# Patient Record
Sex: FEMALE | Race: WHITE | NOT HISPANIC OR LATINO | Employment: OTHER | URBAN - METROPOLITAN AREA
[De-identification: names, ages, dates, MRNs, and addresses within clinical notes are randomized per-mention and may not be internally consistent; named-entity substitution may affect disease eponyms.]

---

## 2017-04-10 ENCOUNTER — OFFICE VISIT (OUTPATIENT)
Dept: FAMILY MEDICINE | Facility: CLINIC | Age: 72
End: 2017-04-10
Payer: MEDICARE

## 2017-04-10 VITALS
RESPIRATION RATE: 18 BRPM | HEIGHT: 64 IN | WEIGHT: 169 LBS | OXYGEN SATURATION: 96 % | SYSTOLIC BLOOD PRESSURE: 109 MMHG | HEART RATE: 94 BPM | DIASTOLIC BLOOD PRESSURE: 65 MMHG | BODY MASS INDEX: 28.85 KG/M2 | TEMPERATURE: 97.9 F

## 2017-04-10 DIAGNOSIS — Z12.31 ENCOUNTER FOR SCREENING MAMMOGRAM FOR BREAST CANCER: ICD-10-CM

## 2017-04-10 DIAGNOSIS — N89.8 VAGINAL DISCHARGE: ICD-10-CM

## 2017-04-10 DIAGNOSIS — Z11.59 NEED FOR HEPATITIS C SCREENING TEST: ICD-10-CM

## 2017-04-10 DIAGNOSIS — Z87.891 HISTORY OF TOBACCO USE: ICD-10-CM

## 2017-04-10 DIAGNOSIS — R73.01 IMPAIRED FASTING GLUCOSE: ICD-10-CM

## 2017-04-10 DIAGNOSIS — Z23 NEED FOR TDAP VACCINATION: Primary | ICD-10-CM

## 2017-04-10 LAB
HBA1C MFR BLD: 5.1 % (ref 4.3–6)
MICRO REPORT STATUS: NORMAL
SPECIMEN SOURCE: NORMAL
WET PREP SPEC: NORMAL

## 2017-04-10 PROCEDURE — G0472 HEP C SCREEN HIGH RISK/OTHER: HCPCS | Performed by: FAMILY MEDICINE

## 2017-04-10 PROCEDURE — 36415 COLL VENOUS BLD VENIPUNCTURE: CPT | Performed by: FAMILY MEDICINE

## 2017-04-10 PROCEDURE — 90471 IMMUNIZATION ADMIN: CPT | Performed by: FAMILY MEDICINE

## 2017-04-10 PROCEDURE — 87210 SMEAR WET MOUNT SALINE/INK: CPT | Performed by: FAMILY MEDICINE

## 2017-04-10 PROCEDURE — 83036 HEMOGLOBIN GLYCOSYLATED A1C: CPT | Performed by: FAMILY MEDICINE

## 2017-04-10 PROCEDURE — 90715 TDAP VACCINE 7 YRS/> IM: CPT | Performed by: FAMILY MEDICINE

## 2017-04-10 PROCEDURE — 86803 HEPATITIS C AB TEST: CPT | Performed by: FAMILY MEDICINE

## 2017-04-10 PROCEDURE — G0439 PPPS, SUBSEQ VISIT: HCPCS | Performed by: FAMILY MEDICINE

## 2017-04-10 NOTE — PATIENT INSTRUCTIONS
Preventive Health Recommendations    Female Ages 65 +    Yearly exam:     See your health care provider every year in order to  o Review health changes.   o Discuss preventive care.    o Review your medicines if your doctor has prescribed any.      You no longer need a yearly Pap test unless you've had an abnormal Pap test in the past 10 years. If you have vaginal symptoms, such as bleeding or discharge, be sure to talk with your provider about a Pap test.      Every 1 to 2 years, have a mammogram.  If you are over 69, talk with your health care provider about whether or not you want to continue having screening mammograms.      Every 10 years, have a colonoscopy. Or, have a yearly FIT test (stool test). These exams will check for colon cancer.       Have a cholesterol test every 5 years, or more often if your doctor advises it.       Have a diabetes test (fasting glucose) every three years. If you are at risk for diabetes, you should have this test more often.       At age 65, have a bone density scan (DEXA) to check for osteoporosis (brittle bone disease).    Shots:    Get a flu shot each year.    Get a tetanus shot every 10 years.    Talk to your doctor about your pneumonia vaccines. There are now two you should receive - Pneumovax (PPSV 23) and Prevnar (PCV 13).    Talk to your doctor about the shingles vaccine.    Talk to your doctor about the hepatitis B vaccine.    Nutrition:     Eat at least 5 servings of fruits and vegetables each day.      Eat whole-grain bread, whole-wheat pasta and brown rice instead of white grains and rice.      Talk to your provider about Calcium and Vitamin D.     Lifestyle    Exercise at least 150 minutes a week (30 minutes a day, 5 days a week). This will help you control your weight and prevent disease.      Limit alcohol to one drink per day.      No smoking.       Wear sunscreen to prevent skin cancer.       See your dentist twice a year for an exam and cleaning.      See your  eye doctor every 1 to 2 years to screen for conditions such as glaucoma, macular degeneration and cataracts.  Lung Cancer Screening   Frequently Asked Questions  If you are at high-risk for lung cancer, getting screened with low-dose computed tomography (LDCT) every year can help save your life. This handout offers answers to some of the most common questions about lung cancer screening. If you have other questions, please call [phone] ([phone]).     What is it?  Lung cancer screening uses special X-ray technology to create an image of your lung tissue. The exam is quick and easy and takes less than 10 seconds. We don t give you any medicine or use any needles. You can eat before and after the exam. You don t need to change your clothes as long as the clothing on your chest doesn t contain metal. But, you do need to be able to hold your breath for at least 6 seconds during the exam.    What is the goal of lung cancer screening?  The goal of lung cancer screening is to save lives. Many times, lung cancer is not found until a person starts having physical symptoms. Lung cancer screening can help detect lung cancer in the earliest stages when it may be easier to treat.    Who should be screened for lung cancer?  We suggest lung cancer screening for anyone who is at high-risk for lung cancer. You are in the high-risk group if you:      are between the ages of 55 and 79, and    have smoked at least 1 pack of cigarettes a day for 30 or more years, and    still smoke or have quit within the past 15 years.    However, if you have a new cough or shortness of breath, you should talk to your doctor before being screened.    Some national lung health advocacy groups also recommend screening for people ages 50 to 79 who have smoked an average of 1 pack of cigarettes a day for 20 years. They must also have at least 1 other risk factor for lung cancer, not including exposure to secondhand smoke. Other risk factors  are having had cancer in the past, emphysema, pulmonary fibrosis, COPD, a family history of lung cancer, or exposure to certain materials such as arsenic, asbestos, beryllium, cadmium, chromium, diesel fumes, nickel, radon or silica. Your care team can help you know if you have one of these risk factors.     Why does it matter if I have symptoms?  Certain symptoms can be a sign that you have a condition in your lungs that should be checked and treated by your doctor. These symptoms include fever, chest pain, a new or changing cough, shortness of breath that you have never felt before, coughing up blood or unexplained weight loss. Having any of these symptoms can greatly affect the results of lung cancer screening.       Should all smokers get an LDCT lung cancer screening exam?  It depends. Lung cancer screening is for a very specific group of men and women who have a history of heavy smoking over a long period of time (see  Who should be screened for lung cancer  above).  I am in the high-risk group, but have been diagnosed with cancer in the past. Is LDCT lung cancer screening right for me?  In some cases, you should not have LDCT lung screening, such as when your doctor is already following your cancer with CT scan studies. Your doctor will help you decide if LDCT lung screening is right for you.  Do I need to have a screening exam every year?  Yes. If you are in the high-risk group described earlier, you should get an LDCT lung cancer screening exam every year until you are 79, or are no longer willing or able to undergo screening and possible procedures to diagnose and treat lung cancer.  How effective is LDCT at preventing death from lung cancer?  Studies have shown that LDCT lung cancer screening can lower the risk of death from lung cancer by 20 percent in people who are at high-risk.  What are the risks?  There are some risks and limitations of LDCT lung cancer screening. We want to make sure you understand  the risks and benefits, so please let us know if you have any questions. Your doctor may want to talk with you more about these risks.    Radiation exposure: As with any exam that uses radiation, there is a very small increased risk of cancer. The amount of radiation in LDCT is small--about the same amount a person would get from a mammogram. Your doctor orders the exam when he or she feels the potential benefits outweigh the risks.    False negatives: No test is perfect, including LDCT. It is possible that you may have a medical condition, including lung cancer, that is not found during your exam. This is called a false negative result.    False positives and more testing: LDCT very often finds something in the lung that could be cancer, but in fact is not. This is called a false positive result. False positive tests often cause anxiety. To make sure these findings are not cancer, you may need to have more tests. These tests will be done only if you give us permission. Sometimes patients need a treatment that can have side effects, such as a biopsy. For more information on false positives, see  What can I expect from the results?     Findings not related to lung cancer: Your LDCT exam also takes pictures of areas of your body next to your lungs. In a very small number of cases, the CT scan will show an abnormal finding in one of these areas, such as your kidneys, adrenal glands, liver or thyroid. This finding may not be serious, but you may need more tests. Your doctor can help you decide what other tests you may need, if any.  What can I expect from the results?  About 1 out of 4 LDCT exams will find something that may need more tests. Most of the time, these findings are lung nodules. Lung nodules are very small collections of tissue in the lung. These nodules are very common, and the vast majority--more than 97 percent--are not cancer (benign). Most are normal lymph nodes or small areas of scarring from past  infections.  But, if a small lung nodule is found to be cancer, the cancer can be cured more than 90 percent of the time. To know if the nodule is cancer, we may need to get more images before your next yearly screening exam. If the nodule has suspicious features (for example, it is large, has an odd shape or grows over time), we will refer you to a specialist for further testing.  Will my doctor also get the results?  Yes. Your doctor will get a copy of your results.  Is it okay to keep smoking now that there s a cancer screening exam?  No. Tobacco is one of the strongest cancer-causing agents. It causes not only lung cancer, but other cancers and cardiovascular (heart) diseases as well. The damage caused by smoking builds over time. This means that the longer you smoke, the higher your risk of disease. While it is never too late to quit, the sooner you quit, the better.  Where can I find help to quit smoking?  The best way to prevent lung cancer is to stop smoking. If you have already quit smoking, congratulations and keep it up! For help on quitting smoking, please call [facility] at 8-061-818-QOLU ([phone]) or the [employer] at 1-534.733.7406 to find local resources near you.  One-on-one health coaching:  If you d prefer to work individually with a health care provider on tobacco cessation, we offer:      Medication Therapy Management:  Our specially trained pharmacists work closely with you and your doctor to help you quit smoking.  Call [phone] or [phone] (toll free).     Can Do: Health coaching offered by [facility].  [email or web address]

## 2017-04-10 NOTE — PROGRESS NOTES
SUBJECTIVE:                                                            Raisa Archer is a 72 year old female who presents for Preventive Visit.    Are you in the first 12 months of your Medicare Part B coverage?  No    Healthy Habits:    Do you get at least three servings of calcium containing foods daily (dairy, green leafy vegetables, etc.)? yes    Amount of exercise or daily activities, outside of work: 1-2 day(s) per week    Problems taking medications regularly No    Medication side effects: No    Have you had an eye exam in the past two years? yes    Do you see a dentist twice per year? yes    Do you have sleep apnea, excessive snoring or daytime drowsiness?no    COGNITIVE SCREEN  1) Repeat 3 items (Banana, Sunrise, Chair)    2) Clock draw: ABNORMAL   3) 3 item recall: Recalls 2 objects   Results: ABNORMAL clock, 1-2 items recalled: PROBABLE COGNITIVE IMPAIRMENT, **INFORM PROVIDER**  She denies any problems with memory.      Mini-CogTM Copyright S [employer]rington. Licensed by the author for use in [facility]; reprinted with permission ([email or web address]). All rights reserved.      CV: h/o tobacco use, quit 2 years ago.  Strong family h/o diabetes and would like screening today; is not fasting.  She may have had some elevated glucose in the past (though last year A1c was 5.6).  Malignancy: patient with h/o breast cancer s/p surgery, chemo and radiation, has been discharged from oncology clinic. Two great maternal uncles with colon cancer; she has had colon polyps, last colonoscopy was 2016, and she is due for repeat q 3 years. She is s/p hysterectomy >20 yrs ago, nonmalignant.  She has a known meningioma and is due for recheck with neurology.  She will be due for her yearly low dose CT scan to screen for lung cancer this May.  She quit smoking three years ago and has a 30 pk year history; no symptoms such as fever, unintended weight loss, cough or hemoptysis.    Bone health: she does have osteoporosis,  denies any h/o fracture (last dexa 2010 showed possible vertebral fracture), is improving her diet, getting some exercise, has stopped smoking; does NOT want to try osteoporosis medications due to risk of side effects.   Immunizations: tdap is due now.  Up to date on zoster, PCV13 and pneumovax.   Sexual health: she is not sexually active.  She has noticed some intermittent discharge, which she thinks is clear.  There has been no bleeding, itching or odor.  She has some urge and stress incontinence, no dysuria.      Intermittent right lower quad pain, worse if she hasn't had a bowel movement.  Worse with bending over.  Was told she had a hernia somewhere, not sure where, years ago (on other imaging done to stage her breast cancer). She has not noticed any bulging or worse pain with valsalva or lifting items.  This has been on and off for months.  It is relieved by having a BM.  She had a colonoscopy last year, as above.      TMJ: bilateral, intermittent, better with wearing a disposable mouth guard she bought over the counter, also better with applying heating pads to neck and face.  She has some left ear pressure.  No drainage or hearing change.  No sinus pain/pressure.    Reviewed and updated as needed this visit by clinical staff         Reviewed and updated as needed this visit by Provider        Social History   Substance Use Topics     Smoking status: Former Smoker     Packs/day: 1.00     Years: 30.00     Types: Cigarettes     Start date: 1/2/1967     Quit date: 5/10/2014     Smokeless tobacco: Never Used      Comment: Quit several times over the years including when pregnant     Alcohol use No       The patient does not drink >3 drinks per day nor >7 drinks per week.    Today's PHQ-2 Score:   PHQ-2 ( 1999 Pfizer) 10/7/2016 9/2/2016   Q1: Little interest or pleasure in doing things 0 0   Q2: Feeling down, depressed or hopeless 0 0   PHQ-2 Score 0 0   Little interest or pleasure in doing things - -   Feeling  down, depressed or hopeless - -   PHQ-2 Score - -       Do you feel safe in your environment - Yes    Do you have a Health Care Directive?: No: Advance care planning reviewed with patient; information given to patient to review.    Current providers sharing in care for this patient include:   Patient Care Team:  Lyla Hawkins MD as PCP - General (Family Practice)      Hearing impairment: No    Ability to successfully perform activities of daily living: Yes, no assistance needed     Fall risk:  Fallen 2 or more times in the past year?: No  Any fall with injury in the past year?: No    Home safety:  none identified  click delete button to remove this line now    The following health maintenance items are reviewed in Epic and correct as of today:  Health Maintenance   Topic Date Due     HEPATITIS C SCREENING  02/28/1963     ADVANCE DIRECTIVE PLANNING Q5 YRS (NO INBASKET)  12/14/2016     FALL RISK ASSESSMENT  01/04/2017     TETANUS IMMUNIZATION (SYSTEM ASSIGNED)  03/12/2017     LUNG CANCER SCREENING ANNUAL  05/13/2017     MAMMO Q1 YR INY[employer]ja Media GroupSKET MESSAGE  05/13/2017     INFLUENZA VACCINE (SYSTEM ASSIGNED)  09/01/2017     COLONOSCOPY Q3 YR INBASKET MESSAGE  05/31/2019     LIPID SCREEN Q5 YR FEMALE (SYSTEM ASSIGNED)  04/11/2021     DEXA SCAN SCREENING (SYSTEM ASSIGNED)  Completed     PNEUMOCOCCAL  Completed              ROS:  Constitutional, HEENT, cardiovascular, pulmonary, gi and gu systems are negative, except as otherwise noted.    BP Readings from Last 3 Encounters:   04/10/17 109/65   10/07/16 118/69   09/02/16 118/71    Wt Readings from Last 3 Encounters:   04/10/17 169 lb (76.7 kg)   10/07/16 165 lb 3.2 oz (74.9 kg)   09/02/16 166 lb (75.3 kg)                  Patient Active Problem List   Diagnosis     Tobacco use disorder     Malignant neoplasm of female breast (H)     Gall stones     Osteoporosis     Advanced directives, counseling/discussion     Chronic rhinitis     Hyperlipidemia LDL goal <160     Other  hammer toe (acquired)     Meningioma (H)     History of colonic polyps     Past Surgical History:   Procedure Laterality Date     ABDOMEN SURGERY       BIOPSY       BREAST SURGERY       CATARACT IOL, RT/LT       CHOLECYSTECTOMY       COLONOSCOPY       ORTHOPEDIC SURGERY       PHACOEMULSIFICATION CLEAR CORNEA WITH STANDARD INTRAOCULAR LENS IMPLANT  5/22/2014    Procedure: PHACOEMULSIFICATION CLEAR CORNEA WITH STANDARD INTRAOCULAR LENS IMPLANT;  Surgeon: Rahul Reid MD;  Location:  [facility]     PHACOEMULSIFICATION CLEAR CORNEA WITH STANDARD INTRAOCULAR LENS IMPLANT  7/8/2014    Procedure: PHACOEMULSIFICATION CLEAR CORNEA WITH STANDARD INTRAOCULAR LENS IMPLANT;  Surgeon: Rahul Reid MD;  Location:  [facility]     SURGICAL HISTORY OF -   8/00    left foot reconstruction     SURGICAL HISTORY OF -   1979    hysterectomy       Social History   Substance Use Topics     Smoking status: Former Smoker     Packs/day: 1.00     Years: 30.00     Types: Cigarettes     Start date: 1/2/1967     Quit date: 5/10/2014     Smokeless tobacco: Never Used      Comment: Quit several times over the years including when pregnant     Alcohol use No     Family History   Problem Relation Age of Onset     Breast Cancer Sister      DIABETES Sister      CANCER Brother      Asthma Brother      Other Cancer Brother      Hodkins Lymphoma/Hodkins Lymphoma/Hodkins Lymphoma/Hodkins Lymphoma     CEREBROVASCULAR DISEASE Father      Had several minor strokes     CANCER Other      Stomach cancer- cousin     Breast Cancer Other      Aunts     DIABETES Other      Uncles and cousin/Paternal Uncles/Paternal Uncles/Paternal Uncles/Paternal Uncles     CANCER Brother      Obesity Brother      Asthma Brother      CANCER Other      Family Hx of colon cancer     Breast Cancer Other      Paternal Aunt/Paternal Aunt/Paternal Aunt/Paternal Aunt     Colon Cancer Other      Maternal Great Uncles     Breast Cancer Sister      Breast Cancer Cousin      Paternal/Paternal      "Colon Cancer Other      Maternal Uncle/Maternal Great Uncles/Maternal Great Uncles/Maternal Great Uncles     Obesity Sister      On diet/On diet/On diet     Obesity Brother          Current Outpatient Prescriptions   Medication Sig Dispense Refill     Nutritional Supplements (OSTEO ADVANCE PO)        glycerin-hypromellose- ([employer] DRY EYE RELIEF) 0.2-0.2-1 % SOLN ophthalmic solution 1 drop 1 Bottle      carboxymethylcellul-glycerin (OPTIVE) 0.5-0.9 % SOLN ophthalmic solution 1 drop       Acetaminophen (TYLENOL PO) Take by mouth as needed for mild pain or fever       fluticasone (FLONASE) 50 MCG/ACT nasal spray Spray 1-2 sprays into both nostrils daily 16 g 11     cycloSPORINE (RESTASIS) 0.05 % ophthalmic emulsion 1 drop 2 times daily       vitamin E 400 UNIT capsule Take 400 Units by mouth daily       Omega-3 Fatty Acids (FISH OIL) 1200 MG CAPS Take 1 capsule by mouth daily       Homeopathic Products (SLEEP MEDICINE) TABS Take by mouth daily as needed       Calcium Carbonate-Vitamin D (CALCIUM + D PO) Take 2 tablets by mouth daily.       Multiple Vitamins-Minerals (ZAHIDA MULTIVITAMIN FOR WOMEN) TABS Take 1 tablet by mouth daily.       predniSONE (DELTASONE) 20 MG tablet Take 1 tablet (20 mg) by mouth daily (Patient not taking: Reported on 4/10/2017) 3 tablet 0     triamcinolone (KENALOG) 0.1 % cream Apply sparingly to affected area two times daily for 14 days. (Patient not taking: Reported on 4/10/2017) 15 g 0     OBJECTIVE:                                                            /65  Pulse 94  Temp 97.9  F (36.6  C) (Oral)  Resp 18  Ht 5' 4\" (1.626 m)  Wt 169 lb (76.7 kg)  SpO2 96%  BMI 29.01 kg/m2 Estimated body mass index is 28.34 kg/(m^2) as calculated from the following:    Height as of 5/31/16: 5' 4.02\" (1.626 m).    Weight as of 10/7/16: 165 lb 3.2 oz (74.9 kg).  EXAM:   GENERAL APPEARANCE: healthy, alert and no distress  EYES: Eyes grossly normal to inspection, PERRL and conjunctivae and " sclerae normal  HENT: ear canals and TM's normal, nose and mouth without ulcers or lesions, oropharynx clear and oral mucous membranes moist  NECK: no adenopathy, no asymmetry, masses, or scars and thyroid normal to palpation  RESP: lungs clear to auscultation - no rales, rhonchi or wheezes  BREAST: right breast s/p mastectomy no masses.  Left breast is normal without masses, tenderness or nipple discharge and no palpable axillary masses or adenopathy  CV: regular rate and rhythm, normal S1 S2, no S3 or S4, no murmur, click or rub, no peripheral edema and peripheral pulses strong  ABDOMEN: soft, nontender, no hepatosplenomegaly, no masses and bowel sounds normal   (female): normal female external genitalia, some scant yellowish discharge on the wet prep swab.  MS: no musculoskeletal defects are noted and gait is age appropriate without ataxia  SKIN: no suspicious lesions or rashes  NEURO: Normal strength and tone, sensory exam grossly normal, mentation intact and speech normal  PSYCH: mentation appears normal and affect normal/bright    ASSESSMENT / PLAN:                                                            1. Need for Tdap vaccination    - TDAP VACCINE (ADACEL)    2. Impaired fasting glucose    - Hemoglobin A1c    3. Need for hepatitis C screening test    - **Hepatitis C Screen Reflex to RNA FUTURE anytime    4. Vaginal discharge  Wet prep negative today.  F/u if worsening or more symptomatic.  Denies sexual activity.  - Wet prep    5. History of tobacco use  Needs yearly screening till 81.  - CT Chest Lung Cancer Scrn Low Dose wo; Future    6. Encounter for screening mammogram for breast cancer    - [place] Screening Digital Left; Future    CV: checking A1c  Malignancy: up to date on colonoscopy, repeat 2019. Needs left breast mammogram, needs to f/u with neurology regarding meningioma, and needs yearly low dose Ct scan as below for screening for lung cancer.  Bone health; as she does not want to treat this,  "did not repeat dexa at this point.  Immunizations: tdap today    Right lower quadrant pain: mild, intermittent and worse with constipation, so is most likely related to constipation.  Had colonoscopy last year.  I reviewed her PET scans from 2006, no mention of a hernia, and I did not palpate a bulge.  F/u if worsening.  Manage constipation with more fluids, fiber, laxative if needed.     End of Life Planning:  Patient was given advance care forms and was shown the poster for free class on health care directives.  She thinks she would like to be DNR.    COUNSELING:  Reviewed preventive health counseling, as reflected in patient instructions       Regular exercise       Healthy diet/nutrition       Dental care       Vaccinated for: TDAP       Consider lung cancer screening for ages 55-80 years and 30 pack-year smoking history        Colon cancer screening       Hepatitis C screening        Estimated body mass index is 28.34 kg/(m^2) as calculated from the following:    Height as of 5/31/16: 5' 4.02\" (1.626 m).    Weight as of 10/7/16: 165 lb 3.2 oz (74.9 kg).     reports that she quit smoking about 2 years ago. Her smoking use included Cigarettes. She started smoking about 50 years ago. She has a 30.00 pack-year smoking history. She has never used smokeless tobacco.      Appropriate preventive services were discussed with this patient, including applicable screening as appropriate for cardiovascular disease, diabetes, osteopenia/osteoporosis, and glaucoma.  As appropriate for age/gender, discussed screening for colorectal cancer, prostate cancer, breast cancer, and cervical cancer. Checklist reviewing preventive services available has been given to the patient.    Reviewed patients plan of care and provided an AVS. The Basic Care Plan (routine screening as documented in Health Maintenance) for Raisa meets the Care Plan requirement. This Care Plan has been established and reviewed with the Patient.    Counseling " Resources:  ATP IV Guidelines  Pooled Cohorts Equation Calculator  Breast Cancer Risk Calculator  FRAX Risk Assessment  ICSI Preventive Guidelines  Dietary Guidelines for Americans, 2010  [employer]'s MyPlate  ASA Prophylaxis  Lung CA Screening    Lyla Hawkins MD  [facility]  Lung Cancer Screening Shared Decision Making Visit     Raisa Archer is eligible for lung cancer screening on the basis of the information provided in my signed lung cancer screening order.     I have discussed with patient the risks and benefits of screening for lung cancer with low-dose CT.     The risks include:   radiation exposure    false positives     over-diagnosis    The benefit of early detection of lung cancer is contingent upon adherence to annual screening or more frequent follow up if indicated.     Furthermore, reaping the benefits of screening requires Raisa Archer to be willing and physically able to undergo diagnostic procedures, if indicated. Although no specific guide is available for determining severity of comorbidities, it is reasonable to withhold screening in patients who have greater mortality risk from other diseases.     We did discuss that the only way to prevent lung cancer is to not smoke. Smoking cessation assistance was not offered because she has already quit.    I did not offer risk estimation using a calculator such as this one:    ShouldIScreen

## 2017-04-10 NOTE — NURSING NOTE
"Chief Complaint   Patient presents with     Medicare Visit       Initial /65  Pulse 94  Temp 97.9  F (36.6  C) (Oral)  Resp 18  Ht 5' 4\" (1.626 m)  Wt 169 lb (76.7 kg)  SpO2 96%  BMI 29.01 kg/m2 Estimated body mass index is 29.01 kg/(m^2) as calculated from the following:    Height as of this encounter: 5' 4\" (1.626 m).    Weight as of this encounter: 169 lb (76.7 kg).  Medication Reconciliation: complete     Savana Oneill MA     "

## 2017-04-10 NOTE — MR AVS SNAPSHOT
After Visit Summary   4/10/2017    Raisa Archer    MRN: [id number]           Patient Information     Date Of Birth          1945        Visit Information        Provider Department      4/10/2017 1:40 PM Lyla Hawkins MD [facility]        Today's Diagnoses     Need for Tdap vaccination    -  1    Impaired fasting glucose        Need for hepatitis C screening test        Vaginal discharge        History of tobacco use        Encounter for screening mammogram for breast cancer          Care Instructions      Preventive Health Recommendations    Female Ages 65 +    Yearly exam:     See your health care provider every year in order to  o Review health changes.   o Discuss preventive care.    o Review your medicines if your doctor has prescribed any.      You no longer need a yearly Pap test unless you've had an abnormal Pap test in the past 10 years. If you have vaginal symptoms, such as bleeding or discharge, be sure to talk with your provider about a Pap test.      Every 1 to 2 years, have a mammogram.  If you are over 69, talk with your health care provider about whether or not you want to continue having screening mammograms.      Every 10 years, have a colonoscopy. Or, have a yearly FIT test (stool test). These exams will check for colon cancer.       Have a cholesterol test every 5 years, or more often if your doctor advises it.       Have a diabetes test (fasting glucose) every three years. If you are at risk for diabetes, you should have this test more often.       At age 65, have a bone density scan (DEXA) to check for osteoporosis (brittle bone disease).    Shots:    Get a flu shot each year.    Get a tetanus shot every 10 years.    Talk to your doctor about your pneumonia vaccines. There are now two you should receive - Pneumovax (PPSV 23) and Prevnar (PCV 13).    Talk to your doctor about the shingles vaccine.    Talk to your doctor about the hepatitis B  vaccine.    Nutrition:     Eat at least 5 servings of fruits and vegetables each day.      Eat whole-grain bread, whole-wheat pasta and brown rice instead of white grains and rice.      Talk to your provider about Calcium and Vitamin D.     Lifestyle    Exercise at least 150 minutes a week (30 minutes a day, 5 days a week). This will help you control your weight and prevent disease.      Limit alcohol to one drink per day.      No smoking.       Wear sunscreen to prevent skin cancer.       See your dentist twice a year for an exam and cleaning.      See your eye doctor every 1 to 2 years to screen for conditions such as glaucoma, macular degeneration and cataracts.  Lung Cancer Screening   Frequently Asked Questions  If you are at high-risk for lung cancer, getting screened with low-dose computed tomography (LDCT) every year can help save your life. This handout offers answers to some of the most common questions about lung cancer screening. If you have other questions, please call [phone] ([phone]).     What is it?  Lung cancer screening uses special X-ray technology to create an image of your lung tissue. The exam is quick and easy and takes less than 10 seconds. We don t give you any medicine or use any needles. You can eat before and after the exam. You don t need to change your clothes as long as the clothing on your chest doesn t contain metal. But, you do need to be able to hold your breath for at least 6 seconds during the exam.    What is the goal of lung cancer screening?  The goal of lung cancer screening is to save lives. Many times, lung cancer is not found until a person starts having physical symptoms. Lung cancer screening can help detect lung cancer in the earliest stages when it may be easier to treat.    Who should be screened for lung cancer?  We suggest lung cancer screening for anyone who is at high-risk for lung cancer. You are in the high-risk group if you:      are between  the ages of 55 and 79, and    have smoked at least 1 pack of cigarettes a day for 30 or more years, and    still smoke or have quit within the past 15 years.    However, if you have a new cough or shortness of breath, you should talk to your doctor before being screened.    Some national lung health advocacy groups also recommend screening for people ages 50 to 79 who have smoked an average of 1 pack of cigarettes a day for 20 years. They must also have at least 1 other risk factor for lung cancer, not including exposure to secondhand smoke. Other risk factors are having had cancer in the past, emphysema, pulmonary fibrosis, COPD, a family history of lung cancer, or exposure to certain materials such as arsenic, asbestos, beryllium, cadmium, chromium, diesel fumes, nickel, radon or silica. Your care team can help you know if you have one of these risk factors.     Why does it matter if I have symptoms?  Certain symptoms can be a sign that you have a condition in your lungs that should be checked and treated by your doctor. These symptoms include fever, chest pain, a new or changing cough, shortness of breath that you have never felt before, coughing up blood or unexplained weight loss. Having any of these symptoms can greatly affect the results of lung cancer screening.       Should all smokers get an LDCT lung cancer screening exam?  It depends. Lung cancer screening is for a very specific group of men and women who have a history of heavy smoking over a long period of time (see  Who should be screened for lung cancer  above).  I am in the high-risk group, but have been diagnosed with cancer in the past. Is LDCT lung cancer screening right for me?  In some cases, you should not have LDCT lung screening, such as when your doctor is already following your cancer with CT scan studies. Your doctor will help you decide if LDCT lung screening is right for you.  Do I need to have a screening exam every year?  Yes. If you  are in the high-risk group described earlier, you should get an LDCT lung cancer screening exam every year until you are 79, or are no longer willing or able to undergo screening and possible procedures to diagnose and treat lung cancer.  How effective is LDCT at preventing death from lung cancer?  Studies have shown that LDCT lung cancer screening can lower the risk of death from lung cancer by 20 percent in people who are at high-risk.  What are the risks?  There are some risks and limitations of LDCT lung cancer screening. We want to make sure you understand the risks and benefits, so please let us know if you have any questions. Your doctor may want to talk with you more about these risks.    Radiation exposure: As with any exam that uses radiation, there is a very small increased risk of cancer. The amount of radiation in LDCT is small--about the same amount a person would get from a mammogram. Your doctor orders the exam when he or she feels the potential benefits outweigh the risks.    False negatives: No test is perfect, including LDCT. It is possible that you may have a medical condition, including lung cancer, that is not found during your exam. This is called a false negative result.    False positives and more testing: LDCT very often finds something in the lung that could be cancer, but in fact is not. This is called a false positive result. False positive tests often cause anxiety. To make sure these findings are not cancer, you may need to have more tests. These tests will be done only if you give us permission. Sometimes patients need a treatment that can have side effects, such as a biopsy. For more information on false positives, see  What can I expect from the results?     Findings not related to lung cancer: Your LDCT exam also takes pictures of areas of your body next to your lungs. In a very small number of cases, the CT scan will show an abnormal finding in one of these areas, such as your  kidneys, adrenal glands, liver or thyroid. This finding may not be serious, but you may need more tests. Your doctor can help you decide what other tests you may need, if any.  What can I expect from the results?  About 1 out of 4 LDCT exams will find something that may need more tests. Most of the time, these findings are lung nodules. Lung nodules are very small collections of tissue in the lung. These nodules are very common, and the vast majority--more than 97 percent--are not cancer (benign). Most are normal lymph nodes or small areas of scarring from past infections.  But, if a small lung nodule is found to be cancer, the cancer can be cured more than 90 percent of the time. To know if the nodule is cancer, we may need to get more images before your next yearly screening exam. If the nodule has suspicious features (for example, it is large, has an odd shape or grows over time), we will refer you to a specialist for further testing.  Will my doctor also get the results?  Yes. Your doctor will get a copy of your results.  Is it okay to keep smoking now that there s a cancer screening exam?  No. Tobacco is one of the strongest cancer-causing agents. It causes not only lung cancer, but other cancers and cardiovascular (heart) diseases as well. The damage caused by smoking builds over time. This means that the longer you smoke, the higher your risk of disease. While it is never too late to quit, the sooner you quit, the better.  Where can I find help to quit smoking?  The best way to prevent lung cancer is to stop smoking. If you have already quit smoking, congratulations and keep it up! For help on quitting smoking, please call [employer] at 9-452-837-RPMF ([phone]) or the American Cancer Society at 1-262.949.4284 to find local resources near you.  One-on-one health coaching:  If you d prefer to work individually with a health care provider on tobacco cessation, we offer:      Medication Therapy Management:  Our  specially trained pharmacists work closely with you and your doctor to help you quit smoking.  Call [phone] or [phone] (toll free).     Can Do: Health coaching offered by [facility].  [email or web address]            Follow-ups after your visit        Future tests that were ordered for you today     Open Future Orders        Priority Expected Expires Ordered    MA Screening Digital Left Routine  4/10/2018 4/10/2017    CT Chest Lung Cancer Scrn Low Dose wo Routine  4/10/2018 4/10/2017            Who to contact     If you have questions or need follow up information about today's clinic visit or your schedule please contact [facility] directly at [phone].  Normal or non-critical lab and imaging results will be communicated to you by [facility], letter or phone within 4 business days after the clinic has received the results. If you do not hear from us within 7 days, please contact the clinic through [facility] or phone. If you have a critical or abnormal lab result, we will notify you by phone as soon as possible.  Submit refill requests through [employer] or call your pharmacy and they will forward the refill request to us. Please allow 3 business days for your refill to be completed.          Additional Information About Your Visit        [employer] Information     [employer] gives you secure access to your electronic health record. If you see a primary care provider, you can also send messages to your care team and make appointments. If you have questions, please call your primary care clinic.  If you do not have a primary care provider, please call [phone] and they will assist you.        Care EveryWhere ID     This is your Care EveryWhere ID. This could be used by other organizations to access your [place] medical records  [id number]        Your Vitals Were     Pulse Temperature Respirations Height Pulse Oximetry BMI (Body Mass Index)    94 97.9  F (36.6  C) (Oral)  "18 5' 4\" (1.626 m) 96% 29.01 kg/m2       Blood Pressure from Last 3 Encounters:   04/10/17 109/65   10/07/16 118/69   09/02/16 118/71    Weight from Last 3 Encounters:   04/10/17 169 lb (76.7 kg)   10/07/16 165 lb 3.2 oz (74.9 kg)   09/02/16 166 lb (75.3 kg)              We Performed the Following     **Hepatitis C Screen Reflex to RNA FUTURE anytime     Hemoglobin A1c     Prof fee: Shared Decisionmaking for Lung Cancer Screening     TDAP VACCINE (ADACEL)     Wet prep        Primary Care Provider Office Phone # Fax #    Lyla Hawkins [phone]       [facility] [address]  [place] [address]        Thank you!     Thank you for choosing [facility]  for your care. Our goal is always to provide you with excellent care. Hearing back from our patients is one way we can continue to improve our services. Please take a few minutes to complete the written survey that you may receive in the mail after your visit with us. Thank you!             Your Updated Medication List - Protect others around you: Learn how to safely use, store and throw away your medicines at www.disposemymeds.org.          This list is accurate as of: 4/10/17  2:43 PM.  Always use your most recent med list.                   Brand Name Dispense Instructions for use    CALCIUM + D PO      Take 2 tablets by mouth daily.       [employer] DRY EYE RELIEF 0.2-0.2-1 % Soln ophthalmic solution   Generic drug:  glycerin-hypromellose-     1 Bottle    1 drop       cycloSPORINE 0.05 % ophthalmic emulsion    RESTASIS     1 drop 2 times daily       Fish Oil 1200 MG Caps      Take 1 capsule by mouth daily       fluticasone 50 MCG/ACT spray    FLONASE    16 g    Spray 1-2 sprays into both nostrils daily       ZAHIDA MULTIVITAMIN FOR WOMEN Tabs      Take 1 tablet by mouth daily.       OPTIVE 0.5-0.9 % Soln ophthalmic solution   Generic drug:  carboxymethylcellul-glycerin      1 drop       OSTEO ADVANCE PO "          predniSONE 20 MG tablet    DELTASONE    3 tablet    Take 1 tablet (20 mg) by mouth daily       SLEEP MEDICINE Tabs      Take by mouth daily as needed       triamcinolone 0.1 % cream    KENALOG    15 g    Apply sparingly to affected area two times daily for 14 days.       TYLENOL PO      Take by mouth as needed for mild pain or fever       vitamin E 400 UNIT capsule      Take 400 Units by mouth daily

## 2017-04-11 LAB — HCV AB SERPL QL IA: NORMAL

## 2017-04-18 DIAGNOSIS — D32.9 MENINGIOMA (H): Primary | ICD-10-CM

## 2017-05-18 ENCOUNTER — RADIANT APPOINTMENT (OUTPATIENT)
Dept: MAMMOGRAPHY | Facility: CLINIC | Age: 72
End: 2017-05-18
Attending: FAMILY MEDICINE

## 2017-05-18 DIAGNOSIS — Z12.31 ENCOUNTER FOR SCREENING MAMMOGRAM FOR BREAST CANCER: ICD-10-CM

## 2017-07-20 ENCOUNTER — RADIANT APPOINTMENT (OUTPATIENT)
Dept: GENERAL RADIOLOGY | Facility: CLINIC | Age: 72
End: 2017-07-20
Attending: FAMILY MEDICINE
Payer: MEDICARE

## 2017-07-20 ENCOUNTER — THERAPY VISIT (OUTPATIENT)
Dept: PHYSICAL THERAPY | Facility: CLINIC | Age: 72
End: 2017-07-20
Payer: MEDICARE

## 2017-07-20 ENCOUNTER — OFFICE VISIT (OUTPATIENT)
Dept: FAMILY MEDICINE | Facility: CLINIC | Age: 72
End: 2017-07-20
Payer: MEDICARE

## 2017-07-20 VITALS
SYSTOLIC BLOOD PRESSURE: 118 MMHG | BODY MASS INDEX: 29.83 KG/M2 | TEMPERATURE: 98.2 F | RESPIRATION RATE: 16 BRPM | WEIGHT: 173.8 LBS | DIASTOLIC BLOOD PRESSURE: 76 MMHG

## 2017-07-20 DIAGNOSIS — M25.561 KNEE PAIN, RIGHT: Primary | ICD-10-CM

## 2017-07-20 DIAGNOSIS — M25.561 ACUTE PAIN OF RIGHT KNEE: ICD-10-CM

## 2017-07-20 DIAGNOSIS — M25.561 ACUTE PAIN OF RIGHT KNEE: Primary | ICD-10-CM

## 2017-07-20 DIAGNOSIS — R06.09 DOE (DYSPNEA ON EXERTION): ICD-10-CM

## 2017-07-20 DIAGNOSIS — J43.2 CENTRILOBULAR EMPHYSEMA (H): ICD-10-CM

## 2017-07-20 LAB
FEF 25/75: NORMAL
FEV-1: NORMAL
FEV1/FVC: NORMAL
FVC: NORMAL

## 2017-07-20 PROCEDURE — 97161 PT EVAL LOW COMPLEX 20 MIN: CPT | Mod: GP | Performed by: PHYSICAL THERAPIST

## 2017-07-20 PROCEDURE — 99214 OFFICE O/P EST MOD 30 MIN: CPT | Mod: 25 | Performed by: FAMILY MEDICINE

## 2017-07-20 PROCEDURE — 73562 X-RAY EXAM OF KNEE 3: CPT | Mod: RT

## 2017-07-20 PROCEDURE — 97110 THERAPEUTIC EXERCISES: CPT | Mod: GP | Performed by: PHYSICAL THERAPIST

## 2017-07-20 PROCEDURE — G8978 MOBILITY CURRENT STATUS: HCPCS | Mod: GP | Performed by: PHYSICAL THERAPIST

## 2017-07-20 PROCEDURE — G8979 MOBILITY GOAL STATUS: HCPCS | Mod: GP | Performed by: PHYSICAL THERAPIST

## 2017-07-20 PROCEDURE — 94010 BREATHING CAPACITY TEST: CPT | Performed by: FAMILY MEDICINE

## 2017-07-20 PROCEDURE — 71020 XR CHEST 2 VW: CPT

## 2017-07-20 ASSESSMENT — ACTIVITIES OF DAILY LIVING (ADL)
SWELLING: THE SYMPTOM AFFECTS MY ACTIVITY SLIGHTLY
GIVING WAY, BUCKLING OR SHIFTING OF KNEE: I DO NOT HAVE THE SYMPTOM
HOW_WOULD_YOU_RATE_THE_OVERALL_FUNCTION_OF_YOUR_KNEE_DURING_YOUR_USUAL_DAILY_ACTIVITIES?: NEARLY NORMAL
GO UP STAIRS: ACTIVITY IS FAIRLY DIFFICULT
AS_A_RESULT_OF_YOUR_KNEE_INJURY,_HOW_WOULD_YOU_RATE_YOUR_CURRENT_LEVEL_OF_DAILY_ACTIVITY?: NEARLY NORMAL
RISE FROM A CHAIR: ACTIVITY IS MINIMALLY DIFFICULT
STIFFNESS: THE SYMPTOM AFFECTS MY ACTIVITY SLIGHTLY
GO DOWN STAIRS: ACTIVITY IS SOMEWHAT DIFFICULT
KNEE_ACTIVITY_OF_DAILY_LIVING_SCORE: 67.14
WEAKNESS: I HAVE THE SYMPTOM BUT IT DOES NOT AFFECT MY ACTIVITY
WALK: ACTIVITY IS MINIMALLY DIFFICULT
RAW_SCORE: 47
PAIN: THE SYMPTOM AFFECTS MY ACTIVITY MODERATELY
SQUAT: ACTIVITY IS SOMEWHAT DIFFICULT
HOW_WOULD_YOU_RATE_THE_CURRENT_FUNCTION_OF_YOUR_KNEE_DURING_YOUR_USUAL_DAILY_ACTIVITIES_ON_A_SCALE_FROM_0_TO_100_WITH_100_BEING_YOUR_LEVEL_OF_KNEE_FUNCTION_PRIOR_TO_YOUR_INJURY_AND_0_BEING_THE_INABILITY_TO_PERFORM_ANY_OF_YOUR_USUAL_DAILY_ACTIVITIES?: 94
STAND: ACTIVITY IS MINIMALLY DIFFICULT
KNEEL ON THE FRONT OF YOUR KNEE: ACTIVITY IS VERY DIFFICULT
KNEE_ACTIVITY_OF_DAILY_LIVING_SUM: 47
SIT WITH YOUR KNEE BENT: ACTIVITY IS NOT DIFFICULT
LIMPING: I HAVE THE SYMPTOM BUT IT DOES NOT AFFECT MY ACTIVITY

## 2017-07-20 NOTE — LETTER
DEPARTMENT OF HEALTH AND HUMAN SERVICES  CENTERS FOR MEDICARE & MEDICAID SERVICES    PLAN/UPDATED PLAN OF PROGRESS FOR OUTPATIENT REHABILITATION    PATIENTS NAME:  Raisa Archer   : 1945  PROVIDER NUMBER:    [id number]  [id number]  PROVIDER NAME: [facility] PHYSICAL THERAP[facility]  MEDICAL RECORD NUMBER: [id number]   START OF CARE DATE:  SOC Date: 17   TYPE:  PT  PRIMARY/TREATMENT DIAGNOSIS: (Pertinent Medical Diagnosis)  Knee pain, right  VISITS FROM START OF CARE: 1  Rxs Used: 1     Subjective:  Patient is a 72 year old female presenting with rehab right knee hpi.   Raisa Archer is a 72 year old female with a right knee condition.  Condition occurred with:  Insidious onset.  Condition occurred: for unknown reasons.  This is a recurrent condition  17 referral.  Hx of R knee injury skiing at age 22, was hit by another skiier.  Had PT at that time.  It has bothered her on and off since then, but has generally felt fine.  Suddenly 3 weeks she began to feel throbbing pain with no known precipitating event..    Patient reports pain:  Anterior and posterior.    Pain is described as aching and other (throbbing) and is constant and reported as 7/10.  Associated symptoms:  Edema and loss of motion/stiffness. Pain is worse in the P.M..  Symptoms are exacerbated by sitting, ascending stairs and descending stairs and relieved by bracing/immobilizing, ice, NSAID's and other (elevation).  Since onset symptoms are gradually improving.  Special tests:  X-ray (normal).      General health as reported by patient is good.  Pertinent medical history includes:  High blood pressure and cancer (BP controlled, cancer resolved 10 years).  Medical allergies: yes (see Epic).  Other surgeries include:  Cancer surgery, orthopedic surgery and other (R breast, L hammer toe (not painful now), gall bladder, hysterectomy).  Current medications:  None as reported by the patient.  Current  occupation is retired.    Employment tasks: house work.  Barriers include:  None as reported by the patient.  Red flags:  None as reported by the patient.               Objective:  Standing Alignment:    Lumbar:  Lordosis decr  Pelvis deviations alignment: CCW rotation.  Hip deviations alignment: L>R increased femoral medial rotation.  Ankle/Foot:  Calcaneal valgus R  Functional/Special Tests:  L single leg stance: Trendelenburg, no change in sxs; UE support required due to impaired balance  R single leg stance: R trunk shift, pulling in R knee; UE support required due to impaired balance  Anterior drawer negative B  MCL and LCL stress tests negative B  Sensation:  Light touch intact and symmetrical B LEs  PATIENTS NAME:  Raisa Archer   : 1945    Strength:  Hip flexion: B 4+/5 pain free  Knee extension: L 5/5, R 4/5 limited by pain   Dorsiflexion: B 5/5 pain free  Glut med: B 3+/5 prior to compensation very stiff into hip extension  Glut max: L 5/5 pain free though stiffness into hip ext, R 3-/5 limited by pain   Hamstrings: B 5/5 pain free    Knee ROM:  Motion L R Comments   Passive flexion 134 119 Limited by pain R   Passive extension 10 deg hyper ext 8 deg hyper ext Pain free B     Palpation:  Not assessed today  Edema:  None noted today  Gait:  B Trendelenburg, R CCW pelvic rotation, decreased R knee flexion, R toe out    Assessment/Plan:    Patient is a 72 year old female with right side knee complaints.    Patient has the following significant findings with corresponding treatment plan.                Diagnosis 1:  R knee pain  Pain -  hot/cold therapy, manual therapy, education and home program  Decreased ROM/flexibility - manual therapy, therapeutic exercise, therapeutic activity and home program  Decreased strength - therapeutic exercise, therapeutic activities and home program  Impaired balance - neuro re-education, therapeutic activities and home program  Impaired gait - gait training and home  program  Decreased function - therapeutic activities and home program  Therapy Evaluation Codes:   1) History comprised of:   Personal factors that impact the plan of care:      Age and Time since onset of symptoms.    Comorbidity factors that impact the plan of care are:      Cancer.     Medications impacting care: None.  2) Examination of Body Systems comprised of:   Body structures and functions that impact the plan of care:      Knee.   Activity limitations that impact the plan of care are:      Sitting and Stairs.  3) Clinical presentation characteristics are:   Stable/Uncomplicated.  4) Decision-Making    Low complexity using standardized patient assessment instrument and/or measureable assessment of functional outcome.  PATIENTS NAME:  Raisa Archer   : 1945    Cumulative Therapy Evaluation is: Low complexity.  Previous and current functional limitations:  (See Goal Flow Sheet for this information)    Short term and Long term goals: (See Goal Flow Sheet for this information)   Communication ability:  Patient appears to be able to clearly communicate and understand verbal and written communication and follow directions correctly.  Treatment Explanation - The following has been discussed with the patient:   RX ordered/plan of care  Anticipated outcomes  Possible risks and side effects  This patient would benefit from PT intervention to resume normal activities.   Rehab potential is good.  Frequency:  1 X week, once daily  Duration:  for 6 weeks  Discharge Plan:  Achieve all LTG.  Independent in home treatment program.  Reach maximal therapeutic benefit.                Caregiver Signature/Credentials _____________________________ Date ________           Allen Aguilera PT, DPT   I have reviewed and certified the need for these services and plan of treatment while under my care.        PHYSICIAN'S SIGNATURE:   _____________________________________  Date___________                   Les Gamez,  "MD    Certification period:  Beginning of Cert date period: 07/20/17 to  End of Cert period date: 10/17/17     Functional Level Progress Report: Please see attached \"Goal Flow sheet for Functional level.\"    ____X____ Continue Services or       ________ DC Services                Service dates: From  SOC Date: 07/20/17 date to present                         "

## 2017-07-20 NOTE — MR AVS SNAPSHOT
After Visit Summary   7/20/2017    Raisa Archer    MRN: [id number]           Patient Information     Date Of Birth          1945        Visit Information        Provider Department      7/20/2017 1:40 PM Les Gamez MD [facility]        Today's Diagnoses     Acute pain of right knee    -  1    ZAPATA (dyspnea on exertion)        Centrilobular emphysema (H)           Follow-ups after your visit        Additional Services     [facility] PT, HAND, AND CHIROPRACTIC REFERRAL       **This order will print in the [facility] Scheduling Office**    Physical Therapy, Hand Therapy and Chiropractic Care are available through:    *[facility]  *[facility]  *[facility]    Call one number to schedule at any of the above locations: [phone].    Your provider has referred you to: Physical Therapy at [facility] or [facility]    Indication/Reason for Referral: Knee Pain  Onset of Illness:    Therapy Orders: Evaluate and Treat  Special Programs: None  Special Request: None    Tyler Scott      Additional Comments for the Therapist or Chiropractor:      Please be aware that coverage of these services is subject to the terms and limitations of your health insurance plan.  Call member services at your health plan with any benefit or coverage questions.      Please bring the following to your appointment:    *Your personal calendar for scheduling future appointments  *Comfortable clothing                  Your next 10 appointments already scheduled     Jul 27, 2017  1:40 PM CDT   Yaakov Chicas with Lyla Hawkins MD   [facility] ([facility])    [address] [place] [address] [phone]            Jul 28, 2017  2:10 PM CDT   CARLA Extremity with Allen Aguilera PT   [facility] Physical Therapy ([facility]  )    [address] [place] [address]    [phone]            Aug 11, 2017  1:30 PM CDT   CARLA Extremity with Allen Aguilera PT   [facility] ([facility]  )    [address] [place] [address]   [phone]            Aug 18, 2017  1:30 PM CDT   CARLA Extremity with Allen Aguilera PT   [facility] Physical Therapy ([facility]  )    [address] [place] [address]   [phone]            Aug 25, 2017  1:30 PM CDT   CARLA Extremity with Allen Aguilera PT   [facility] Physical [facility] ([facility]  )    [address] [place] [address]   [phone]            Sep 01, 2017  1:30 PM CDT   CARLA Extremity with Allen Aguilera PT   [facility] ([facility]  )    [address] [place] [address]   [phone]            Sep 06, 2017  1:45 PM CDT   (Arrive by 1:30 PM)   MR BRAIN W/O & W CONTRAST with [address] [facility] ([facility])    [address] [address] [phone]           Take your medicines as usual, unless your doctor tells you not to. Bring a list of your current medicines to your exam (including vitamins, minerals and over-the-counter drugs).  You will be given intravenous contrast for this exam. To prepare:   The day before your exam, drink extra fluids at least six 8-ounce glasses (unless your doctor tells you to restrict your fluids).   Have a blood test (creatinine test) within 30 days of your exam. Go to your clinic or Diagnostic Imaging Department for this test.  The MRI machine uses a strong magnet. Please wear clothes without metal (snaps, zippers). A sweatsuit works well, or we may give you a hospital gown.  Please remove any body piercings and hair extensions before you arrive. You will also remove watches, jewelry, hairpins, wallets,  dentures, partial dental plates and hearing aids. You may wear contact lenses, and you may be able to wear your rings. We have a safe place to keep your personal items, but it is safer to leave them at home.   **IMPORTANT** THE INSTRUCTIONS BELOW ARE ONLY FOR THOSE PATIENTS WHO HAVE BEEN TOLD THEY WILL RECEIVE SEDATION OR GENERAL ANESTHESIA DURING THEIR MRI PROCEDURE:  IF YOU WILL RECEIVE SEDATION (take medicine to help you relax during your exam):   You must get the medicine from your doctor before you arrive. Bring the medicine to the exam. Do not take it at home.   Arrive one hour early. Bring someone who can take you home after the test. Your medicine will make you sleepy. After the exam, you may not drive, take a bus or take a taxi by yourself.   No eating 8 hours before your exam. You may have clear liquids up until 4 hours before your exam. (Clear liquids include water, clear tea, black coffee and fruit juice without pulp.)  IF YOU WILL RECEIVE ANESTHESIA (be asleep for your exam):   Arrive 1 1/2 hours early. Bring someone who can take you home after the test. You may not drive, take a bus or take a taxi by yourself.   No eating 8 hours before your exam. You may have clear liquids up until 4 hours before your exam. (Clear liquids include water, clear tea, black coffee and fruit juice without pulp.)  Please call the Imaging Department at your exam site with any questions.            Sep 08, 2017  1:30 PM CDT   CARLA Extremity with Allen Aguilera PT   [facility] Therapy ([facility]  )    [address] [place] [id number] [phone]            Sep 20, 2017 11:00 AM CDT   (Arrive by 10:45 AM)   Return Visit with Lazaro So MD   [facility] Neuro[facility] ([facility])    [address] [facility]  3rd Floor  [facility] [address] [phone]              Who to contact     If you have questions or need follow up information  about today's clinic visit or your schedule please contact [facility] directly at [phone].  Normal or non-critical lab and imaging results will be communicated to you by Snapdealhart, letter or phone within 4 business days after the clinic has received the results. If you do not hear from us within 7 days, please contact the clinic through Snapdealhart or phone. If you have a critical or abnormal lab result, we will notify you by phone as soon as possible.  Submit refill requests through [employer] or call your pharmacy and they will forward the refill request to us. Please allow 3 business days for your refill to be completed.          Additional Information About Your Visit        [employer]apdeal[employer] Information     [employer] gives you secure access to your electronic health record. If you see a primary care provider, you can also send messages to your care team and make appointments. If you have questions, please call your primary care clinic.  If you do not have a primary care provider, please call [phone] and they will assist you.        Care EveryWhere ID     This is your Care EveryWhere ID. This could be used by other organizations to access your [facility] medical records  [id number]        Your Vitals Were     Temperature Respirations BMI (Body Mass Index)             98.2  F (36.8  C) (Oral) 16 29.83 kg/m2          Blood Pressure from Last 3 Encounters:   07/20/17 118/76   04/10/17 109/65   10/07/16 118/69    Weight from Last 3 Encounters:   07/20/17 173 lb 12.8 oz (78.8 kg)   04/10/17 169 lb (76.7 kg)   10/07/16 165 lb 3.2 oz (74.9 kg)              We Performed the Following     [facility] PT, HAND, AND CHIROPRACTIC REFERRAL     Spirometry, Breathing Capacity: Normal Order, Clinic Performed          Today's Medication Changes          These changes are accurate as of: 7/20/17 11:59 PM.  If you have any questions, ask your nurse or doctor.               Start taking these medicines.        Dose/Directions     Ipratropium-Albuterol  MCG/ACT inhaler   Commonly known as:  COMBIVENT RESPIMAT   Used for:  ZAPATA (dyspnea on exertion)   Started by:  Les Gamez MD        Dose:  1 puff   Inhale 1 puff into the lungs 4 times daily Not to exceed 6 doses per day.   Quantity:  1 Inhaler   Refills:  1            Where to get your medicines      These medications were sent to [facility] [address] [place] [address] AT [address] [address]    Hours:  24-hours Phone:  [phone]     Ipratropium-Albuterol  MCG/ACT inhaler                Primary Care Provider Office Phone # Fax #    Lyla Hawkins [phone]       [address] [place] [address]        Equal Access to Services     ASHLEY NAIR AH: Hadii aad ku hadasho Soomaali, waaxda luqadaha, qaybta kaalmada adeegyada, waxay idiin hayaan anais alexis . So [facility] [phone].    ATENCIÓN: Si habla español, tiene a casanova disposición servicios gratuitos de asistencia lingüística. Llame al [phone].    We comply with applicable federal civil rights laws and [place] laws. We do not discriminate on the basis of race, color, national origin, age, disability sex, sexual orientation or gender identity.            Thank you!     Thank you for choosing [facility]  for your care. Our goal is always to provide you with excellent care. Hearing back from our patients is one way we can continue to improve our services. Please take a few minutes to complete the written survey that you may receive in the mail after your visit with us. Thank you!             Your Updated Medication List - Protect others around you: Learn how to safely use, store and throw away your medicines at www.disposemymeds.org.          This list is accurate as of: 7/20/17 11:59 PM.  Always use your most recent med list.                   Brand Name  Dispense Instructions for use Diagnosis    CALCIUM + D PO      Take 2 tablets by mouth daily.        [employer] DRY EYE RELIEF 0.2-0.2-1 % Soln ophthalmic solution   Generic drug:  glycerin-hypromellose-     1 Bottle    1 drop    Medicare annual wellness visit, subsequent       cycloSPORINE 0.05 % ophthalmic emulsion    RESTASIS     1 drop 2 times daily        Fish Oil 1200 MG Caps      Take 1 capsule by mouth daily        fluticasone 50 MCG/ACT spray    FLONASE    16 g    Spray 1-2 sprays into both nostrils daily    Seasonal allergic rhinitis       Ipratropium-Albuterol  MCG/ACT inhaler    COMBIVENT RESPIMAT    1 Inhaler    Inhale 1 puff into the lungs 4 times daily Not to exceed 6 doses per day.    ZAPATA (dyspnea on exertion)       ZAHIDA MULTIVITAMIN FOR WOMEN Tabs      Take 1 tablet by mouth daily.        OPTIVE 0.5-0.9 % Soln ophthalmic solution   Generic drug:  carboxymethylcellul-glycerin      1 drop    Medicare annual wellness visit, subsequent       OSTEO ADVANCE PO           predniSONE 20 MG tablet    DELTASONE    3 tablet    Take 1 tablet (20 mg) by mouth daily    Hives       SLEEP MEDICINE Tabs      Take by mouth daily as needed        triamcinolone 0.1 % cream    KENALOG    15 g    Apply sparingly to affected area two times daily for 14 days.    Hives       TYLENOL PO      Take by mouth as needed for mild pain or fever        vitamin E 400 UNIT capsule      Take 400 Units by mouth daily

## 2017-07-20 NOTE — PROGRESS NOTES
SUBJECTIVE:                                                    Raisa BOWMAN Archer is a 72 year old female who presents to clinic today for the following health issues:    Shortness of Breath       Duration: Beginning of July     Description (location/character/radiation): Pt state she get shortness of breath going up and down the stairs, and getting stuff out from the car.    Intensity:  mild    Accompanying signs and symptoms: None     History (similar episodes/previous evaluation): None    Precipitating or alleviating factors: None    Therapies tried and outcome: None           Noted when active. Not when at rest. ? Allergy related. More sneezing as of late. Not much for cough. Former smoker. No fevers nor chest pain. No palpitations.     Musculoskeletal problem/pain      Duration: Beginning of July    Description  Location: right knee     Intensity:  mild    Accompanying signs and symptoms: sharp pain, dull ache, and stabbing pain     History  Previous similar problem: no   Previous evaluation:  none    Precipitating or alleviating factors:  Trauma or overuse: YES, pt state she twisted her knee when someone ran into her   Aggravating factors include: pt state no matter what she do her knee hurts     Therapies tried and outcome: knee brace somewhat effective     She quit smoking about 4 years ago    Ros negative for const, cardiac, other resp, gi, derm symptoms     OBJECTIVE: /76 (BP Location: Left arm, Patient Position: Sitting, Cuff Size: Adult Regular)  Temp 98.2  F (36.8  C) (Oral)  Resp 16  Wt 173 lb 12.8 oz (78.8 kg)  BMI 29.83 kg/m2   Exam:  GENERAL APPEARANCE: healthy, alert and no distress  EYES: Eyes grossly normal to inspection  HENT: ear canals and TM's normal and nose and mouth without ulcers or lesions  NECK: no adenopathy, no asymmetry, masses, or scars and thyroid normal to palpation  RESP: lungs clear to auscultation - no rales, rhonchi or wheezes  CV: regular rates and rhythm, normal S1  S2, no S3 or S4 and no murmur, click or rub -  ABDOMEN:  soft, nontender, no HSM or masses and bowel sounds normal  MS: Focused knee examination: flexes to about 75 degrees, no effusion, no laxity with Lachman's, varus or valgus stress, negative Jo's, mild pes anserine bursa tenderness but more tenderness about the patella. Worse with compression   SKIN: no suspicious lesions or rashes  PSYCH: mentation appears normal and affect normal/bright    Some swelling. No new injury or overuse. No calf or thigh pain.      Xray minimal djd changes of the knee. The cxr is clear    spirmetry shows obstructive pattern      ICD-10-CM    1. Acute pain of right knee M25.561 XR Knee Right 3 Views     [facility] PT, HAND, AND CHIROPRACTIC REFERRAL   2. ZAPATA (dyspnea on exertion) R06.09 XR Chest 2 Views     Spirometry, Breathing Capacity: Normal Order, Clinic Performed     Ipratropium-Albuterol (COMBIVENT RESPIMAT)  MCG/ACT inhaler   3. Centrilobular emphysema (H) J43.2     likely her dyspnea on exertion is related to her emphysema. Discussed trial of inhaler. follow up discussed. We started with combivent. Cold consider switching over to a longer acting medication if it seems helpful. I doubt there is a ligament or sigificant meniscal tear. Recommend physical therapy for possible beginning of OA or patella alignment syndrome. Use of OTC  meds. discussed

## 2017-07-20 NOTE — NURSING NOTE
"Chief Complaint   Patient presents with     Shortness of Breath     Musculoskeletal Problem       Initial /76 (BP Location: Left arm, Patient Position: Sitting, Cuff Size: Adult Regular)  Temp 98.2  F (36.8  C) (Oral)  Resp 16  Wt 173 lb 12.8 oz (78.8 kg)  BMI 29.83 kg/m2 Estimated body mass index is 29.83 kg/(m^2) as calculated from the following:    Height as of 4/10/17: 5' 4\" (1.626 m).    Weight as of this encounter: 173 lb 12.8 oz (78.8 kg).  Medication Reconciliation: unable or not appropriate to perform       Scott Diaz MA        "

## 2017-07-20 NOTE — MR AVS SNAPSHOT
After Visit Summary   7/20/2017    Raisa Archer    MRN: [id number]           Patient Information     Date Of Birth          1945        Visit Information        Provider Department      7/20/2017 3:20 PM Allen Aguilera PT [facility] Physical Therapy        Today's Diagnoses     Knee pain, right    -  1       Follow-ups after your visit        Your next 10 appointments already scheduled     Jul 27, 2017  1:40 PM CDT   Ajayt Long with Lyla Hawkins MD   [facility] ([facility])    [address] [place] [address]   [phone]            Jul 28, 2017  2:10 PM CDT   CARLA Extremity with Allen Aguilera PT   [facility] ([facility]  )    [address] [place] [address]   [phone]            Aug 11, 2017  1:30 PM CDT   CARLA Extremity with Allen Aguilera PT   [place] [facility] ([facility]  )    [address] [place] [address]   [phone]            Aug 18, 2017  1:30 PM CDT   CARLA Extremity with Allen Aguilera PT   [place] [facility] ([facility]  )    [address] [place] [address]   [phone]            Aug 25, 2017  1:30 PM CDT   CARLA Extremity with Allen Aguilera PT   [facility] Physical Therapy ([facility]  )    [address] [place] [address]   [phone]            Sep 01, 2017  1:30 PM CDT   CARLA Extremity with Allen Aguilera PT   [facility] Physical Therapy ([facility]  )    [address] [place] [address]   [phone]            Sep 06, 2017  1:45 PM CDT   (Arrive by 1:30 PM)   MR BRAIN W/O & W CONTRAST with [address] [facility] ([facility])    [address] [place] [address]  [address] [facility] [address] [phone]           Take your medicines as usual, unless your doctor tells you not to. Bring a list of your current medicines to your exam (including vitamins, minerals and over-the-counter drugs).  You will be given intravenous contrast for this exam. To prepare:   The day before your exam, drink extra fluids at least six 8-ounce glasses (unless your doctor tells you to restrict your fluids).   Have a blood test (creatinine test) within 30 days of your exam. Go to your clinic or Diagnostic Imaging Department for this test.  The MRI machine uses a strong magnet. Please wear clothes without metal (snaps, zippers). A sweatsuit works well, or we may give you a hospital gown.  Please remove any body piercings and hair extensions before you arrive. You will also remove watches, jewelry, hairpins, wallets, dentures, partial dental plates and hearing aids. You may wear contact lenses, and you may be able to wear your rings. We have a safe place to keep your personal items, but it is safer to leave them at home.   **IMPORTANT** THE INSTRUCTIONS BELOW ARE ONLY FOR THOSE PATIENTS WHO HAVE BEEN TOLD THEY WILL RECEIVE SEDATION OR GENERAL ANESTHESIA DURING THEIR MRI PROCEDURE:  IF YOU WILL RECEIVE SEDATION (take medicine to help you relax during your exam):   You must get the medicine from your doctor before you arrive. Bring the medicine to the exam. Do not take it at home.   Arrive one hour early. Bring someone who can take you home after the test. Your medicine will make you sleepy. After the exam, you may not drive, take a bus or take a taxi by yourself.   No eating 8 hours before your exam. You may have clear liquids up until 4 hours before your exam. (Clear liquids include water, clear tea, black coffee and fruit juice without pulp.)  IF YOU WILL RECEIVE ANESTHESIA (be asleep for your exam):   Arrive 1 1/2 hours early. Bring someone who can take you home after the test. You may  not drive, take a bus or take a taxi by yourself.   No eating 8 hours before your exam. You may have clear liquids up until 4 hours before your exam. (Clear liquids include water, clear tea, black coffee and fruit juice without pulp.)  Please call the Imaging Department at your exam site with any questions.            Sep 08, 2017  1:30 PM CDT   CARLA Extremity with Allen Aguilera PT   [facility] Physical Therapy ([facility]  )    [address] [place] [address] [phone]            Sep 20, 2017 11:00 AM CDT   (Arrive by 10:45 AM)   Return Visit with Lazaro So MD   [facility] ([facility] [place])    [address] [address] [phone]              Who to contact     If you have questions or need follow up information about today's clinic visit or your schedule please contact [facility] ATHLETIC [facility] PHYSICAL [facility] directly at [phone].  Normal or non-critical lab and imaging results will be communicated to you by Ardelyxhart, letter or phone within 4 business days after the clinic has received the results. If you do not hear from us within 7 days, please contact the clinic through [employer] or phone. If you have a critical or abnormal lab result, we will notify you by phone as soon as possible.  Submit refill requests through Ayannah or call your pharmacy and they will forward the refill request to us. Please allow 3 business days for your refill to be completed.          Additional Information About Your Visit        ArdelyxharSyndicateRoom Information     Ayannah gives you secure access to your electronic health record. If you see a primary care provider, you can also send messages to your care team and make appointments. If you have questions, please call your primary care clinic.  If you do not have a primary care provider, please call [phone] and they will assist you.         Care EveryWhere ID     This is your Care EveryWhere ID. This could be used by other organizations to access your [facility] medical records  [id number]         Blood Pressure from Last 3 Encounters:   07/20/17 118/76   04/10/17 109/65   10/07/16 118/69    Weight from Last 3 Encounters:   07/20/17 78.8 kg (173 lb 12.8 oz)   04/10/17 76.7 kg (169 lb)   10/07/16 74.9 kg (165 lb 3.2 oz)              We Performed the Following     HC PT EVAL, LOW COMPLEXITY     CARLA CERT REPORT     THERAPEUTIC EXERCISES          Today's Medication Changes          These changes are accurate as of: 7/20/17 11:59 PM.  If you have any questions, ask your nurse or doctor.               Start taking these medicines.        Dose/Directions    Ipratropium-Albuterol  MCG/ACT inhaler   Commonly known as:  COMBIVENT RESPIMAT   Used for:  ZAPATA (dyspnea on exertion)   Started by:  Les Gamez MD        Dose:  1 puff   Inhale 1 puff into the lungs 4 times daily Not to exceed 6 doses per day.   Quantity:  1 Inhaler   Refills:  1            Where to get your medicines      These medications were sent to [facility] [address] [place] [address] AT [address] [address]    Hours:  24-hours Phone:  [phone]     Ipratropium-Albuterol  MCG/ACT inhaler                Primary Care Provider Office Phone # Fax #    Lyla Hawkins [phone]       [address] [place] [address]        Equal Access to Services     MARIA D NAIR AH: Hadii dania cruz Solaura, waaxda luqadaha, qaybta kaalmada simon, david heck. So [facility] [phone].    ATENCIÓN: Si habla español, tiene a casanova disposición servicios gratuitos de asistencia lingüística. Llame al [phone].    We comply with applicable federal civil rights laws and [place] laws. We do not discriminate on the basis of race,  color, national origin, age, disability sex, sexual orientation or gender identity.            Thank you!     Thank you for choosing [facility] PHYSICAL [facility]  for your care. Our goal is always to provide you with excellent care. Hearing back from our patients is one way we can continue to improve our services. Please take a few minutes to complete the written survey that you may receive in the mail after your visit with us. Thank you!             Your Updated Medication List - Protect others around you: Learn how to safely use, store and throw away your medicines at www.disposemymeds.org.          This list is accurate as of: 7/20/17 11:59 PM.  Always use your most recent med list.                   Brand Name Dispense Instructions for use Diagnosis    CALCIUM + D PO      Take 2 tablets by mouth daily.        [employer] DRY EYE RELIEF 0.2-0.2-1 % Soln ophthalmic solution   Generic drug:  glycerin-hypromellose-     1 Bottle    1 drop    Medicare annual wellness visit, subsequent       cycloSPORINE 0.05 % ophthalmic emulsion    RESTASIS     1 drop 2 times daily        Fish Oil 1200 MG Caps      Take 1 capsule by mouth daily        fluticasone 50 MCG/ACT spray    FLONASE    16 g    Spray 1-2 sprays into both nostrils daily    Seasonal allergic rhinitis       Ipratropium-Albuterol  MCG/ACT inhaler    COMBIVENT RESPIMAT    1 Inhaler    Inhale 1 puff into the lungs 4 times daily Not to exceed 6 doses per day.    ZAPATA (dyspnea on exertion)       ZAHIDA MULTIVITAMIN FOR WOMEN Tabs      Take 1 tablet by mouth daily.        OPTIVE 0.5-0.9 % Soln ophthalmic solution   Generic drug:  carboxymethylcellul-glycerin      1 drop    Medicare annual wellness visit, subsequent       OSTEO ADVANCE PO           predniSONE 20 MG tablet    DELTASONE    3 tablet    Take 1 tablet (20 mg) by mouth daily    Hives       SLEEP MEDICINE Tabs      Take by mouth daily as needed        triamcinolone 0.1 %  cream    KENALOG    15 g    Apply sparingly to affected area two times daily for 14 days.    Hives       TYLENOL PO      Take by mouth as needed for mild pain or fever        vitamin E 400 UNIT capsule      Take 400 Units by mouth daily

## 2017-07-20 NOTE — PROGRESS NOTES
Subjective:    Patient is a 72 year old female presenting with rehab right knee hpi.   Raisa Archer is a 72 year old female with a right knee condition.  Condition occurred with:  Insidious onset.  Condition occurred: for unknown reasons.  This is a recurrent condition  7/20/17 referral.  Hx of R knee injury skiing at age 22, was hit by another skiier.  Had PT at that time.  It has bothered her on and off since then, but has generally felt fine.  Suddenly 3 weeks she began to feel throbbing pain with no known precipitating event..    Patient reports pain:  Anterior and posterior.    Pain is described as aching and other (throbbing) and is constant and reported as 7/10.  Associated symptoms:  Edema and loss of motion/stiffness. Pain is worse in the P.M..  Symptoms are exacerbated by sitting, ascending stairs and descending stairs and relieved by bracing/immobilizing, ice, NSAID's and other (elevation).  Since onset symptoms are gradually improving.  Special tests:  X-ray (normal).      General health as reported by patient is good.  Pertinent medical history includes:  High blood pressure and cancer (BP controlled, cancer resolved 10 years).  Medical allergies: yes (see Epic).  Other surgeries include:  Cancer surgery, orthopedic surgery and other (R breast, L hammer toe (not painful now), gall bladder, hysterectomy).  Current medications:  None as reported by the patient.  Current occupation is retired.    Employment tasks: house work.    Barriers include:  None as reported by the patient.    Red flags:  None as reported by the patient.                        Objective:    Standing Alignment:        Lumbar:  Lordosis decr  Pelvis deviations alignment: CCW rotation.  Hip deviations alignment: L>R increased femoral medial rotation.    Ankle/Foot:  Calcaneal valgus R              Physical Exam     Functional/Special Tests:  L single leg stance: Trendelenburg, no change in sxs; UE support required due to impaired  balance  R single leg stance: R trunk shift, pulling in R knee; UE support required due to impaired balance  Anterior drawer negative B  MCL and LCL stress tests negative B    Sensation:  Light touch intact and symmetrical B LEs    Strength:  Hip flexion: B 4+/5 pain free  Knee extension: L 5/5, R 4/5 limited by pain   Dorsiflexion: B 5/5 pain free  Glut med: B 3+/5 prior to compensation very stiff into hip extension  Glut max: L 5/5 pain free though stiffness into hip ext, R 3-/5 limited by pain   Hamstrings: B 5/5 pain free    Knee ROM:  Motion L R Comments   Passive flexion 134 119 Limited by pain R   Passive extension 10 deg hyper ext 8 deg hyper ext Pain free B     Palpation:  Not assessed today    Edema:  None noted today    Gait:  B Trendelenburg, R CCW pelvic rotation, decreased R knee flexion, R toe out        General     ROS    Assessment/Plan:      Patient is a 72 year old female with right side knee complaints.    Patient has the following significant findings with corresponding treatment plan.                Diagnosis 1:  R knee pain  Pain -  hot/cold therapy, manual therapy, education and home program  Decreased ROM/flexibility - manual therapy, therapeutic exercise, therapeutic activity and home program  Decreased strength - therapeutic exercise, therapeutic activities and home program  Impaired balance - neuro re-education, therapeutic activities and home program  Impaired gait - gait training and home program  Decreased function - therapeutic activities and home program    Therapy Evaluation Codes:   1) History comprised of:   Personal factors that impact the plan of care:      Age and Time since onset of symptoms.    Comorbidity factors that impact the plan of care are:      Cancer.     Medications impacting care: None.  2) Examination of Body Systems comprised of:   Body structures and functions that impact the plan of care:      Knee.   Activity limitations that impact the plan of care are:       Sitting and Stairs.  3) Clinical presentation characteristics are:   Stable/Uncomplicated.  4) Decision-Making    Low complexity using standardized patient assessment instrument and/or measureable assessment of functional outcome.  Cumulative Therapy Evaluation is: Low complexity.    Previous and current functional limitations:  (See Goal Flow Sheet for this information)    Short term and Long term goals: (See Goal Flow Sheet for this information)     Communication ability:  Patient appears to be able to clearly communicate and understand verbal and written communication and follow directions correctly.  Treatment Explanation - The following has been discussed with the patient:   RX ordered/plan of care  Anticipated outcomes  Possible risks and side effects  This patient would benefit from PT intervention to resume normal activities.   Rehab potential is good.    Frequency:  1 X week, once daily  Duration:  for 6 weeks  Discharge Plan:  Achieve all LTG.  Independent in home treatment program.  Reach maximal therapeutic benefit.    Please refer to the daily flowsheet for treatment today, total treatment time and time spent performing 1:1 timed codes.

## 2017-07-21 PROBLEM — M25.561 KNEE PAIN, RIGHT: Status: ACTIVE | Noted: 2017-07-21

## 2017-07-28 ENCOUNTER — THERAPY VISIT (OUTPATIENT)
Dept: PHYSICAL THERAPY | Facility: CLINIC | Age: 72
End: 2017-07-28
Payer: MEDICARE

## 2017-07-28 DIAGNOSIS — M25.561 KNEE PAIN, RIGHT: ICD-10-CM

## 2017-07-28 PROCEDURE — 97110 THERAPEUTIC EXERCISES: CPT | Mod: GP | Performed by: PHYSICAL THERAPIST

## 2017-08-11 ENCOUNTER — THERAPY VISIT (OUTPATIENT)
Dept: PHYSICAL THERAPY | Facility: CLINIC | Age: 72
End: 2017-08-11
Payer: MEDICARE

## 2017-08-11 DIAGNOSIS — M25.561 KNEE PAIN, RIGHT: ICD-10-CM

## 2017-08-11 PROCEDURE — 97110 THERAPEUTIC EXERCISES: CPT | Mod: GP | Performed by: PHYSICAL THERAPIST

## 2017-08-11 PROCEDURE — 97112 NEUROMUSCULAR REEDUCATION: CPT | Mod: GP | Performed by: PHYSICAL THERAPIST

## 2017-08-18 ENCOUNTER — THERAPY VISIT (OUTPATIENT)
Dept: PHYSICAL THERAPY | Facility: CLINIC | Age: 72
End: 2017-08-18
Payer: MEDICARE

## 2017-08-18 DIAGNOSIS — M25.561 KNEE PAIN, RIGHT: ICD-10-CM

## 2017-08-18 PROCEDURE — 97112 NEUROMUSCULAR REEDUCATION: CPT | Mod: GP | Performed by: PHYSICAL THERAPIST

## 2017-08-18 PROCEDURE — 97530 THERAPEUTIC ACTIVITIES: CPT | Mod: GP | Performed by: PHYSICAL THERAPIST

## 2017-08-18 PROCEDURE — 97110 THERAPEUTIC EXERCISES: CPT | Mod: GP | Performed by: PHYSICAL THERAPIST

## 2017-08-25 ENCOUNTER — THERAPY VISIT (OUTPATIENT)
Dept: PHYSICAL THERAPY | Facility: CLINIC | Age: 72
End: 2017-08-25
Payer: MEDICARE

## 2017-08-25 DIAGNOSIS — M25.561 KNEE PAIN, RIGHT: ICD-10-CM

## 2017-08-25 PROCEDURE — 97530 THERAPEUTIC ACTIVITIES: CPT | Mod: GP | Performed by: PHYSICAL THERAPIST

## 2017-08-25 PROCEDURE — 97110 THERAPEUTIC EXERCISES: CPT | Mod: GP | Performed by: PHYSICAL THERAPIST

## 2017-08-25 PROCEDURE — 97112 NEUROMUSCULAR REEDUCATION: CPT | Mod: GP | Performed by: PHYSICAL THERAPIST

## 2017-09-01 ENCOUNTER — OFFICE VISIT (OUTPATIENT)
Dept: FAMILY MEDICINE | Facility: CLINIC | Age: 72
End: 2017-09-01
Payer: MEDICARE

## 2017-09-01 ENCOUNTER — THERAPY VISIT (OUTPATIENT)
Dept: PHYSICAL THERAPY | Facility: CLINIC | Age: 72
End: 2017-09-01
Payer: MEDICARE

## 2017-09-01 VITALS
DIASTOLIC BLOOD PRESSURE: 72 MMHG | WEIGHT: 174 LBS | TEMPERATURE: 97.3 F | RESPIRATION RATE: 18 BRPM | HEART RATE: 103 BPM | OXYGEN SATURATION: 95 % | SYSTOLIC BLOOD PRESSURE: 101 MMHG | BODY MASS INDEX: 29.87 KG/M2

## 2017-09-01 DIAGNOSIS — M77.12 LEFT LATERAL EPICONDYLITIS: Primary | ICD-10-CM

## 2017-09-01 DIAGNOSIS — M81.0 AGE-RELATED OSTEOPOROSIS WITHOUT CURRENT PATHOLOGICAL FRACTURE: ICD-10-CM

## 2017-09-01 DIAGNOSIS — M25.561 KNEE PAIN, RIGHT: ICD-10-CM

## 2017-09-01 DIAGNOSIS — D32.9 MENINGIOMA (H): ICD-10-CM

## 2017-09-01 DIAGNOSIS — J43.2 CENTRILOBULAR EMPHYSEMA (H): ICD-10-CM

## 2017-09-01 DIAGNOSIS — Z23 NEED FOR PROPHYLACTIC VACCINATION AND INOCULATION AGAINST INFLUENZA: ICD-10-CM

## 2017-09-01 PROCEDURE — 90662 IIV NO PRSV INCREASED AG IM: CPT | Performed by: FAMILY MEDICINE

## 2017-09-01 PROCEDURE — G0008 ADMIN INFLUENZA VIRUS VAC: HCPCS | Performed by: FAMILY MEDICINE

## 2017-09-01 PROCEDURE — G8979 MOBILITY GOAL STATUS: HCPCS | Mod: GP | Performed by: PHYSICAL THERAPIST

## 2017-09-01 PROCEDURE — 99213 OFFICE O/P EST LOW 20 MIN: CPT | Performed by: FAMILY MEDICINE

## 2017-09-01 PROCEDURE — 97112 NEUROMUSCULAR REEDUCATION: CPT | Mod: GP | Performed by: PHYSICAL THERAPIST

## 2017-09-01 PROCEDURE — 36415 COLL VENOUS BLD VENIPUNCTURE: CPT | Performed by: FAMILY MEDICINE

## 2017-09-01 PROCEDURE — 82565 ASSAY OF CREATININE: CPT | Performed by: FAMILY MEDICINE

## 2017-09-01 PROCEDURE — 97110 THERAPEUTIC EXERCISES: CPT | Mod: GP | Performed by: PHYSICAL THERAPIST

## 2017-09-01 PROCEDURE — G8980 MOBILITY D/C STATUS: HCPCS | Mod: GP | Performed by: PHYSICAL THERAPIST

## 2017-09-01 ASSESSMENT — ENCOUNTER SYMPTOMS
SENSORY CHANGE: 0
TINGLING: 0
FEVER: 0
CHILLS: 0
FOCAL WEAKNESS: 0

## 2017-09-01 ASSESSMENT — ACTIVITIES OF DAILY LIVING (ADL)
SIT WITH YOUR KNEE BENT: ACTIVITY IS NOT DIFFICULT
RISE FROM A CHAIR: ACTIVITY IS MINIMALLY DIFFICULT
HOW_WOULD_YOU_RATE_THE_OVERALL_FUNCTION_OF_YOUR_KNEE_DURING_YOUR_USUAL_DAILY_ACTIVITIES?: NORMAL
STAND: ACTIVITY IS NOT DIFFICULT
KNEE_ACTIVITY_OF_DAILY_LIVING_SUM: 63
GO UP STAIRS: ACTIVITY IS MINIMALLY DIFFICULT
STIFFNESS: I HAVE THE SYMPTOM BUT IT DOES NOT AFFECT MY ACTIVITY
LIMPING: I DO NOT HAVE THE SYMPTOM
WALK: ACTIVITY IS NOT DIFFICULT
SWELLING: I DO NOT HAVE THE SYMPTOM
KNEEL ON THE FRONT OF YOUR KNEE: ACTIVITY IS MINIMALLY DIFFICULT
PAIN: I HAVE THE SYMPTOM BUT IT DOES NOT AFFECT MY ACTIVITY
KNEE_ACTIVITY_OF_DAILY_LIVING_SCORE: 90
GO DOWN STAIRS: ACTIVITY IS MINIMALLY DIFFICULT
SQUAT: ACTIVITY IS NOT DIFFICULT
HOW_WOULD_YOU_RATE_THE_CURRENT_FUNCTION_OF_YOUR_KNEE_DURING_YOUR_USUAL_DAILY_ACTIVITIES_ON_A_SCALE_FROM_0_TO_100_WITH_100_BEING_YOUR_LEVEL_OF_KNEE_FUNCTION_PRIOR_TO_YOUR_INJURY_AND_0_BEING_THE_INABILITY_TO_PERFORM_ANY_OF_YOUR_USUAL_DAILY_ACTIVITIES?: 95
GIVING WAY, BUCKLING OR SHIFTING OF KNEE: I DO NOT HAVE THE SYMPTOM
RAW_SCORE: 63
WEAKNESS: I HAVE THE SYMPTOM BUT IT DOES NOT AFFECT MY ACTIVITY
AS_A_RESULT_OF_YOUR_KNEE_INJURY,_HOW_WOULD_YOU_RATE_YOUR_CURRENT_LEVEL_OF_DAILY_ACTIVITY?: NEARLY NORMAL

## 2017-09-01 NOTE — MR AVS SNAPSHOT
After Visit Summary   9/1/2017    Raisa Archer    MRN: [id number]           Patient Information     Date Of Birth          1945        Visit Information        Provider Department      9/1/2017 1:30 PM Allen Aguilera, PT [place] [facility]        Today's Diagnoses     Knee pain, right           Follow-ups after your visit        Your next 10 appointments already scheduled     Sep 06, 2017  1:45 PM CDT   (Arrive by 1:30 PM)   MR BRAIN W/O & W CONTRAST with [address] [facility] ([facility])    [address] [place] [address] [phone]           Take your medicines as usual, unless your doctor tells you not to. Bring a list of your current medicines to your exam (including vitamins, minerals and over-the-counter drugs).  You will be given intravenous contrast for this exam. To prepare:   The day before your exam, drink extra fluids at least six 8-ounce glasses (unless your doctor tells you to restrict your fluids).   Have a blood test (creatinine test) within 30 days of your exam. Go to your clinic or Diagnostic Imaging Department for this test.  The MRI machine uses a strong magnet. Please wear clothes without metal (snaps, zippers). A sweatsuit works well, or we may give you a hospital gown.  Please remove any body piercings and hair extensions before you arrive. You will also remove watches, jewelry, hairpins, wallets, dentures, partial dental plates and hearing aids. You may wear contact lenses, and you may be able to wear your rings. We have a safe place to keep your personal items, but it is safer to leave them at home.   **IMPORTANT** THE INSTRUCTIONS BELOW ARE ONLY FOR THOSE PATIENTS WHO HAVE BEEN TOLD THEY WILL RECEIVE SEDATION OR GENERAL ANESTHESIA DURING THEIR MRI PROCEDURE:  IF YOU WILL RECEIVE SEDATION (take medicine to help you relax during your exam):   You must get  the medicine from your doctor before you arrive. Bring the medicine to the exam. Do not take it at home.   Arrive one hour early. Bring someone who can take you home after the test. Your medicine will make you sleepy. After the exam, you may not drive, take a bus or take a taxi by yourself.   No eating 8 hours before your exam. You may have clear liquids up until 4 hours before your exam. (Clear liquids include water, clear tea, black coffee and fruit juice without pulp.)  IF YOU WILL RECEIVE ANESTHESIA (be asleep for your exam):   Arrive 1 1/2 hours early. Bring someone who can take you home after the test. You may not drive, take a bus or take a taxi by yourself.   No eating 8 hours before your exam. You may have clear liquids up until 4 hours before your exam. (Clear liquids include water, clear tea, black coffee and fruit juice without pulp.)  Please call the Imaging Department at your exam site with any questions.            Sep 20, 2017 11:00 AM CDT   (Arrive by 10:45 AM)   Return Visit with Lazaro So MD   [facility] ([facility])    [address] [place] [address] [phone]              Who to contact     If you have questions or need follow up information about today's clinic visit or your schedule please contact [facility] directly at [phone].  Normal or non-critical lab and imaging results will be communicated to you by MyChart, letter or phone within 4 business days after the clinic has received the results. If you do not hear from us within 7 days, please contact the clinic through MyChart or phone. If you have a critical or abnormal lab result, we will notify you by phone as soon as possible.  Submit refill requests through [employer] or call your pharmacy and they will forward the refill request to us. Please allow 3 business days for your refill to be completed.           Additional Information About Your Visit        [employer] Information     MEEP gives you secure access to your electronic health record. If you see a primary care provider, you can also send messages to your care team and make appointments. If you have questions, please call your primary care clinic.  If you do not have a primary care provider, please call [phone] and they will assist you.        Care EveryWhere ID     This is your Care EveryWhere ID. This could be used by other organizations to access your [place] medical records  [id number]         Blood Pressure from Last 3 Encounters:   09/01/17 101/72   07/20/17 118/76   04/10/17 109/65    Weight from Last 3 Encounters:   09/01/17 78.9 kg (174 lb)   07/20/17 78.8 kg (173 lb 12.8 oz)   04/10/17 76.7 kg (169 lb)              We Performed the Following     CARLA PROGRESS NOTES REPORT     NEUROMUSCULAR RE-EDUCATION     THERAPEUTIC EXERCISES          Today's Medication Changes          These changes are accurate as of: 9/1/17  2:04 PM.  If you have any questions, ask your nurse or doctor.               Start taking these medicines.        Dose/Directions    order for DME   Used for:  Left lateral epicondylitis   Started by:  Lyla Hawkins MD        Left elbow brace for lateral epicondylitis (tennis elbow)   Quantity:  1 each   Refills:  0         Stop taking these medicines if you haven't already. Please contact your care team if you have questions.     [employer] DRY EYE RELIEF 0.2-0.2-1 % Soln ophthalmic solution   Generic drug:  glycerin-hypromellose-   Stopped by:  Lyla Hawkins MD           cycloSPORINE 0.05 % ophthalmic emulsion   Commonly known as:  RESTASIS   Stopped by:  Lyla Hawkins MD           predniSONE 20 MG tablet   Commonly known as:  DELTASONE   Stopped by:  Lyla Hawkins MD           triamcinolone 0.1 % cream   Commonly known as:  KENALOG   Stopped by:  Lyla Hawkins MD                Where to get your  medicines      Some of these will need a paper prescription and others can be bought over the counter.  Ask your nurse if you have questions.     Bring a paper prescription for each of these medications     order for DME                Primary Care Provider Office Phone # Fax #    Lyla Hawkins [phone] [address] [facility] [address]        Equal Access to Services     [facility] : Hadii aad ku hadasho Soomaali, waaxda luqadaha, qaybta kaalmada adeegyada, waxay idiin hayaan adeeg khjoselitosh lanikn ah. So [facility] [phone].    ATENCIÓN: Si david al, tiene a casanova disposición servicios gratuitos de asistencia lingüística. [facility] [phone].    We comply with applicable federal civil rights laws and [place] laws. We do not discriminate on the basis of race, color, national origin, age, disability sex, sexual orientation or gender identity.            Thank you!     Thank you for choosing [place] [facility]  for your care. Our goal is always to provide you with excellent care. Hearing back from our patients is one way we can continue to improve our services. Please take a few minutes to complete the written survey that you may receive in the mail after your visit with us. Thank you!             Your Updated Medication List - Protect others around you: Learn how to safely use, store and throw away your medicines at www.disposemymeds.org.          This list is accurate as of: 9/1/17  2:04 PM.  Always use your most recent med list.                   Brand Name Dispense Instructions for use Diagnosis    CALCIUM + D PO      Take 2 tablets by mouth daily.        Fish Oil 1200 MG Caps      Take 1 capsule by mouth daily        fluticasone 50 MCG/ACT spray    FLONASE    16 g    Spray 1-2 sprays into both nostrils daily    Seasonal allergic rhinitis       Ipratropium-Albuterol  MCG/ACT inhaler    COMBIVENT RESPIMAT    1 Inhaler     Inhale 1 puff into the lungs 4 times daily Not to exceed 6 doses per day.    ZAPATA (dyspnea on exertion)       ZAHIDA MULTIVITAMIN FOR WOMEN Tabs      Take 1 tablet by mouth daily.        OPTIVE 0.5-0.9 % Soln ophthalmic solution   Generic drug:  carboxymethylcellul-glycerin      1 drop    Medicare annual wellness visit, subsequent       order for DME     1 each    Left elbow brace for lateral epicondylitis (tennis elbow)    Left lateral epicondylitis       OSTEO ADVANCE PO           SLEEP MEDICINE Tabs      Take by mouth daily as needed        TYLENOL PO      Take by mouth as needed for mild pain or fever        vitamin E 400 UNIT capsule      Take 400 Units by mouth daily

## 2017-09-01 NOTE — MR AVS SNAPSHOT
After Visit Summary   9/1/2017    Raisa Archer    MRN: [id number]           Patient Information     Date Of Birth          1945        Visit Information        Provider Department      9/1/2017 12:40 PM Lyla Hawkins MD [facility]        Today's Diagnoses     Left lateral epicondylitis    -  1    Meningioma (H)        Age-related osteoporosis without current pathological fracture        Centrilobular emphysema (H)           Follow-ups after your visit        Your next 10 appointments already scheduled     Sep 01, 2017  1:30 PM CDT   CARLA Extremity with Allen Aguilera PT   [place] [facility]    [address] [facility] [address]   [phone]            Sep 06, 2017  1:45 PM CDT   (Arrive by 1:30 PM)   MR BRAIN W/O & W CONTRAST with [address] [facility] [facility] ([facility])    [address] [address] [phone]           Take your medicines as usual, unless your doctor tells you not to. Bring a list of your current medicines to your exam (including vitamins, minerals and over-the-counter drugs).  You will be given intravenous contrast for this exam. To prepare:   The day before your exam, drink extra fluids at least six 8-ounce glasses (unless your doctor tells you to restrict your fluids).   Have a blood test (creatinine test) within 30 days of your exam. Go to your clinic or Diagnostic Imaging Department for this test.  The MRI machine uses a strong magnet. Please wear clothes without metal (snaps, zippers). A sweatsuit works well, or we may give you a hospital gown.  Please remove any body piercings and hair extensions before you arrive. You will also remove watches, jewelry, hairpins, wallets, dentures, partial dental plates and hearing aids. You may wear contact lenses, and you may be able to wear your rings. We have a safe  place to keep your personal items, but it is safer to leave them at home.   **IMPORTANT** THE INSTRUCTIONS BELOW ARE ONLY FOR THOSE PATIENTS WHO HAVE BEEN TOLD THEY WILL RECEIVE SEDATION OR GENERAL ANESTHESIA DURING THEIR MRI PROCEDURE:  IF YOU WILL RECEIVE SEDATION (take medicine to help you relax during your exam):   You must get the medicine from your doctor before you arrive. Bring the medicine to the exam. Do not take it at home.   Arrive one hour early. Bring someone who can take you home after the test. Your medicine will make you sleepy. After the exam, you may not drive, take a bus or take a taxi by yourself.   No eating 8 hours before your exam. You may have clear liquids up until 4 hours before your exam. (Clear liquids include water, clear tea, black coffee and fruit juice without pulp.)  IF YOU WILL RECEIVE ANESTHESIA (be asleep for your exam):   Arrive 1 1/2 hours early. Bring someone who can take you home after the test. You may not drive, take a bus or take a taxi by yourself.   No eating 8 hours before your exam. You may have clear liquids up until 4 hours before your exam. (Clear liquids include water, clear tea, black coffee and fruit juice without pulp.)  Please call the Imaging Department at your exam site with any questions.            Sep 07, 2017  1:20 PM CDT   CARLA Extremity with Allen Aguilera PT   [place] [facility]  )    [address] [place] [id number] [phone]            Sep 20, 2017 11:00 AM CDT   (Arrive by 10:45 AM)   Return Visit with Lazaro So MD   [facility] ([facility])    [address] [facility]  [address] [facility] [address] [phone]              Who to contact     If you have questions or need follow up information about today's clinic visit or your schedule please contact [facility] directly at [phone].  Normal or  non-critical lab and imaging results will be communicated to you by [employer], letter or phone within 4 business days after the clinic has received the results. If you do not hear from us within 7 days, please contact the clinic through [facility] or phone. If you have a critical or abnormal lab result, we will notify you by phone as soon as possible.  Submit refill requests through [employer] or call your pharmacy and they will forward the refill request to us. Please allow 3 business days for your refill to be completed.          Additional Information About Your Visit        [facility] Information     [facility] gives you secure access to your electronic health record. If you see a primary care provider, you can also send messages to your care team and make appointments. If you have questions, please call your primary care clinic.  If you do not have a primary care provider, please call [phone] and they will assist you.        Care EveryWhere ID     This is your Care EveryWhere ID. This could be used by other organizations to access your [place] medical records  [id number]        Your Vitals Were     Pulse Temperature Respirations Pulse Oximetry BMI (Body Mass Index)       103 97.3  F (36.3  C) (Tympanic) 18 95% 29.87 kg/m2        Blood Pressure from Last 3 Encounters:   09/01/17 101/72   07/20/17 118/76   04/10/17 109/65    Weight from Last 3 Encounters:   09/01/17 174 lb (78.9 kg)   07/20/17 173 lb 12.8 oz (78.8 kg)   04/10/17 169 lb (76.7 kg)              We Performed the Following     Creatinine          Today's Medication Changes          These changes are accurate as of: 9/1/17  1:20 PM.  If you have any questions, ask your nurse or doctor.               Start taking these medicines.        Dose/Directions    order for DME   Used for:  Left lateral epicondylitis   Started by:  Lyla Hawkins MD        Left elbow brace for lateral epicondylitis (tennis elbow)   Quantity:  1 each   Refills:  0         Stop  taking these medicines if you haven't already. Please contact your care team if you have questions.     [employer]S DRY EYE RELIEF 0.2-0.2-1 % Soln ophthalmic solution   Generic drug:  glycerin-hypromellose-   Stopped by:  Lyla Hawkins MD           cycloSPORINE 0.05 % ophthalmic emulsion   Commonly known as:  RESTASIS   Stopped by:  Lyla Hawkins MD           predniSONE 20 MG tablet   Commonly known as:  DELTASONE   Stopped by:  Lyla Hawkins MD           triamcinolone 0.1 % cream   Commonly known as:  KENALOG   Stopped by:  Lyla Hawkins MD                Where to get your medicines      Some of these will need a paper prescription and others can be bought over the counter.  Ask your nurse if you have questions.     Bring a paper prescription for each of these medications     order for DME                Primary Care Provider Office Phone # Fax #    Lyla Hawkins [phone] [address] [place] [address]        Equal Access to Services     [facility]: Hadii dania ku hadasho Soomaali, waaxda luqadaha, qaybta kaalmada adeegyada, waxay mikyin hayzulay alexis . So [facility] [phone].    ATENCIÓN: Si habla español, tiene a casanova disposición servicios gratuitos de asistencia lingüística. Llame al [phone].    We comply with applicable federal civil rights laws and [place] laws. We do not discriminate on the basis of race, color, national origin, age, disability sex, sexual orientation or gender identity.            Thank you!     Thank you for choosing [facility]  for your care. Our goal is always to provide you with excellent care. Hearing back from our patients is one way we can continue to improve our services. Please take a few minutes to complete the written survey that you may receive in the mail after your visit with us. Thank you!             Your Updated Medication List - Protect others around you: Learn how to  safely use, store and throw away your medicines at www.disposemymeds.org.          This list is accurate as of: 9/1/17  1:20 PM.  Always use your most recent med list.                   Brand Name Dispense Instructions for use Diagnosis    CALCIUM + D PO      Take 2 tablets by mouth daily.        Fish Oil 1200 MG Caps      Take 1 capsule by mouth daily        fluticasone 50 MCG/ACT spray    FLONASE    16 g    Spray 1-2 sprays into both nostrils daily    Seasonal allergic rhinitis       Ipratropium-Albuterol  MCG/ACT inhaler    COMBIVENT RESPIMAT    1 Inhaler    Inhale 1 puff into the lungs 4 times daily Not to exceed 6 doses per day.    ZAPATA (dyspnea on exertion)       ZAHIDA MULTIVITAMIN FOR WOMEN Tabs      Take 1 tablet by mouth daily.        OPTIVE 0.5-0.9 % Soln ophthalmic solution   Generic drug:  carboxymethylcellul-glycerin      1 drop    Medicare annual wellness visit, subsequent       order for DME     1 each    Left elbow brace for lateral epicondylitis (tennis elbow)    Left lateral epicondylitis       OSTEO ADVANCE PO           SLEEP MEDICINE Tabs      Take by mouth daily as needed        TYLENOL PO      Take by mouth as needed for mild pain or fever        vitamin E 400 UNIT capsule      Take 400 Units by mouth daily

## 2017-09-01 NOTE — NURSING NOTE
"Chief Complaint   Patient presents with     *_* Health Care Directive *_*     discuss health care directive        Initial /72 (BP Location: Right arm, Patient Position: Sitting, Cuff Size: Adult Regular)  Pulse 103  Temp 97.3  F (36.3  C) (Tympanic)  Resp 18  Wt 174 lb (78.9 kg)  SpO2 95%  BMI 29.87 kg/m2 Estimated body mass index is 29.87 kg/(m^2) as calculated from the following:    Height as of 4/10/17: 5' 4\" (1.626 m).    Weight as of this encounter: 174 lb (78.9 kg).  Medication Reconciliation: unable or not appropriate to perform       Scott Diaz MA      "

## 2017-09-01 NOTE — PROGRESS NOTES
Subjective:    HPI                    Objective:    System    Physical Exam    General     ROS    Assessment/Plan:      DISCHARGE REPORT    Progress reporting period is from 7/20/17 to 9/1/17.       SUBJECTIVE  Subjective changes noted by patient: Pt stated her knee is feeling good, only starts to hurt a little if she sits too long.  Comfortable discharging today.    Current pain level is 0/10.     Previous pain level was 7/10.   Changes in function:  Yes (See Goal flowsheet attached for changes in current functional level)  Adverse reaction to treatment or activity: None    OBJECTIVE  Changes noted in objective findings:  Yes, improved knee ROM  AAROM L knee flexion: 123 deg     ASSESSMENT/PLAN  STG/LTGs have been met or progress has been made towards goals:  Yes (See Goal flow sheet completed today.)  Assessment of Progress: The patient's condition is improving.  Self Management Plans:  Patient has been instructed in a home treatment program.  Raisa continues to require the following intervention to meet STG and LTG's:  PT intervention is no longer required to meet STG/LTG.    Recommendations:  This patient is ready to be discharged from therapy and continue their home treatment program.    Please refer to the daily flowsheet for treatment today, total treatment time and time spent performing 1:1 timed codes.

## 2017-09-01 NOTE — PROGRESS NOTES
HPI  1. Pt received a high risk noticed from her insurance that the dosage combivent respimat is too much for her because of her age.  She uses it twice daily at most.    2. Discuss health care directive--she forgot this paperwork at home.   3. Neurologist requested some lab work to be done before MRI --creatine--for routine f/u of meningioma.  4.  Left elbow pain: started about 2 weeks ago, no known injury or repetitive actions, but lateral elbow is sore, tender and looks swollen.  It is achy.  Pain is mild.  It is worse with certain movements and better with rest.  She has tried icing, which helps temporarily. She thinks that, because she was sleeping more on her left side to accommodate her right knee pain, this might have caused it.  She is right handed but uses her left hand a lot (thinks she was really left-handed but trained at home and in school to use her right hand).    5.  Osteoporosis: seen on dexa in 2010, patient resistant to taking a medication, has been reading about diet and exercise and is implementing these changes.     Review of Systems   Constitutional: Negative for chills and fever.   Musculoskeletal: Positive for joint pain.   Neurological: Negative for tingling, sensory change and focal weakness.       Allergies   Allergen Reactions     Aspirin Nausea and Vomiting     sick     Chantix [Varenicline Tartrate] Other (See Comments)     Sees things     Glycerin Itching     LIQUID,   Reactions: itchy and redness       Sympathomimetics Other (See Comments)     Patient doesn't know why this is listed as an allergy     Wool Fiber Itching     Redness      Current Outpatient Prescriptions   Medication     order for DME     Ipratropium-Albuterol (COMBIVENT RESPIMAT)  MCG/ACT inhaler     Nutritional Supplements (OSTEO ADVANCE PO)     carboxymethylcellul-glycerin (OPTIVE) 0.5-0.9 % SOLN ophthalmic solution     Acetaminophen (TYLENOL PO)     fluticasone (FLONASE) 50 MCG/ACT nasal spray     vitamin E  400 UNIT capsule     Omega-3 Fatty Acids (FISH OIL) 1200 MG CAPS     Homeopathic Products (SLEEP MEDICINE) TABS     Calcium Carbonate-Vitamin D (CALCIUM + D PO)     Multiple Vitamins-Minerals (ZAHIDA MULTIVITAMIN FOR WOMEN) TABS     No current facility-administered medications for this visit.      Active Ambulatory Problems     Diagnosis Date Noted     Tobacco use disorder 09/21/2006     Malignant neoplasm of female breast (H) 01/03/2007     Gall stones 02/24/2009     Osteoporosis 08/18/2010     Advanced directives, counseling/discussion 12/14/2011     Chronic rhinitis 12/14/2011     Hyperlipidemia LDL goal <160 04/04/2012     Other hammer toe (acquired) 05/19/2014     Meningioma (H) 09/24/2014     History of colonic polyps 05/31/2016     Knee pain, right 07/21/2017     Centrilobular emphysema (H)      Resolved Ambulatory Problems     Diagnosis Date Noted     Stiffness of joint, not elsewhere classified,  shoulder region 04/12/2007     Post-proc states NEC 04/12/2007     CARDIOVASCULAR SCREENING; LDL GOAL LESS THAN 130 10/31/2010     Past Medical History:   Diagnosis Date     Arthritis 1971     Breast cancer, right breast (H)      Centrilobular emphysema (H)      Colon polyps      Rash      Unspecified essential hypertension        Physical Exam   Constitutional: She is well-developed, well-nourished, and in no distress.   Eyes: Conjunctivae are normal. Pupils are equal, round, and reactive to light. Right eye exhibits no discharge. Left eye exhibits no discharge. No scleral icterus.   Cardiovascular: Normal rate, regular rhythm and normal heart sounds.  Exam reveals no gallop and no friction rub.    No murmur heard.  Pulmonary/Chest: Effort normal and breath sounds normal. No respiratory distress. She has no wheezes. She has no rales.   Musculoskeletal:   Left arm: full ROM in shoulder, elbow, wrist  Normal strength throughout  Tender over the lateral epicondyle and extensor tendon muscle mass.  Pain with wrist  extension, supination and pronation against resistance.     Neurological: She is alert.   Skin: She is not diaphoretic.   Psychiatric: Affect normal.   Vitals reviewed.      /72 (BP Location: Right arm, Patient Position: Sitting, Cuff Size: Adult Regular)  Pulse 103  Temp 97.3  F (36.3  C) (Tympanic)  Resp 18  Wt 174 lb (78.9 kg)  SpO2 95%  BMI 29.87 kg/m2      A/P  1.  Emphysema: she came in to clinic and saw Dr. Gamez in July for shortness of breath with exertion.  Office spirometry was done showing moderate obstructive disease.  She was prescribed combivent, and I discussed that with using this twice daily, the benefits would outweigh the risks.  She quit smoking 3 yrs ago.  She has yearly screening CT.  2.  Health care directives--bring in another time, she just wants to share them with me.  Ok to just drop off, or can go over at next physical  3.  Creatinine ordered for MRI with contrast to re-evaluate meningioma.  4.  Left elbow pain: c/w lateral epicondylitis.  Brace given, rest, ice as needed, exercises given, f/u if not improving.\  5. Osteoporosis: advised repeat dexa next check up to re-evaluate.

## 2017-09-01 NOTE — PROGRESS NOTES
Injectable Influenza Immunization Documentation    1.  Is the person to be vaccinated sick today?  No    2. Does the person to be vaccinated have an allergy to eggs or to a component of the vaccine?  No    3. Has the person to be vaccinated today ever had a serious reaction to influenza vaccine in the past?  No    4. Has the person to be vaccinated ever had Guillain-[place] syndrome?  No     Form completed by patient.      Scott Diaz MA

## 2017-09-02 LAB
CREAT SERPL-MCNC: 0.92 MG/DL (ref 0.52–1.04)
GFR SERPL CREATININE-BSD FRML MDRD: 61 ML/MIN/1.7M2

## 2017-09-20 ENCOUNTER — OFFICE VISIT (OUTPATIENT)
Dept: NEUROSURGERY | Facility: CLINIC | Age: 72
End: 2017-09-20

## 2017-09-20 VITALS
TEMPERATURE: 97.6 F | HEIGHT: 64 IN | WEIGHT: 174.6 LBS | HEART RATE: 90 BPM | BODY MASS INDEX: 29.81 KG/M2 | RESPIRATION RATE: 20 BRPM | DIASTOLIC BLOOD PRESSURE: 67 MMHG | OXYGEN SATURATION: 93 % | SYSTOLIC BLOOD PRESSURE: 131 MMHG

## 2017-09-20 DIAGNOSIS — D32.9 MENINGIOMA (H): Primary | ICD-10-CM

## 2017-09-20 ASSESSMENT — PAIN SCALES - GENERAL: PAINLEVEL: NO PAIN (1)

## 2017-09-20 NOTE — PATIENT INSTRUCTIONS
1. Repeat brain MRI and followup visit with Dr. So in 3 years (2020).  2. Call earlier for concerns or questions.

## 2017-09-20 NOTE — Clinical Note
This pt will need a f/u and MRI of brain with and without contrast in 3 years for her left frontl convexity meningioma. Please send yourself a reminder note. MRI cannot be ordered 3 years in advance.

## 2017-09-20 NOTE — MR AVS SNAPSHOT
After Visit Summary   9/20/2017    Raisa Archer    MRN: [id number]           Patient Information     Date Of Birth          1945        Visit Information        Provider Department      9/20/2017 11:00 AM Lazaro So MD [facility] Neurosurgery        Today's Diagnoses     Meningioma (H)    -  1      Care Instructions    1. Repeat brain MRI and followup visit with Dr. So in 3 years (2020).  2. Call earlier for concerns or questions.          Follow-ups after your visit        Follow-up notes from your care team     Return in about 3 years (around 9/20/2020).      Who to contact     Please call your clinic at [phone] to:    Ask questions about your health    Make or cancel appointments    Discuss your medicines    Learn about your test results    Speak to your doctor   If you have compliments or concerns about an experience at your clinic, or if you wish to file a complaint, please contact [facility] Patient Relations at [phone] or email us at [email or web address]         Additional Information About Your Visit        MyChart Information     [employer] gives you secure access to your electronic health record. If you see a primary care provider, you can also send messages to your care team and make appointments. If you have questions, please call your primary care clinic.  If you do not have a primary care provider, please call [phone] and they will assist you.      pic5 is an electronic gateway that provides easy, online access to your medical records. With pic5, you can request a clinic appointment, read your test results, renew a prescription or communicate with your care team.     To access your existing account, please contact your [facility] or call [phone] for assistance.        Care EveryWhere ID     This is your Care EveryWhere ID. This could be used by other organizations to access  "your [place] medical records  [id number]        Your Vitals Were     Pulse Temperature Respirations Height Pulse Oximetry Breastfeeding?    90 97.6  F (36.4  C) 20 1.626 m (5' 4\") 93% No    BMI (Body Mass Index)                   29.97 kg/m2            Blood Pressure from Last 3 Encounters:   09/20/17 131/67   09/01/17 101/72   07/20/17 118/76    Weight from Last 3 Encounters:   09/20/17 79.2 kg (174 lb 9.6 oz)   09/01/17 78.9 kg (174 lb)   07/20/17 78.8 kg (173 lb 12.8 oz)               Primary Care Provider Office Phone # Fax #    Lyla Hawkins [phone] [address] [place] [address]        Equal Access to Services     [facility]: Hadii dania fernandes hadmaria de jesus Solaura, waaxda luqadaha, qaybta kaalmada anaisyagrace, david alexis . So [facility] [phone].    ATENCIÓN: Si habla español, tiene a casanova disposición servicios gratuitos de asistencia lingüística. Alfonso al [phone].    We comply with applicable federal civil rights laws and [place] laws. We do not discriminate on the basis of race, color, national origin, age, disability, sex, sexual orientation, or gender identity.            Thank you!     Thank you for choosing [facility]  for your care. Our goal is always to provide you with excellent care. Hearing back from our patients is one way we can continue to improve our services. Please take a few minutes to complete the written survey that you may receive in the mail after your visit with us. Thank you!             Your Updated Medication List - Protect others around you: Learn how to safely use, store and throw away your medicines at www.disposemymeds.org.          This list is accurate as of: 9/20/17 11:59 PM.  Always use your most recent med list.                   Brand Name Dispense Instructions for use Diagnosis    CALCIUM + D PO      Take 2 tablets by mouth daily.        Fish Oil 1200 MG Caps      Take 1 capsule by mouth daily "        fluticasone 50 MCG/ACT spray    FLONASE    16 g    Spray 1-2 sprays into both nostrils daily    Seasonal allergic rhinitis       Ipratropium-Albuterol  MCG/ACT inhaler    COMBIVENT RESPIMAT    1 Inhaler    Inhale 1 puff into the lungs 4 times daily Not to exceed 6 doses per day.    ZAPATA (dyspnea on exertion)       ZAHIDA MULTIVITAMIN FOR WOMEN Tabs      Take 1 tablet by mouth daily.        OPTIVE 0.5-0.9 % Soln ophthalmic solution   Generic drug:  carboxymethylcellul-glycerin      1 drop    Medicare annual wellness visit, subsequent       order for DME     1 each    Left elbow brace for lateral epicondylitis (tennis elbow)    Left lateral epicondylitis       OSTEO ADVANCE PO           SLEEP MEDICINE Tabs      Take by mouth daily as needed        TYLENOL PO      Take by mouth as needed for mild pain or fever        vitamin E 400 UNIT capsule      Take 400 Units by mouth daily

## 2017-09-20 NOTE — LETTER
9/20/2017       RE: Raisa Archer  [address] [facility] [address]     Dear Colleague,    Thank you for referring your patient, Raisa Archer, to the [facility] NEUROSURGERY at [facility]. Please see a copy of my visit note below.    NEUROSURGERY PROGRESS NOTE      Eder Thao MD  [facility]  [address] [place] [address] [address]      Ms. Archer was seen in neurosurgery office for followup of her incidentally discovered left frontal convexity meningioma.     This is a 72 year old female with significant history of left breast cancer (in remission), who was found to have a left posterior frontal meningioma as part of the surveillant workup after she was diagnosed with  breast cancer in 2007. Because she was asymptomatic and the lesion was relatively small, we elected watchful waiting over the years. Because she remains neurologically stable and her scan appears relatively unchanged, we extended her followup visits/ MRI's to every 3 years since we saw her last in 9/2014.     Today, she tells us that she continues to do and functions well on day to day basis. She has no unusual headaches and/or neurologic symptoms.       INTERVAL HEALTH HISTORY:    Past Medical History:   Diagnosis Date     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     Breast cancer, right breast (H)      Centrilobular emphysema (H)      Centrilobular emphysema (H)      Colon polyps     repeat colonoscopy 2019     Left tennis elbow      Rash     currently at masectomy site     Unspecified essential hypertension     situational and resolved       CURRENT MEDICATIONS:  Current Outpatient Prescriptions   Medication Sig Dispense Refill     order for DME Left elbow brace for lateral epicondylitis (tennis elbow) 1 each 0     Ipratropium-Albuterol (COMBIVENT RESPIMAT)  MCG/ACT inhaler Inhale 1 puff into the lungs 4 times daily Not to exceed 6 doses per day. 1 Inhaler 1     Nutritional Supplements (OSTEO  "ADVANCE PO)        carboxymethylcellul-glycerin (OPTIVE) 0.5-0.9 % SOLN ophthalmic solution 1 drop       Acetaminophen (TYLENOL PO) Take by mouth as needed for mild pain or fever       fluticasone (FLONASE) 50 MCG/ACT nasal spray Spray 1-2 sprays into both nostrils daily 16 g 11     vitamin E 400 UNIT capsule Take 400 Units by mouth daily       Omega-3 Fatty Acids (FISH OIL) 1200 MG CAPS Take 1 capsule by mouth daily       Homeopathic Products (SLEEP MEDICINE) TABS Take by mouth daily as needed       Calcium Carbonate-Vitamin D (CALCIUM + D PO) Take 2 tablets by mouth daily.       Multiple Vitamins-Minerals (ZAHIDA MULTIVITAMIN FOR WOMEN) TABS Take 1 tablet by mouth daily.         ALLERGIES:  Aspirin; Chantix [varenicline tartrate]; Glycerin; Sympathomimetics; and Wool fiber    REVIEW OF SYSTEMS:  Her 10 point ROS of systems including Constitutional, Eyes, Respiratory, Cardiovascular, Gastroenterology, Genitourinary, Integumentary, Musculoskeletal, Psychiatric were all negative except for pertinent positives noted in my HPI.    PHYSICAL EXAMINATION:  Filed Vitals:  /67  Pulse 90  Temp 97.6  F (36.4  C)  Resp 20  Ht 1.626 m (5' 4\")  Wt 79.2 kg (174 lb 9.6 oz)  SpO2 93%  Breastfeeding? No  BMI 29.97 kg/m2     Appearance: Comfortable and relaxed  HENT:   Normocephalic. Normal hearing and external ear bilaterally  Neck:   Normal ROM. Supple  Cardiovascular:   Normal heart rate and rhythm  Pulmonary/Chest Wall:   Effort normal. Breath sounds normal  Abdominal:   Soft. Normal bowel sounds    LOC and Cognition:  Normal:   Alert and oriented to time, person and place for age, Appropriate and fluent speech for age, Follows commands appropriately for age and Affect pleasant, behavior cooperativeGeneral fund of knowledge and memory are appropriate for age.  Cranial Nerves:  Complete II-XII is grossly normal.  Strength in 4 extremities is 5/5. DTR's are symmetric. Romberg sway is negative. Station and gait are " normal for age.    IMAGING:  We reviewed her MRI of brain completed on 9/6/2017. The study shows a slight interval increase in size of left frontotemporal extra-axial mass, consistent with meningioma (2.2 x 1.6 x 1.8 cm, previously 2.0 x  1.5 x 1.6 cm). No new findings.    ASSESSMENT AND RECOMMENDATIONS: We reviewed the study with Mrs. Archer and explained to her that in the absence of (new) symptoms, there is no reason to change the plan or intervene this lesion surgically. We will plan to see her again in 3 years with a followup scan. We went over signs and symptoms that should prompt earlier re-evaluation.      The patient was seen in conjunction with attending Dr. So. Dr. So reviewed the history, examined the patient, and jointly developed the plan of care.      Lazaro So MD, Professor Katina Alegria, DNP, APRN, FNP-[facility]   Department of Neurosurgery  Phone: [phone]  Fax: [phone]

## 2017-09-20 NOTE — NURSING NOTE
Chief Complaint   Patient presents with     RECHECK     UMP- MENINGIOMA F/U W/ MRI PRIOR     Heriberto Bowles, GILBERTO

## 2017-09-22 NOTE — PROGRESS NOTES
NEUROSURGERY PROGRESS NOTE      Eder Thao MD  [facility]  [address] [place] [address] [address]      Ms. Archer was seen in neurosurgery office for followup of her incidentally discovered left frontal convexity meningioma.     This is a 72 year old female with significant history of left breast cancer (in remission), who was found to have a left posterior frontal meningioma as part of the surveillant workup after she was diagnosed with  breast cancer in 2007. Because she was asymptomatic and the lesion was relatively small, we elected watchful waiting over the years. Because she remains neurologically stable and her scan appears relatively unchanged, we extended her followup visits/ MRI's to every 3 years since we saw her last in 9/2014.     Today, she tells us that she continues to do and functions well on day to day basis. She has no unusual headaches and/or neurologic symptoms.       INTERVAL HEALTH HISTORY:    Past Medical History:   Diagnosis Date     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     Breast cancer, right breast (H)      Centrilobular emphysema (H)      Centrilobular emphysema (H)      Colon polyps     repeat colonoscopy 2019     Left tennis elbow      Rash     currently at masectomy site     Unspecified essential hypertension     situational and resolved       CURRENT MEDICATIONS:  Current Outpatient Prescriptions   Medication Sig Dispense Refill     order for DME Left elbow brace for lateral epicondylitis (tennis elbow) 1 each 0     Ipratropium-Albuterol (COMBIVENT RESPIMAT)  MCG/ACT inhaler Inhale 1 puff into the lungs 4 times daily Not to exceed 6 doses per day. 1 Inhaler 1     Nutritional Supplements (OSTEO ADVANCE PO)        carboxymethylcellul-glycerin (OPTIVE) 0.5-0.9 % SOLN ophthalmic solution 1 drop       Acetaminophen (TYLENOL PO) Take by mouth as needed for mild pain or fever       fluticasone (FLONASE) 50 MCG/ACT nasal spray Spray 1-2 sprays into both nostrils daily 16 g 11     vitamin E  "400 UNIT capsule Take 400 Units by mouth daily       Omega-3 Fatty Acids (FISH OIL) 1200 MG CAPS Take 1 capsule by mouth daily       Homeopathic Products (SLEEP MEDICINE) TABS Take by mouth daily as needed       Calcium Carbonate-Vitamin D (CALCIUM + D PO) Take 2 tablets by mouth daily.       Multiple Vitamins-Minerals (ZAHIDA MULTIVITAMIN FOR WOMEN) TABS Take 1 tablet by mouth daily.         ALLERGIES:  Aspirin; Chantix [varenicline tartrate]; Glycerin; Sympathomimetics; and Wool fiber    REVIEW OF SYSTEMS:  Her 10 point ROS of systems including Constitutional, Eyes, Respiratory, Cardiovascular, Gastroenterology, Genitourinary, Integumentary, Musculoskeletal, Psychiatric were all negative except for pertinent positives noted in my HPI.    PHYSICAL EXAMINATION:  Filed Vitals:  /67  Pulse 90  Temp 97.6  F (36.4  C)  Resp 20  Ht 1.626 m (5' 4\")  Wt 79.2 kg (174 lb 9.6 oz)  SpO2 93%  Breastfeeding? No  BMI 29.97 kg/m2     Appearance: Comfortable and relaxed  HENT:   Normocephalic. Normal hearing and external ear bilaterally  Neck:   Normal ROM. Supple  Cardiovascular:   Normal heart rate and rhythm  Pulmonary/Chest Wall:   Effort normal. Breath sounds normal  Abdominal:   Soft. Normal bowel sounds    LOC and Cognition:  Normal:   Alert and oriented to time, person and place for age, Appropriate and fluent speech for age, Follows commands appropriately for age and Affect pleasant, behavior cooperativeGeneral fund of knowledge and memory are appropriate for age.  Cranial Nerves:  Complete II-XII is grossly normal.  Strength in 4 extremities is 5/5. DTR's are symmetric. Romberg sway is negative. Station and gait are normal for age.    IMAGING:  We reviewed her MRI of brain completed on 9/6/2017. The study shows a slight interval increase in size of left frontotemporal extra-axial mass, consistent with meningioma (2.2 x 1.6 x 1.8 cm, previously 2.0 x  1.5 x 1.6 cm). No new findings.    ASSESSMENT AND " RECOMMENDATIONS: We reviewed the study with Mrs. Archer and explained to her that in the absence of (new) symptoms, there is no reason to change the plan or intervene this lesion surgically. We will plan to see her again in 3 years with a followup scan. We went over signs and symptoms that should prompt earlier re-evaluation.      The patient was seen in conjunction with attending Dr. So. Dr. So reviewed the history, examined the patient, and jointly developed the plan of care.      Lazaro So MD, Professor  Katina Alegria, DNP, APRN, FNP-[facility]   Department of Neurosurgery  Phone: [phone]  Fax: [phone]

## 2017-11-25 DIAGNOSIS — R06.09 DOE (DYSPNEA ON EXERTION): ICD-10-CM

## 2017-11-25 NOTE — TELEPHONE ENCOUNTER
Medication Detail      Disp Refills Start End TREASURE   Ipratropium-Albuterol (COMBIVENT RESPIMAT)  MCG/ACT inhaler 1 Inhaler 1 7/20/2017  --   Sig: Inhale 1 puff into the lungs 4 times daily Not to exceed 6 doses per day.   Class: E-Prescribe   Route: Inhalation   Order: [id number]   E-Prescribing Status: Receipt confirmed by pharmacy (7/20/2017  3:11 PM CDT)     LOV:9/1/2017

## 2017-11-29 NOTE — TELEPHONE ENCOUNTER
Ipratropium-Albuterol (COMBIVENT RESPIMAT)  MCG/ACT inhaler  Last Written Prescription Date: 7/20/2017  Last Fill Quantity:1, # refills: 1   Last Office Visit with [facility], [facility] or [facility] prescribing provider:  7/20/2017        Used for Emphysema    Thanks! Makayla Wheeler RN

## 2018-04-23 ENCOUNTER — OFFICE VISIT (OUTPATIENT)
Dept: FAMILY MEDICINE | Facility: CLINIC | Age: 73
End: 2018-04-23
Payer: MEDICARE

## 2018-04-23 VITALS
DIASTOLIC BLOOD PRESSURE: 68 MMHG | HEIGHT: 63 IN | BODY MASS INDEX: 30.65 KG/M2 | TEMPERATURE: 97.6 F | SYSTOLIC BLOOD PRESSURE: 98 MMHG | RESPIRATION RATE: 18 BRPM | HEART RATE: 106 BPM | WEIGHT: 173 LBS

## 2018-04-23 DIAGNOSIS — Z13.1 SCREENING FOR DIABETES MELLITUS: ICD-10-CM

## 2018-04-23 DIAGNOSIS — Z87.891 HISTORY OF TOBACCO USE: ICD-10-CM

## 2018-04-23 DIAGNOSIS — N90.89 VULVAR IRRITATION: ICD-10-CM

## 2018-04-23 DIAGNOSIS — Z00.00 ROUTINE GENERAL MEDICAL EXAMINATION AT A HEALTH CARE FACILITY: Primary | ICD-10-CM

## 2018-04-23 DIAGNOSIS — Z12.2 ENCOUNTER FOR SCREENING FOR LUNG CANCER: ICD-10-CM

## 2018-04-23 DIAGNOSIS — R73.01 IMPAIRED FASTING GLUCOSE: ICD-10-CM

## 2018-04-23 DIAGNOSIS — R10.11 ABDOMINAL PAIN, RIGHT UPPER QUADRANT: ICD-10-CM

## 2018-04-23 LAB
ERYTHROCYTE [DISTWIDTH] IN BLOOD BY AUTOMATED COUNT: 12.6 % (ref 10–15)
HBA1C MFR BLD: 5.2 % (ref 0–5.6)
HCT VFR BLD AUTO: 44.6 % (ref 35–47)
HGB BLD-MCNC: 14.6 G/DL (ref 11.7–15.7)
MCH RBC QN AUTO: 29.3 PG (ref 26.5–33)
MCHC RBC AUTO-ENTMCNC: 32.7 G/DL (ref 31.5–36.5)
MCV RBC AUTO: 90 FL (ref 78–100)
PLATELET # BLD AUTO: 205 10E9/L (ref 150–450)
RBC # BLD AUTO: 4.98 10E12/L (ref 3.8–5.2)
SPECIMEN SOURCE: NORMAL
WBC # BLD AUTO: 5.4 10E9/L (ref 4–11)
WET PREP SPEC: NORMAL

## 2018-04-23 PROCEDURE — 99213 OFFICE O/P EST LOW 20 MIN: CPT | Mod: 25 | Performed by: FAMILY MEDICINE

## 2018-04-23 PROCEDURE — 80053 COMPREHEN METABOLIC PANEL: CPT | Performed by: FAMILY MEDICINE

## 2018-04-23 PROCEDURE — G0296 VISIT TO DETERM LDCT ELIG: HCPCS | Performed by: FAMILY MEDICINE

## 2018-04-23 PROCEDURE — 85027 COMPLETE CBC AUTOMATED: CPT | Performed by: FAMILY MEDICINE

## 2018-04-23 PROCEDURE — 83036 HEMOGLOBIN GLYCOSYLATED A1C: CPT | Performed by: FAMILY MEDICINE

## 2018-04-23 PROCEDURE — 36415 COLL VENOUS BLD VENIPUNCTURE: CPT | Performed by: FAMILY MEDICINE

## 2018-04-23 PROCEDURE — 87210 SMEAR WET MOUNT SALINE/INK: CPT | Performed by: FAMILY MEDICINE

## 2018-04-23 PROCEDURE — G0439 PPPS, SUBSEQ VISIT: HCPCS | Performed by: FAMILY MEDICINE

## 2018-04-23 RX ORDER — CLOTRIMAZOLE AND BETAMETHASONE DIPROPIONATE 10; .64 MG/G; MG/G
CREAM TOPICAL 2 TIMES DAILY
Qty: 15 G | Refills: 1 | Status: SHIPPED | OUTPATIENT
Start: 2018-04-23 | End: 2020-02-06

## 2018-04-23 NOTE — PROGRESS NOTES
Lung Cancer Screening Shared Decision Making Visit     Raisa Archer is eligible for lung cancer screening on the basis of the information provided in my signed lung cancer screening order.     I have discussed with patient the risks and benefits of screening for lung cancer with low-dose CT.     The risks include:   radiation exposure    false positives     over-diagnosis    The benefit of early detection of lung cancer is contingent upon adherence to annual screening or more frequent follow up if indicated.     Furthermore, reaping the benefits of screening requires Raisa Archer to be willing and physically able to undergo diagnostic procedures, if indicated. Although no specific guide is available for determining severity of comorbidities, it is reasonable to withhold screening in patients who have greater mortality risk from other diseases.     We did discuss that the only way to prevent lung cancer is to not smoke. Smoking cessation assistance was not offered., as she has already quit.    I did not offer risk estimation using a calculator such as this one:    ShouldIScreen

## 2018-04-23 NOTE — PROGRESS NOTES
SUBJECTIVE:   Raisa Archer is a 73 year old female who presents for Preventive Visit.  Are you in the first 12 months of your Medicare Part B coverage?  No    Healthy Habits:    Do you get at least three servings of calcium containing foods daily (dairy, green leafy vegetables, etc.)? no, taking calcium and/or vitamin D supplement: yes     Amount of exercise or daily activities, outside of work: 1-2 day(s) per week    Problems taking medications regularly No    Medication side effects: No    Have you had an eye exam in the past two years? yes    Do you see a dentist twice per year? yes    Do you have sleep apnea, excessive snoring or daytime drowsiness?no      Ability to successfully perform activities of daily living: Yes, no assistance needed    Home safety:  throw rugs in the hallway and lack of grab bars in the bathroom     Hearing impairment: No    Fall risk:  Fallen 2 or more times in the past year?: No  Any fall with injury in the past year?: No        COGNITIVE SCREEN  1) Repeat 3 items (Banana, Sunrise, Chair)    2) Clock draw: NORMAL  3) 3 item recall: Recalls 3 objects  Results: 3 items recalled: COGNITIVE IMPAIRMENT LESS LIKELY    Mini-CogTM Copyright S Barrington. Licensed by the author for use in [facility]; reprinted with permission ([email or web address]). All rights reserved.      CV: h/o tobacco use, quit 4 years ago.  Strong family h/o diabetes and would like screening today; is not fasting.  She may have had some elevated glucose in the past (though last two years have shown normal A1c).  Malignancy: patient with h/o breast cancer s/p surgery, chemo and radiation, has been discharged from oncology clinic. Two great maternal uncles with colon cancer; she has had colon polyps, last colonoscopy was 2016, and she is due for repeat q 3 years. She is s/p hysterectomy >20 yrs ago, nonmalignant.  She has a known meningioma and states she recently had a recheck with neurology and was advised to  recheck in 3 years.    She will be due for her yearly low dose CT scan to screen for lung cancer this May.  She quit smoking four years ago and has a 30 pk year history; no symptoms such as fever, unintended weight loss, cough or hemoptysis.    Bone health: she does have osteoporosis, denies any h/o fracture (last dexa 2010 showed possible vertebral fracture), is improving her diet, getting some exercise, has stopped smoking; does NOT want to try osteoporosis medications due to risk of side effects.   Immunizations: tdap done last year.  Up to date on zoster, PCV13 and pneumovax.   Sexual health: she is not sexually active.  She has noticed some intermittent discharge, which she thinks is clear.  This has been ongoing since last year's physical (wet prep was negative then).  Now she has some itching that is external and possibly also internal.  She has not tried anything for this.  She has not been sexually active for a few decades and denies possibility of STI.  She has some urge and stress incontinence, no dysuria.       Intermittent right abdominal pain: she mentioned this last year at her physical as well.  Last year she had associated it with bending over and constipation.  Today she is denying constipation or any other bowel movement changes.  Was told she had a hernia somewhere, not sure where, years ago (on other imaging done to stage her breast cancer). She has not noticed any bulging or worse pain with valsalva or lifting items.  This has been on and off for over a year now.  Last year, she told me that It is relieved by having a BM.  Her colonoscopy is up to date.  Some occasional nausea.  No vomiting.               Reviewed and updated as needed this visit by clinical staff         Reviewed and updated as needed this visit by Provider        Social History   Substance Use Topics     Smoking status: Former Smoker     Packs/day: 1.00     Years: 30.00     Types: Cigarettes     Start date: 1/2/1967     Quit  date: 5/10/2014     Smokeless tobacco: Never Used      Comment: Quit several times over the years including when pregnant     Alcohol use No       If you drink alcohol do you typically have >3 drinks per day or >7 drinks per week? No                        Today's PHQ-2 Score:   PHQ-2 ( 1999 Pf[employer]) 10/7/2016 9/2/2016   Q1: Little interest or pleasure in doing things 0 0   Q2: Feeling down, depressed or hopeless 0 0   PHQ-2 Score 0 0   Q1: Little interest or pleasure in doing things - -   Q2: Feeling down, depressed or hopeless - -   PHQ-2 Score - -       Do you feel safe in your environment - Yes    Do you have a Health Care Directive?: pt has questions regarding health care directive     Current providers sharing in care for this patient include:   Patient Care Team:  Lyla Hawkins MD as PCP - General (Family Practice)    The following health maintenance items are reviewed in Epic and correct as of today:  Health Maintenance   Topic Date Due     ADVANCE DIRECTIVE PLANNING Q5 YRS  12/14/2016     FALL RISK ASSESSMENT  04/10/2018     MAMMO Q1 YR  05/18/2018     LUNG CANCER SCREENING ANNUAL  05/18/2018     COLONOSCOPY Q3 YR  05/31/2019     LIPID SCREEN Q5 YR FEMALE (SYSTEM ASSIGNED)  04/11/2021     TETANUS IMMUNIZATION (SYSTEM ASSIGNED)  04/10/2027     DEXA SCAN SCREENING (SYSTEM ASSIGNED)  Completed     PNEUMOCOCCAL  Completed     INFLUENZA VACCINE  Completed     HEPATITIS C SCREENING  Completed     Patient Active Problem List   Diagnosis     Tobacco use disorder     Malignant neoplasm of female breast (H)     Gall stones     Osteoporosis     Advanced directives, counseling/discussion     Chronic rhinitis     Hyperlipidemia LDL goal <160     Other hammer toe (acquired)     Meningioma (H)     History of colonic polyps     Centrilobular emphysema (H)     Past Surgical History:   Procedure Laterality Date     ABDOMEN SURGERY       BIOPSY       BREAST SURGERY       CATARACT IOL, RT/LT       CHOLECYSTECTOMY        COLONOSCOPY       ORTHOPEDIC SURGERY       PHACOEMULSIFICATION CLEAR CORNEA WITH STANDARD INTRAOCULAR LENS IMPLANT  5/22/2014    Procedure: PHACOEMULSIFICATION CLEAR CORNEA WITH STANDARD INTRAOCULAR LENS IMPLANT;  Surgeon: Rahul Reid MD;  Location:  [facility]     PHACOEMULSIFICATION CLEAR CORNEA WITH STANDARD INTRAOCULAR LENS IMPLANT  7/8/2014    Procedure: PHACOEMULSIFICATION CLEAR CORNEA WITH STANDARD INTRAOCULAR LENS IMPLANT;  Surgeon: Rahul Reid MD;  Location:  [facility]     SURGICAL HISTORY OF - 8/00    left foot reconstruction     SURGICAL HISTORY OF - 1979    hysterectomy       Social History   Substance Use Topics     Smoking status: Former Smoker     Packs/day: 1.00     Years: 30.00     Types: Cigarettes     Start date: 1/2/1967     Quit date: 5/10/2014     Smokeless tobacco: Never Used      Comment: Quit several times over the years including when pregnant     Alcohol use No     Family History   Problem Relation Age of Onset     Breast Cancer Sister      DIABETES Sister      CANCER Brother      Asthma Brother      Other Cancer Brother      Hodkins Lymphoma/Hodkins Lymphoma/Hodkins Lymphoma/Hodkins Lymphoma     CEREBROVASCULAR DISEASE Father      Had several small ones     CANCER Other      Stomach cancer- cousin     Breast Cancer Other      Aunts     DIABETES Other      Uncles and cousin/Paternal Uncles/Paternal Uncles/Paternal Uncles/Paternal Uncles     CANCER Brother      Obesity Brother      Asthma Brother      CANCER Other      Family Hx of colon cancer     Breast Cancer Other      Paternal Aunt/Paternal Aunt/Paternal Aunt/Paternal Aunt     Colon Cancer Other      Maternal Great Uncles     Breast Cancer Sister      Breast Cancer Cousin      Paternal/Paternal     Colon Cancer Other      Maternal Uncle/Maternal Great Uncles/Maternal Great Uncles/Maternal Great Uncles     Obesity Sister      On diet/On diet/On diet     Obesity Brother      Asthma Niece      DIABETES Other      Breast Cancer Other       Colon Cancer Other          Current Outpatient Prescriptions   Medication Sig Dispense Refill     Acetaminophen (TYLENOL PO) Take by mouth as needed for mild pain or fever       clotrimazole-betamethasone (LOTRISONE) cream Apply topically 2 times daily 15 g 1     fluticasone (FLONASE) 50 MCG/ACT nasal spray Spray 1-2 sprays into both nostrils daily 16 g 11     Ipratropium-Albuterol (COMBIVENT RESPIMAT)  MCG/ACT inhaler Inhale 1 puff into the lungs 4 times daily Not to exceed 6 doses per day. 1 Inhaler 1     Loratadine (CLARITIN PO)        Calcium Carbonate-Vitamin D (CALCIUM + D PO) Take 2 tablets by mouth daily.       carboxymethylcellul-glycerin (OPTIVE) 0.5-0.9 % SOLN ophthalmic solution 1 drop       Homeopathic Products (SLEEP MEDICINE) TABS Take by mouth daily as needed       Multiple Vitamins-Minerals (ZAHIDA MULTIVITAMIN FOR WOMEN) TABS Take 1 tablet by mouth daily.       Nutritional Supplements (OSTEO ADVANCE PO)        Omega-3 Fatty Acids (FISH OIL) 1200 MG CAPS Take 1 capsule by mouth daily       order for DME Left elbow brace for lateral epicondylitis (tennis elbow) 1 each 0     vitamin E 400 UNIT capsule Take 400 Units by mouth daily       Allergies   Allergen Reactions     Aspirin Nausea and Vomiting     sick     Chantix [Varenicline Tartrate] Other (See Comments)     Sees things     Glycerin Itching     LIQUID,   Reactions: itchy and redness       Sympathomimetics Other (See Comments)     Patient doesn't know why this is listed as an allergy     Wool Fiber Itching     Redness      Recent Labs   Lab Test  04/23/18   1410  09/01/17   1319  04/10/17   1438  04/11/16   1503  11/03/14   1214  12/14/11   1102  11/08/11   1413  07/21/10   1030   A1C  5.2   --   5.1  5.6   --    --    --    --    LDL   --    --    --   96   --   142*   --   109   HDL   --    --    --   59   --   61   --   63   TRIG   --    --    --   130   --   113   --   130   ALT  28   --    --    --   36   --   22  21   CR  0.95   "0.92   --    --   0.96   --   0.96  1.00   GFRESTIMATED  58*  61   --    --   58*   --   59*  56*   GFRESTBLACK  70  73   --    --   70   --   71  68   POTASSIUM  4.1   --    --    --   4.2   --   4.4  3.9          ROS:  Constitutional, HEENT, cardiovascular, pulmonary, GI, , musculoskeletal, neuro, skin, endocrine and psych systems are negative, except as otherwise noted.    OBJECTIVE:   There were no vitals taken for this visit. Estimated body mass index is 29.97 kg/(m^2) as calculated from the following:    Height as of 9/20/17: 5' 4\" (1.626 m).    Weight as of 9/20/17: 174 lb 9.6 oz (79.2 kg).  EXAM:   GENERAL APPEARANCE: healthy, alert and no distress  EYES: Eyes grossly normal to inspection, PERRL and conjunctivae and sclerae normal  HENT: ear canals and TM's normal, nose and mouth without ulcers or lesions, oropharynx clear and oral mucous membranes moist  NECK: no adenopathy, no asymmetry, masses, or scars and thyroid normal to palpation  RESP: lungs clear to auscultation - no rales, rhonchi or wheezes  BREAST: right chest wall s/p mastectomy, some telangiectasias within the scars, no masses or LAD.  Left breast is normal without dominant masses, tenderness or nipple discharge and no palpable axillary masses or adenopathy  CV: regular rate and rhythm, normal S1 S2, no S3 or S4, no murmur, click or rub, no peripheral edema and peripheral pulses strong  ABDOMEN: soft, with right upper and lower quadrant tenderness, no rebound or guarding, no hepatosplenomegaly, no masses and bowel sounds normal.  Carnet's sign positive.     (female): vulva have mild erythema and area of mild excoriation no ulcerations or lesions noted, atrophic tissues, there is pelvic floor prolapse visible just beyond the introitus, no obvious discharge is observed, no unusual odor.    MS: no musculoskeletal defects are noted and gait is age appropriate without ataxia.  Hammer toe and callus to left foot.   SKIN: no suspicious lesions or " rashes  NEURO: Normal strength and tone, sensory exam grossly normal, mentation intact and speech normal  PSYCH: mentation appears normal and affect normal/bright    ASSESSMENT / PLAN:   Well adult  CV: reviewed last year's normal lipids, repeat q 5 yr   The 10-year ASCVD risk score (Reymundo ANDERSON Jr, et al., 2013) is: 6.2%    Values used to calculate the score:      Age: 73 years      Sex: Female      Is Non- : No      Diabetic: No      Tobacco smoker: No      Systolic Blood Pressure: 98 mmHg      Is BP treated: No      HDL Cholesterol: 59 mg/dL      Total Cholesterol: 181 mg/dL   Malignancy: colonoscopy due next year.  Continue left mammogram screens.  Lung cancer screening as below.    Bone health: osteoporosis, declines treatment.   Immunizations; recommended Shingrix; she will check insurance.  Sexual health; see below; declines STI screen. She also has some prolapse not sure if this may be related to discharge, or if discharge is actually bladder incontinence.    1. Vulvar irritation  Her wet prep today is negative.  Will treat with lotrisone; if not helping, then could consider a topical estrogen however has h/o breast cancer.    - clotrimazole-betamethasone (LOTRISONE) cream; Apply topically 2 times daily  Dispense: 15 g; Refill: 1  - Wet prep    2. Impaired fasting glucose  If this is normal again this year could go q 3 year screening.  - Hemoglobin A1c    5. History of tobacco use  Continue screening until age 81  - Prof fee: Shared Decisionmaking for Lung Cancer Screening  - CT Chest Lung Cancer Scrn Low Dose wo; Future    6. Abdominal pain, right upper quadrant  This really seems to be mechanical, as it only really bothers her with movement, bending over.  Also, positive Carnet's sign on exam, making visceral etiology such as hepatic disease, (she is s/p mick), kidney stone, constipation or other colon issue less likely.  Will start with US.  May need CT scan if considering hernia.  I  "will see if I can find the older imaging study to which she is referring.  Or may simply be musculoskeletal abdominal wall pain.    - US Abdomen Limited; Future  - Comprehensive metabolic panel  - CBC with platelets    End of Life Planning:  Patient currently has an advanced directive: Yes.  Practitioner is supportive of decision.    COUNSELING:  Reviewed preventive health counseling, as reflected in patient instructions        Estimated body mass index is 29.97 kg/(m^2) as calculated from the following:    Height as of 9/20/17: 5' 4\" (1.626 m).    Weight as of 9/20/17: 174 lb 9.6 oz (79.2 kg).       reports that she quit smoking about 3 years ago. Her smoking use included Cigarettes. She started smoking about 51 years ago. She has a 30.00 pack-year smoking history. She has never used smokeless tobacco.      Appropriate preventive services were discussed with this patient, including applicable screening as appropriate for cardiovascular disease, diabetes, osteopenia/osteoporosis, and glaucoma.  As appropriate for age/gender, discussed screening for colorectal cancer, prostate cancer, breast cancer, and cervical cancer. Checklist reviewing preventive services available has been given to the patient.    Reviewed patients plan of care and provided an AVS. The Intermediate Care Plan ( asthma action plan, low back pain action plan, and migraine action plan) for Raisa meets the Care Plan requirement. This Care Plan has been established and reviewed with the Patient.    Counseling Resources:  ATP IV Guidelines  Pooled Cohorts Equation Calculator  Breast Cancer Risk Calculator  FRAX Risk Assessment  ICSI Preventive Guidelines  Dietary Guidelines for Americans, 2010  [employer]'s MyPlate  ASA Prophylaxis  Lung CA Screening    Lyla Hawkins MD  [facility]  "

## 2018-04-23 NOTE — PATIENT INSTRUCTIONS
1.  Schedule your yearly lung CT scan to screen for lung cancer at [phone]  2.  Schedule an ultrasound of your right upper abdomen by calling that same number.   3.  No more neosporin.  I have sent in lotrisone.  If your vaginal swab today does not show any infection, then you may wish to try a topical estrogen cream twice weekly.  You have a bit of prolapse (where the pelvic organs are coming down a little bit), which may cause you some symptoms.  Let me know if you want to see a specialist (OB/GYN) regarding this (if the creams don't help).    4.  Check your insurance regarding Shingrix--vaccination for shingles that just came out this year 2018.  I recommend that you have the two-shot series done.        Preventive Health Recommendations    Female Ages 65 +    Yearly exam:     See your health care provider every year in order to  o Review health changes.   o Discuss preventive care.    o Review your medicines if your doctor has prescribed any.      You no longer need a yearly Pap test unless you've had an abnormal Pap test in the past 10 years. If you have vaginal symptoms, such as bleeding or discharge, be sure to talk with your provider about a Pap test.      Every 1 to 2 years, have a mammogram.  If you are over 69, talk with your health care provider about whether or not you want to continue having screening mammograms.      Every 10 years, have a colonoscopy. Or, have a yearly FIT test (stool test). These exams will check for colon cancer.       Have a cholesterol test every 5 years, or more often if your doctor advises it.       Have a diabetes test (fasting glucose) every three years. If you are at risk for diabetes, you should have this test more often.       At age 65, have a bone density scan (DEXA) to check for osteoporosis (brittle bone disease).    Shots:    Get a flu shot each year.    Get a tetanus shot every 10 years.    Talk to your doctor about your pneumonia vaccines. There are now two you  should receive - Pneumovax (PPSV 23) and Prevnar (PCV 13).    Talk to your doctor about the shingles vaccine.    Talk to your doctor about the hepatitis B vaccine.    Nutrition:     Eat at least 5 servings of fruits and vegetables each day.      Eat whole-grain bread, whole-wheat pasta and brown rice instead of white grains and rice.      Talk to your provider about Calcium and Vitamin D.     Lifestyle    Exercise at least 150 minutes a week (30 minutes a day, 5 days a week). This will help you control your weight and prevent disease.      Limit alcohol to one drink per day.      No smoking.       Wear sunscreen to prevent skin cancer.       See your dentist twice a year for an exam and cleaning.      See your eye doctor every 1 to 2 years to screen for conditions such as glaucoma, macular degeneration and cataracts.    Lung Cancer Screening   Frequently Asked Questions  If you are at high-risk for lung cancer, getting screened with low-dose computed tomography (LDCT) every year can help save your life. This handout offers answers to some of the most common questions about lung cancer screening. If you have other questions, please call [phone] ([phone]).     What is it?  Lung cancer screening uses special X-ray technology to create an image of your lung tissue. The exam is quick and easy and takes less than 10 seconds. We don t give you any medicine or use any needles. You can eat before and after the exam. You don t need to change your clothes as long as the clothing on your chest doesn t contain metal. But, you do need to be able to hold your breath for at least 6 seconds during the exam.    What is the goal of lung cancer screening?  The goal of lung cancer screening is to save lives. Many times, lung cancer is not found until a person starts having physical symptoms. Lung cancer screening can help detect lung cancer in the earliest stages when it may be easier to treat.    Who should be screened  for lung cancer?  We suggest lung cancer screening for anyone who is at high-risk for lung cancer. You are in the high-risk group if you:      are between the ages of 55 and 79, and    have smoked at least 1 pack of cigarettes a day for 30 or more years, and    still smoke or have quit within the past 15 years.    However, if you have a new cough or shortness of breath, you should talk to your doctor before being screened.    Some national lung health advocacy groups also recommend screening for people ages 50 to 79 who have smoked an average of 1 pack of cigarettes a day for 20 years. They must also have at least 1 other risk factor for lung cancer, not including exposure to secondhand smoke. Other risk factors are having had cancer in the past, emphysema, pulmonary fibrosis, COPD, a family history of lung cancer, or exposure to certain materials such as arsenic, asbestos, beryllium, cadmium, chromium, diesel fumes, nickel, radon or silica. Your care team can help you know if you have one of these risk factors.     Why does it matter if I have symptoms?  Certain symptoms can be a sign that you have a condition in your lungs that should be checked and treated by your doctor. These symptoms include fever, chest pain, a new or changing cough, shortness of breath that you have never felt before, coughing up blood or unexplained weight loss. Having any of these symptoms can greatly affect the results of lung cancer screening.       Should all smokers get an LDCT lung cancer screening exam?  It depends. Lung cancer screening is for a very specific group of men and women who have a history of heavy smoking over a long period of time (see  Who should be screened for lung cancer  above).  I am in the high-risk group, but have been diagnosed with cancer in the past. Is LDCT lung cancer screening right for me?  In some cases, you should not have LDCT lung screening, such as when your doctor is already following your cancer  with CT scan studies. Your doctor will help you decide if LDCT lung screening is right for you.  Do I need to have a screening exam every year?  Yes. If you are in the high-risk group described earlier, you should get an LDCT lung cancer screening exam every year until you are 79, or are no longer willing or able to undergo screening and possible procedures to diagnose and treat lung cancer.  How effective is LDCT at preventing death from lung cancer?  Studies have shown that LDCT lung cancer screening can lower the risk of death from lung cancer by 20 percent in people who are at high-risk.  What are the risks?  There are some risks and limitations of LDCT lung cancer screening. We want to make sure you understand the risks and benefits, so please let us know if you have any questions. Your doctor may want to talk with you more about these risks.    Radiation exposure: As with any exam that uses radiation, there is a very small increased risk of cancer. The amount of radiation in LDCT is small--about the same amount a person would get from a mammogram. Your doctor orders the exam when he or she feels the potential benefits outweigh the risks.    False negatives: No test is perfect, including LDCT. It is possible that you may have a medical condition, including lung cancer, that is not found during your exam. This is called a false negative result.    False positives and more testing: LDCT very often finds something in the lung that could be cancer, but in fact is not. This is called a false positive result. False positive tests often cause anxiety. To make sure these findings are not cancer, you may need to have more tests. These tests will be done only if you give us permission. Sometimes patients need a treatment that can have side effects, such as a biopsy. For more information on false positives, see  What can I expect from the results?     Findings not related to lung cancer: Your LDCT exam also takes pictures  of areas of your body next to your lungs. In a very small number of cases, the CT scan will show an abnormal finding in one of these areas, such as your kidneys, adrenal glands, liver or thyroid. This finding may not be serious, but you may need more tests. Your doctor can help you decide what other tests you may need, if any.  What can I expect from the results?  About 1 out of 4 LDCT exams will find something that may need more tests. Most of the time, these findings are lung nodules. Lung nodules are very small collections of tissue in the lung. These nodules are very common, and the vast majority--more than 97 percent--are not cancer (benign). Most are normal lymph nodes or small areas of scarring from past infections.  But, if a small lung nodule is found to be cancer, the cancer can be cured more than 90 percent of the time. To know if the nodule is cancer, we may need to get more images before your next yearly screening exam. If the nodule has suspicious features (for example, it is large, has an odd shape or grows over time), we will refer you to a specialist for further testing.  Will my doctor also get the results?  Yes. Your doctor will get a copy of your results.  Is it okay to keep smoking now that there s a cancer screening exam?  No. Tobacco is one of the strongest cancer-causing agents. It causes not only lung cancer, but other cancers and cardiovascular (heart) diseases as well. The damage caused by smoking builds over time. This means that the longer you smoke, the higher your risk of disease. While it is never too late to quit, the sooner you quit, the better.  Where can I find help to quit smoking?  The best way to prevent lung cancer is to stop smoking. If you have already quit smoking, congratulations and keep it up! For help on quitting smoking, please call [facility] at 1-406-558-GOFV ([phone]) or the [employer] at 1-221.353.7290 to find local resources near you.  One-on-one health  coaching:  If you d prefer to work individually with a health care provider on tobacco cessation, we offer:      Medication Therapy Management:  Our specially trained pharmacists work closely with you and your doctor to help you quit smoking.  Call [phone] or [phone] (toll free).     Can Do: Health coaching offered by [facility].  [email or web address]

## 2018-04-23 NOTE — MR AVS SNAPSHOT
After Visit Summary   4/23/2018    Raisa Archer    MRN: [id number]           Patient Information     Date Of Birth          1945        Visit Information        Provider Department      4/23/2018 1:20 PM Lyla Hawkins MD [facility]        Today's Diagnoses     Vulvar irritation    -  1    Screening for diabetes mellitus        Encounter for screening for lung cancer        Impaired fasting glucose        History of tobacco use        Abdominal pain, right upper quadrant          Care Instructions    1.  Schedule your yearly lung CT scan to screen for lung cancer at [phone]  2.  Schedule an ultrasound of your right upper abdomen by calling that same number.   3.  No more neosporin.  I have sent in lotrisone.  If your vaginal swab today does not show any infection, then you may wish to try a topical estrogen cream twice weekly.  You have a bit of prolapse (where the pelvic organs are coming down a little bit), which may cause you some symptoms.  Let me know if you want to see a specialist (OB/GYN) regarding this (if the creams don't help).    4.  Check your insurance regarding Shingrix--vaccination for shingles that just came out this year 2018.  I recommend that you have the two-shot series done.        Preventive Health Recommendations    Female Ages 65 +    Yearly exam:     See your health care provider every year in order to  o Review health changes.   o Discuss preventive care.    o Review your medicines if your doctor has prescribed any.      You no longer need a yearly Pap test unless you've had an abnormal Pap test in the past 10 years. If you have vaginal symptoms, such as bleeding or discharge, be sure to talk with your provider about a Pap test.      Every 1 to 2 years, have a mammogram.  If you are over 69, talk with your health care provider about whether or not you want to continue having screening mammograms.      Every 10 years, have a colonoscopy.  Or, have a yearly FIT test (stool test). These exams will check for colon cancer.       Have a cholesterol test every 5 years, or more often if your doctor advises it.       Have a diabetes test (fasting glucose) every three years. If you are at risk for diabetes, you should have this test more often.       At age 65, have a bone density scan (DEXA) to check for osteoporosis (brittle bone disease).    Shots:    Get a flu shot each year.    Get a tetanus shot every 10 years.    Talk to your doctor about your pneumonia vaccines. There are now two you should receive - Pneumovax (PPSV 23) and Prevnar (PCV 13).    Talk to your doctor about the shingles vaccine.    Talk to your doctor about the hepatitis B vaccine.    Nutrition:     Eat at least 5 servings of fruits and vegetables each day.      Eat whole-grain bread, whole-wheat pasta and brown rice instead of white grains and rice.      Talk to your provider about Calcium and Vitamin D.     Lifestyle    Exercise at least 150 minutes a week (30 minutes a day, 5 days a week). This will help you control your weight and prevent disease.      Limit alcohol to one drink per day.      No smoking.       Wear sunscreen to prevent skin cancer.       See your dentist twice a year for an exam and cleaning.      See your eye doctor every 1 to 2 years to screen for conditions such as glaucoma, macular degeneration and cataracts.    Lung Cancer Screening   Frequently Asked Questions  If you are at high-risk for lung cancer, getting screened with low-dose computed tomography (LDCT) every year can help save your life. This handout offers answers to some of the most common questions about lung cancer screening. If you have other questions, please call [phone] ([phone]).     What is it?  Lung cancer screening uses special X-ray technology to create an image of your lung tissue. The exam is quick and easy and takes less than 10 seconds. We don t give you any medicine or  use any needles. You can eat before and after the exam. You don t need to change your clothes as long as the clothing on your chest doesn t contain metal. But, you do need to be able to hold your breath for at least 6 seconds during the exam.    What is the goal of lung cancer screening?  The goal of lung cancer screening is to save lives. Many times, lung cancer is not found until a person starts having physical symptoms. Lung cancer screening can help detect lung cancer in the earliest stages when it may be easier to treat.    Who should be screened for lung cancer?  We suggest lung cancer screening for anyone who is at high-risk for lung cancer. You are in the high-risk group if you:      are between the ages of 55 and 79, and    have smoked at least 1 pack of cigarettes a day for 30 or more years, and    still smoke or have quit within the past 15 years.    However, if you have a new cough or shortness of breath, you should talk to your doctor before being screened.    Some national lung health advocacy groups also recommend screening for people ages 50 to 79 who have smoked an average of 1 pack of cigarettes a day for 20 years. They must also have at least 1 other risk factor for lung cancer, not including exposure to secondhand smoke. Other risk factors are having had cancer in the past, emphysema, pulmonary fibrosis, COPD, a family history of lung cancer, or exposure to certain materials such as arsenic, asbestos, beryllium, cadmium, chromium, diesel fumes, nickel, radon or silica. Your care team can help you know if you have one of these risk factors.     Why does it matter if I have symptoms?  Certain symptoms can be a sign that you have a condition in your lungs that should be checked and treated by your doctor. These symptoms include fever, chest pain, a new or changing cough, shortness of breath that you have never felt before, coughing up blood or unexplained weight loss. Having any of these symptoms  can greatly affect the results of lung cancer screening.       Should all smokers get an LDCT lung cancer screening exam?  It depends. Lung cancer screening is for a very specific group of men and women who have a history of heavy smoking over a long period of time (see  Who should be screened for lung cancer  above).  I am in the high-risk group, but have been diagnosed with cancer in the past. Is LDCT lung cancer screening right for me?  In some cases, you should not have LDCT lung screening, such as when your doctor is already following your cancer with CT scan studies. Your doctor will help you decide if LDCT lung screening is right for you.  Do I need to have a screening exam every year?  Yes. If you are in the high-risk group described earlier, you should get an LDCT lung cancer screening exam every year until you are 79, or are no longer willing or able to undergo screening and possible procedures to diagnose and treat lung cancer.  How effective is LDCT at preventing death from lung cancer?  Studies have shown that LDCT lung cancer screening can lower the risk of death from lung cancer by 20 percent in people who are at high-risk.  What are the risks?  There are some risks and limitations of LDCT lung cancer screening. We want to make sure you understand the risks and benefits, so please let us know if you have any questions. Your doctor may want to talk with you more about these risks.    Radiation exposure: As with any exam that uses radiation, there is a very small increased risk of cancer. The amount of radiation in LDCT is small--about the same amount a person would get from a mammogram. Your doctor orders the exam when he or she feels the potential benefits outweigh the risks.    False negatives: No test is perfect, including LDCT. It is possible that you may have a medical condition, including lung cancer, that is not found during your exam. This is called a false negative result.    False positives  and more testing: LDCT very often finds something in the lung that could be cancer, but in fact is not. This is called a false positive result. False positive tests often cause anxiety. To make sure these findings are not cancer, you may need to have more tests. These tests will be done only if you give us permission. Sometimes patients need a treatment that can have side effects, such as a biopsy. For more information on false positives, see  What can I expect from the results?     Findings not related to lung cancer: Your LDCT exam also takes pictures of areas of your body next to your lungs. In a very small number of cases, the CT scan will show an abnormal finding in one of these areas, such as your kidneys, adrenal glands, liver or thyroid. This finding may not be serious, but you may need more tests. Your doctor can help you decide what other tests you may need, if any.  What can I expect from the results?  About 1 out of 4 LDCT exams will find something that may need more tests. Most of the time, these findings are lung nodules. Lung nodules are very small collections of tissue in the lung. These nodules are very common, and the vast majority--more than 97 percent--are not cancer (benign). Most are normal lymph nodes or small areas of scarring from past infections.  But, if a small lung nodule is found to be cancer, the cancer can be cured more than 90 percent of the time. To know if the nodule is cancer, we may need to get more images before your next yearly screening exam. If the nodule has suspicious features (for example, it is large, has an odd shape or grows over time), we will refer you to a specialist for further testing.  Will my doctor also get the results?  Yes. Your doctor will get a copy of your results.  Is it okay to keep smoking now that there s a cancer screening exam?  No. Tobacco is one of the strongest cancer-causing agents. It causes not only lung cancer, but other cancers and  cardiovascular (heart) diseases as well. The damage caused by smoking builds over time. This means that the longer you smoke, the higher your risk of disease. While it is never too late to quit, the sooner you quit, the better.  Where can I find help to quit smoking?  The best way to prevent lung cancer is to stop smoking. If you have already quit smoking, congratulations and keep it up! For help on quitting smoking, please call [employer] at 9-094-218-VNNU (4485) or the American Cancer Society at 1-727.892.7858 to find local resources near you.  One-on-one health coaching:  If you d prefer to work individually with a health care provider on tobacco cessation, we offer:      Medication Therapy Management:  Our specially trained pharmacists work closely with you and your doctor to help you quit smoking.  Call [phone] or [phone] (toll free).     Can Do: Health coaching offered by [facility].  [email or web address]              Follow-ups after your visit        Future tests that were ordered for you today     Open Future Orders        Priority Expected Expires Ordered    US Abdomen Limited Routine  4/23/2019 4/23/2018    CT Chest Lung Cancer Scrn Low Dose wo Routine  4/23/2019 4/23/2018            Who to contact     If you have questions or need follow up information about today's clinic visit or your schedule please contact [facility] directly at [phone].  Normal or non-critical lab and imaging results will be communicated to you by MyChart, letter or phone within 4 business days after the clinic has received the results. If you do not hear from us within 7 days, please contact the clinic through [employer] or phone. If you have a critical or abnormal lab result, we will notify you by phone as soon as possible.  Submit refill requests through [employer] or call your pharmacy and they will forward the refill request to us. Please allow 3 business days for your refill to be  "completed.          Additional Information About Your Visit        Kickfirehart Information     [employer] gives you secure access to your electronic health record. If you see a primary care provider, you can also send messages to your care team and make appointments. If you have questions, please call your primary care clinic.  If you do not have a primary care provider, please call [phone] and they will assist you.        Care EveryWhere ID     This is your Care EveryWhere ID. This could be used by other organizations to access your [place] medical records  [id number]        Your Vitals Were     Pulse Temperature Respirations Height BMI (Body Mass Index)       106 97.6  F (36.4  C) (Oral) 18 5' 3\" (1.6 m) 30.65 kg/m2        Blood Pressure from Last 3 Encounters:   04/23/18 98/68   09/20/17 131/67   09/01/17 101/72    Weight from Last 3 Encounters:   04/23/18 173 lb (78.5 kg)   09/20/17 174 lb 9.6 oz (79.2 kg)   09/01/17 174 lb (78.9 kg)              We Performed the Following     CBC with platelets     Comprehensive metabolic panel     Hemoglobin A1c     Prof fee: Shared Decisionmaking for Lung Cancer Screening     Wet prep          Today's Medication Changes          These changes are accurate as of 4/23/18  2:13 PM.  If you have any questions, ask your nurse or doctor.               Start taking these medicines.        Dose/Directions    clotrimazole-betamethasone cream   Commonly known as:  LOTRISONE   Used for:  Vulvar irritation   Started by:  Lyla Hawkins MD        Apply topically 2 times daily   Quantity:  15 g   Refills:  1            Where to get your medicines      These medications were sent to [facility] [address] [facility] [address] AT [address] [address]     Phone:  [phone]     clotrimazole-betamethasone cream                Primary Care Provider Office Phone # Fax #    Lyla Hawkins [phone] " [phone]       [address]  [place] [address]        Equal Access to Services     ASHLEY NAIR : Hadii aad ku hadcaseyo Sodarcyali, waaxda luqadaha, qaybta kaalmada simon, david mikyin hayaabhavik cornellnely galvan reuben heck. So [facility] [place] [facility] [phone].    ATENCIÓN: Si habla español, tiene a casanova disposición servicios gratuitos de asistencia lingüística. Llame al [phone].    We comply with applicable federal civil rights laws and [place] laws. We do not discriminate on the basis of race, color, national origin, age, disability, sex, sexual orientation, or gender identity.            Thank you!     Thank you for choosing [facility]  for your care. Our goal is always to provide you with excellent care. Hearing back from our patients is one way we can continue to improve our services. Please take a few minutes to complete the written survey that you may receive in the mail after your visit with us. Thank you!             Your Updated Medication List - Protect others around you: Learn how to safely use, store and throw away your medicines at www.disposemymeds.org.          This list is accurate as of 4/23/18  2:13 PM.  Always use your most recent med list.                   Brand Name Dispense Instructions for use Diagnosis    CALCIUM + D PO      Take 2 tablets by mouth daily.        CLARITIN PO           clotrimazole-betamethasone cream    LOTRISONE    15 g    Apply topically 2 times daily    Vulvar irritation       fish Oil 1200 MG capsule      Take 1 capsule by mouth daily        fluticasone 50 MCG/ACT spray    FLONASE    16 g    Spray 1-2 sprays into both nostrils daily    Seasonal allergic rhinitis       Ipratropium-Albuterol  MCG/ACT inhaler    COMBIVENT RESPIMAT    1 Inhaler    Inhale 1 puff into the lungs 4 times daily Not to exceed 6 doses per day.    ZAPATA (dyspnea on exertion)       ZAHIDA MULTIVITAMIN FOR WOMEN Tabs      Take 1 tablet by mouth daily.        OPTIVE 0.5-0.9 % Soln  ophthalmic solution   Generic drug:  carboxymethylcellul-glycerin      1 drop    Medicare annual wellness visit, subsequent       order for DME     1 each    Left elbow brace for lateral epicondylitis (tennis elbow)    Left lateral epicondylitis       OSTEO ADVANCE PO           SLEEP MEDICINE Tabs      Take by mouth daily as needed        TYLENOL PO      Take by mouth as needed for mild pain or fever        vitamin E 400 UNIT capsule      Take 400 Units by mouth daily

## 2018-04-24 LAB
ALBUMIN SERPL-MCNC: 4.1 G/DL (ref 3.4–5)
ALP SERPL-CCNC: 102 U/L (ref 40–150)
ALT SERPL W P-5'-P-CCNC: 28 U/L (ref 0–50)
ANION GAP SERPL CALCULATED.3IONS-SCNC: 6 MMOL/L (ref 3–14)
AST SERPL W P-5'-P-CCNC: 18 U/L (ref 0–45)
BILIRUB SERPL-MCNC: 0.6 MG/DL (ref 0.2–1.3)
BUN SERPL-MCNC: 20 MG/DL (ref 7–30)
CALCIUM SERPL-MCNC: 9.2 MG/DL (ref 8.5–10.1)
CHLORIDE SERPL-SCNC: 107 MMOL/L (ref 94–109)
CO2 SERPL-SCNC: 28 MMOL/L (ref 20–32)
CREAT SERPL-MCNC: 0.95 MG/DL (ref 0.52–1.04)
GFR SERPL CREATININE-BSD FRML MDRD: 58 ML/MIN/1.7M2
GLUCOSE SERPL-MCNC: 92 MG/DL (ref 70–99)
POTASSIUM SERPL-SCNC: 4.1 MMOL/L (ref 3.4–5.3)
PROT SERPL-MCNC: 7.4 G/DL (ref 6.8–8.8)
SODIUM SERPL-SCNC: 141 MMOL/L (ref 133–144)

## 2018-04-26 ENCOUNTER — RADIANT APPOINTMENT (OUTPATIENT)
Dept: CT IMAGING | Facility: CLINIC | Age: 73
End: 2018-04-26
Attending: FAMILY MEDICINE
Payer: MEDICARE

## 2018-04-26 ENCOUNTER — RADIANT APPOINTMENT (OUTPATIENT)
Dept: ULTRASOUND IMAGING | Facility: CLINIC | Age: 73
End: 2018-04-26
Attending: FAMILY MEDICINE
Payer: MEDICARE

## 2018-04-26 DIAGNOSIS — R10.11 ABDOMINAL PAIN, RIGHT UPPER QUADRANT: ICD-10-CM

## 2018-04-26 DIAGNOSIS — Z87.891 HISTORY OF TOBACCO USE: ICD-10-CM

## 2018-05-04 ENCOUNTER — OFFICE VISIT (OUTPATIENT)
Dept: FAMILY MEDICINE | Facility: CLINIC | Age: 73
End: 2018-05-04
Payer: MEDICARE

## 2018-05-04 VITALS
SYSTOLIC BLOOD PRESSURE: 110 MMHG | BODY MASS INDEX: 30.65 KG/M2 | OXYGEN SATURATION: 100 % | TEMPERATURE: 97.5 F | HEART RATE: 87 BPM | DIASTOLIC BLOOD PRESSURE: 74 MMHG | WEIGHT: 173 LBS | RESPIRATION RATE: 18 BRPM

## 2018-05-04 DIAGNOSIS — R10.84 ABDOMINAL PAIN, GENERALIZED: Primary | ICD-10-CM

## 2018-05-04 DIAGNOSIS — L30.4 INTERTRIGO: ICD-10-CM

## 2018-05-04 DIAGNOSIS — R06.09 DOE (DYSPNEA ON EXERTION): ICD-10-CM

## 2018-05-04 DIAGNOSIS — L01.00 IMPETIGO: ICD-10-CM

## 2018-05-04 PROCEDURE — 99214 OFFICE O/P EST MOD 30 MIN: CPT | Performed by: FAMILY MEDICINE

## 2018-05-04 RX ORDER — MUPIROCIN 20 MG/G
OINTMENT TOPICAL 3 TIMES DAILY
Qty: 22 G | Refills: 1 | Status: SHIPPED | OUTPATIENT
Start: 2018-05-04 | End: 2018-05-09

## 2018-05-04 RX ORDER — NYSTATIN 100000 [USP'U]/G
POWDER TOPICAL 3 TIMES DAILY PRN
Qty: 60 G | Refills: 1 | Status: SHIPPED | OUTPATIENT
Start: 2018-05-04 | End: 2020-02-06

## 2018-05-04 NOTE — MR AVS SNAPSHOT
After Visit Summary   5/4/2018    Raisa Archer    MRN: [id number]           Patient Information     Date Of Birth          1945        Visit Information        Provider Department      5/4/2018 11:20 AM Lyla Hawkins MD [facility]        Today's Diagnoses     Abdominal pain, generalized    -  1    ZAPATA (dyspnea on exertion)        Intertrigo        Impetigo          Care Instructions    1.  CT scan with contrast.     2.  Mupirocin antibiotic ointment to your belly button 3 times daily for 5-7 days.  Ok to put a bit of the nystatin powder as well.     3.  Nystatin powder to the groin area three times daily as needed.            Follow-ups after your visit        Your next 10 appointments already scheduled     May 18, 2018  1:00 PM CDT   Screening Mammogram with [id number]   [facility] ([email or web address])    [address] [place] [address] [phone]           Do NOT use body powder, lotions, perfume or deodorant the day of the exam.  If your last mammogram was not done at [facility], please bring your mammogram films. We will need the name of your provider to send a copy of your report.  A mammogram may be covered on an annual or biannual basis, please check with your insurance company.              Future tests that were ordered for you today     Open Future Orders        Priority Expected Expires Ordered    CT Abdomen Pelvis w Contrast Routine  5/4/2019 5/4/2018            Who to contact     If you have questions or need follow up information about today's clinic visit or your schedule please contact [facility] directly at [phone].  Normal or non-critical lab and imaging results will be communicated to you by MyChart, letter or phone within 4 business days after the clinic has received the results. If you do not hear from us within 7 days, please contact the clinic through [employer]  or phone. If you have a critical or abnormal lab result, we will notify you by phone as soon as possible.  Submit refill requests through [employer] or call your pharmacy and they will forward the refill request to us. Please allow 3 business days for your refill to be completed.          Additional Information About Your Visit        T[employer]ver[employer] Information     [employer] gives you secure access to your electronic health record. If you see a primary care provider, you can also send messages to your care team and make appointments. If you have questions, please call your primary care clinic.  If you do not have a primary care provider, please call [phone] and they will assist you.        Care EveryWhere ID     This is your Care EveryWhere ID. This could be used by other organizations to access your [place] medical records  [id number]        Your Vitals Were     Pulse Temperature Respirations Pulse Oximetry BMI (Body Mass Index)       87 97.5  F (36.4  C) (Oral) 18 100% 30.65 kg/m2        Blood Pressure from Last 3 Encounters:   05/04/18 110/74   04/23/18 98/68   09/20/17 131/67    Weight from Last 3 Encounters:   05/04/18 173 lb (78.5 kg)   04/23/18 173 lb (78.5 kg)   09/20/17 174 lb 9.6 oz (79.2 kg)                 Today's Medication Changes          These changes are accurate as of 5/4/18 12:03 PM.  If you have any questions, ask your nurse or doctor.               Start taking these medicines.        Dose/Directions    mupirocin 2 % ointment   Commonly known as:  BACTROBAN   Used for:  Abdominal pain, generalized   Started by:  Lyla Hawkins MD        Apply topically 3 times daily for 5 days   Quantity:  22 g   Refills:  1       nystatin 830090 UNIT/GM Powd   Commonly known as:  MYCOSTATIN   Used for:  Intertrigo   Started by:  Lyla Hawkins MD        Apply topically 3 times daily as needed   Quantity:  60 g   Refills:  1            Where to get your medicines      [facility] [address] [facility] [address] AT [facility] [address]  [address] [place], [place] [address], [facility] [address]     Phone:  [phone]     Ipratropium-Albuterol  MCG/ACT inhaler    mupirocin 2 % ointment    nystatin 911110 UNIT/GM Powd                Primary Care Provider Office Phone # Fax #    Lyla Hawkins [phone] [address] [place] [address]        Equal Access to Services     [facility] : Hadii aad ku hadasho Soomaali, waaxda luqadaha, qaybta kaalmada adeegyada, david thurman hayzulay alexis . So [facility] [phone].    ATENCIÓN: Si habla español, tiene a casanova disposición servicios gratuitos de asistencia lingüística. [facility] [phone].    We comply with applicable federal civil rights laws and [place] laws. We do not discriminate on the basis of race, color, national origin, age, disability, sex, sexual orientation, or gender identity.            Thank you!     Thank you for choosing [facility]  for your care. Our goal is always to provide you with excellent care. Hearing back from our patients is one way we can continue to improve our services. Please take a few minutes to complete the written survey that you may receive in the mail after your visit with us. Thank you!             Your Updated Medication List - Protect others around you: Learn how to safely use, store and throw away your medicines at www.disposemymeds.org.          This list is accurate as of 5/4/18 12:03 PM.  Always use your most recent med list.                   Brand Name Dispense Instructions for use Diagnosis    CALCIUM + D PO      Take 2 tablets by mouth daily.        CLARITIN PO      Take by mouth daily        clotrimazole-betamethasone cream    LOTRISONE    15 g    Apply topically 2 times daily    Vulvar irritation       fish Oil 1200 MG capsule      Take 1 capsule by mouth daily        fluticasone 50 MCG/ACT spray     FLONASE    16 g    Spray 1-2 sprays into both nostrils daily    Seasonal allergic rhinitis       Ipratropium-Albuterol  MCG/ACT inhaler    COMBIVENT RESPIMAT    1 Inhaler    Inhale 1 puff into the lungs 4 times daily Not to exceed 6 doses per day.    ZAPATA (dyspnea on exertion)       ZAHIDA MULTIVITAMIN FOR WOMEN Tabs      Take 1 tablet by mouth daily.        mupirocin 2 % ointment    BACTROBAN    22 g    Apply topically 3 times daily for 5 days    Abdominal pain, generalized       nystatin 980085 UNIT/GM Powd    MYCOSTATIN    60 g    Apply topically 3 times daily as needed    Intertrigo       OPTIVE 0.5-0.9 % Soln ophthalmic solution   Generic drug:  carboxymethylcellul-glycerin      1 drop    Medicare annual wellness visit, subsequent       OSTEO ADVANCE PO           SLEEP MEDICINE Tabs      Take by mouth daily as needed        TYLENOL PO      Take by mouth as needed for mild pain or fever        vitamin E 400 UNIT capsule      Take 400 Units by mouth daily

## 2018-05-04 NOTE — PATIENT INSTRUCTIONS
1.  CT scan with contrast.     2.  Mupirocin antibiotic ointment to your belly button 3 times daily for 5-7 days.  Ok to put a bit of the nystatin powder as well.     3.  Nystatin powder to the groin area three times daily as needed.

## 2018-05-04 NOTE — PROGRESS NOTES
"HPI      ROS      Physical Exam      SUBJECTIVE:   Raisa Archer is a 73 year old female who presents to clinic today for the following health issues:    Pt is here to follow up on ultrasound results and see what further action is needed.     Right abdominal pain: this has been gradually worsening over the past year or longer.  She had mentioned it at her well adult visit last year; it was mainly positional with bending over, or associated with constipation.  At her physical last month,s he stated that she has worse pain I this area, still with bending over, but sometimes not associated with change in position.  Earlier this week, she had a more severe pain in the right abdomen that radiated laterally across her abdomen.  She has some occasional nausea and heartburn.  Stools have been normal.  No fevers. She is s/p cholecystectomy.  She had a normal RUQ ultrasound recently that did not reveal the cause of her pain. She is concerned bc the pain seems to be worsening, and around the time of her breast cancer surgery, she was advised she had a hernia somewhere.      Skin issues; She has had some redness and oozing from her belly button and from in her groin \"for a while.\"  She has been trying neosporin in her belly button but it is not helping.  It can be itchy.       Problem list and histories reviewed & adjusted, as indicated.  Additional history: as documented    Patient Active Problem List   Diagnosis     Tobacco use disorder     Malignant neoplasm of female breast (H)     Gall stones     Osteoporosis     Advanced directives, counseling/discussion     Chronic rhinitis     Hyperlipidemia LDL goal <160     Other hammer toe (acquired)     Meningioma (H)     History of colonic polyps     Centrilobular emphysema (H)     Past Surgical History:   Procedure Laterality Date     ABDOMEN SURGERY       BIOPSY       BREAST SURGERY       CATARACT IOL, RT/LT       CHOLECYSTECTOMY       COLONOSCOPY       ORTHOPEDIC SURGERY   "     PHACOEMULSIFICATION CLEAR CORNEA WITH STANDARD INTRAOCULAR LENS IMPLANT  5/22/2014    Procedure: PHACOEMULSIFICATION CLEAR CORNEA WITH STANDARD INTRAOCULAR LENS IMPLANT;  Surgeon: Rahul Reid MD;  Location:  [facility]     PHACOEMULSIFICATION CLEAR CORNEA WITH STANDARD INTRAOCULAR LENS IMPLANT  7/8/2014    Procedure: PHACOEMULSIFICATION CLEAR CORNEA WITH STANDARD INTRAOCULAR LENS IMPLANT;  Surgeon: Rahul Reid MD;  [facility]:  [facility]     SURGICAL HISTORY OF - 8/00    left foot reconstruction     SURGICAL HISTORY OF -   1979    hysterectomy       Social History   Substance Use Topics     Smoking status: Former Smoker     Packs/day: 1.00     Years: 30.00     Types: Cigarettes     Start date: 1/2/1967     Quit date: 5/10/2014     Smokeless tobacco: Never Used      Comment: Quit several times over the years including when pregnant     Alcohol use No     Family History   Problem Relation Age of Onset     Breast Cancer Sister      DIABETES Sister      CANCER Brother      Asthma Brother      Other Cancer Brother      Hodkins Lymphoma/Hodkins Lymphoma/Hodkins Lymphoma/Hodkins Lymphoma     CEREBROVASCULAR DISEASE Father      Had several small ones     CANCER Other      Stomach cancer- cousin     Breast Cancer Other      Aunts     DIABETES Other      Uncles and cousin/Paternal Uncles/Paternal Uncles/Paternal Uncles/Paternal Uncles     CANCER Brother      Obesity Brother      Asthma Brother      CANCER Other      Family Hx of colon cancer     Breast Cancer Other      Paternal Aunt/Paternal Aunt/Paternal Aunt/Paternal Aunt     Colon Cancer Other      Maternal Great Uncles     Breast Cancer Sister      Breast Cancer Cousin      Paternal/Paternal     Colon Cancer Other      Maternal Uncle/Maternal Great Uncles/Maternal Great Uncles/Maternal Great Uncles     Obesity Sister      On diet/On diet/On diet     Obesity Brother      Asthma Niece      DIABETES Other      Breast Cancer Other      Colon Cancer Other          Current  Outpatient Prescriptions   Medication Sig Dispense Refill     Acetaminophen (TYLENOL PO) Take by mouth as needed for mild pain or fever       Calcium Carbonate-Vitamin D (CALCIUM + D PO) Take 2 tablets by mouth daily.       carboxymethylcellul-glycerin (OPTIVE) 0.5-0.9 % SOLN ophthalmic solution 1 drop       clotrimazole-betamethasone (LOTRISONE) cream Apply topically 2 times daily 15 g 1     fluticasone (FLONASE) 50 MCG/ACT nasal spray Spray 1-2 sprays into both nostrils daily 16 g 11     Homeopathic Products (SLEEP MEDICINE) TABS Take by mouth daily as needed       Ipratropium-Albuterol (COMBIVENT RESPIMAT)  MCG/ACT inhaler Inhale 1 puff into the lungs 4 times daily Not to exceed 6 doses per day. 1 Inhaler 3     Loratadine (CLARITIN PO) Take by mouth daily        Multiple Vitamins-Minerals (ZAHIDA MULTIVITAMIN FOR WOMEN) TABS Take 1 tablet by mouth daily.       mupirocin (BACTROBAN) 2 % ointment Apply topically 3 times daily for 5 days 22 g 1     Nutritional Supplements (OSTEO ADVANCE PO)        nystatin (MYCOSTATIN) 540847 UNIT/GM POWD Apply topically 3 times daily as needed 60 g 1     Omega-3 Fatty Acids (FISH OIL) 1200 MG CAPS Take 1 capsule by mouth daily       vitamin E 400 UNIT capsule Take 400 Units by mouth daily       Allergies   Allergen Reactions     Aspirin Nausea and Vomiting     sick     Chantix [Varenicline Tartrate] Other (See Comments)     Sees things     Glycerin Itching     LIQUID,   Reactions: itchy and redness       Sympathomimetics Other (See Comments)     Patient doesn't know why this is listed as an allergy     Wool Fiber Itching     Redness        Reviewed and updated as needed this visit by clinical staff  Tobacco  Allergies  Meds  Med Hx  Surg Hx  Fam Hx  Soc Hx      Reviewed and updated as needed this visit by Provider         ROS:  Constitutional, HEENT, cardiovascular, pulmonary, gi and gu systems are negative, except as otherwise noted.    OBJECTIVE:     /74 (BP  Location: Left arm, Patient Position: Sitting, Cuff Size: Adult Regular)  Pulse 87  Temp 97.5  F (36.4  C) (Oral)  Resp 18  Wt 173 lb (78.5 kg)  SpO2 100%  BMI 30.65 kg/m2  Body mass index is 30.65 kg/(m^2).  GENERAL APPEARANCE: healthy, alert and no distress  EYES: Eyes grossly normal to inspection, PERRL and conjunctivae and sclerae normal  HENT: nose and mouth without ulcers or lesions  NECK: no adenopathy, no asymmetry, masses, or scars and thyroid normal to palpation  RESP: lungs clear to auscultation - no rales, rhonchi or wheezes  CV: regular rates and rhythm, normal S1 S2, no S3 or S4 and no murmur, click or rub  ABDOMEN: soft, with right upper and lower quadrant mild tenderness no guarding or rebound, without hepatosplenomegaly or masses and bowel sounds normal  Skin: skin to umbilicus is mildly erythematous with some mild crusting.  Skin to the folds under her small pannus and in her groin is red and macerated.          ASSESSMENT/PLAN:     72 yo F with h/o breast cancer, COPD, meningioma, and colon polyps presents with gradually worsening chronic abdominal pain.  She also has skin rash.        1. Abdominal pain, generalized  This had been confined to the RUQ and was positional; now it is more diffuse on the right side and can radiate across.  Last time I examined her Carnet's sign was positive.  I did not repeat this test today but still would consider abdominal wall pain. If her CT scan is negative, I will refer her for physiatry.  Other differential diagnosis to include atypical GERD, chronic pancreatitis, perhaps nephrolithiasis, bowel wall ischemia. She is up to date on colon cancer screening.    - CT Abdomen Pelvis w Contrast; Future      3. Intertrigo  To groin  - nystatin (MYCOSTATIN) 848890 UNIT/GM POWD; Apply topically 3 times daily as needed  Dispense: 60 g; Refill: 1    4. Impetigo  To umbilicus,   Mupirocin prescribed; may also apply nystatin.  - mupirocin (BACTROBAN) 2 % ointment; Apply  topically 3 times daily for 5 days  Dispense: 22 g; Refill: 1        Lyla Hawkins MD  [facility]

## 2018-05-11 ENCOUNTER — RADIANT APPOINTMENT (OUTPATIENT)
Dept: CT IMAGING | Facility: CLINIC | Age: 73
End: 2018-05-11
Attending: FAMILY MEDICINE
Payer: MEDICARE

## 2018-05-11 DIAGNOSIS — R10.84 ABDOMINAL PAIN, GENERALIZED: ICD-10-CM

## 2018-05-11 RX ORDER — IOPAMIDOL 755 MG/ML
105 INJECTION, SOLUTION INTRAVASCULAR ONCE
Status: COMPLETED | OUTPATIENT
Start: 2018-05-11 | End: 2018-05-11

## 2018-05-11 RX ADMIN — IOPAMIDOL 105 ML: 755 INJECTION, SOLUTION INTRAVASCULAR at 13:34

## 2018-05-11 NOTE — DISCHARGE INSTRUCTIONS

## 2018-05-16 DIAGNOSIS — R10.84 ABDOMINAL PAIN, GENERALIZED: ICD-10-CM

## 2018-05-16 LAB — LIPASE SERPL-CCNC: 105 U/L (ref 73–393)

## 2018-05-16 PROCEDURE — 83690 ASSAY OF LIPASE: CPT | Performed by: FAMILY MEDICINE

## 2018-05-16 PROCEDURE — 36415 COLL VENOUS BLD VENIPUNCTURE: CPT | Performed by: FAMILY MEDICINE

## 2018-05-17 ENCOUNTER — MYC MEDICAL ADVICE (OUTPATIENT)
Dept: FAMILY MEDICINE | Facility: CLINIC | Age: 73
End: 2018-05-17

## 2018-05-18 ENCOUNTER — RADIANT APPOINTMENT (OUTPATIENT)
Dept: MAMMOGRAPHY | Facility: CLINIC | Age: 73
End: 2018-05-18
Attending: FAMILY MEDICINE
Payer: MEDICARE

## 2018-05-18 DIAGNOSIS — Z12.31 VISIT FOR SCREENING MAMMOGRAM: ICD-10-CM

## 2018-09-03 ASSESSMENT — ENCOUNTER SYMPTOMS
NECK PAIN: 1
JAUNDICE: 0
MUSCLE WEAKNESS: 0
BLOATING: 1
CONSTIPATION: 0
DIFFICULTY URINATING: 0
DYSURIA: 0
BOWEL INCONTINENCE: 0
ARTHRALGIAS: 0
MYALGIAS: 1
STIFFNESS: 0
BLOOD IN STOOL: 0
VOMITING: 0
RECTAL PAIN: 0
MUSCLE CRAMPS: 0
FLANK PAIN: 0
ABDOMINAL PAIN: 0
DIARRHEA: 1
BACK PAIN: 1
NAUSEA: 0
JOINT SWELLING: 0
HEMATURIA: 0
HEARTBURN: 1

## 2018-09-07 ENCOUNTER — OFFICE VISIT (OUTPATIENT)
Dept: FAMILY MEDICINE | Facility: CLINIC | Age: 73
End: 2018-09-07
Payer: MEDICARE

## 2018-09-07 VITALS
DIASTOLIC BLOOD PRESSURE: 72 MMHG | HEART RATE: 69 BPM | OXYGEN SATURATION: 96 % | RESPIRATION RATE: 18 BRPM | BODY MASS INDEX: 29.94 KG/M2 | WEIGHT: 169 LBS | SYSTOLIC BLOOD PRESSURE: 109 MMHG

## 2018-09-07 DIAGNOSIS — M54.12 CERVICAL RADICULOPATHY: Primary | ICD-10-CM

## 2018-09-07 DIAGNOSIS — Z23 NEED FOR PROPHYLACTIC VACCINATION AND INOCULATION AGAINST INFLUENZA: ICD-10-CM

## 2018-09-07 PROCEDURE — 99213 OFFICE O/P EST LOW 20 MIN: CPT | Mod: 25 | Performed by: FAMILY MEDICINE

## 2018-09-07 PROCEDURE — 90662 IIV NO PRSV INCREASED AG IM: CPT | Performed by: FAMILY MEDICINE

## 2018-09-07 PROCEDURE — G0008 ADMIN INFLUENZA VIRUS VAC: HCPCS | Performed by: FAMILY MEDICINE

## 2018-09-07 NOTE — PROGRESS NOTES

## 2018-09-07 NOTE — PROGRESS NOTES
SUBJECTIVE:   Raisa Archer is a 73 year old female who presents to clinic today for the following health issues:    Musculoskeletal problem/pain      Duration: x1 month     Description  Location: right arm    Intensity:  5/10    Accompanying signs and symptoms: numbness once in a while pt report pain varies    History  Previous similar problem: YES  Previous evaluation:  none    Precipitating or alleviating factors:  Trauma or overuse: no   Aggravating factors include: lifting, pushing     Therapies tried and outcome: Asprin cream     Right neck pain with intermittent numbness, tingling and weakness all the way down her arm.  No known injury.  Has been needing to use a tool to open jars.  Has seen her chiropractor, but not improving.  Pain does bother her at night.  No fevers or chills.  She is right-handed.        Problem list and histories reviewed & adjusted, as indicated.  Additional history: as documented    Patient Active Problem List   Diagnosis     Tobacco use disorder     Malignant neoplasm of female breast (H)     Gall stones     Osteoporosis     Advanced directives, counseling/discussion     Chronic rhinitis     Hyperlipidemia LDL goal <160     Other hammer toe (acquired)     Meningioma (H)     History of colonic polyps     Centrilobular emphysema (H)     Past Surgical History:   Procedure Laterality Date     ABDOMEN SURGERY       BIOPSY       BREAST SURGERY       CATARACT IOL, RT/LT       CHOLECYSTECTOMY       COLONOSCOPY       ORTHOPEDIC SURGERY       PHACOEMULSIFICATION CLEAR CORNEA WITH STANDARD INTRAOCULAR LENS IMPLANT  5/22/2014    Procedure: PHACOEMULSIFICATION CLEAR CORNEA WITH STANDARD INTRAOCULAR LENS IMPLANT;  Surgeon: Rahul Reid MD;  Location: [facility]     PHACOEMULSIFICATION CLEAR CORNEA WITH STANDARD INTRAOCULAR LENS IMPLANT  7/8/2014    Procedure: PHACOEMULSIFICATION CLEAR CORNEA WITH STANDARD INTRAOCULAR LENS IMPLANT;  Surgeon: Rahul Reid MD;  Location:  OR     SURGICAL  HISTORY OF -   8/00    left foot reconstruction     SURGICAL HISTORY OF -   1979    hysterectomy       Social History   Substance Use Topics     Smoking status: Former Smoker     Packs/day: 1.50     Years: 30.00     Types: Cigarettes     Start date: 1/2/1967     Quit date: 5/10/2014     Smokeless tobacco: Never Used      Comment: Quit several times over the years including when pregnant     Alcohol use No     Family History   Problem Relation Age of Onset     Breast Cancer Sister      Diabetes Sister      Cancer Brother      Asthma Brother      Other Cancer Brother      Hodkins Lymphoma/Hodkins Lymphoma/Hodkins Lymphoma/Hodkins Lymphoma     Cerebrovascular Disease Father      Had several small ones     Cancer Other      Stomach cancer- cousin     Breast Cancer Other      Aunts     Diabetes Other      Uncles and cousin/Paternal Uncles/Paternal Uncles/Paternal Uncles/Paternal Uncles     Cancer Brother      Obesity Brother      Asthma Brother      Cancer Other      Family Hx of colon cancer     Breast Cancer Other      Paternal Aunt/Paternal Aunt/Paternal Aunt/Paternal Aunt     Colon Cancer Other      Maternal Great Uncles     Breast Cancer Sister      Breast Cancer Cousin      Paternal/Paternal     Colon Cancer Other      Maternal Uncle/Maternal Great Uncles/Maternal Great Uncles/Maternal Great Uncles     Obesity Sister      On diet/On diet/On diet     Obesity Brother      Asthma Niece      Diabetes Other      Breast Cancer Other      Colon Cancer Other          Current Outpatient Prescriptions   Medication Sig Dispense Refill     Acetaminophen (TYLENOL PO) Take by mouth as needed for mild pain or fever       Calcium Carbonate-Vitamin D (CALCIUM + D PO) Take 2 tablets by mouth daily.       carboxymethylcellul-glycerin (OPTIVE) 0.5-0.9 % SOLN ophthalmic solution 1 drop       clotrimazole-betamethasone (LOTRISONE) cream Apply topically 2 times daily 15 g 1     fluticasone (FLONASE) 50 MCG/ACT nasal spray Spray 1-2  sprays into both nostrils daily 16 g 11     Homeopathic Products (SLEEP MEDICINE) TABS Take by mouth daily as needed       Ipratropium-Albuterol (COMBIVENT RESPIMAT)  MCG/ACT inhaler Inhale 1 puff into the lungs 4 times daily Not to exceed 6 doses per day. 1 Inhaler 3     Loratadine (CLARITIN PO) Take by mouth daily        Multiple Vitamins-Minerals (ZAHIDA MULTIVITAMIN FOR WOMEN) TABS Take 1 tablet by mouth daily.       Nutritional Supplements (OSTEO ADVANCE PO)        nystatin (MYCOSTATIN) 394011 UNIT/GM POWD Apply topically 3 times daily as needed 60 g 1     Omega-3 Fatty Acids (FISH OIL) 1200 MG CAPS Take 1 capsule by mouth daily       vitamin E 400 UNIT capsule Take 400 Units by mouth daily       Allergies   Allergen Reactions     Aspirin Nausea and Vomiting     sick     Chantix [Varenicline Tartrate] Other (See Comments)     Sees things     Glycerin Itching     LIQUID,   Reactions: itchy and redness       Sympathomimetics Other (See Comments)     Patient doesn't know why this is listed as an allergy     Wool Fiber Itching     Redness        Reviewed and updated as needed this visit by clinical staff  Tobacco  Allergies  Med Hx  Surg Hx  Fam Hx  Soc Hx      Reviewed and updated as needed this visit by Provider         ROS:  Constitutional, HEENT, cardiovascular, pulmonary, gi and gu systems are negative, except as otherwise noted.    OBJECTIVE:     /72 (BP Location: Left arm, Patient Position: Sitting, Cuff Size: Adult Regular)  Pulse 69  Resp 18  Wt 169 lb (76.7 kg)  SpO2 96%  BMI 29.94 kg/m2  Body mass index is 29.94 kg/(m^2).  GENERAL APPEARANCE: healthy, alert and no distress  NECK: no adenopathy, no asymmetry, masses, or scars and thyroid normal to palpation  RESP: lungs clear to auscultation - no rales, rhonchi or wheezes  CV: regular rates and rhythm, normal S1 S2, no S3 or S4 and no murmur, click or rub  MS: cervical spine is tender to palpation over the right aspect, Spurlings is  negative, ROM is mildly limited.  Right shoulder with full ROM.  She is tender over the subacromial bursa.  Negative scarf, equivocal Hawkin's [place].  Negative pop can test.   Bicep strength is 5/5.   strength is minimally diminished on the right compared to the left.          ASSESSMENT/PLAN:             1. Cervical radiculopathy  With pain, numbness and tingling as well as weakness radiating down the right arm, I am concerned about radiculopathy.  Will check MRI and recommend physical therapy and/or referral to spine specialist if indicated.  Also considered primary shoulder impingement, and there may be a component of bursitis (discussed ice, rest, stretching, cortisone injection), but this would not be expected to cause the radicular symptoms.    - MR Cervical Spine w/o Contrast; Future    2. Need for prophylactic vaccination and inoculation against influenza    - FLU VACCINE, INCREASED ANTIGEN, PRESV FREE, AGE 65+ [04111]  - Vaccine Administration, Initial [68090]        Lyla Hawkins MD  [facility]

## 2018-09-07 NOTE — MR AVS SNAPSHOT
After Visit Summary   9/7/2018    Raisa Archer    MRN: [id number]           Patient Information     Date Of Birth          1945        Visit Information        Provider Department      9/7/2018 1:00 PM Lyla Hawkins MD [facility]        Today's Diagnoses     Cervical radiculopathy    -  1      Care Instructions    Please schedule the MRI of your cervical spine by calling [phone].          Follow-ups after your visit        Your next 10 appointments already scheduled     Sep 17, 2018  1:40 PM CDT   (Arrive by 1:25 PM)   New Patient Visit with Josh Almonte MD   [facility] ([facility])    [address] [place] [address]  [address]th [facility] [address] [phone]              Future tests that were ordered for you today     Open Future Orders        Priority Expected Expires Ordered    MR Cervical Spine w/o Contrast Routine  9/7/2019 9/7/2018            Who to contact     If you have questions or need follow up information about today's clinic visit or your schedule please contact [facility] directly at [phone].  Normal or non-critical lab and imaging results will be communicated to you by MyChart, letter or phone within 4 business days after the clinic has received the results. If you do not hear from us within 7 days, please contact the clinic through [employer] or phone. If you have a critical or abnormal lab result, we will notify you by phone as soon as possible.  Submit refill requests through [employer] or call your pharmacy and they will forward the refill request to us. Please allow 3 business days for your refill to be completed.          Additional Information About Your Visit        MyChart Information     [employer] gives you secure access to your electronic health record. If you see a primary care provider, you can also send messages to your care team and make appointments. If you  have questions, please call your primary care clinic.  If you do not have a primary care provider, please call [phone] and they will assist you.        Care EveryWhere ID     This is your Care EveryWhere ID. This could be used by other organizations to access your [facility] medical records  [id number]        Your Vitals Were     Pulse Respirations Pulse Oximetry BMI (Body Mass Index)          69 18 96% 29.94 kg/m2         Blood Pressure from Last 3 Encounters:   09/07/18 109/72   05/04/18 110/74   04/23/18 98/68    Weight from Last 3 Encounters:   09/07/18 169 lb (76.7 kg)   05/04/18 173 lb (78.5 kg)   04/23/18 173 lb (78.5 kg)               Primary Care Provider Office Phone # Fax #    Lyla Hawkins [phone] [address] [place] [address]        Equal Access to Services     ASHLEY NAIR : Hadii dania ku hadasho Soomaali, waaxda luqadaha, qaybta kaalmada adeegyada, david alexis . So [facility] [phone].    ATENCIÓN: Si habla español, tiene a casanova disposición servicios gratuitos de asistencia lingüística. Llame al [phone].    We comply with applicable federal civil rights laws and [place] laws. We do not discriminate on the basis of race, color, national origin, age, disability, sex, sexual orientation, or gender identity.            Thank you!     Thank you for choosing [facility]  for your care. Our goal is always to provide you with excellent care. Hearing back from our patients is one way we can continue to improve our services. Please take a few minutes to complete the written survey that you may receive in the mail after your visit with us. Thank you!             Your Updated Medication List - Protect others around you: Learn how to safely use, store and throw away your medicines at www.disposemymeds.org.          This list is accurate as of 9/7/18  1:36 PM.  Always use your most recent med list.                    Brand Name Dispense Instructions for use Diagnosis    CALCIUM + D PO      Take 2 tablets by mouth daily.        CLARITIN PO      Take by mouth daily        clotrimazole-betamethasone cream    LOTRISONE    15 g    Apply topically 2 times daily    Vulvar irritation       fish Oil 1200 MG capsule      Take 1 capsule by mouth daily        fluticasone 50 MCG/ACT spray    FLONASE    16 g    Spray 1-2 sprays into both nostrils daily    Seasonal allergic rhinitis       Ipratropium-Albuterol  MCG/ACT inhaler    COMBIVENT RESPIMAT    1 Inhaler    Inhale 1 puff into the lungs 4 times daily Not to exceed 6 doses per day.    ZAPATA (dyspnea on exertion)       ZAHIDA MULTIVITAMIN FOR WOMEN Tabs      Take 1 tablet by mouth daily.        nystatin 581420 UNIT/GM Powd    MYCOSTATIN    60 g    Apply topically 3 times daily as needed    Intertrigo       OPTIVE 0.5-0.9 % Soln ophthalmic solution   Generic drug:  carboxymethylcellul-glycerin      1 drop    Medicare annual wellness visit, subsequent       OSTEO ADVANCE PO           SLEEP MEDICINE Tabs      Take by mouth daily as needed        TYLENOL PO      Take by mouth as needed for mild pain or fever        vitamin E 400 UNIT capsule      Take 400 Units by mouth daily

## 2018-09-14 ENCOUNTER — TELEPHONE (OUTPATIENT)
Dept: GASTROENTEROLOGY | Facility: CLINIC | Age: 73
End: 2018-09-14

## 2018-09-17 ENCOUNTER — OFFICE VISIT (OUTPATIENT)
Dept: GASTROENTEROLOGY | Facility: CLINIC | Age: 73
End: 2018-09-17
Payer: MEDICARE

## 2018-09-17 VITALS
BODY MASS INDEX: 29.95 KG/M2 | OXYGEN SATURATION: 96 % | HEIGHT: 63 IN | RESPIRATION RATE: 16 BRPM | WEIGHT: 169 LBS | DIASTOLIC BLOOD PRESSURE: 70 MMHG | TEMPERATURE: 97.4 F | SYSTOLIC BLOOD PRESSURE: 127 MMHG | HEART RATE: 75 BPM

## 2018-09-17 DIAGNOSIS — R10.9 ABDOMINAL WALL PAIN: ICD-10-CM

## 2018-09-17 DIAGNOSIS — K59.00 CONSTIPATION, UNSPECIFIED CONSTIPATION TYPE: Primary | ICD-10-CM

## 2018-09-17 DIAGNOSIS — R93.5 ABNORMAL CT OF THE ABDOMEN: ICD-10-CM

## 2018-09-17 ASSESSMENT — PAIN SCALES - GENERAL: PAINLEVEL: NO PAIN (0)

## 2018-09-17 NOTE — LETTER
9/17/2018       RE: Raisa Archer  [address] [place] [id number]     Dear Colleague,    Thank you for referring your patient, Raisa Archer, to the [facility] at [facility]. Please see a copy of my visit note below.    GI CLINIC VISIT    CC/REFERRING PROVIDER: Lyla Hawkins  REASON FOR CONSULTATION: lower abdominal pain    HPI: 73 year old female w/ h/o R breast CA s/p mastectomy + chemotherapy 2007, s/p cholecystectomy 2008, endometriosis s/p hysterectomy + LSO ~40yrs ago, chronic tobacco abuse (in remission), centrilobular emphysema, among other medical issues - presenting for evaluation of lower abd pain x6 months (now resolved). Reports having lower abd pain, usually noticeable when leaning/bending forward and occ assoc w/ cramping in lead-up to needing to have a BM (relieved after passage of stool/flatus). Reports having 1-2 hard formed BMs of varying size at least every 2-3 days (baseline, not currently using a bowel regimen). Denies any tenesmus or insecurity passing flatus, no fecal incontinence or new perianal/nocturnal sxs noted. Denies any N/V/F/C/HA/NS or other const/syst/cardiopulmonary sxs, no BRBPR/melena/urinary changes, no unintentional wt loss or appetite/satiety changes. No other bowel/bladder habit changes, no dysphagia/odynophagia. No jaundice/icterus/pruritus, no acholic stools/steatorrhea, no new lumps/bumps, no jt pain/oral ulcer/rash/eye sxs noted. Interestingly, also reports decreased flank/lower abd pain following recent chiropractic appt.    ROS: 10pt ROS performed and otherwise negative.    PERTINENT PAST MEDICAL/SURGICAL HISTORY:  As noted above.    PERTINENT MEDICATIONS:  Medications reviewed with patient today, see Medication List/Assessment for details.  No other NSAID/anticoagulation reported by patient.  No other OTC/herbal/supplements reported by patient.    SOCIAL HISTORY: Tobacco:  quit 2013, prior 1-2ppd since age 21. Denies any etoh/rd/ivda.    FAMILY HISTORY:  Sister w/ breast CA, Great-uncle w/ CRC, brother w/ NHL. No panc/esophageal/other GI CA, no other [place] or other HPS-related [place]. No IBD/celiac, no other AI/liver/thyroid disease.    PHYSICAL EXAMINATION:  Vitals reviewed, AFVSS  Wt 169# today (stable)  Gen: aaox3, cooperative, pleasant, not diaphoretic, nad  HEENT: ncat, neck supple, no clad/sclad, normal op w/o ulcer/exudate, anicteric, mmm  Resp/CV without acute findings, not dyspneic/tachycardic  Abd: +nabs, soft, nt, nd, no peritoneal s/s noted. No ecchymoses, +lap surgical scars, +suprapubic surgical scar, no CVA/spinal tenderness noted.  Ext: no c/c/e  Skin: warm, perfused, no jaundice  Neuro: grossly intact, no asterixis noted    PERTINENT STUDIES:  MRI/MRCP ordered today    CT Abd/Pelvis 5/11/18 (images reviewed personally today)  Tiny hypodense lesions within the spleen, subcentimeter, one of which  has decreased in size since 2008, while others are new. However, the  comparison images have relatively thick slices, and therefore these  lesions are unlikely to be visualized on the comparison examination.  The pancreas is relatively hypoenhancing. Questionable mild stranding  near the tail. No focal mass on single phase of contrast. Mild  prominence of common bile duct and intrahepatic biliary system. This  is likely due to reservoir effect from cholecystectomy. Focal fat near  falciform ligament of the liver. Mild hepatic steatosis. Tiny  subcentimeter hypodense lesion the right lobe of the liver, too small  to characterize, stable since 2008. Otherwise the kidneys and adrenal  glands are unremarkable.     No abnormally dilated loops of small bowel or colon. Decompressed  appearance of the sigmoid colon and the descending colon. No free air  or free fluid. No abdominal or pelvic lymphadenopathy. Hysterectomy.  Unremarkable left ovary. Small fat-containing umbilical  hernia.  Partially visualized right mastectomy changes. Relative compression of  the left renal vein from the angle of the SMA. No surrounding  collaterals.     Emphysematous centrilobular changes of the lung. Left fat-containing  Bochdalek hernia. Left basilar atelectasis. No suspicious bony  lesions. Degenerative findings of the spine. Rightward curvature of  the lumbar spine and leftward curvature of the thoracic lumbar spine.         Impression:   1. Relative hypoenhancement of the pancreas, which is nonspecific and  likely reflect fatty infiltration. In this patient with abdominal  pain, simple correlation with lipase levels can be made to exclude  possibility of early pancreatitis.  2. Centrilobular emphysematous changes of the lung bases.  2. Tiny hypodense lesions within the spleen. One of these has  decreased since prior while others were not well visualized. These are  likely benign in nature.    Colonoscopy 5/31/16 (Marcio ARCE): 2 TAs (one 10mm in size), o/w normal.    ASSESSMENT/PLAN:    1. Nonspecific lower abdominal pain associated with position/posture and improved with defecation, components of musculoskeletal + constipation-associated discomfort (now resolved in the absence of other clinical alarm features) - will complete non-invasive evaluation of pancreatic abnormality noted on recent CT scan, continue supportive care as ordered for now  1. It was wonderful getting a chance to meet with you to discuss your Abdominal Pain today - discussed next steps in evaluation and management together today.  - Discussed the positional/postural component of your lower abdominal discomfort (likely musculoskeletal in nature, improved following recent chiropractic visit) + discomfort improved with passage of stool gas in setting of infrequent bowel movements (suggestive of Constipation) today.  - Recommend starting Miralax daily, at least 2-3 times/week to start, as part of your consistent non-stimulant bowel  "regimen as we discussed today. This will be the \"backbone\" of your overall bowel regimen management.  - Continue to engage a regular core-strengthening exercise regimen as able.    2. Discussed the nonspecific fatty infiltration of the pancreas noted on your 4/2018 CT scan, notably in the absence of prior history of Acute Pancreatitis (though history of cholecystectomy 2008) and your prior smoking history.  - Recommend moving forward with an MRI/MCRP with IV Contrast to evaluate the pancreas and biliary/pancreatic ductal systems more closely.  - Can readdress role for any further pancreas-directed evaluation (i.e. fecal elastase, nutritional markers, EUS) depending on findings.    3. Continue to monitor for the danger signs/symptoms we reviewed together today:  worsening abdominal pain, worsening diarrhea, obstructive symptoms (nausea/vomiting, abdominal distention, inability to pass stool/flatus), blood mixed into stools, persistent fevers/chills, progressive anemia (particulary with iron deficiency), difficulty with swallowing, perianal/rectal discomfort (particularly with defecation), unexpected weight loss, etc.  - Contact us via MyChart or phone should these symptoms occur, particularly as we may advise further evaluation accordingly.    2. Cancer Screening  No high-risk FH CRC (great-uncle), colonoscopy 5/2016 with adenomatous lesions (10mm TA x1, diminutive TA x1). Recommend repeat colonoscopy in 2019 unless symptomatic sooner, or if required for current diagnostic evaluation.    RTC 3-4 months with GI PA, sooner if symptomatic.      Thank you for this consultation. It was a pleasure to participate in the care of this patient; please contact us with any further questions.     Josh Almonte MD   of Medicine  [facility] - Department of Medicine  Division of Gastroenterology        "

## 2018-09-17 NOTE — PATIENT INSTRUCTIONS
"I've included a brief summary of our discussion and care plan from today's visit below.  Please review this information with your primary care provider.  _______________________________________________________________________      1. It was wonderful getting a chance to meet with you to discuss your Abdominal Pain today - discussed next steps in evaluation and management together today.  - Discussed the positional/postural component of your lower abdominal discomfort (likely musculoskeletal in nature, improved following recent chiropractic visit) + discomfort improved with passage of stool gas in setting of infrequent bowel movements (suggestive of Constipation) today.  - Recommend starting Miralax daily, at least 2-3 times/week to start, as part of your consistent non-stimulant bowel regimen as we discussed today. This will be the \"backbone\" of your overall bowel regimen management.  - Continue to engage a regular core-strengthening exercise regimen as able.    2. Discussed the nonspecific fatty infiltration of the pancreas noted on your 4/2018 CT scan, notably in the absence of prior history of Acute Pancreatitis (though history of cholecystectomy 2008) and your prior smoking history.  - Recommend moving forward with an MRI/MCRP with IV Contrast to evaluate the pancreas and biliary/pancreatic ductal systems more closely.  - Can readdress role for any further pancreas-directed evaluation (i.e. fecal elastase, nutritional markers, EUS) depending on findings.    3. Continue to monitor for the danger signs/symptoms we reviewed together today:  worsening abdominal pain, worsening diarrhea, obstructive symptoms (nausea/vomiting, abdominal distention, inability to pass stool/flatus), blood mixed into stools, persistent fevers/chills, progressive anemia (particulary with iron deficiency), difficulty with swallowing, perianal/rectal discomfort (particularly with defecation), unexpected weight loss, etc.  - Contact us via " MyChart or phone should these symptoms occur, particularly as we may advise further evaluation accordingly.    4. Return to GI Clinic with my GI Physician Assistants (Lamonte or Moni) in 3-4 months to review your progress, sooner if symptomatic.   - If you are unable to schedule this follow-up appointment today, please contact our  at [phone] within the next week to help set up this necessary appointment.    _______________________________________________________________________    It was a pleasure seeing you in clinic today - please be in touch if there are any further questions that arise following today's visit.  During business hours, you may reach Clinic Nurse Triage Line at [phone].  For urgent/emergent questions after business hours, you may reach the on-call GI Fellow by contacting the [facility]  at [phone].    Any benign/non-urgent test results are usually communicated via letter or [employer] message within 1-2 weeks after completion.  Urgent results (those that require a change in the previously-discussed care plan) are usually communicated via a phone call once available from our clinic staff to discuss the results and the next steps in your evaluation.    I recommend signing up for [employer] access if you have not already done so and are comfortable with using a computer.  This allows for online access to your lab results and also helps you communicate efficiently with my clinic should any questions arise in your care.    We have Financial Counseling services available through our clinic.  If you have questions about your insurance coverage or payment responsibilities, particularly before you undergo any tests or procedures, please let us know and we can arrange a consultation accordingly to help you make informed decisions about your healthcare.    Sincerely,    Josh Almonte MD    [facility] - Department of  Medicine  Division of Gastroenterology

## 2018-09-17 NOTE — NURSING NOTE
"Chief Complaint   Patient presents with     Consult     Abdominal Pain       Vitals:    09/17/18 1326   BP: 127/70   BP Location: Left arm   Patient Position: Sitting   Cuff Size: Adult Regular   Pulse: 75   Resp: 16   Temp: 97.4  F (36.3  C)   TempSrc: Oral   SpO2: 96%   Weight: 76.7 kg (169 lb)   Height: 1.59 m (5' 2.6\")       Body mass index is 30.32 kg/(m^2).      Makayla Szymanski LPN                          "

## 2018-09-17 NOTE — MR AVS SNAPSHOT
"              After Visit Summary   9/17/2018    Raisa Archer    MRN: [id number]           Patient Information     Date Of Birth          1945        Visit Information        Provider Department      9/17/2018 1:40 PM Josh Almonte MD [facility]        Today's Diagnoses     Constipation, unspecified constipation type    -  1    Abdominal wall pain        Abnormal CT of the abdomen          Care Instructions    I've included a brief summary of our discussion and care plan from today's visit below.  Please review this information with your primary care provider.  _______________________________________________________________________      1. It was wonderful getting a chance to meet with you to discuss your Abdominal Pain today - discussed next steps in evaluation and management together today.  - Discussed the positional/postural component of your lower abdominal discomfort (likely musculoskeletal in nature, improved following recent chiropractic visit) + discomfort improved with passage of stool gas in setting of infrequent bowel movements (suggestive of Constipation) today.  - Recommend starting Miralax daily, at least 2-3 times/week to start, as part of your consistent non-stimulant bowel regimen as we discussed today. This will be the \"backbone\" of your overall bowel regimen management.  - Continue to engage a regular core-strengthening exercise regimen as able.    2. Discussed the nonspecific fatty infiltration of the pancreas noted on your 4/2018 CT scan, notably in the absence of prior history of Acute Pancreatitis (though history of cholecystectomy 2008) and your prior smoking history.  - Recommend moving forward with an MRI/MCRP with IV Contrast to evaluate the pancreas and biliary/pancreatic ductal systems more closely.  - Can readdress role for any further pancreas-directed evaluation (i.e. fecal elastase, nutritional markers, EUS) depending on findings.    3. " Continue to monitor for the danger signs/symptoms we reviewed together today:  worsening abdominal pain, worsening diarrhea, obstructive symptoms (nausea/vomiting, abdominal distention, inability to pass stool/flatus), blood mixed into stools, persistent fevers/chills, progressive anemia (particulary with iron deficiency), difficulty with swallowing, perianal/rectal discomfort (particularly with defecation), unexpected weight loss, etc.  - Contact us via Metailhart or phone should these symptoms occur, particularly as we may advise further evaluation accordingly.    4. Return to GI Clinic with my GI Physician Assistants (Lamonte or Moni) in 3-4 months to review your progress, sooner if symptomatic.   - If you are unable to schedule this follow-up appointment today, please contact our  at [phone] within the next week to help set up this necessary appointment.    _______________________________________________________________________    It was a pleasure seeing you in clinic today - please be in touch if there are any further questions that arise following today's visit.  During business hours, you may reach Clinic Nurse Triage Line at [phone].  For urgent/emergent questions after business hours, you may reach the on-call GI Fellow by contacting the [facility]  at [phone].    Any benign/non-urgent test results are usually communicated via letter or Metailhart message within 1-2 weeks after completion.  Urgent results (those that require a change in the previously-discussed care plan) are usually communicated via a phone call once available from our clinic staff to discuss the results and the next steps in your evaluation.    I recommend signing up for Trufa access if you have not already done so and are comfortable with using a computer.  This allows for online access to your lab results and also helps you communicate efficiently with my clinic should any questions arise  in your care.    We have Financial Counseling services available through our clinic.  If you have questions about your insurance coverage or payment responsibilities, particularly before you undergo any tests or procedures, please let us know and we can arrange a consultation accordingly to help you make informed decisions about your healthcare.    Sincerely,    Josh Almonte MD    [facility] Department of Medicine  Division of Gastroenterology            Follow-ups after your visit        Your next 10 appointments already scheduled     Oct 03, 2018  1:00 PM CDT   MR CERVICAL SPINE W/O CONTRAST with [address] [facility] [place] [facility] ([facility])    [address] [address] [phone]           Take your medicines as usual, unless your doctor tells you not to. Bring a list of your current medicines to your exam (including vitamins, minerals and over-the-counter drugs). Also bring the results of similar scans you may have had.  Please remove any body piercings and hair extensions before you arrive.  Follow your doctor s orders. If you do not, we may have to postpone your exam.  You may or may not receive IV contrast for this exam pending the discretion of the Radiologist.  You do not need to do anything special to prepare.  The MRI machine uses a strong magnet. Please wear clothes without metal (snaps, zippers). A sweatsuit works well, or we may give you a hospital gown.   **IMPORTANT** THE INSTRUCTIONS BELOW ARE ONLY FOR THOSE PATIENTS WHO HAVE BEEN PRESCRIBED SEDATION OR GENERAL ANESTHESIA DURING THEIR MRI PROCEDURE:  IF YOUR DOCTOR PRESCRIBED ORAL SEDATION (take medicine to help you relax during your exam):   You must get the medicine from your doctor (oral medication) before you arrive. Bring the medicine to the exam. Do not take it at home. You ll be told when to take it upon arriving for your exam.   Arrive one  hour early. Bring someone who can take you home after the test. Your medicine will make you sleepy. After the exam, you may not drive, take a bus or take a taxi by yourself.  IF YOUR DOCTOR PRESCRIBED IV SEDATION:   Arrive one hour early. Bring someone who can take you home after the test. Your medicine will make you sleepy. After the exam, you may not drive, take a bus or take a taxi by yourself.   No eating 6 hours before your exam. You may have clear liquids up until 4 hours before your exam. (Clear liquids include water, clear tea, black coffee and fruit juice without pulp.)  IF YOUR DOCTOR PRESCRIBED ANESTHESIA (be asleep for your exam):   Arrive 1 1/2 hours early. Bring someone who can take you home after the test. You may not drive, take a bus or take a taxi by yourself.   No eating 8 hours before your exam. You may have clear liquids up until 4 hours before your exam. (Clear liquids include water, clear tea, black coffee and fruit juice without pulp.)   You will spend four to five hours in the recovery room.  Please call the Imaging Department at your exam site with any questions.            Oct 06, 2018  1:45 PM CDT   MR ABDOMEN MRCP W/O & W CONTRAST with [address] [facility] ([facility])    [address] [address] [phone]           Take your medicines as usual, unless your doctor tells you not to. Bring a list of your current medicines to your exam (including vitamins, minerals and over-the-counter drugs). Also bring the results of similar scans you may have had.    You may or may not receive IV contrast for this exam pending the discretion of the Radiologist.   Do not eat or drink for 6 hours prior to exam.  The MRI machine uses a strong magnet. Please wear clothes without metal (snaps, zippers). A sweatsuit works well, or we may give you a hospital gown.  Please remove any body piercings and hair extensions before you  arrive. You will also remove watches, jewelry, hairpins, wallets, dentures, partial dental plates and hearing aids. You may wear contact lenses, and you may be able to wear your rings. We have a safe place to keep your personal items, but it is safer to leave them at home.  **IMPORTANT** THE INSTRUCTIONS BELOW ARE ONLY FOR THOSE PATIENTS WHO HAVE BEEN PRESCRIBED SEDATION OR GENERAL ANESTHESIA DURING THEIR MRI PROCEDURE:  IF YOUR DOCTOR PRESCRIBED ORAL SEDATION (take medicine to help you relax during your exam):   You must get the medicine from your doctor (oral medication) before you arrive. Bring the medicine to the exam. Do not take it at home. You ll be told when to take it upon arriving for your exam.   Arrive one hour early. Bring someone who can take you home after the test. Your medicine will make you sleepy. After the exam, you may not drive, take a bus or take a taxi by yourself.  IF YOUR DOCTOR PRESCRIBED IV SEDATION:   Arrive one hour early. Bring someone who can take you home after the test. Your medicine will make you sleepy. After the exam, you may not drive, take a bus or take a taxi by yourself.   No eating 6 hours before your exam. You may have clear liquids up until 4 hours before your exam. (Clear liquids include water, clear tea, black coffee and fruit juice without pulp.)  IF YOUR DOCTOR PRESCRIBED ANESTHESIA (be asleep for your exam):   Arrive 1 1/2 hours early. Bring someone who can take you home after the test. You may not drive, take a bus or take a taxi by yourself.   No eating 8 hours before your exam. You may have clear liquids up until 4 hours before your exam. (Clear liquids include water, clear tea, black coffee and fruit juice without pulp.)   You will spend four to five hours in the recovery room.  If you have any questions, please contact your Imaging Department exam site.              Future tests that were ordered for you today     Open Future Orders        Priority Expected  "Expires Ordered    MRI Abdomen w & w/o contrast mrcp Routine  9/17/2019 9/17/2018            Who to contact     Please call your clinic at [phone] to:    Ask questions about your health    Make or cancel appointments    Discuss your medicines    Learn about your test results    Speak to your doctor            Additional Information About Your Visit        NodejitsuharKeywee Information     [employer] gives you secure access to your electronic health record. If you see a primary care provider, you can also send messages to your care team and make appointments. If you have questions, please call your primary care clinic.  If you do not have a primary care provider, please call [phone] and they will assist you.      [employer] is an electronic gateway that provides easy, online access to your medical records. With [employer], you can request a clinic appointment, read your test results, renew a prescription or communicate with your care team.     To access your existing account, please contact your [facility] or call [phone] for assistance.        Care EveryWhere ID     This is your Care EveryWhere ID. This could be used by other organizations to access your [place] medical records  [id number]        Your Vitals Were     Pulse Temperature Respirations Height Pulse Oximetry BMI (Body Mass Index)    75 97.4  F (36.3  C) (Oral) 16 1.59 m (5' 2.6\") 96% 30.32 kg/m2       Blood Pressure from Last 3 Encounters:   09/17/18 127/70   09/07/18 109/72   05/04/18 110/74    Weight from Last 3 Encounters:   09/17/18 76.7 kg (169 lb)   09/07/18 76.7 kg (169 lb)   05/04/18 78.5 kg (173 lb)               Primary Care Provider Office Phone # Fax #    Lyla Hawkins [phone] [address] [place] [address]        Equal Access to Services     ASHLEY NAIR AH: Hadii dania gouldo Solaura, waaxda luqadaha, qaybta kaalmada adehien, david alexis " ah. So [facility] [phone].    ATENCIÓN: Si david al, tiene a casanova disposición servicios gratuitos de asistencia lingüística. Alfonso kessler [phone].    We comply with applicable federal civil rights laws and [place] laws. We do not discriminate on the basis of race, color, national origin, age, disability, sex, sexual orientation, or gender identity.            Thank you!     Thank you for choosing [facility]  for your care. Our goal is always to provide you with excellent care. Hearing back from our patients is one way we can continue to improve our services. Please take a few minutes to complete the written survey that you may receive in the mail after your visit with us. Thank you!             Your Updated Medication List - Protect others around you: Learn how to safely use, store and throw away your medicines at www.disposemymeds.org.          This list is accurate as of 9/17/18  2:38 PM.  Always use your most recent med list.                   Brand Name Dispense Instructions for use Diagnosis    CALCIUM + D PO      Take 2 tablets by mouth daily.        CLARITIN PO      Take by mouth daily        clotrimazole-betamethasone cream    LOTRISONE    15 g    Apply topically 2 times daily    Vulvar irritation       fish Oil 1200 MG capsule      Take 1 capsule by mouth daily        fluticasone 50 MCG/ACT spray    FLONASE    16 g    Spray 1-2 sprays into both nostrils daily    Seasonal allergic rhinitis       Ipratropium-Albuterol  MCG/ACT inhaler    COMBIVENT RESPIMAT    1 Inhaler    Inhale 1 puff into the lungs 4 times daily Not to exceed 6 doses per day.    ZAPATA (dyspnea on exertion)       ZAHIDA MULTIVITAMIN FOR WOMEN Tabs      Take 1 tablet by mouth daily.        nystatin 340594 UNIT/GM Powd    MYCOSTATIN    60 g    Apply topically 3 times daily as needed    Intertrigo       OPTIVE 0.5-0.9 % Soln ophthalmic solution   Generic drug:  carboxymethylcellul-glycerin      1 drop     Medicare annual wellness visit, subsequent       OSTEO ADVANCE PO           SLEEP MEDICINE Tabs      Take by mouth daily as needed        TYLENOL PO      Take by mouth as needed for mild pain or fever        vitamin E 400 UNIT capsule      Take 400 Units by mouth daily

## 2018-09-17 NOTE — PROGRESS NOTES
GI CLINIC VISIT    CC/REFERRING PROVIDER: Lyla Hawkins  REASON FOR CONSULTATION: lower abdominal pain    HPI: 73 year old female w/ h/o R breast CA s/p mastectomy + chemotherapy 2007, s/p cholecystectomy 2008, endometriosis s/p hysterectomy + LSO ~40yrs ago, chronic tobacco abuse (in remission), centrilobular emphysema, among other medical issues - presenting for evaluation of lower abd pain x6 months (now resolved). Reports having lower abd pain, usually noticeable when leaning/bending forward and occ assoc w/ cramping in lead-up to needing to have a BM (relieved after passage of stool/flatus). Reports having 1-2 hard formed BMs of varying size at least every 2-3 days (baseline, not currently using a bowel regimen). Denies any tenesmus or insecurity passing flatus, no fecal incontinence or new perianal/nocturnal sxs noted. Denies any N/V/F/C/HA/NS or other const/syst/cardiopulmonary sxs, no BRBPR/melena/urinary changes, no unintentional wt loss or appetite/satiety changes. No other bowel/bladder habit changes, no dysphagia/odynophagia. No jaundice/icterus/pruritus, no acholic stools/steatorrhea, no new lumps/bumps, no jt pain/oral ulcer/rash/eye sxs noted. Interestingly, also reports decreased flank/lower abd pain following recent chiropractic appt.    ROS: 10pt ROS performed and otherwise negative.    PERTINENT PAST MEDICAL/SURGICAL HISTORY:  As noted above.    PERTINENT MEDICATIONS:  Medications reviewed with patient today, see Medication List/Assessment for details.  No other NSAID/anticoagulation reported by patient.  No other OTC/herbal/supplements reported by patient.    SOCIAL HISTORY: Tobacco: quit 2013, prior 1-2ppd since age 21. Denies any etoh/rd/ivda.    FAMILY HISTORY:  Sister w/ breast CA, Great-uncle w/ CRC, brother w/ NHL. No panc/esophageal/other GI CA, no other [place] or other HPS-related [place]. No IBD/celiac, no other AI/liver/thyroid disease.    PHYSICAL EXAMINATION:  Vitals reviewed,  AFVSS  Wt 169# today (stable)  Gen: aaox3, cooperative, pleasant, not diaphoretic, nad  HEENT: ncat, neck supple, no clad/sclad, normal op w/o ulcer/exudate, anicteric, mmm  Resp/CV without acute findings, not dyspneic/tachycardic  Abd: +nabs, soft, nt, nd, no peritoneal s/s noted. No ecchymoses, +lap surgical scars, +suprapubic surgical scar, no CVA/spinal tenderness noted.  Ext: no c/c/e  Skin: warm, perfused, no jaundice  Neuro: grossly intact, no asterixis noted    PERTINENT STUDIES:  MRI/MRCP ordered today    CT Abd/Pelvis 5/11/18 (images reviewed personally today)  Tiny hypodense lesions within the spleen, subcentimeter, one of which  has decreased in size since 2008, while others are new. However, the  comparison images have relatively thick slices, and therefore these  lesions are unlikely to be visualized on the comparison examination.  The pancreas is relatively hypoenhancing. Questionable mild stranding  near the tail. No focal mass on single phase of contrast. Mild  prominence of common bile duct and intrahepatic biliary system. This  is likely due to reservoir effect from cholecystectomy. Focal fat near  falciform ligament of the liver. Mild hepatic steatosis. Tiny  subcentimeter hypodense lesion the right lobe of the liver, too small  to characterize, stable since 2008. Otherwise the kidneys and adrenal  glands are unremarkable.     No abnormally dilated loops of small bowel or colon. Decompressed  appearance of the sigmoid colon and the descending colon. No free air  or free fluid. No abdominal or pelvic lymphadenopathy. Hysterectomy.  Unremarkable left ovary. Small fat-containing umbilical hernia.  Partially visualized right mastectomy changes. Relative compression of  the left renal vein from the angle of the SMA. No surrounding  collaterals.     Emphysematous centrilobular changes of the lung. Left fat-containing  Bochdalek hernia. Left basilar atelectasis. No suspicious bony  lesions. Degenerative  "findings of the spine. Rightward curvature of  the lumbar spine and leftward curvature of the thoracic lumbar spine.         Impression:   1. Relative hypoenhancement of the pancreas, which is nonspecific and  likely reflect fatty infiltration. In this patient with abdominal  pain, simple correlation with lipase levels can be made to exclude  possibility of early pancreatitis.  2. Centrilobular emphysematous changes of the lung bases.  2. Tiny hypodense lesions within the spleen. One of these has  decreased since prior while others were not well visualized. These are  likely benign in nature.    Colonoscopy 5/31/16 (Marcio [facility]): 2 TAs (one 10mm in size), o/w normal.    ASSESSMENT/PLAN:    1. Nonspecific lower abdominal pain associated with position/posture and improved with defecation, components of musculoskeletal + constipation-associated discomfort (now resolved in the absence of other clinical alarm features) - will complete non-invasive evaluation of pancreatic abnormality noted on recent CT scan, continue supportive care as ordered for now  1. It was wonderful getting a chance to meet with you to discuss your Abdominal Pain today - discussed next steps in evaluation and management together today.  - Discussed the positional/postural component of your lower abdominal discomfort (likely musculoskeletal in nature, improved following recent chiropractic visit) + discomfort improved with passage of stool gas in setting of infrequent bowel movements (suggestive of Constipation) today.  - Recommend starting Miralax daily, at least 2-3 times/week to start, as part of your consistent non-stimulant bowel regimen as we discussed today. This will be the \"backbone\" of your overall bowel regimen management.  - Continue to engage a regular core-strengthening exercise regimen as able.    2. Discussed the nonspecific fatty infiltration of the pancreas noted on your 4/2018 CT scan, notably in the absence of prior history of " Acute Pancreatitis (though history of cholecystectomy 2008) and your prior smoking history.  - Recommend moving forward with an MRI/MCRP with IV Contrast to evaluate the pancreas and biliary/pancreatic ductal systems more closely.  - Can readdress role for any further pancreas-directed evaluation (i.e. fecal elastase, nutritional markers, EUS) depending on findings.    3. Continue to monitor for the danger signs/symptoms we reviewed together today:  worsening abdominal pain, worsening diarrhea, obstructive symptoms (nausea/vomiting, abdominal distention, inability to pass stool/flatus), blood mixed into stools, persistent fevers/chills, progressive anemia (particulary with iron deficiency), difficulty with swallowing, perianal/rectal discomfort (particularly with defecation), unexpected weight loss, etc.  - Contact us via MyChart or phone should these symptoms occur, particularly as we may advise further evaluation accordingly.    2. Cancer Screening  No high-risk FH CRC (great-uncle), colonoscopy 5/2016 with adenomatous lesions (10mm TA x1, diminutive TA x1). Recommend repeat colonoscopy in 2019 unless symptomatic sooner, or if required for current diagnostic evaluation.    RTC 3-4 months with GI PA, sooner if symptomatic.      Thank you for this consultation. It was a pleasure to participate in the care of this patient; please contact us with any further questions.     Josh Almonte MD   of Medicine  [facility] - Department of Medicine  Division of Gastroenterology

## 2018-10-03 ENCOUNTER — RADIANT APPOINTMENT (OUTPATIENT)
Dept: MRI IMAGING | Facility: CLINIC | Age: 73
End: 2018-10-03
Attending: FAMILY MEDICINE
Payer: MEDICARE

## 2018-10-03 DIAGNOSIS — M54.12 CERVICAL RADICULOPATHY: ICD-10-CM

## 2018-10-04 ENCOUNTER — MYC MEDICAL ADVICE (OUTPATIENT)
Dept: FAMILY MEDICINE | Facility: CLINIC | Age: 73
End: 2018-10-04

## 2018-10-06 ENCOUNTER — RADIANT APPOINTMENT (OUTPATIENT)
Dept: MRI IMAGING | Facility: CLINIC | Age: 73
End: 2018-10-06
Attending: INTERNAL MEDICINE
Payer: MEDICARE

## 2018-10-06 DIAGNOSIS — R93.5 ABNORMAL CT OF THE ABDOMEN: ICD-10-CM

## 2018-10-06 DIAGNOSIS — R10.9 ABDOMINAL WALL PAIN: ICD-10-CM

## 2018-10-06 DIAGNOSIS — K59.00 CONSTIPATION, UNSPECIFIED CONSTIPATION TYPE: ICD-10-CM

## 2018-10-06 RX ORDER — GADOBUTROL 604.72 MG/ML
7.5 INJECTION INTRAVENOUS ONCE
Status: COMPLETED | OUTPATIENT
Start: 2018-10-06 | End: 2018-10-06

## 2018-10-06 RX ADMIN — GADOBUTROL 7.5 ML: 604.72 INJECTION INTRAVENOUS at 13:32

## 2018-10-06 NOTE — DISCHARGE INSTRUCTIONS

## 2018-10-24 ENCOUNTER — CARE COORDINATION (OUTPATIENT)
Dept: GASTROENTEROLOGY | Facility: CLINIC | Age: 73
End: 2018-10-24

## 2018-10-24 NOTE — PROGRESS NOTES
Message received to organize the following per luminal GI:   Can we arrange a diagnostic EUS to further evaluate the MRI findings in the next 3-6wks?     Routed to Dr. Dowd for review.     Wendy SCHMIDT RN Coordinator  Dr. Maradiaga, Dr. Fan & Dr. Dowd   Advanced Endoscopy  [phone]

## 2018-11-05 ENCOUNTER — TELEPHONE (OUTPATIENT)
Dept: GASTROENTEROLOGY | Facility: CLINIC | Age: 73
End: 2018-11-05

## 2018-11-05 DIAGNOSIS — K85.90 PANCREATITIS: Primary | ICD-10-CM

## 2018-11-05 NOTE — TELEPHONE ENCOUNTER
Talked with patient in relation to her labs Dr mensah would like her to have, see below note . Patient will go to the lab close to her home and have them completed. Patient will verbalized understanding of plan of care.       Can we arrange a diagnostic EUS to further evaluate the MRI findings in the next 3-6wks?     Also needs the following labs:   - BMP, Hepatic Panel, CBC w/ diff, lipase (please order Hepatic Panel separately, CMP does not include fractionated bilirubin)   - Vitamin ADEK   - Vitamin B12     Stool tests:   - Fecal pancreatic elastase   - Random qualitative fecal fat   - Stool antitrypsin     Thanks!   Josh

## 2018-11-13 ENCOUNTER — OFFICE VISIT (OUTPATIENT)
Dept: FAMILY MEDICINE | Facility: CLINIC | Age: 73
End: 2018-11-13
Payer: MEDICARE

## 2018-11-13 VITALS
RESPIRATION RATE: 18 BRPM | WEIGHT: 165 LBS | BODY MASS INDEX: 29.61 KG/M2 | DIASTOLIC BLOOD PRESSURE: 77 MMHG | TEMPERATURE: 97.3 F | OXYGEN SATURATION: 96 % | HEART RATE: 83 BPM | SYSTOLIC BLOOD PRESSURE: 119 MMHG

## 2018-11-13 DIAGNOSIS — R39.89 URINARY PROBLEM: ICD-10-CM

## 2018-11-13 DIAGNOSIS — R06.09 DOE (DYSPNEA ON EXERTION): ICD-10-CM

## 2018-11-13 DIAGNOSIS — K85.90 PANCREATITIS: ICD-10-CM

## 2018-11-13 DIAGNOSIS — N89.8 VAGINAL ITCHING: Primary | ICD-10-CM

## 2018-11-13 DIAGNOSIS — M79.674 PAIN OF TOE OF RIGHT FOOT: ICD-10-CM

## 2018-11-13 DIAGNOSIS — J43.2 CENTRILOBULAR EMPHYSEMA (H): ICD-10-CM

## 2018-11-13 LAB
ALBUMIN UR-MCNC: NEGATIVE MG/DL
APPEARANCE UR: CLEAR
BASOPHILS # BLD AUTO: 0.1 10E9/L (ref 0–0.2)
BASOPHILS NFR BLD AUTO: 1.1 %
BILIRUB UR QL STRIP: ABNORMAL
COLOR UR AUTO: YELLOW
DIFFERENTIAL METHOD BLD: NORMAL
EOSINOPHIL # BLD AUTO: 0.1 10E9/L (ref 0–0.7)
EOSINOPHIL NFR BLD AUTO: 1.1 %
ERYTHROCYTE [DISTWIDTH] IN BLOOD BY AUTOMATED COUNT: 12.4 % (ref 10–15)
GLUCOSE UR STRIP-MCNC: NEGATIVE MG/DL
HCT VFR BLD AUTO: 43 % (ref 35–47)
HGB BLD-MCNC: 13.7 G/DL (ref 11.7–15.7)
HGB UR QL STRIP: ABNORMAL
KETONES UR STRIP-MCNC: ABNORMAL MG/DL
LEUKOCYTE ESTERASE UR QL STRIP: NEGATIVE
LYMPHOCYTES # BLD AUTO: 0.8 10E9/L (ref 0.8–5.3)
LYMPHOCYTES NFR BLD AUTO: 17.2 %
MCH RBC QN AUTO: 29 PG (ref 26.5–33)
MCHC RBC AUTO-ENTMCNC: 31.9 G/DL (ref 31.5–36.5)
MCV RBC AUTO: 91 FL (ref 78–100)
MONOCYTES # BLD AUTO: 0.2 10E9/L (ref 0–1.3)
MONOCYTES NFR BLD AUTO: 5.4 %
NEUTROPHILS # BLD AUTO: 3.4 10E9/L (ref 1.6–8.3)
NEUTROPHILS NFR BLD AUTO: 75.2 %
NITRATE UR QL: NEGATIVE
NON-SQ EPI CELLS #/AREA URNS LPF: NORMAL /LPF
PH UR STRIP: 5.5 PH (ref 5–7)
PLATELET # BLD AUTO: 196 10E9/L (ref 150–450)
RBC # BLD AUTO: 4.72 10E12/L (ref 3.8–5.2)
RBC #/AREA URNS AUTO: NORMAL /HPF
SOURCE: ABNORMAL
SP GR UR STRIP: >1.03 (ref 1–1.03)
SPECIMEN SOURCE: NORMAL
UROBILINOGEN UR STRIP-ACNC: 0.2 EU/DL (ref 0.2–1)
VIT B12 SERPL-MCNC: 683 PG/ML (ref 193–986)
WBC # BLD AUTO: 4.5 10E9/L (ref 4–11)
WBC #/AREA URNS AUTO: NORMAL /HPF
WET PREP SPEC: NORMAL

## 2018-11-13 PROCEDURE — 82607 VITAMIN B-12: CPT | Performed by: INTERNAL MEDICINE

## 2018-11-13 PROCEDURE — 99000 SPECIMEN HANDLING OFFICE-LAB: CPT | Performed by: INTERNAL MEDICINE

## 2018-11-13 PROCEDURE — 82306 VITAMIN D 25 HYDROXY: CPT | Performed by: INTERNAL MEDICINE

## 2018-11-13 PROCEDURE — 84590 ASSAY OF VITAMIN A: CPT | Mod: 90 | Performed by: INTERNAL MEDICINE

## 2018-11-13 PROCEDURE — 99213 OFFICE O/P EST LOW 20 MIN: CPT | Performed by: FAMILY MEDICINE

## 2018-11-13 PROCEDURE — 81001 URINALYSIS AUTO W/SCOPE: CPT | Performed by: FAMILY MEDICINE

## 2018-11-13 PROCEDURE — 80048 BASIC METABOLIC PNL TOTAL CA: CPT | Performed by: INTERNAL MEDICINE

## 2018-11-13 PROCEDURE — 80076 HEPATIC FUNCTION PANEL: CPT | Performed by: INTERNAL MEDICINE

## 2018-11-13 PROCEDURE — 87210 SMEAR WET MOUNT SALINE/INK: CPT | Performed by: FAMILY MEDICINE

## 2018-11-13 PROCEDURE — 36415 COLL VENOUS BLD VENIPUNCTURE: CPT | Performed by: INTERNAL MEDICINE

## 2018-11-13 PROCEDURE — 85025 COMPLETE CBC W/AUTO DIFF WBC: CPT | Performed by: INTERNAL MEDICINE

## 2018-11-13 PROCEDURE — 84597 ASSAY OF VITAMIN K: CPT | Mod: 90 | Performed by: INTERNAL MEDICINE

## 2018-11-13 PROCEDURE — 84446 ASSAY OF VITAMIN E: CPT | Mod: 90 | Performed by: INTERNAL MEDICINE

## 2018-11-13 NOTE — PROGRESS NOTES
"  SUBJECTIVE:   Raisa Donohueer is a 73 year old female who presents to clinic today for the following health issues:    Concerns:  1.  COPD follow up :  Using combivent as needed, which is not very often.  Mainly when exerting herself and feeling a little short of breath; the inhaler relieves her symptoms.  Denies current cough, wheeze or SOB.  Treats sneezing and runny nose with claritin and flonase.    2.  Urinary frequency with urge and stress incontinence: she denies any dysuria, hematuria, pelvic or flank pain.  Tried using a pessary, but vaginal dryness precluded this.      3.  Vaginal itching: mostly just with urination.  No discharge or odor.  In the past, she used lotrisone which did help.  She has not tried it this time.  No known alleviating or exacerbating factors.    4.  Right toe problem: just recently, she feels like one of her toes on her right foot is \"flopping\" when she walks.  It does not cause her to fall or trip.  A toe was sore for a short time, but that resolved. Denies trauma, numbness, tingling, or any swelling, bruising or erythema.  She is not sure which toe it is, the right 2nd or 3rd toe.             Problem list and histories reviewed & adjusted, as indicated.  Additional history:     Patient Active Problem List   Diagnosis     Tobacco use disorder     Malignant neoplasm of female breast (H)     Gall stones     Osteoporosis     Advanced directives, counseling/discussion     Chronic rhinitis     Hyperlipidemia LDL goal <160     Other hammer toe (acquired)     Meningioma (H)     History of colonic polyps     Centrilobular emphysema (H)     Past Surgical History:   Procedure Laterality Date     ABDOMEN SURGERY       BIOPSY       BREAST SURGERY       CATARACT IOL, RT/LT       CHOLECYSTECTOMY       COLONOSCOPY       ORTHOPEDIC SURGERY       PHACOEMULSIFICATION CLEAR CORNEA WITH STANDARD INTRAOCULAR LENS IMPLANT  5/22/2014    Procedure: PHACOEMULSIFICATION CLEAR CORNEA WITH STANDARD " INTRAOCULAR LENS IMPLANT;  Surgeon: Rahul Reid MD;  Location:  [facility]     PHACOEMULSIFICATION CLEAR CORNEA WITH STANDARD INTRAOCULAR LENS IMPLANT  7/8/2014    Procedure: PHACOEMULSIFICATION CLEAR CORNEA WITH STANDARD INTRAOCULAR LENS IMPLANT;  Surgeon: Rahul Reid MD;  Location:  [facility]     SURGICAL HISTORY OF -   8/00    left foot reconstruction     SURGICAL HISTORY OF - 1979    hysterectomy       Social History   Substance Use Topics     Smoking status: Former Smoker     Packs/day: 1.50     Years: 30.00     Types: Cigarettes     Start date: 1/2/1967     Quit date: 5/10/2014     Smokeless tobacco: Never Used     Alcohol use No     Family History   Problem Relation Age of Onset     Breast Cancer Sister      Diabetes Sister      Cancer Brother      Asthma Brother      Other Cancer Brother      Hodkins Lymphoma/Hodkins Lymphoma/Hodkins Lymphoma/Hodkins Lymphoma     Cerebrovascular Disease Father      Had several small ones     Cancer Other      Stomach cancer- cousin     Breast Cancer Other      Aunts     Diabetes Other      Uncles and cousin/Paternal Uncles/Paternal Uncles/Paternal Uncles/Paternal Uncles     Cancer Brother      Obesity Brother      Asthma Brother      Cancer Other      Family Hx of colon cancer     Breast Cancer Other      Paternal Aunt/Paternal Aunt/Paternal Aunt/Paternal Aunt     Colon Cancer Other      Maternal Great Uncles     Breast Cancer Sister      Breast Cancer Cousin      Paternal/Paternal     Colon Cancer Other      Maternal Uncle/Maternal Great Uncles/Maternal Great Uncles/Maternal Great Uncles     Obesity Sister      On diet/On diet/On diet     Obesity Brother      Asthma Niece      Diabetes Other      Breast Cancer Other      Colon Cancer Other          Current Outpatient Prescriptions   Medication Sig Dispense Refill     Acetaminophen (TYLENOL PO) Take by mouth as needed for mild pain or fever       Calcium Carbonate-Vitamin D (CALCIUM + D PO) Take 2 tablets by mouth daily.        carboxymethylcellul-glycerin (OPTIVE) 0.5-0.9 % SOLN ophthalmic solution 1 drop       fluticasone (FLONASE) 50 MCG/ACT nasal spray Spray 1-2 sprays into both nostrils daily 16 g 11     Homeopathic Products (SLEEP MEDICINE) TABS Take by mouth daily as needed       Ipratropium-Albuterol (COMBIVENT RESPIMAT)  MCG/ACT inhaler Inhale 1 puff into the lungs 4 times daily Not to exceed 6 doses per day. 1 Inhaler 3     Loratadine (CLARITIN PO) Take by mouth daily        Multiple Vitamins-Minerals (ZAHIDA MULTIVITAMIN FOR WOMEN) TABS Take 1 tablet by mouth daily.       Nutritional Supplements (OSTEO ADVANCE PO)        nystatin (MYCOSTATIN) 826684 UNIT/GM POWD Apply topically 3 times daily as needed 60 g 1     Omega-3 Fatty Acids (FISH OIL) 1200 MG CAPS Take 1 capsule by mouth daily       vitamin E 400 UNIT capsule Take 400 Units by mouth daily       clotrimazole-betamethasone (LOTRISONE) cream Apply topically 2 times daily (Patient not taking: Reported on 11/13/2018) 15 g 1     Allergies   Allergen Reactions     Aspirin Nausea and Vomiting     sick     Chantix [Varenicline Tartrate] Other (See Comments)     Sees things     Glycerin Itching     LIQUID,   Reactions: itchy and redness       Sympathomimetics Other (See Comments)     Patient doesn't know why this is listed as an allergy     Wool Fiber Itching     Redness        Reviewed and updated as needed this visit by clinical staff  Tobacco  Allergies  Meds  Med Hx  Surg Hx  Fam Hx  Soc Hx      Reviewed and updated as needed this visit by Provider         ROS:  Constitutional, HEENT, cardiovascular, pulmonary, gi and gu systems are negative, except as otherwise noted.    OBJECTIVE:     /77 (BP Location: Left arm, Patient Position: Sitting, Cuff Size: Adult Regular)  Pulse 83  Temp 97.3  F (36.3  C) (Oral)  Resp 18  Wt 165 lb (74.8 kg)  SpO2 96%  BMI 29.61 kg/m2  Body mass index is 29.61 kg/(m^2).  GENERAL APPEARANCE: healthy, alert and no  "distress  EYES: Eyes grossly normal to inspection, PERRL and conjunctivae and sclerae normal  HENT: ear canals and TM's normal and nose and mouth without ulcers or lesions  NECK: no adenopathy, no asymmetry, masses, or scars and thyroid normal to palpation  RESP: lungs clear to auscultation - no rales, rhonchi or wheezes; breath sounds are diminished in the bases bilaterally.  Normal work of breathing.  CV: regular rates and rhythm, normal S1 S2, no S3 or S4 and no murmur, click or rub  MS: right toes with hammer toe deformities; no erythema, tenderness or swelling.  ROM of the toes other than the great toe appears limited.  Neurovascularly intact.  PSYCH: mentation appears normal and affect normal/bright        ASSESSMENT/PLAN:             1. Vaginal itching  Wet prep is negative; patient with vaginal atrophy and some yeast dermatitis in the past which improved with lotrisone.  Advised that she try this again.  Have avoided topical estrogen due to h/o breast cancer, but may need to weigh risks and benefits if symptoms are not improving.  - Wet prep  - Urine Microscopic    2. Urinary problem  Urinary stress and urge incontinence, unable to use pessary due to vaginal atrophy and dryness.  Could try non-hormonal lubricant but may need hormonal, with concern of h/o breast cancer as above, did not prescribe this today.  She was not really interested in medications, but would like to consult with a urologist.  Referral done.  - *UA reflex to Microscopic and Culture ([place] [facility] (except [place] and [place])  - UROLOGY ADULT REFERRAL    3. Pain of toe of right foot  I am not certain how to help the patient with her complaint of a \"floppy toe.\"  On exam, her toes seem fairly rigid with hammer toes.  I would appreciate podiatry referral expertise.   - PODIATRY/FOOT & ANKLE SURGERY REFERRAL    4. ZAPATA (dyspnea on exertion)    - Ipratropium-Albuterol (COMBIVENT RESPIMAT)  MCG/ACT inhaler; Inhale 1 puff " into the lungs 4 times daily Not to exceed 6 doses per day.  Dispense: 1 Inhaler; Refill: 3    5. Centrilobular emphysema (H)  Stable.  She is up to date with lung cancer screening due to tobacco hx.          Lyla Hawkins MD  [facility]

## 2018-11-13 NOTE — MR AVS SNAPSHOT
After Visit Summary   11/13/2018    Raisa Archer    MRN: [id number]           Patient Information     Date Of Birth          1945        Visit Information        Provider Department      11/13/2018 1:40 PM Lyla Hawkins MD [facility]        Today's Diagnoses     Vaginal itching    -  1    Urinary problem        Pain of toe of right foot           Follow-ups after your visit        Additional Services     PODIATRY/FOOT & ANKLE SURGERY REFERRAL       Your provider has referred you to: [facility]: [facility] [phone]   [email or web address]    Please be aware that coverage of these services is subject to the terms and limitations of your health insurance plan.  Call member services at your health plan with any benefit or coverage questions.      Please bring the following to your appointment:  >>   Any x-rays, CTs or MRIs which have been performed.  Contact the facility where they were done to arrange for  prior to your scheduled appointment.    >>   List of current medications   >>   This referral request   >>   Any documents/labs given to you for this referral            UROLOGY ADULT REFERRAL       Your provider has referred you to: [facility]: [place] [facility] [place] [phone]   [email or web address]    Please be aware that coverage of these services is subject to the terms and limitations of your health insurance plan.  Call member services at your health plan with any benefit or coverage questions.      Please bring the following with you to your appointment:    (1) Any X-Rays, CTs or MRIs which have been performed.  Contact the facility where they were done to arrange for  prior to your scheduled appointment.    (2) List of current medications  (3) This referral request   (4) Any documents/labs given to you for this referral                  Your next 10  appointments already scheduled     Dec 17, 2018  1:00 PM CST   (Arrive by 12:45 PM)   Return Visit with Moni Hinton PA-C   [facility] ([facility])    [address] [address] [phone]              Who to contact     If you have questions or need follow up information about today's clinic visit or your schedule please contact [facility] directly at [phone].  Normal or non-critical lab and imaging results will be communicated to you by Ahandyhandhart, letter or phone within 4 business days after the clinic has received the results. If you do not hear from us within 7 days, please contact the clinic through [employer] or phone. If you have a critical or abnormal lab result, we will notify you by phone as soon as possible.  Submit refill requests through [employer] or call your pharmacy and they will forward the refill request to us. Please allow 3 business days for your refill to be completed.          Additional Information About Your Visit        Ahandyhandhart Information     [employer] gives you secure access to your electronic health record. If you see a primary care provider, you can also send messages to your care team and make appointments. If you have questions, please call your primary care clinic.  If you do not have a primary care provider, please call [phone] and they will assist you.        Care EveryWhere ID     This is your Care EveryWhere ID. This could be used by other organizations to access your [facility] medical records  [id number]        Your Vitals Were     Pulse Temperature Respirations Pulse Oximetry BMI (Body Mass Index)       83 97.3  F (36.3  C) (Oral) 18 96% 29.61 kg/m2        Blood Pressure from Last 3 Encounters:   11/13/18 119/77   09/17/18 127/70   09/07/18 109/72    Weight from Last 3 Encounters:   11/13/18 165 lb (74.8 kg)   09/17/18 169 lb (76.7 kg)   09/07/18 169 lb  (76.7 kg)              We Performed the Following     *UA reflex to Microscopic and Culture ([place] [facility] (except [place] and [place])     PODIATRY/FOOT & ANKLE SURGERY REFERRAL     Urine Microscopic     UROLOGY ADULT REFERRAL     Wet prep        Primary Care Provider Office Phone # Fax #    Lyla Hawkins [phone] [address] [place]  [place] [address]        Equal Access to Services     ASHLEY NAIR : Hadii aad ku hadasho Soomaali, waaxda luqadaha, qaybta kaalmada adeegyada, waxay idiin hayaan adeeg kharash la'aan . So [facility] [phone].    ATENCIÓN: Si david al, tiene a casanova disposición servicios gratuitos de asistencia lingüística. Llame al [phone].    We comply with applicable federal civil rights laws and [place] laws. We do not discriminate on the basis of race, color, national origin, age, disability, sex, sexual orientation, or gender identity.            Thank you!     Thank you for choosing [facility]  for your care. Our goal is always to provide you with excellent care. Hearing back from our patients is one way we can continue to improve our services. Please take a few minutes to complete the written survey that you may receive in the mail after your visit with us. Thank you!             Your Updated Medication List - Protect others around you: Learn how to safely use, store and throw away your medicines at www.disposemymeds.org.          This list is accurate as of 11/13/18  2:35 PM.  Always use your most recent med list.                   Brand Name Dispense Instructions for use Diagnosis    CALCIUM + D PO      Take 2 tablets by mouth daily.        CLARITIN PO      Take by mouth daily        clotrimazole-betamethasone cream    LOTRISONE    15 g    Apply topically 2 times daily    Vulvar irritation       fish Oil 1200 MG capsule      Take 1 capsule by mouth daily        fluticasone 50 MCG/ACT spray    FLONASE    16 g     Spray 1-2 sprays into both nostrils daily    Seasonal allergic rhinitis       Ipratropium-Albuterol  MCG/ACT inhaler    COMBIVENT RESPIMAT    1 Inhaler    Inhale 1 puff into the lungs 4 times daily Not to exceed 6 doses per day.    ZAPATA (dyspnea on exertion)       ZAHIDA MULTIVITAMIN FOR WOMEN Tabs      Take 1 tablet by mouth daily.        nystatin 449405 UNIT/GM Powd    MYCOSTATIN    60 g    Apply topically 3 times daily as needed    Intertrigo       OPTIVE 0.5-0.9 % Soln ophthalmic solution   Generic drug:  carboxymethylcellul-glycerin      1 drop    Medicare annual wellness visit, subsequent       OSTEO ADVANCE PO           SLEEP MEDICINE Tabs      Take by mouth daily as needed        TYLENOL PO      Take by mouth as needed for mild pain or fever        vitamin E 400 UNIT capsule      Take 400 Units by mouth daily

## 2018-11-14 LAB
ALBUMIN SERPL-MCNC: 4 G/DL (ref 3.4–5)
ALP SERPL-CCNC: 99 U/L (ref 40–150)
ALT SERPL W P-5'-P-CCNC: 23 U/L (ref 0–50)
ANION GAP SERPL CALCULATED.3IONS-SCNC: 8 MMOL/L (ref 3–14)
AST SERPL W P-5'-P-CCNC: 23 U/L (ref 0–45)
BILIRUB DIRECT SERPL-MCNC: 0.1 MG/DL (ref 0–0.2)
BILIRUB SERPL-MCNC: 0.6 MG/DL (ref 0.2–1.3)
BUN SERPL-MCNC: 21 MG/DL (ref 7–30)
CALCIUM SERPL-MCNC: 9.6 MG/DL (ref 8.5–10.1)
CHLORIDE SERPL-SCNC: 103 MMOL/L (ref 94–109)
CO2 SERPL-SCNC: 28 MMOL/L (ref 20–32)
CREAT SERPL-MCNC: 0.84 MG/DL (ref 0.52–1.04)
DEPRECATED CALCIDIOL+CALCIFEROL SERPL-MC: 36 UG/L (ref 20–75)
GFR SERPL CREATININE-BSD FRML MDRD: 67 ML/MIN/1.7M2
GLUCOSE SERPL-MCNC: 89 MG/DL (ref 70–99)
POTASSIUM SERPL-SCNC: 3.8 MMOL/L (ref 3.4–5.3)
PROT SERPL-MCNC: 7.4 G/DL (ref 6.8–8.8)
SODIUM SERPL-SCNC: 139 MMOL/L (ref 133–144)

## 2018-11-15 LAB — PHYTONADIONE SERPL-MCNC: 0.83 NMOL/L (ref 0.22–4.88)

## 2018-11-16 LAB
A-TOCOPHEROL VIT E SERPL-MCNC: 15.1 MG/L (ref 5.5–18)
ANNOTATION COMMENT IMP: NORMAL
BETA+GAMMA TOCOPHEROL SERPL-MCNC: 0.6 MG/L (ref 0–6)
RETINYL PALMITATE SERPL-MCNC: <0.02 MG/L (ref 0–0.1)
VIT A SERPL-MCNC: 0.5 MG/L (ref 0.3–1.2)

## 2018-11-17 ENCOUNTER — PRE VISIT (OUTPATIENT)
Dept: UROLOGY | Facility: CLINIC | Age: 73
End: 2018-11-17

## 2018-11-17 NOTE — TELEPHONE ENCOUNTER
MEDICAL RECORDS REQUEST   [facility] Clinic  [address] [place] [address]  PHONE: [phone]  Fax: [phone]        FUTURE VISIT INFORMATION                                                   Raisa Archer, : 1945 scheduled for future visit at [facility]    APPOINTMENT INFORMATION:    Date: 2019    Provider:  Yaritza Basilio    Reason for Visit/Diagnosis: Incontinence    REFERRAL INFORMATION:    Referring provider:  Lyla Abrams    Specialty: MD    Referring providers clinic:  [facility]    Clinic contact number:  [phone];     RECORDS REQUESTED FOR VISIT                                                     NOTES  STATUS/DETAILS   OFFICE NOTE from referring provider  yes   OFFICE NOTE from other specialist  no   DISCHARGE SUMMARY from hospital  no   DISCHARGE REPORT from the ER  no   OPERATIVE REPORT  no   MEDICATION LIST  yes       PRE-VISIT CHECKLIST      Record collection complete Yes   Appointment appropriately scheduled           (right time/right provider) Yes   MyChart activation Yes   Questionnaire complete If no, please explain in process     Completed by: Yaritza Sims

## 2018-11-19 ENCOUNTER — RADIANT APPOINTMENT (OUTPATIENT)
Dept: GENERAL RADIOLOGY | Facility: CLINIC | Age: 73
End: 2018-11-19
Attending: PODIATRIST
Payer: MEDICARE

## 2018-11-19 ENCOUNTER — OFFICE VISIT (OUTPATIENT)
Dept: PODIATRY | Facility: CLINIC | Age: 73
End: 2018-11-19
Payer: MEDICARE

## 2018-11-19 VITALS
DIASTOLIC BLOOD PRESSURE: 76 MMHG | SYSTOLIC BLOOD PRESSURE: 100 MMHG | BODY MASS INDEX: 29.38 KG/M2 | HEIGHT: 63 IN | WEIGHT: 165.8 LBS

## 2018-11-19 DIAGNOSIS — L84 CALLUS: ICD-10-CM

## 2018-11-19 DIAGNOSIS — D36.10 NEUROMA: ICD-10-CM

## 2018-11-19 DIAGNOSIS — M20.41 HAMMER TOES OF BOTH FEET: ICD-10-CM

## 2018-11-19 DIAGNOSIS — M20.42 HAMMER TOES OF BOTH FEET: ICD-10-CM

## 2018-11-19 DIAGNOSIS — M79.671 RIGHT FOOT PAIN: Primary | ICD-10-CM

## 2018-11-19 DIAGNOSIS — K85.90 PANCREATITIS: ICD-10-CM

## 2018-11-19 PROCEDURE — 82103 ALPHA-1-ANTITRYPSIN TOTAL: CPT | Mod: 90 | Performed by: INTERNAL MEDICINE

## 2018-11-19 PROCEDURE — 83520 IMMUNOASSAY QUANT NOS NONAB: CPT | Mod: 90 | Performed by: INTERNAL MEDICINE

## 2018-11-19 PROCEDURE — 82705 FATS/LIPIDS FECES QUAL: CPT | Mod: 90 | Performed by: INTERNAL MEDICINE

## 2018-11-19 PROCEDURE — 73630 X-RAY EXAM OF FOOT: CPT | Mod: RT

## 2018-11-19 PROCEDURE — 99203 OFFICE O/P NEW LOW 30 MIN: CPT | Performed by: PODIATRIST

## 2018-11-19 PROCEDURE — 99000 SPECIMEN HANDLING OFFICE-LAB: CPT | Performed by: INTERNAL MEDICINE

## 2018-11-19 NOTE — MR AVS SNAPSHOT
After Visit Summary   11/19/2018    Raisa Archer    MRN: [id number]           Patient Information     Date Of Birth          1945        Visit Information        Provider Department      11/19/2018 2:00 PM Levy Villafana DPM [facility]        Today's Diagnoses     Right foot pain    -  1    Hammer toes of both feet        Neuroma        Callus          Care Instructions    Thank you for choosing [facility] / [facility]!    Follow up as needed    DR. VILLAFANA'S CLINIC LOCATIONS     MONDAY  [place] TUESDAY & FRIDAY AM  [place]   [address] [facility]   [address] [place] [address] [address]   [place] [address] [place] [address]   [phone]  [phone]  [phone]       WEDNESDAY  [place] SCHEDULE SURGERY: [phone]   [facility] APPOINTMENTS: [phone]   [place] [address] BILLING QUESTIONS: [phone]   [phone]       Calluses, Corns, IPKs, Porokeratosis    When there is excessive friction or pressure on the skin, the body responds by making the skin thicker to protect the deeper structures from becoming exposed.  While this works well to protect the deeper structures, the thickened skin can increase pressure and pain.    Flat, diffuse thickening are simple calluses and they are usually caused by friction.  Often these are the result of rubbing on a shoe or going barefoot.    Calluses with a central core between the toes are called corns.  These result from prominent joints on adjacent toes rubbing together.  Theses are a symptom of bone malalignment and will always recur unless the underlying bones are addressed surgically.    Calluses with a central core on the ball of the foot are usually IPKs (intractable plantar keratosis).  These are caused by excessive pressure from the metatarsals, the bones that make up the ball of the foot.  Often one of these bones is too long or too  prominent.  Again, these will always recur unless the underlying bone issue is addressed.  There is no cure for these.  They will either go away by themselves, recur, or more could develop.    Regardless of the diagnosis, most of these lesions can be kept comfortable with routine maintenance.   1.This consists of filing them with a pumice stone or callus file a couple of times per week.    2. Lotion can be applied to soften the callus. A urea based cream such as Kerasal or Vanicream or thicker cream with shea butter are good options.  3. Toe spacers or toe covers can be used for corns, gel pads can be used for other lesions on the bottom of the foot.   If there is a surgical pathology noted, such as a prominent bone, often this needs to be addressed surgically to minimize recurrence. However, sometimes the lesion simply migrates to another spot after surgery, so it is not a guaranteed cure.     Please call with any additional questions.       We discussed the potential costs of callus trimming including the possibility this may not be covered under insurance.  Cost of this can be around $150 if paying out of pocket.    Hammertoe treatment options were discussed.  This included both surgical and non-surgical treatment alternatives.  Non-surgical options include wide fitting shoes, deep toe box, crest pad or foam pads, foot orthotics and debridement of any callous as necessary.  Surgical treatments were explained including soft tissue release, arthroplasty, joint fusion, metatarsal phalangeal joint release with extensor tendon release.      Potential complications from surgery were discussed to include recurrent deformity, floppy toe, incomplete correction, need for repeat surgery, infection, lateral drift and blood clot.  Surgery may involve external pin fixation.  It was explained that hammertoe surgery does not make the toes normal but rather the goal is to make the toe less crooked and hopefully less painful from  shoe pressure.     Patient is aware that surgery is elective, can be avoided if desired.  The recovery process was discussed including impact to work, walking, shoes and daily activities.  I would anticipate up to 12 months for maximum recovery after surgery. Likely surgical procedures based on exam and xray findings include.    SEN'S NEUROMA     What is a Sen's Neuroma?     Sen's neuroma is an enlargement or thickening of a nerve in the foot. It is also sometimes referred to as an intermetatarsal neuroma, interdigital neuroma, Sen's metatarsalgia (pain in the metatarsal head area), jerry-neural fibrosis (scar tissue around a nerve) or entrapment neuropathy (abnormal nerve due to compression). A Sen's neuroma most commonly occurs in the third interspace between the third and fourth toes, followed by the second interspace between the second and third toes. Sen's neuromas have also occurred in the fourth and first interspaces, but these are rare. If you have a Sen's neuroma, there is a 15% chance it will occur bilaterally (on both feet). Sen's neuromas occur most commonly in women who are between 30 to 50 years old. The reason they are more common in women is thought to be due to the shoes women wear.   What Causes a Sen's Neuroma?   A Sen's neuroma is thought to be caused by trauma to the nerve, but scientists are still not sure about the exact cause of the trauma. The trauma may be caused by the metatarsal heads, the deep transverse intermetatarsal ligament (holds the metatarsal heads together) or an intermetatarsal bursa (fluid-filled sac). All of these structures can cause compression/trauma on the nerve which initially causes swelling and injury in the nerve. Over time if the compression/trauma continues, the nerve repairs itself with very fibrous tissue that leads to enlargement and thickening of the nerve. Other causes of trauma to the nerve may include; overpronation (foot rolls  "inward), hypermobility (too much motion), cavo varus (high arch foot) and excessive dorsiflexion (toes bend upward) of the toes. These biomechanical (how the foot moves) factors may cause trauma to the nerve with every step. If the nerve becomes irritated and enlarged then it takes up more space and gets even more compressed and irritated. It becomes a vicious cycle.   Signs & Symptoms of a Sen's Neuroma    - Pain (sharp, stabbing, throbbing, shooting)    - Numbness    - Tingling or \"pins & needles\"    - Burning    - Cramping    - A feeling that you are stepping on something or that something is in your shoe    - Initially the symptoms may happen once in a while, but as the condition gets worse, the symptoms may happen all of the time    - It usually feels better by taking off your shoe and massaging your foot     Diagnosis/Tests (Exam) for a Sen's Neuroma   Your podiatrist (foot doctor) will ask many questions about your signs and symptoms and will perform a physical exam. Some of the exams may include a web space compression test. This is done by squeezing the metatarsals together with one hand and using the thumb and index finger of the other hand to compress the affected web space to reproduce the pain/symptoms. A palpable click (Kamini's click) is usually present. This test may also cause pain to shoot into the toes and that is called a Tinel's sign. Trent's test involves squeezing the metatarsals together and moving the toes up and down for 30 seconds. This will usually cause pain or it will bring on your other symptoms. Ivy's sign is positive when you stand and the affected toes spread apart. A Sen's neuroma is usually diagnosed based on the history and physical exam findings, but sometimes other tests such as an x-ray, ultrasound or an MRI are needed.   Treatment of a Sen's Neuroma    1.  Footwear changes: Wear shoes that are wide and deep in the toe box so they do not put pressure on " your toes and metatarsals. Avoid wearing high heels because they cause increased pressure on the ball of your foot (forefoot).     2.  Metatarsal pads: These help to lift and separate the metatarsal heads to take pressure off of the nerve. They are placed just behind where you feel the pain, not on top of the painful spot.    3.  Activity modification: For example, you may try swimming instead of running until your symptoms go away.    4.  Taping    5.  Icing    6.  NSAIDs (anti-inflammatories): aleve, ibuprofen, etc.    7.  Arch supports or orthotics: These help to control some of the abnormal motion in your feet. The abnormal motion can lead to extra torque and pressure on the nerve.    8.  Physical Therapy   9.  Cortisone injection: Helps to decrease the size of the irritated, enlarged nerve.    10.  Sclerosing Alcohol injection: Helps to destroy the nerve chemically. Does cause permanent numbness.   11.  Surgery: If conservative treatment does not help surgery may be needed. Surgery may involve cutting out the nerve or cutting the intermetatarsal ligament. Studies have shown surgery has an 80-85% success rate.  This will result in numbness.     Prevention of a Sen's Neuroma    -Avoid wearing narrow, pointed toe shoes    -Avoid wearing high heel shoes               Body Mass Index (BMI)  Many things can cause foot and ankle problems. Foot structure, activity level, foot mechanics and injuries are common causes of pain.  One very important issue that often goes unmentioned, is body weight. Extra weight can cause increased stress on muscles, ligaments, bones and tendons.  Sometimes just a few extra pounds is all it takes to put one over her/his threshold. Without reducing that stress, it can be difficult to alleviate pain. Some people are uncomfortable addressing this issue, but we feel it is important for you to think about it. As Foot &  Ankle specialists, our job is addressing the lower extremity problem and  possible causes. Regarding extra body weight, we encourage patients to discuss diet and weight management plans with their primary care doctors. It is this team approach that gives you the best opportunity for pain relief and getting you back on your feet.      Try orthotics with metatarsal pads from [employer].                      Follow-ups after your visit        Your next 10 appointments already scheduled     Dec 17, 2018  1:00 PM CST   (Arrive by 12:45 PM)   Return Visit with Moni Hinton PA-C   [facility] ([facility])    [address] [place] [id number] [phone]            Jan 02, 2019  1:45 PM CST   (Arrive by 1:30 PM)   NEW FEMALE PELVIC with Yaritza See Chino Basilio MD   [facility]rology and [facility] ([facility])    [address] [place] [phone]              Who to contact     If you have questions or need follow up information about today's clinic visit or your schedule please contact [facility] directly at [phone].  Normal or non-critical lab and imaging results will be communicated to you by MyChart, letter or phone within 4 business days after the clinic has received the results. If you do not hear from us within 7 days, please contact the clinic through [employer] or phone. If you have a critical or abnormal lab result, we will notify you by phone as soon as possible.  Submit refill requests through [employer] or call your pharmacy and they will forward the refill request to us. Please allow 3 business days for your refill to be completed.          Additional Information About Your Visit        [employer] Information     [employer] gives you secure access to your electronic health record. If you see a primary care provider, you can also send messages to your care team and make appointments. If  "you have questions, please call your primary care clinic.  If you do not have a primary care provider, please call [phone] and they will assist you.        Care EveryWhere ID     This is your Care EveryWhere ID. This could be used by other organizations to access your [place] medical records  [id number]        Your Vitals Were     Height BMI (Body Mass Index)                5' 2.6\" (1.59 m) 29.75 kg/m2           Blood Pressure from Last 3 Encounters:   11/19/18 100/76   11/13/18 119/77   09/17/18 127/70    Weight from Last 3 Encounters:   11/19/18 165 lb 12.8 oz (75.2 kg)   11/13/18 165 lb (74.8 kg)   09/17/18 169 lb (76.7 kg)              We Performed the Following     XR Foot Right G/E 3 Views        Primary Care Provider Office Phone # Fax #    Lyla Hawkins [phone] [address] [place] [address]        Equal Access to Services     [facility]: Hadii aad ku hadasho Soomaali, waaxda luqadaha, qaybta kaalmada adeegyada, waxay mikyin hayzulay alexis . So [facility] [phone].    ATENCIÓN: Si habla español, tiene a casanova disposición servicios gratuitos de asistencia lingüística. Llame al [phone].    We comply with applicable federal civil rights laws and [place] laws. We do not discriminate on the basis of race, color, national origin, age, disability, sex, sexual orientation, or gender identity.            Thank you!     Thank you for choosing [facility]  for your care. Our goal is always to provide you with excellent care. Hearing back from our patients is one way we can continue to improve our services. Please take a few minutes to complete the written survey that you may receive in the mail after your visit with us. Thank you!             Your Updated Medication List - Protect others around you: Learn how to safely use, store and throw away your medicines at www.disposemymeds.org.          This list is accurate as of 11/19/18  " 2:28 PM.  Always use your most recent med list.                   Brand Name Dispense Instructions for use Diagnosis    CALCIUM + D PO      Take 2 tablets by mouth daily.        CLARITIN PO      Take by mouth daily        clotrimazole-betamethasone cream    LOTRISONE    15 g    Apply topically 2 times daily    Vulvar irritation       fish Oil 1200 MG capsule      Take 1 capsule by mouth daily        fluticasone 50 MCG/ACT spray    FLONASE    16 g    Spray 1-2 sprays into both nostrils daily    Seasonal allergic rhinitis       Ipratropium-Albuterol  MCG/ACT inhaler    COMBIVENT RESPIMAT    1 Inhaler    Inhale 1 puff into the lungs 4 times daily Not to exceed 6 doses per day.    ZAPATA (dyspnea on exertion)       ZAHIDA MULTIVITAMIN FOR WOMEN Tabs      Take 1 tablet by mouth daily.        nystatin 191742 UNIT/GM Powd    MYCOSTATIN    60 g    Apply topically 3 times daily as needed    Intertrigo       OPTIVE 0.5-0.9 % Soln ophthalmic solution   Generic drug:  carboxymethylcellul-glycerin      1 drop    Medicare annual wellness visit, subsequent       OSTEO ADVANCE PO           SLEEP MEDICINE Tabs      Take by mouth daily as needed        TYLENOL PO      Take by mouth as needed for mild pain or fever        vitamin E 400 UNIT capsule      Take 400 Units by mouth daily

## 2018-11-19 NOTE — LETTER
"    11/19/2018         RE: Raisa Archer  [address] [place] [id number]        Dear Colleague,    Thank you for referring your patient, Raisa Archer, to the [facility]. Please see a copy of my visit note below.    PATIENT HISTORY:  Raisa Archer is a 73 year old female who presents to clinic for R toes that \"feel strange.\"  Some pain in the area near 3rd and 4th toes.  Toes feel like they are \"flopping.\"  Pt unsure of an injury, though she thinks it's possible she stubbed the toes.  No treatments.  0-2/10 pain.  1 week duration.    I was requested to see this patient for this issue by Dr Hawkins.    Review of Systems:  Patient denies fever, chills, rash, wound, stiffness, limping, numbness, weakness, heart burn, blood in stool, chest pain with activity, calf pain when walking, shortness of breath with activity, chronic cough, easy bleeding/bruising, swelling of ankles, excessive thirst, fatigue, depression, anxiety.       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     Breast cancer, right breast (H)      Centrilobular emphysema (H)      Centrilobular emphysema (H)      Colon polyps     repeat colonoscopy 2019     Left tennis elbow      Rash     currently at masectomy site     Unspecified essential hypertension     situational and resolved        PAST SURGICAL HISTORY:   Past Surgical History:   Procedure Laterality Date     ABDOMEN SURGERY       BIOPSY       BREAST SURGERY       CATARACT IOL, RT/LT       CHOLECYSTECTOMY       COLONOSCOPY       ORTHOPEDIC SURGERY       PHACOEMULSIFICATION CLEAR CORNEA WITH STANDARD INTRAOCULAR LENS IMPLANT  5/22/2014    Procedure: PHACOEMULSIFICATION CLEAR CORNEA WITH STANDARD INTRAOCULAR LENS IMPLANT;  Surgeon: Rahul Reid MD;  [facility]     PHACOEMULSIFICATION CLEAR CORNEA WITH STANDARD INTRAOCULAR LENS IMPLANT  7/8/2014    Procedure: PHACOEMULSIFICATION CLEAR CORNEA " WITH STANDARD INTRAOCULAR LENS IMPLANT;  Surgeon: Rahul Reid MD;  Location:  OR     SURGICAL HISTORY OF -   8/00    left foot reconstruction     SURGICAL HISTORY OF -   1979    hysterectomy        MEDICATIONS:   Current Outpatient Prescriptions:      Acetaminophen (TYLENOL PO), Take by mouth as needed for mild pain or fever, Disp: , Rfl:      Calcium Carbonate-Vitamin D (CALCIUM + D PO), Take 2 tablets by mouth daily., Disp: , Rfl:      carboxymethylcellul-glycerin (OPTIVE) 0.5-0.9 % SOLN ophthalmic solution, 1 drop, Disp: , Rfl:      clotrimazole-betamethasone (LOTRISONE) cream, Apply topically 2 times daily, Disp: 15 g, Rfl: 1     fluticasone (FLONASE) 50 MCG/ACT nasal spray, Spray 1-2 sprays into both nostrils daily, Disp: 16 g, Rfl: 11     Homeopathic Products (SLEEP MEDICINE) TABS, Take by mouth daily as needed, Disp: , Rfl:      Ipratropium-Albuterol (COMBIVENT RESPIMAT)  MCG/ACT inhaler, Inhale 1 puff into the lungs 4 times daily Not to exceed 6 doses per day., Disp: 1 Inhaler, Rfl: 3     Loratadine (CLARITIN PO), Take by mouth daily , Disp: , Rfl:      Multiple Vitamins-Minerals (ZAHIDA MULTIVITAMIN FOR WOMEN) TABS, Take 1 tablet by mouth daily., Disp: , Rfl:      Nutritional Supplements (OSTEO ADVANCE PO), , Disp: , Rfl:      nystatin (MYCOSTATIN) 495148 UNIT/GM POWD, Apply topically 3 times daily as needed, Disp: 60 g, Rfl: 1     Omega-3 Fatty Acids (FISH OIL) 1200 MG CAPS, Take 1 capsule by mouth daily, Disp: , Rfl:      vitamin E 400 UNIT capsule, Take 400 Units by mouth daily, Disp: , Rfl:      ALLERGIES:    Allergies   Allergen Reactions     Aspirin Nausea and Vomiting     sick     Chantix [Varenicline Tartrate] Other (See Comments)     Sees things     Glycerin Itching     LIQUID,   Reactions: itchy and redness       Sympathomimetics Other (See Comments)     Patient doesn't know why this is listed as an allergy     Wool Fiber Itching     Redness         SOCIAL HISTORY:   Social History      Social History     Marital status: Single     Spouse name: N/A     Number of children: N/A     Years of education: N/A     Occupational History     Not on file.     Social History Main Topics     Smoking status: Former Smoker     Packs/day: 1.50     Years: 30.00     Types: Cigarettes     Start date: 1/2/1967     Quit date: 5/10/2014     Smokeless tobacco: Never Used     Alcohol use No     Drug use: No     Sexual activity: No     Other Topics Concern     Parent/Sibling W/ Cabg, Mi Or Angioplasty Before 65f 55m? No     Social History Narrative    Dairy/d 1-2 servings/d.     Caffeine 0-1 servings/d    Exercise 3 x week    Sunscreen used - Yes    Seatbelts used - Yes    Working smoke/CO detectors in the home - Yes    Guns stored in the home - No    Self Breast Exams - Yes    Self Testicular Exam - NA    Eye Exam up to date - Yes    Dental Exam up to date - Yes     Pap Smear up to date - hysterectomy    Mammogram up to date - Yes     PSA up to date - NA    Dexa Scan up to date - No    Flex Sig / Colonoscopy up to date - yes    Immunizations up to date - Yes 2007 Tetanus    Abuse: Current or Past(Physical, Sexual or Emotional)- No    Do you feel safe in your environment - Yes    2008 last updated 7/10                            FAMILY HISTORY:   Family History   Problem Relation Age of Onset     Breast Cancer Sister      Diabetes Sister      Cancer Brother      Asthma Brother      Other Cancer Brother      Hodkins Lymphoma/Hodkins Lymphoma/Hodkins Lymphoma/Hodkins Lymphoma     Cerebrovascular Disease Father      Had several small ones     Cancer Other      Stomach cancer- cousin     Breast Cancer Other      Aunts     Diabetes Other      Uncles and cousin/Paternal Uncles/Paternal Uncles/Paternal Uncles/Paternal Uncles     Cancer Brother      Obesity Brother      Asthma Brother      Cancer Other      Family Hx of colon cancer     Breast Cancer Other      Paternal Aunt/Paternal Aunt/Paternal Aunt/Paternal Aunt     Colon  "Cancer Other      Maternal Great Uncles     Breast Cancer Sister      Breast Cancer Cousin      Paternal/Paternal     Colon Cancer Other      Maternal Uncle/Maternal Great Uncles/Maternal Great Uncles/Maternal Great Uncles     Obesity Sister      On diet/On diet/On diet     Obesity Brother      Asthma Niece      Diabetes Other      Breast Cancer Other      Colon Cancer Other         EXAM:Vitals: /76 (BP Location: Left arm, Patient Position: Chair, Cuff Size: Adult Regular)  Ht 5' 2.6\" (1.59 m)  Wt 165 lb 12.8 oz (75.2 kg)  BMI 29.75 kg/m2  BMI= Body mass index is 29.75 kg/(m^2).    General appearance: Patient is alert and fully cooperative with history & exam.  No sign of distress is noted during the visit.     Psychiatric: Affect is pleasant & appropriate.  Patient appears motivated to improve health.     Respiratory: Breathing is regular & unlabored while sitting.     HEENT: Hearing is intact to spoken word.  Speech is clear.  No gross evidence of visual impairment that would impact ambulation.     Dermatologic: Diffuse callus sub left 2nd MPJ area.  Skin is intact to both lower extremities without significant lesions, rash or abrasion.  No paronychia or evidence of soft tissue infection is noted.     Vascular: DP & PT pulses are intact & regular bilaterally.  No significant edema.  Varicosities noted.  CFT and skin temperature are normal to both lower extremities.     Neurologic: Lower extremity sensation is intact to light touch.  No evidence of weakness or contracture in the lower extremities.  No evidence of neuropathy.     Musculoskeletal: Lesser hammertoes b/l, L toes 3-5 more contracted.  Some discomfort to palpation of the R 3rd interspace.  Patient is ambulatory without assistive device or brace.  No gross ankle deformity noted.  No foot or ankle joint effusion is noted.  No muscle weakness noted to RLE.     XRs of R foot reviewed with pt.  No acute findings.    ASSESSMENT:   R foot pain, suspect " neuroma  B/l hammertoes  L foot callus     PLAN:  Reviewed patient's chart in epic.  Discussed condition and treatment options including pros and cons.    Difficult to localize issue.  Described symptoms are unusual.  I don't appreciate weakness.  I suspect neuroma given altered sensation, location.  Hammertoe related cause is possible.    Discussed causes of calluses.  They are due to areas of increased friction.  Discussed treatments such as using foot file, pumice stone, pads, orthotics, and not walking barefoot.     Advised otc orthotics with metatarsal pads, wider shoes, deep toebox.  RICE.  May consider MRI if not improving.    Handouts given.     F/u prn.    Levy Anderson DPM, FACFAS          Again, thank you for allowing me to participate in the care of your patient.        Sincerely,        Levy Anderson DPM

## 2018-11-19 NOTE — PATIENT INSTRUCTIONS
Thank you for choosing [facility]!    Follow up as needed    DR. VILLAFANA'S CLINIC LOCATIONS     MONDAY  [place] TUESDAY & FRIDAY AM  [place]   [address] [facility]   [address]   [place] [address] [place] [address]   [phone]  [phone]  [phone]       [facility]NESDAY  [facility] [phone]   [address] [facility] APPOINTMENTS: [phone]   [place] [address] BILLING QUESTIONS: [phone]   [phone]       Calluses, Corns, IPKs, Porokeratosis    When there is excessive friction or pressure on the skin, the body responds by making the skin thicker to protect the deeper structures from becoming exposed.  While this works well to protect the deeper structures, the thickened skin can increase pressure and pain.    Flat, diffuse thickening are simple calluses and they are usually caused by friction.  Often these are the result of rubbing on a shoe or going barefoot.    Calluses with a central core between the toes are called corns.  These result from prominent joints on adjacent toes rubbing together.  Theses are a symptom of bone malalignment and will always recur unless the underlying bones are addressed surgically.    Calluses with a central core on the ball of the foot are usually IPKs (intractable plantar keratosis).  These are caused by excessive pressure from the metatarsals, the bones that make up the ball of the foot.  Often one of these bones is too long or too prominent.  Again, these will always recur unless the underlying bone issue is addressed.  There is no cure for these.  They will either go away by themselves, recur, or more could develop.    Regardless of the diagnosis, most of these lesions can be kept comfortable with routine maintenance.   1.This consists of filing them with a pumice stone or callus file a couple of times per week.    2. Lotion can be applied to soften the  callus. A urea based cream such as Kerasal or Vanicream or thicker cream with shea butter are good options.  3. Toe spacers or toe covers can be used for corns, gel pads can be used for other lesions on the bottom of the foot.   If there is a surgical pathology noted, such as a prominent bone, often this needs to be addressed surgically to minimize recurrence. However, sometimes the lesion simply migrates to another spot after surgery, so it is not a guaranteed cure.     Please call with any additional questions.       We discussed the potential costs of callus trimming including the possibility this may not be covered under insurance.  Cost of this can be around $150 if paying out of pocket.    Hammertoe treatment options were discussed.  This included both surgical and non-surgical treatment alternatives.  Non-surgical options include wide fitting shoes, deep toe box, crest pad or foam pads, foot orthotics and debridement of any callous as necessary.  Surgical treatments were explained including soft tissue release, arthroplasty, joint fusion, metatarsal phalangeal joint release with extensor tendon release.      Potential complications from surgery were discussed to include recurrent deformity, floppy toe, incomplete correction, need for repeat surgery, infection, lateral drift and blood clot.  Surgery may involve external pin fixation.  It was explained that hammertoe surgery does not make the toes normal but rather the goal is to make the toe less crooked and hopefully less painful from shoe pressure.     Patient is aware that surgery is elective, can be avoided if desired.  The recovery process was discussed including impact to work, walking, shoes and daily activities.  I would anticipate up to 12 months for maximum recovery after surgery. Likely surgical procedures based on exam and xray findings include.    SEN'S NEUROMA     What is a Sen's Neuroma?     Sen's neuroma is an enlargement or thickening  of a nerve in the foot. It is also sometimes referred to as an intermetatarsal neuroma, interdigital neuroma, Sen's metatarsalgia (pain in the metatarsal head area), jerry-neural fibrosis (scar tissue around a nerve) or entrapment neuropathy (abnormal nerve due to compression). A Sen's neuroma most commonly occurs in the third interspace between the third and fourth toes, followed by the second interspace between the second and third toes. Sen's neuromas have also occurred in the fourth and first interspaces, but these are rare. If you have a Sen's neuroma, there is a 15% chance it will occur bilaterally (on both feet). Sen's neuromas occur most commonly in women who are between 30 to 50 years old. The reason they are more common in women is thought to be due to the shoes women wear.   What Causes a Sen's Neuroma?   A Sen's neuroma is thought to be caused by trauma to the nerve, but scientists are still not sure about the exact cause of the trauma. The trauma may be caused by the metatarsal heads, the deep transverse intermetatarsal ligament (holds the metatarsal heads together) or an intermetatarsal bursa (fluid-filled sac). All of these structures can cause compression/trauma on the nerve which initially causes swelling and injury in the nerve. Over time if the compression/trauma continues, the nerve repairs itself with very fibrous tissue that leads to enlargement and thickening of the nerve. Other causes of trauma to the nerve may include; overpronation (foot rolls inward), hypermobility (too much motion), cavo varus (high arch foot) and excessive dorsiflexion (toes bend upward) of the toes. These biomechanical (how the foot moves) factors may cause trauma to the nerve with every step. If the nerve becomes irritated and enlarged then it takes up more space and gets even more compressed and irritated. It becomes a vicious cycle.   Signs & Symptoms of a Sen's Neuroma    - Pain (sharp,  "stabbing, throbbing, shooting)    - Numbness    - Tingling or \"pins & needles\"    - Burning    - Cramping    - A feeling that you are stepping on something or that something is in your shoe    - Initially the symptoms may happen once in a while, but as the condition gets worse, the symptoms may happen all of the time    - It usually feels better by taking off your shoe and massaging your foot     Diagnosis/Tests (Exam) for a Sen's Neuroma   Your podiatrist (foot doctor) will ask many questions about your signs and symptoms and will perform a physical exam. Some of the exams may include a web space compression test. This is done by squeezing the metatarsals together with one hand and using the thumb and index finger of the other hand to compress the affected web space to reproduce the pain/symptoms. A palpable click (Kamini's click) is usually present. This test may also cause pain to shoot into the toes and that is called a Tinel's sign. Trent's test involves squeezing the metatarsals together and moving the toes up and down for 30 seconds. This will usually cause pain or it will bring on your other symptoms. Ivy's sign is positive when you stand and the affected toes spread apart. A Sen's neuroma is usually diagnosed based on the history and physical exam findings, but sometimes other tests such as an x-ray, ultrasound or an MRI are needed.   Treatment of a Sen's Neuroma    1.  Footwear changes: Wear shoes that are wide and deep in the toe box so they do not put pressure on your toes and metatarsals. Avoid wearing high heels because they cause increased pressure on the ball of your foot (forefoot).     2.  Metatarsal pads: These help to lift and separate the metatarsal heads to take pressure off of the nerve. They are placed just behind where you feel the pain, not on top of the painful spot.    3.  Activity modification: For example, you may try swimming instead of running until your symptoms go " away.    4.  Taping    5.  Icing    6.  NSAIDs (anti-inflammatories): aleve, ibuprofen, etc.    7.  Arch supports or orthotics: These help to control some of the abnormal motion in your feet. The abnormal motion can lead to extra torque and pressure on the nerve.    8.  Physical Therapy   9.  Cortisone injection: Helps to decrease the size of the irritated, enlarged nerve.    10.  Sclerosing Alcohol injection: Helps to destroy the nerve chemically. Does cause permanent numbness.   11.  Surgery: If conservative treatment does not help surgery may be needed. Surgery may involve cutting out the nerve or cutting the intermetatarsal ligament. Studies have shown surgery has an 80-85% success rate.  This will result in numbness.     Prevention of a Sen's Neuroma    -Avoid wearing narrow, pointed toe shoes    -Avoid wearing high heel shoes               Body Mass Index (BMI)  Many things can cause foot and ankle problems. Foot structure, activity level, foot mechanics and injuries are common causes of pain.  One very important issue that often goes unmentioned, is body weight. Extra weight can cause increased stress on muscles, ligaments, bones and tendons.  Sometimes just a few extra pounds is all it takes to put one over her/his threshold. Without reducing that stress, it can be difficult to alleviate pain. Some people are uncomfortable addressing this issue, but we feel it is important for you to think about it. As Foot &  Ankle specialists, our job is addressing the lower extremity problem and possible causes. Regarding extra body weight, we encourage patients to discuss diet and weight management plans with their primary care doctors. It is this team approach that gives you the best opportunity for pain relief and getting you back on your feet.      Try orthotics with metatarsal pads from [employer].

## 2018-11-19 NOTE — PROGRESS NOTES
"PATIENT HISTORY:  Raisa Archer is a 73 year old female who presents to clinic for R toes that \"feel strange.\"  Some pain in the area near 3rd and 4th toes.  Toes feel like they are \"flopping.\"  Pt unsure of an injury, though she thinks it's possible she stubbed the toes.  No treatments.  0-2/10 pain.  1 week duration.    I was requested to see this patient for this issue by Dr Hawkins.    Review of Systems:  Patient denies fever, chills, rash, wound, stiffness, limping, numbness, weakness, heart burn, blood in stool, chest pain with activity, calf pain when walking, shortness of breath with activity, chronic cough, easy bleeding/bruising, swelling of ankles, excessive thirst, fatigue, depression, anxiety.       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     Breast cancer, right breast (H)      Centrilobular emphysema (H)      Centrilobular emphysema (H)      Colon polyps     repeat colonoscopy 2019     Left tennis elbow      Rash     currently at masectomy site     Unspecified essential hypertension     situational and resolved        PAST SURGICAL HISTORY:   Past Surgical History:   Procedure Laterality Date     ABDOMEN SURGERY       BIOPSY       BREAST SURGERY       CATARACT IOL, RT/LT       CHOLECYSTECTOMY       COLONOSCOPY       ORTHOPEDIC SURGERY       PHACOEMULSIFICATION CLEAR CORNEA WITH STANDARD INTRAOCULAR LENS IMPLANT  5/22/2014    Procedure: PHACOEMULSIFICATION CLEAR CORNEA WITH STANDARD INTRAOCULAR LENS IMPLANT;  Surgeon: Rahul Reid MD;  Location: [facility]     PHACOEMULSIFICATION CLEAR CORNEA WITH STANDARD INTRAOCULAR LENS IMPLANT  7/8/2014    Procedure: PHACOEMULSIFICATION CLEAR CORNEA WITH STANDARD INTRAOCULAR LENS IMPLANT;  Surgeon: Rahul Reid MD;  Location:  OR     SURGICAL HISTORY OF -   8/00    left foot reconstruction     SURGICAL HISTORY OF -   1979    hysterectomy        MEDICATIONS:   Current Outpatient Prescriptions:      " Acetaminophen (TYLENOL PO), Take by mouth as needed for mild pain or fever, Disp: , Rfl:      Calcium Carbonate-Vitamin D (CALCIUM + D PO), Take 2 tablets by mouth daily., Disp: , Rfl:      carboxymethylcellul-glycerin (OPTIVE) 0.5-0.9 % SOLN ophthalmic solution, 1 drop, Disp: , Rfl:      clotrimazole-betamethasone (LOTRISONE) cream, Apply topically 2 times daily, Disp: 15 g, Rfl: 1     fluticasone (FLONASE) 50 MCG/ACT nasal spray, Spray 1-2 sprays into both nostrils daily, Disp: 16 g, Rfl: 11     Homeopathic Products (SLEEP MEDICINE) TABS, Take by mouth daily as needed, Disp: , Rfl:      Ipratropium-Albuterol (COMBIVENT RESPIMAT)  MCG/ACT inhaler, Inhale 1 puff into the lungs 4 times daily Not to exceed 6 doses per day., Disp: 1 Inhaler, Rfl: 3     Loratadine (CLARITIN PO), Take by mouth daily , Disp: , Rfl:      Multiple Vitamins-Minerals (ZAHIDA MULTIVITAMIN FOR WOMEN) TABS, Take 1 tablet by mouth daily., Disp: , Rfl:      Nutritional Supplements (OSTEO ADVANCE PO), , Disp: , Rfl:      nystatin (MYCOSTATIN) 037926 UNIT/GM POWD, Apply topically 3 times daily as needed, Disp: 60 g, Rfl: 1     Omega-3 Fatty Acids (FISH OIL) 1200 MG CAPS, Take 1 capsule by mouth daily, Disp: , Rfl:      vitamin E 400 UNIT capsule, Take 400 Units by mouth daily, Disp: , Rfl:      ALLERGIES:    Allergies   Allergen Reactions     Aspirin Nausea and Vomiting     sick     Chantix [Varenicline Tartrate] Other (See Comments)     Sees things     Glycerin Itching     LIQUID,   Reactions: itchy and redness       Sympathomimetics Other (See Comments)     Patient doesn't know why this is listed as an allergy     Wool Fiber Itching     Redness         SOCIAL HISTORY:   Social History     Social History     Marital status: Single     Spouse name: N/A     Number of children: N/A     Years of education: N/A     Occupational History     Not on file.     Social History Main Topics     Smoking status: Former Smoker     Packs/day: 1.50     Years:  30.00     Types: Cigarettes     Start date: 1/2/1967     Quit date: 5/10/2014     Smokeless tobacco: Never Used     Alcohol use No     Drug use: No     Sexual activity: No     Other Topics Concern     Parent/Sibling W/ Cabg, Mi Or Angioplasty Before 65f 55m? No     Social History Narrative    Dairy/d 1-2 servings/d.     Caffeine 0-1 servings/d    Exercise 3 x week    Sunscreen used - Yes    Seatbelts used - Yes    Working smoke/CO detectors in the home - Yes    Guns stored in the home - No    Self Breast Exams - Yes    Self Testicular Exam - NA    Eye Exam up to date - Yes    Dental Exam up to date - Yes     Pap Smear up to date - hysterectomy    Mammogram up to date - Yes     PSA up to date - NA    Dexa Scan up to date - No    Flex Sig / Colonoscopy up to date - yes    Immunizations up to date - Yes 2007 Tetanus    Abuse: Current or Past(Physical, Sexual or Emotional)- No    Do you feel safe in your environment - Yes    2008 last updated 7/10                            FAMILY HISTORY:   Family History   Problem Relation Age of Onset     Breast Cancer Sister      Diabetes Sister      Cancer Brother      Asthma Brother      Other Cancer Brother      Hodkins Lymphoma/Hodkins Lymphoma/Hodkins Lymphoma/Hodkins Lymphoma     Cerebrovascular Disease Father      Had several small ones     Cancer Other      Stomach cancer- cousin     Breast Cancer Other      Aunts     Diabetes Other      Uncles and cousin/Paternal Uncles/Paternal Uncles/Paternal Uncles/Paternal Uncles     Cancer Brother      Obesity Brother      Asthma Brother      Cancer Other      Family Hx of colon cancer     Breast Cancer Other      Paternal Aunt/Paternal Aunt/Paternal Aunt/Paternal Aunt     Colon Cancer Other      Maternal Great Uncles     Breast Cancer Sister      Breast Cancer Cousin      Paternal/Paternal     Colon Cancer Other      Maternal Uncle/Maternal Great Uncles/Maternal Great Uncles/Maternal Great Uncles     Obesity Sister      On diet/On  "diet/On diet     Obesity Brother      Asthma Niece      Diabetes Other      Breast Cancer Other      Colon Cancer Other         EXAM:Vitals: /76 (BP Location: Left arm, Patient Position: Chair, Cuff Size: Adult Regular)  Ht 5' 2.6\" (1.59 m)  Wt 165 lb 12.8 oz (75.2 kg)  BMI 29.75 kg/m2  BMI= Body mass index is 29.75 kg/(m^2).    General appearance: Patient is alert and fully cooperative with history & exam.  No sign of distress is noted during the visit.     Psychiatric: Affect is pleasant & appropriate.  Patient appears motivated to improve health.     Respiratory: Breathing is regular & unlabored while sitting.     HEENT: Hearing is intact to spoken word.  Speech is clear.  No gross evidence of visual impairment that would impact ambulation.     Dermatologic: Diffuse callus sub left 2nd MPJ area.  Skin is intact to both lower extremities without significant lesions, rash or abrasion.  No paronychia or evidence of soft tissue infection is noted.     Vascular: DP & PT pulses are intact & regular bilaterally.  No significant edema.  Varicosities noted.  CFT and skin temperature are normal to both lower extremities.     Neurologic: Lower extremity sensation is intact to light touch.  No evidence of weakness or contracture in the lower extremities.  No evidence of neuropathy.     Musculoskeletal: Lesser hammertoes b/l, L toes 3-5 more contracted.  Some discomfort to palpation of the R 3rd interspace.  Patient is ambulatory without assistive device or brace.  No gross ankle deformity noted.  No foot or ankle joint effusion is noted.  No muscle weakness noted to RLE.     XRs of R foot reviewed with pt.  No acute findings.    ASSESSMENT:   R foot pain, suspect neuroma  B/l hammertoes  L foot callus     PLAN:  Reviewed patient's chart in epic.  Discussed condition and treatment options including pros and cons.    Difficult to localize issue.  Described symptoms are unusual.  I don't appreciate weakness.  I suspect " neuroma given altered sensation, location.  Hammertoe related cause is possible.    Discussed causes of calluses.  They are due to areas of increased friction.  Discussed treatments such as using foot file, pumice stone, pads, orthotics, and not walking barefoot.     Advised otc orthotics with metatarsal pads, wider shoes, deep toebox.  RICE.  May consider MRI if not improving.    Handouts given.     F/u prn.    Levy Anderson DPM, FACFAS

## 2018-11-21 LAB
A1AT STL-MCNT: 0.15 MG/G (ref 0–0.5)
FAT STL QL: NORMAL
NEUTRAL FAT STL QL: NORMAL

## 2018-11-22 LAB — ELASTASE PANC STL-MCNT: 443 UG/G

## 2018-12-03 ASSESSMENT — ENCOUNTER SYMPTOMS
HEMATURIA: 0
NECK MASS: 0
MUSCLE CRAMPS: 0
MYALGIAS: 1
POOR WOUND HEALING: 0
NECK PAIN: 1
FLANK PAIN: 0
ARTHRALGIAS: 1
BACK PAIN: 1
JOINT SWELLING: 0
SORE THROAT: 0
NAIL CHANGES: 0
DYSURIA: 0
SINUS CONGESTION: 1
MUSCLE WEAKNESS: 0
STIFFNESS: 0
SMELL DISTURBANCE: 0
SKIN CHANGES: 0
DIFFICULTY URINATING: 0
TROUBLE SWALLOWING: 0
TASTE DISTURBANCE: 0
SINUS PAIN: 0
HOARSE VOICE: 0

## 2018-12-11 ENCOUNTER — TELEPHONE (OUTPATIENT)
Dept: GASTROENTEROLOGY | Facility: CLINIC | Age: 73
End: 2018-12-11

## 2018-12-13 NOTE — PROGRESS NOTES
GI CLINIC VISIT    CC/REFERRING MD:  Josh Almonte  REASON FOR CONSULTATION: follow-up    ASSESSMENT/PLAN:  Raisa Archer is a 73-year-old woman with a past medical history of right breast cancer status post mastectomy and chemotherapy, s/p cholecystectomy in 2008, endometriosis s/p hysterectomy, chronic tobacco abuse, emphysema and medical issues presented for follow-up for lower abdominal pain.      1. Abdominal Pain  Patient has continued nonspecific lower abdominal pain which has improved significantly.  Pain is worsened with certain exercises, positions, and with constipation.  We will continue to evaluate pancreatic sources of pain with EUS as outlined below.  Symptoms improve with having a bowel movement, and patient reports variable stool pattern from constipation to diarrhea.  Would recommend treating constipation with MiraLAX as was recommended at last visit.  She is currently taking occasional Metamucil and may start Metamucil on a daily basis to give her stools improve consistency.  Patient will check bowel movements using [place] stool scale to better understand stool pattern and bring to next visit.  Can also meet with our dietitian to discuss food triggers.  Patient was instructed to contact the clinic if symptoms change or worsen.  -- continue daily fiber supplementation- start with one packet at a day and can increase to 2   -- track bowel movements on a blank calender using the [place] Stool chart   -- If you feel constipated-- Miralax can be used to treat constipation and works by increasing the amount of water in the stool. Start with 1 caps in 8 oz of water. You may increase or decrease dose as needed   -- can meet with our dietitian to discuss food triggers- her name is Meghana Ramirez     2. Abnormal MRI findings   Patient had MRCP 10/6/2018 with findings of hyperintense cysts which may be representative of prior pancreatitis or lymphangiomas.  Findings warrant evaluation with endoscopic  ultrasound which will be arranged at this time.  Blood work and fecal fat and pancreatic elastase normal therefore findings may be less suggestive of Chronic pancreatitis.  --Arrange EUS    Colorectal cancer screening: No high-risk FH CRC (great-uncle), colonoscopy 5/2016 with adenomatous lesions (10mm TA x1, diminutive TA x1). Recommend repeat colonoscopy in 2019 unless symptomatic sooner, or if required for current diagnostic evaluation.    RTC 3-6 months after EUS     Thank you for this consultation.  Plan for EUS discussed with Dr. Almonte.  It was a pleasure to participate in the care of this patient; please contact us with any further questions.       Moni Hinton PA-C  Division of Gastroenterology, Hepatology & Nutrition  [facility]  Raisa Archer is a 73-year-old woman with a past medical history of right breast cancer status post mastectomy and chemotherapy, s/p cholecystectomy in 2008, endometriosis s/p hysterectomy, chronic tobacco abuse, emphysema and medical issues presented for follow-up for lower abdominal pain.  She had previously been seen by Dr. Almonte for abdominal pain which was improved at the time of the visit, and this is my first encounter with the patient.  Patient underwent MRCP without findings to explain constipation and abdominal pain.  She was found to have a cluster of cysts inferior to the pancreatic body, nodule in the right upper anterior abdominal fat thought to be a small lymph node, and biliary dilation possibly due to history of cholecystectomy.  Patient had a normal CBC, hepatic panel, vitamin B12, vitamin A, vitamin D, E vitamin K, and normal pancreatic elastase, fecal fat and alpha-1 antitrypsin.    Patient reports continued lower abdominal pain which is noticeable with leaning forward and certain movements.  This is sometimes associated with cramping can trigger having a bowel movement.  She is currently having a [place] scale 3-4 bowel  movement but will occasionally have a [place] scale 1, or loose diarrhea (patient notes episode of diarrhea over the weekend after consuming grease.  She can skip up to 2-3 days in between bowel movements.  Abdominal pain improves with having a bowel movement.  Currently taking occasional Metamucil and does not taking MiraLAX.  Will occasionally take Imodium for loose stools.  Denies significant urgency or incontinence.  Patient also reports some neck and back pain for which she sees a chiropractor.    ROS:    No fevers or chills  subjectively hot   No weight loss (+2lb weight gain)   No blurry vision, double vision or change in vision  No sore throat  No lymphadenopathy  No headache, paraesthesias, or weakness in a limb  + tingling in right hand   + shortness of breath- with exertion   No chest pain or pressure  No arthralgias or myalgias  + neck pain   No rashes or skin changes  +itchy skin   No odynophagia or dysphagia  No BRBPR, hematochezia, melena  No dysuria, frequency or urgency  + urinary incontinance   No hot/cold intolerance or polyria  No anxiety or depression +SAD     PROBLEM LIST  Patient Active Problem List    Diagnosis Date Noted     Centrilobular emphysema (H)      Priority: Medium     History of colonic polyps 05/31/2016     Priority: Medium     Repeat colonoscopy due 2019.       Meningioma (H) 09/24/2014     Priority: Medium     Other hammer toe (acquired) 05/19/2014     Priority: Medium     Hyperlipidemia LDL goal <160 04/04/2012     Priority: Medium     Advanced directives, counseling/discussion 12/14/2011     Priority: Medium     Advance Directive Problem List Overview:   Name Relationship Phone    Primary Health Care Agent            Alternative Health Care Agent          Discussed advance care planning with patient; however, patient declined at this time. 12/14/2011          Chronic rhinitis 12/14/2011     Priority: Medium     Osteoporosis 08/18/2010     Priority: Medium     Gall stones  02/24/2009     Priority: Medium     Malignant neoplasm of female breast (H) 01/03/2007     Priority: Medium     Problem list name updated by automated process. 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     Breast cancer, right breast (H)      Centrilobular emphysema (H)      Centrilobular emphysema (H)      Colon polyps     repeat colonoscopy 2019     Left tennis elbow      Rash     currently at masectomy site     Unspecified essential hypertension     situational and resolved       PREVIOUS SURGERIES:  cholecystecomy   Past Surgical History:   Procedure Laterality Date     ABDOMEN SURGERY       BIOPSY       BREAST SURGERY       CATARACT IOL, RT/LT       CHOLECYSTECTOMY       COLONOSCOPY       ORTHOPEDIC SURGERY       PHACOEMULSIFICATION CLEAR CORNEA WITH STANDARD INTRAOCULAR LENS IMPLANT  5/22/2014    Procedure: PHACOEMULSIFICATION CLEAR CORNEA WITH STANDARD INTRAOCULAR LENS IMPLANT;  Surgeon: Rahul Reid MD;  Location: [facility]     PHACOEMULSIFICATION CLEAR CORNEA WITH STANDARD INTRAOCULAR LENS IMPLANT  7/8/2014    Procedure: PHACOEMULSIFICATION CLEAR CORNEA WITH STANDARD INTRAOCULAR LENS IMPLANT;  Surgeon: Rahul Reid MD;  Location:  OR     SURGICAL HISTORY OF -   8/00    left foot reconstruction     SURGICAL HISTORY OF -   1979    hysterectomy       ALLERGIES:  Allergies   Allergen Reactions     Aspirin Nausea and Vomiting     sick     Chantix [Varenicline Tartrate] Other (See Comments)     Sees things     Glycerin Itching     LIQUID,   Reactions: itchy and redness       Sympathomimetics Other (See Comments)     Patient doesn't know why this is listed as an allergy     Wool Fiber Itching     Redness        PERTINENT MEDICATIONS:    Current Outpatient Medications:      Acetaminophen (TYLENOL PO), Take by mouth as needed for mild pain or fever, Disp: , Rfl:       Calcium Carbonate-Vitamin D (CALCIUM + D PO), Take 2 tablets by mouth daily., Disp: , Rfl:      carboxymethylcellul-glycerin (OPTIVE) 0.5-0.9 % SOLN ophthalmic solution, 1 drop, Disp: , Rfl:      clotrimazole-betamethasone (LOTRISONE) cream, Apply topically 2 times daily, Disp: 15 g, Rfl: 1     fluticasone (FLONASE) 50 MCG/ACT nasal spray, Spray 1-2 sprays into both nostrils daily, Disp: 16 g, Rfl: 11     Homeopathic Products (SLEEP MEDICINE) TABS, Take by mouth daily as needed, Disp: , Rfl:      Ipratropium-Albuterol (COMBIVENT RESPIMAT)  MCG/ACT inhaler, Inhale 1 puff into the lungs 4 times daily Not to exceed 6 doses per day., Disp: 1 Inhaler, Rfl: 3     Loratadine (CLARITIN PO), Take by mouth daily , Disp: , Rfl:      Multiple Vitamins-Minerals (ZAHIDA MULTIVITAMIN FOR WOMEN) TABS, Take 1 tablet by mouth daily., Disp: , Rfl:      Nutritional Supplements (OSTEO ADVANCE PO), , Disp: , Rfl:      nystatin (MYCOSTATIN) 425367 UNIT/GM POWD, Apply topically 3 times daily as needed, Disp: 60 g, Rfl: 1     Omega-3 Fatty Acids (FISH OIL) 1200 MG CAPS, Take 1 capsule by mouth daily, Disp: , Rfl:      vitamin E 400 UNIT capsule, Take 400 Units by mouth daily, Disp: , Rfl:     SOCIAL HISTORY:  Social History     Socioeconomic History     Marital status: Single     Spouse name: Not on file     Number of children: Not on file     Years of education: Not on file     Highest education level: Not on file   Social Needs     Financial resource strain: Not on file     Food insecurity - worry: Not on file     Food insecurity - inability: Not on file     Transportation needs - medical: Not on file     Transportation needs - non-medical: Not on file   Occupational History     Not on file   Tobacco Use     Smoking status: Former Smoker     Packs/day: 1.50     Years: 30.00     Pack years: 45.00     Types: Cigarettes     Start date: 1967     Last attempt to quit: 5/10/2014     Years since quittin.5     Smokeless tobacco:  Never Used   Substance and Sexual Activity     Alcohol use: No     Alcohol/week: 0.0 oz     Drug use: No     Sexual activity: No   Other Topics Concern     Parent/sibling w/ CABG, MI or angioplasty before 65F 55M? No   Social History Narrative    Dairy/d 1-2 servings/d.     Caffeine 0-1 servings/d    Exercise 3 x week    Sunscreen used - Yes    Seatbelts used - Yes    Working smoke/CO detectors in the home - Yes    Guns stored in the home - No    Self Breast Exams - Yes    Self Testicular Exam - NA    Eye Exam up to date - Yes    Dental Exam up to date - Yes     Pap Smear up to date - hysterectomy    Mammogram up to date - Yes     PSA up to date - NA    Dexa Scan up to date - No    Flex Sig / Colonoscopy up to date - yes    Immunizations up to date - Yes 2007 Tetanus    Abuse: Current or Past(Physical, Sexual or Emotional)- No    Do you feel safe in your environment - Yes    2008 last updated 7/10                           FAMILY HISTORY:  Family History   Problem Relation Age of Onset     Breast Cancer Sister      Diabetes Sister      Cancer Brother      Asthma Brother      Other Cancer Brother         Hodkins Lymphoma/Hodkins Lymphoma/Hodkins Lymphoma/Hodkins Lymphoma     Cerebrovascular Disease Father         Had several small ones     Cancer Other         Stomach cancer- cousin     Breast Cancer Other         Aunts     Diabetes Other         Uncles and cousin/Paternal Uncles/Paternal Uncles/Paternal Uncles/Paternal Uncles     Cancer Brother      Obesity Brother      Asthma Brother      Cancer Other         Family Hx of colon cancer     Breast Cancer Other         Paternal Aunt/Paternal Aunt/Paternal Aunt/Paternal Aunt     Colon Cancer Other         Maternal Great Uncles     Breast Cancer Sister      Breast Cancer Cousin         Paternal/Paternal     Colon Cancer Other         Maternal Uncle/Maternal Great Uncles/Maternal Great Uncles/Maternal Great Uncles     Obesity Sister         On diet/On diet/On diet      "Obesity Brother      Asthma Niece      Diabetes Other      Breast Cancer Other      Colon Cancer Other        Past/family/social history reviewed and no changes    PHYSICAL EXAMINATION:  Constitutional: aaox3, cooperative, pleasant, not dyspneic/diaphoretic, no acute distress  Vitals reviewed: /75   Pulse 88   Ht 1.59 m (5' 2.6\")   Wt 75.7 kg (166 lb 14.4 oz)   SpO2 97%   BMI 29.94 kg/m    Wt:   Wt Readings from Last 2 Encounters:   12/17/18 75.7 kg (166 lb 14.4 oz)   11/19/18 75.2 kg (165 lb 12.8 oz)      Eyes: Sclera anicteric/injected  Ears/nose/mouth/throat: Normal oropharynx without ulcers or exudate, mucus membranes moist, hearing intact  Neck: supple, thyroid normal size  CV: No edema  Respiratory: Unlabored breathing  Lymph: No submandibular, supraclavicular or lymphadenopathy  Abd:  Nondistended, +bs, no hepatosplenomegaly, nontender, no peritoneal signs  Skin: warm, perfused, no jaundice  Psych: Normal affect  MSK: Normal gait      PERTINENT STUDIES:    Orders Only on 11/19/2018   Component Date Value Ref Range Status     Pancreatic Elastase Fecal 11/19/2018 443  >=201 ug/g Final     Neutral Fat Fecal 11/19/2018 Normal  Normal Final     Split Fat Fecal 11/19/2018 Normal  Normal Final     Alpha-1-Antitryp Stool 11/19/2018 0.15  0.00 - 0.50 mg/g Final         "

## 2018-12-17 ENCOUNTER — OFFICE VISIT (OUTPATIENT)
Dept: GASTROENTEROLOGY | Facility: CLINIC | Age: 73
End: 2018-12-17
Payer: MEDICARE

## 2018-12-17 VITALS
SYSTOLIC BLOOD PRESSURE: 116 MMHG | BODY MASS INDEX: 29.57 KG/M2 | HEIGHT: 63 IN | OXYGEN SATURATION: 97 % | DIASTOLIC BLOOD PRESSURE: 75 MMHG | WEIGHT: 166.9 LBS | HEART RATE: 88 BPM

## 2018-12-17 DIAGNOSIS — R10.84 ABDOMINAL PAIN, GENERALIZED: Primary | ICD-10-CM

## 2018-12-17 ASSESSMENT — PAIN SCALES - GENERAL: PAINLEVEL: NO PAIN (0)

## 2018-12-17 ASSESSMENT — MIFFLIN-ST. JEOR: SCORE: 1234.83

## 2018-12-17 NOTE — LETTER
12/17/2018       RE: Raisa Archer  [address] [place] [id number]     Dear Colleague,    Thank you for referring your patient, Raisa Archer, to the [facility] GASTRO[facility] IBD [facility]INIC at [facility]. Please see a copy of my visit note below.    GI CLINIC VISIT    CC/REFERRING MD:  Josh Almonte  REASON FOR CONSULTATION: follow-up    ASSESSMENT/PLAN:  Raisa Archer is a 73-year-old woman with a past medical history of right breast cancer status post mastectomy and chemotherapy, s/p cholecystectomy in 2008, endometriosis s/p hysterectomy, chronic tobacco abuse, emphysema and medical issues presented for follow-up for lower abdominal pain.      1. Abdominal Pain  Patient has continued nonspecific lower abdominal pain which has improved significantly.  Pain is worsened with certain exercises, positions, and with constipation.  We will continue to evaluate pancreatic sources of pain with EUS as outlined below.  Symptoms improve with having a bowel movement, and patient reports variable stool pattern from constipation to diarrhea.  Would recommend treating constipation with MiraLAX as was recommended at last visit.  She is currently taking occasional Metamucil and may start Metamucil on a daily basis to give her stools improve consistency.  Patient will check bowel movements using [place] stool scale to better understand stool pattern and bring to next visit.  Can also meet with our dietitian to discuss food triggers.  Patient was instructed to contact the clinic if symptoms change or worsen.  -- continue daily fiber supplementation- start with one packet at a day and can increase to 2   -- track bowel movements on a blank calender using the [place] Stool chart   -- If you feel constipated-- Miralax can be used to treat constipation and works by increasing the amount of water in the stool. Start with 1 caps in 8 oz of water. You may increase or  decrease dose as needed   -- can meet with our dietitian to discuss food triggers- her name is Meghana Ramirez 2. Abnormal MRI findings   Patient had MRCP 10/6/2018 with findings of hyperintense cysts which may be representative of prior pancreatitis or lymphangiomas.  Findings warrant evaluation with endoscopic ultrasound which will be arranged at this time.  Blood work and fecal fat and pancreatic elastase normal therefore findings may be less suggestive of Chronic pancreatitis.  --Arrange EUS    Colorectal cancer screening: No high-risk FH CRC (great-uncle), colonoscopy 5/2016 with adenomatous lesions (10mm TA x1, diminutive TA x1). Recommend repeat colonoscopy in 2019 unless symptomatic sooner, or if required for current diagnostic evaluation.    RTC 3-6 months after EUS     Thank you for this consultation.  Plan for EUS discussed with Dr. Almonte.  It was a pleasure to participate in the care of this patient; please contact us with any further questions.     Moni Hinton PA-C  Division of Gastroenterology, Hepatology & Nutrition  [facility]  Raisa Archer is a 73-year-old woman with a past medical history of right breast cancer status post mastectomy and chemotherapy, s/p cholecystectomy in 2008, endometriosis s/p hysterectomy, chronic tobacco abuse, emphysema and medical issues presented for follow-up for lower abdominal pain.  She had previously been seen by Dr. Almonte for abdominal pain which was improved at the time of the visit, and this is my first encounter with the patient.  Patient underwent MRCP without findings to explain constipation and abdominal pain.  She was found to have a cluster of cysts inferior to the pancreatic body, nodule in the right upper anterior abdominal fat thought to be a small lymph node, and biliary dilation possibly due to history of cholecystectomy.  Patient had a normal CBC, hepatic panel, vitamin B12, vitamin A, vitamin D, E vitamin K, and normal  pancreatic elastase, fecal fat and alpha-1 antitrypsin.    Patient reports continued lower abdominal pain which is noticeable with leaning forward and certain movements.  This is sometimes associated with cramping can trigger having a bowel movement.  She is currently having a Salix scale 3-4 bowel movement but will occasionally have a [place] scale 1, or loose diarrhea (patient notes episode of diarrhea over the weekend after consuming grease.  She can skip up to 2-3 days in between bowel movements.  Abdominal pain improves with having a bowel movement.  Currently taking occasional Metamucil and does not taking MiraLAX.  Will occasionally take Imodium for loose stools.  Denies significant urgency or incontinence.  Patient also reports some neck and back pain for which she sees a chiropractor.      PROBLEM LIST  Patient Active Problem List    Diagnosis Date Noted     Centrilobular emphysema (H)      Priority: Medium     History of colonic polyps 05/31/2016     Priority: Medium     Repeat colonoscopy due 2019.       Meningioma (H) 09/24/2014     Priority: Medium     Other hammer toe (acquired) 05/19/2014     Priority: Medium     Hyperlipidemia LDL goal <160 04/04/2012     Priority: Medium     Advanced directives, counseling/discussion 12/14/2011     Priority: Medium     Advance Directive Problem List Overview:   Name Relationship Phone    Primary Health Care Agent            Alternative Health Care Agent          Discussed advance care planning with patient; however, patient declined at this time. 12/14/2011          Chronic rhinitis 12/14/2011     Priority: Medium     Osteoporosis 08/18/2010     Priority: Medium     Gall stones 02/24/2009     Priority: Medium     Malignant neoplasm of female breast (H) 01/03/2007     Priority: Medium     Problem list name updated by automated process. Provider to review       Tobacco use disorder 09/21/2006     Priority: Medium       PERTINENT PAST MEDICAL HISTORY:  Past Medical  History:   Diagnosis Date     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     Breast cancer, right breast (H)      Centrilobular emphysema (H)      Centrilobular emphysema (H)      Colon polyps     repeat colonoscopy 2019     Left tennis elbow      Rash     currently at masectomy site     Unspecified essential hypertension     situational and resolved       PREVIOUS SURGERIES:  cholecystecomy   Past Surgical History:   Procedure Laterality Date     ABDOMEN SURGERY       BIOPSY       BREAST SURGERY       CATARACT IOL, RT/LT       CHOLECYSTECTOMY       COLONOSCOPY       ORTHOPEDIC SURGERY       PHACOEMULSIFICATION CLEAR CORNEA WITH STANDARD INTRAOCULAR LENS IMPLANT  5/22/2014    Procedure: PHACOEMULSIFICATION CLEAR CORNEA WITH STANDARD INTRAOCULAR LENS IMPLANT;  Surgeon: Rahul Reid MD;  Location:  [facility]     PHACOEMULSIFICATION CLEAR CORNEA WITH STANDARD INTRAOCULAR LENS IMPLANT  7/8/2014    Procedure: PHACOEMULSIFICATION CLEAR CORNEA WITH STANDARD INTRAOCULAR LENS IMPLANT;  Surgeon: Rahul Reid MD;  Location:  OR     SURGICAL HISTORY OF -   8/00    left foot reconstruction     SURGICAL HISTORY OF -   1979    hysterectomy       ALLERGIES:  Allergies   Allergen Reactions     Aspirin Nausea and Vomiting     sick     Chantix [Varenicline Tartrate] Other (See Comments)     Sees things     Glycerin Itching     LIQUID,   Reactions: itchy and redness       Sympathomimetics Other (See Comments)     Patient doesn't know why this is listed as an allergy     Wool Fiber Itching     Redness      PERTINENT MEDICATIONS:    Current Outpatient Medications:      Acetaminophen (TYLENOL PO), Take by mouth as needed for mild pain or fever, Disp: , Rfl:      Calcium Carbonate-Vitamin D (CALCIUM + D PO), Take 2 tablets by mouth daily., Disp: , Rfl:      carboxymethylcellul-glycerin (OPTIVE) 0.5-0.9 % SOLN ophthalmic solution, 1 drop, Disp: , Rfl:      clotrimazole-betamethasone (LOTRISONE) cream, Apply topically 2 times daily, Disp: 15 g, Rfl:  1     fluticasone (FLONASE) 50 MCG/ACT nasal spray, Spray 1-2 sprays into both nostrils daily, Disp: 16 g, Rfl: 11     Homeopathic Products (SLEEP MEDICINE) TABS, Take by mouth daily as needed, Disp: , Rfl:      Ipratropium-Albuterol (COMBIVENT RESPIMAT)  MCG/ACT inhaler, Inhale 1 puff into the lungs 4 times daily Not to exceed 6 doses per day., Disp: 1 Inhaler, Rfl: 3     Loratadine (CLARITIN PO), Take by mouth daily , Disp: , Rfl:      Multiple Vitamins-Minerals (ZAHIDA MULTIVITAMIN FOR WOMEN) TABS, Take 1 tablet by mouth daily., Disp: , Rfl:      Nutritional Supplements (OSTEO ADVANCE PO), , Disp: , Rfl:      nystatin (MYCOSTATIN) 196551 UNIT/GM POWD, Apply topically 3 times daily as needed, Disp: 60 g, Rfl: 1     Omega-3 Fatty Acids (FISH OIL) 1200 MG CAPS, Take 1 capsule by mouth daily, Disp: , Rfl:      vitamin E 400 UNIT capsule, Take 400 Units by mouth daily, Disp: , Rfl:     SOCIAL HISTORY:  Social History     Socioeconomic History     Marital status: Single     Spouse name: Not on file     Number of children: Not on file     Years of education: Not on file     Highest education level: Not on file   Social Needs     Financial resource strain: Not on file     Food insecurity - worry: Not on file     Food insecurity - inability: Not on file     Transportation needs - medical: Not on file     Transportation needs - non-medical: Not on file   Occupational History     Not on file   Tobacco Use     Smoking status: Former Smoker     Packs/day: 1.50     Years: 30.00     Pack years: 45.00     Types: Cigarettes     Start date: 1967     Last attempt to quit: 5/10/2014     Years since quittin.5     Smokeless tobacco: Never Used   Substance and Sexual Activity     Alcohol use: No     Alcohol/week: 0.0 oz     Drug use: No     Sexual activity: No   Other Topics Concern     Parent/sibling w/ CABG, MI or angioplasty before 65F 55M? No   Social History Narrative    Dairy/d 1-2 servings/d.     Caffeine 0-1  servings/d    Exercise 3 x week    Sunscreen used - Yes    Seatbelts used - Yes    Working smoke/CO detectors in the home - Yes    Guns stored in the home - No    Self Breast Exams - Yes    Self Testicular Exam - NA    Eye Exam up to date - Yes    Dental Exam up to date - Yes     Pap Smear up to date - hysterectomy    Mammogram up to date - Yes     PSA up to date - NA    Dexa Scan up to date - No    Flex Sig / Colonoscopy up to date - yes    Immunizations up to date - Yes 2007 Tetanus    Abuse: Current or Past(Physical, Sexual or Emotional)- No    Do you feel safe in your environment - Yes    2008 last updated 7/10                           FAMILY HISTORY:  Family History   Problem Relation Age of Onset     Breast Cancer Sister      Diabetes Sister      Cancer Brother      Asthma Brother      Other Cancer Brother         Hodkins Lymphoma/Hodkins Lymphoma/Hodkins Lymphoma/Hodkins Lymphoma     Cerebrovascular Disease Father         Had several small ones     Cancer Other         Stomach cancer- cousin     Breast Cancer Other         Aunts     Diabetes Other         Uncles and cousin/Paternal Uncles/Paternal Uncles/Paternal Uncles/Paternal Uncles     Cancer Brother      Obesity Brother      Asthma Brother      Cancer Other         Family Hx of colon cancer     Breast Cancer Other         Paternal Aunt/Paternal Aunt/Paternal Aunt/Paternal Aunt     Colon Cancer Other         Maternal Great Uncles     Breast Cancer Sister      Breast Cancer Cousin         Paternal/Paternal     Colon Cancer Other         Maternal Uncle/Maternal Great Uncles/Maternal Great Uncles/Maternal Great Uncles     Obesity Sister         On diet/On diet/On diet     Obesity Brother      Asthma Niece      Diabetes Other      Breast Cancer Other      Colon Cancer Other        Past/family/social history reviewed and no changes    PHYSICAL EXAMINATION:  Constitutional: aaox3, cooperative, pleasant, not dyspneic/diaphoretic, no acute distress  Vitals  "reviewed: /75   Pulse 88   Ht 1.59 m (5' 2.6\")   Wt 75.7 kg (166 lb 14.4 oz)   SpO2 97%   BMI 29.94 kg/m     Wt:   Wt Readings from Last 2 Encounters:   12/17/18 75.7 kg (166 lb 14.4 oz)   11/19/18 75.2 kg (165 lb 12.8 oz)      Eyes: Sclera anicteric/injected  Ears/nose/mouth/throat: Normal oropharynx without ulcers or exudate, mucus membranes moist, hearing intact  Neck: supple, thyroid normal size  CV: No edema  Respiratory: Unlabored breathing  Lymph: No submandibular, supraclavicular or lymphadenopathy  Abd:  Nondistended, +bs, no hepatosplenomegaly, nontender, no peritoneal signs  Skin: warm, perfused, no jaundice  Psych: Normal affect  MSK: Normal gait      PERTINENT STUDIES:    Orders Only on 11/19/2018   Component Date Value Ref Range Status     Pancreatic Elastase Fecal 11/19/2018 443  >=201 ug/g Final     Neutral Fat Fecal 11/19/2018 Normal  Normal Final     Split Fat Fecal 11/19/2018 Normal  Normal Final     Alpha-1-Antitryp Stool 11/19/2018 0.15  0.00 - 0.50 mg/g Final     Again, thank you for allowing me to participate in the care of your patient.      Sincerely,    Moni Hinton PA-C      "

## 2018-12-17 NOTE — NURSING NOTE
"Chief Complaint   Patient presents with     RECHECK     Return, 4 months follow up       Vitals:    12/17/18 1309   BP: 116/75   Pulse: 88   SpO2: 97%   Weight: 75.7 kg (166 lb 14.4 oz)   Height: 1.59 m (5' 2.6\")       Body mass index is 29.94 kg/m .    SULMA Santos                          "

## 2018-12-17 NOTE — PATIENT INSTRUCTIONS
It was a pleasure taking care of you today.  I've included a brief summary of our discussion and care plan from today's visit below.  Please review this information with your primary care provider.  ______________________________________________________________________    My recommendations are summarized as follows:    -- we will contact you regarding the EUS    -- continue daily fiber supplementation- start with one packet at a day and can increase to 2     -- track bowel movements on a blank calender using the [place] Stool chart     -- If you feel constipated-- Miralax can be used to treat constipation and works by increasing the amount of water in the stool. Start with 1 caps in 8 oz of water. You may increase or decrease dose as needed     -- can meet with our dietitian to discuss food triggers- her name is Meghana Ramirez     -- let us know if your symptoms change or worsen and we can talk about other medications     Return to GI Clinic in 3-6 months after EUS with Moni Hinton PA-C or Dr. Amlonte review your progress.    ______________________________________________________________________    Who do I call with any questions after my visit?  Please be in touch if there are any further questions that arise following today's visit.  There are multiple ways to contact your gastroenterology care team.        During business hours, you may reach a Gastroenterology nurse at [phone]      To schedule or reschedule an appointment, please call [phone].       You can always send a secure message through [employer].  [employer] messages are answered by your nurse or doctor typically within 24 hours.  Please allow extra time on weekends and holidays.        For urgent/emergent questions after business hours, you may reach the on-call GI Fellow by contacting the [facility] at [phone].     How will I get the results of any tests ordered?    You will receive all of your results.  If you have  signed up for [email or web address], any tests ordered at your visit will be available to you after your physician reviews them.  Typically this takes 1-2 weeks.  If there are urgent results that require a change in your care plan, your physician or nurse will call you to discuss the next steps.      What is [email or web address]?  [employer] is a secure way for you to access all of your healthcare records from the [facility].  It is a web based computer program, so you can sign on to it from any location.  It also allows you to send secure messages to your care team.  I recommend signing up for [employer] access if you have not already done so and are comfortable with using a computer.      How to I schedule a follow-up visit?  If you did not schedule a follow-up visit today, please call [phone] to schedule a follow-up office visit.        Sincerely,    Moni Hinton PA-C  Division of Gastroenterology, Hepatology & Nutrition  [facility]

## 2018-12-19 ENCOUNTER — PRE VISIT (OUTPATIENT)
Dept: UROLOGY | Facility: CLINIC | Age: 73
End: 2018-12-19

## 2018-12-19 NOTE — PROGRESS NOTES
Reason for visit: incontinence consult     Relevant information: pt has tried a pessary, but stopped due to vaginal dryness    Records/imaging/labs: all records available    Pt called: no need for a call    Rooming: dip/pvr

## 2018-12-20 ENCOUNTER — PATIENT OUTREACH (OUTPATIENT)
Dept: GASTROENTEROLOGY | Facility: CLINIC | Age: 73
End: 2018-12-20

## 2018-12-20 ENCOUNTER — TELEPHONE (OUTPATIENT)
Dept: GASTROENTEROLOGY | Facility: CLINIC | Age: 73
End: 2018-12-20

## 2018-12-20 DIAGNOSIS — K86.2 PANCREATIC CYST: Primary | ICD-10-CM

## 2018-12-20 NOTE — PROGRESS NOTES
Per Dr. Dowd: EUS in Endo with [facility].     Order placed and sent to endo schedulers.   Called and left detailed message for Raisa about EUS, endo scheduling number left for her to call if she does not hear from them.     Wendy SCHMIDT RN Care Coordinator  Dr. Maradiaga, Dr. Fan & Dr. Dowd   Advanced Endoscopy  [phone]

## 2018-12-21 ENCOUNTER — TELEPHONE (OUTPATIENT)
Dept: GASTROENTEROLOGY | Facility: CLINIC | Age: 73
End: 2018-12-21

## 2018-12-24 ENCOUNTER — TELEPHONE (OUTPATIENT)
Dept: GASTROENTEROLOGY | Facility: CLINIC | Age: 73
End: 2018-12-24

## 2018-12-31 ASSESSMENT — ENCOUNTER SYMPTOMS
NECK MASS: 0
DYSURIA: 0
TASTE DISTURBANCE: 0
NECK PAIN: 1
HEMATURIA: 0
JOINT SWELLING: 0
TROUBLE SWALLOWING: 0
SORE THROAT: 1
FLANK PAIN: 0
SMELL DISTURBANCE: 0
SKIN CHANGES: 0
STIFFNESS: 0
ARTHRALGIAS: 0
NAIL CHANGES: 0
SINUS CONGESTION: 1
MUSCLE CRAMPS: 0
SINUS PAIN: 0
MYALGIAS: 1
BACK PAIN: 1
DIFFICULTY URINATING: 0
POOR WOUND HEALING: 0
MUSCLE WEAKNESS: 0
HOARSE VOICE: 0

## 2019-01-02 ENCOUNTER — OFFICE VISIT (OUTPATIENT)
Dept: UROLOGY | Facility: CLINIC | Age: 74
End: 2019-01-02
Payer: COMMERCIAL

## 2019-01-02 VITALS
HEIGHT: 63 IN | HEART RATE: 80 BPM | WEIGHT: 165 LBS | BODY MASS INDEX: 29.23 KG/M2 | DIASTOLIC BLOOD PRESSURE: 98 MMHG | SYSTOLIC BLOOD PRESSURE: 128 MMHG

## 2019-01-02 DIAGNOSIS — M62.89 PELVIC FLOOR DYSFUNCTION: ICD-10-CM

## 2019-01-02 DIAGNOSIS — N39.46 MIXED INCONTINENCE: Primary | ICD-10-CM

## 2019-01-02 DIAGNOSIS — M79.18 MYALGIA OF PELVIC FLOOR: ICD-10-CM

## 2019-01-02 LAB
APPEARANCE UR: CLEAR
BILIRUB UR QL: NORMAL
COLOR UR: YELLOW
GLUCOSE URINE: NORMAL MG/DL
HGB UR QL: NORMAL
KETONES UR QL: NORMAL MG/DL
LEUKOCYTE ESTERASE URINE: NORMAL
NITRITE UR QL STRIP: NORMAL
PH UR STRIP: 5.5 PH (ref 5–7)
PROTEIN ALBUMIN URINE: NORMAL MG/DL
SOURCE: NORMAL
SP GR UR STRIP: 1 (ref 1–1.03)
UROBILINOGEN UR QL STRIP: 0.2 EU/DL (ref 0.2–1)

## 2019-01-02 ASSESSMENT — ENCOUNTER SYMPTOMS
DIFFICULTY URINATING: 0
BREAST PAIN: 0
DYSPNEA ON EXERTION: 0
EYE WATERING: 0
DIARRHEA: 0
FATIGUE: 0
SNORES LOUDLY: 0
TREMORS: 0
POLYPHAGIA: 0
FEVER: 0
WEIGHT GAIN: 0
WHEEZING: 0
JOINT SWELLING: 0
SMELL DISTURBANCE: 0
MEMORY LOSS: 0
SEIZURES: 0
MYALGIAS: 1
HEARTBURN: 0
ABDOMINAL PAIN: 0
STIFFNESS: 0
BACK PAIN: 1
HEADACHES: 0
PANIC: 0
JAUNDICE: 0
BLOATING: 0
SINUS CONGESTION: 1
DECREASED CONCENTRATION: 0
INCREASED ENERGY: 0
ORTHOPNEA: 0
NECK MASS: 0
CONSTIPATION: 0
VOMITING: 0
POSTURAL DYSPNEA: 0
RECTAL PAIN: 0
DISTURBANCES IN COORDINATION: 0
MUSCLE WEAKNESS: 0
SWOLLEN GLANDS: 0
EXTREMITY NUMBNESS: 0
SHORTNESS OF BREATH: 0
NAUSEA: 0
SORE THROAT: 1
NERVOUS/ANXIOUS: 0
SPEECH CHANGE: 0
BRUISES/BLEEDS EASILY: 0
INSOMNIA: 0
DYSURIA: 0
HOARSE VOICE: 0
SKIN CHANGES: 0
CLAUDICATION: 0
HALLUCINATIONS: 0
TASTE DISTURBANCE: 0
PALPITATIONS: 0
ALTERED TEMPERATURE REGULATION: 0
DIZZINESS: 0
EXERCISE INTOLERANCE: 0
HEMOPTYSIS: 0
LOSS OF CONSCIOUSNESS: 0
NIGHT SWEATS: 0
TROUBLE SWALLOWING: 0
BLOOD IN STOOL: 0
DECREASED APPETITE: 0
POLYDIPSIA: 0
TINGLING: 0
NUMBNESS: 0
PARALYSIS: 0
POOR WOUND HEALING: 0
EYE PAIN: 0
CHILLS: 0
DEPRESSION: 0
ARTHRALGIAS: 0
EYE REDNESS: 0
SLEEP DISTURBANCES DUE TO BREATHING: 0
BREAST MASS: 0
BOWEL INCONTINENCE: 0
SYNCOPE: 0
SPUTUM PRODUCTION: 0
RECTAL BLEEDING: 0
HEMATURIA: 0
SINUS PAIN: 0
RESPIRATORY PAIN: 0
MUSCLE CRAMPS: 0
WEAKNESS: 0
DOUBLE VISION: 0
DECREASED LIBIDO: 0
HYPERTENSION: 0
FLANK PAIN: 0
LEG SWELLING: 0
NECK PAIN: 1
HOT FLASHES: 0
LEG PAIN: 0
COUGH: 0
LIGHT-HEADEDNESS: 0
NAIL CHANGES: 0
EYE IRRITATION: 0
COUGH DISTURBING SLEEP: 0
TACHYCARDIA: 0
WEIGHT LOSS: 0
HYPOTENSION: 0

## 2019-01-02 ASSESSMENT — PAIN SCALES - GENERAL: PAINLEVEL: MODERATE PAIN (4)

## 2019-01-02 ASSESSMENT — MIFFLIN-ST. JEOR: SCORE: 1226.22

## 2019-01-02 NOTE — PATIENT INSTRUCTIONS
Websites with free information:    [employer] patient website: www.voicesforpfd.org    Total Control Program: www.totalcontrolprogram.com    Please make sure you urinate every 3-4 hours during the day    Please see one of the dedicated pelvic floor physical therapist ([facility] [phone])    Please return to see me in 4 months, sooner if needed    It was a pleasure meeting with you today.  Thank you for allowing me and my team the privilege of caring for you today.  YOU are the reason we are here, and I truly hope we provided you with the excellent service you deserve.  Please let us know if there is anything else we can do for you so that we can be sure you are leaving completely satisfied with your care experience.
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
## 2019-01-02 NOTE — NURSING NOTE
"Chief Complaint   Patient presents with     Consult     mixed urinary incontinence       Blood pressure (!) 128/98, pulse 80, height 1.59 m (5' 2.6\"), weight 74.8 kg (165 lb), not currently breastfeeding. Body mass index is 29.6 kg/m .    Patient Active Problem List   Diagnosis     Tobacco use disorder     Malignant neoplasm of female breast (H)     Gall stones     Osteoporosis     Advanced directives, counseling/discussion     Chronic rhinitis     Hyperlipidemia LDL goal <160     Other hammer toe (acquired)     Meningioma (H)     History of colonic polyps     Centrilobular emphysema (H)       Allergies   Allergen Reactions     Aspirin Nausea and Vomiting     sick     Chantix [Varenicline Tartrate] Other (See Comments)     Sees things     Glycerin Itching     LIQUID,   Reactions: itchy and redness       Sympathomimetics Other (See Comments)     Patient doesn't know why this is listed as an allergy     Wool Fiber Itching     Redness        Current Outpatient Medications   Medication Sig Dispense Refill     Acetaminophen (TYLENOL PO) Take by mouth as needed for mild pain or fever       Calcium Carbonate-Vitamin D (CALCIUM + D PO) Take 2 tablets by mouth daily.       carboxymethylcellul-glycerin (OPTIVE) 0.5-0.9 % SOLN ophthalmic solution 1 drop       clotrimazole-betamethasone (LOTRISONE) cream Apply topically 2 times daily 15 g 1     fluticasone (FLONASE) 50 MCG/ACT nasal spray Spray 1-2 sprays into both nostrils daily 16 g 11     Homeopathic Products (SLEEP MEDICINE) TABS Take by mouth daily as needed       Ipratropium-Albuterol (COMBIVENT RESPIMAT)  MCG/ACT inhaler Inhale 1 puff into the lungs 4 times daily Not to exceed 6 doses per day. 1 Inhaler 3     Loratadine (CLARITIN PO) Take by mouth daily        Multiple Vitamins-Minerals (ZAHIDA MULTIVITAMIN FOR WOMEN) TABS Take 1 tablet by mouth daily.       Nutritional Supplements (OSTEO ADVANCE PO)        nystatin (MYCOSTATIN) 318632 UNIT/GM POWD Apply topically 3 " times daily as needed 60 g 1     Omega-3 Fatty Acids (FISH OIL) 1200 MG CAPS Take 1 capsule by mouth daily       vitamin E 400 UNIT capsule Take 400 Units by mouth daily         Social History     Tobacco Use     Smoking status: Former Smoker     Packs/day: 1.50     Years: 30.00     Pack years: 45.00     Types: Cigarettes     Start date: 1967     Last attempt to quit: 5/10/2014     Years since quittin.6     Smokeless tobacco: Never Used   Substance Use Topics     Alcohol use: No     Alcohol/week: 0.0 oz     Drug use: No       Rylie Garza LPN  2019  1:46 PM

## 2019-04-03 ENCOUNTER — THERAPY VISIT (OUTPATIENT)
Dept: PHYSICAL THERAPY | Facility: CLINIC | Age: 74
End: 2019-04-03
Payer: MEDICARE

## 2019-04-03 DIAGNOSIS — N39.46 MIXED INCONTINENCE: Primary | ICD-10-CM

## 2019-04-03 DIAGNOSIS — M99.05 SOMATIC DYSFUNCTION OF PELVIC REGION: ICD-10-CM

## 2019-04-03 PROCEDURE — 97112 NEUROMUSCULAR REEDUCATION: CPT | Mod: GP | Performed by: PHYSICAL THERAPIST

## 2019-04-03 PROCEDURE — 97161 PT EVAL LOW COMPLEX 20 MIN: CPT | Mod: GP | Performed by: PHYSICAL THERAPIST

## 2019-04-03 PROCEDURE — 97530 THERAPEUTIC ACTIVITIES: CPT | Mod: GP | Performed by: PHYSICAL THERAPIST

## 2019-04-03 NOTE — PROGRESS NOTES
"  HPI  System  Physical Exam  General   ROS        Physical Therapy Initial Examination/Evaluation April 3, 2019   Raisa Archer is a 74 year old female referred to physical therapy by Dr. Basilio for treatment of Mixed Incontinence, myalgia of pelvic floor, pelvic floor dysfunction  with Precautions/Restrictions/MD instructions марина chowdary, internal myofascial release   Therapist Assessment:   Clinical Impression: Pt presents to [facility] with primary complaint of urinary incontinence.  Per clinical examination, pt with decreased strength in core and pelvic floor.    Pt will benefit from skilled physical therapy for strengthening program as well as education on normal voiding techniques for optimal bladder health.      Subjective: Pt reports incontinence has been going on for about 3 years now. She has also had some GI issues but that is improved. Pt reports that she tries not to drink a lot before going anyplace because it's hard to make it to the bathroom.   DOI/onset: MD order: 01/02/2019   Mechanism of injury: NA   DOS NA   Related PMH: Breast CA in 2007 with chemo, radiation ,and masectomy  Previous treatment: none    Imaging: NA   Chief Complaint: Urinary incontinence    Pain: rest 0  /10, activity 0/10  Described as: NA Alleviated by: Unknown Exacerbated by: Drinking water Progression of symptoms since initial onset: staying the same or worsening  Time of day when pain is worse: none noted    Sleeping: \"Depends on kitties\"; gets up to urinate when cats wake her up   Social history: ; 1 daughter who lives in [place]; 2 cat; has grandchildren and great-grandchildren in the [facility] area  Occupation: Retired  Job duties: normal housewok    Current HEP/exercise regimen: has a gym membership-sees a  1x/wk at [employer]   Patient's goals are Decrease incontinence   Other pertinent PMH: past smoker, past CA, htn, post-menopausal General health as reported by patient: Good  "   Return to MD: May 02, 2019      Urination:  Do you leak on the way to the bathroom or with a strong urge to void? Yes    Do you leak with cough,sneeze, jumping, running?Yes   Any other activities that cause leaking? Laughing   Do you have triggers that make you feel you can't wait to go to the bathroom? No    Type of pad and number used per day? Pad and underwear-Always Discreet; wears for protection; does not need to change because they are wet  When you leak what is the amount? Small-Medium     How long can you delay the need to urinate? 1 hour.   How many times do you get up to urinate at night? Varies    Can you stop the flow of urine when on the toilet? Yes  Is the volume of urine passed usually: medium. (8sec rule=  250ml with average bladder storing  400-600ml)    Do you strain to pass urine? No  Do you have a slow or hesitant urinary stream? No  Do you have difficulty initiating the urine stream? No  Is urination painful?  No    How many bladder infections have you had in last 12 months? 0      Fluid intake(one glass is 8oz or one cup) 4-5, 16 oz bottles of water/day, 1 caffinated glasses/day  NA alcohol glasses/day.    Bowel habits:  Frequency of bowel movements? 4 times a week  Consistancy of stool? soft formed  Do you ignore the urge to defecate? Sometimes  Do you strain to pass stool? Very seldom     Pelvic Pain:  Do you have any pelvic pain with intercourse, exams, use of tampons? No  Is initial penetration during intercourse painful? NA  Is deeper penetration painful? NA  Do you use lubricant?   NA      Given birth? Yes Any complications? No  # of vaginal delieveries? 1   # of C-sections? NA   # of episiotomies? NA  Are you sexually active?No  Have you ever been worried for your physical safety? No  Any abdominal or pelvic surgeries?  Hysterectomy at age 34 for endometriosis  Are you having any regular exercise? Yes   Have you practiced the PF(kegel) exercises for 4 or more weeks? Not  currently      OBSERVATION  Posterior pelvic tilt    ROM  Flexion: 75%   Extension: 75%  Sidebendin%   Rotation: 25% bilat     MUSCLE PERFORMANCE    Baseline PF tone:WNL  PF Tone with cough: Slight bulge  Valsalva: not tested  PF Response quality: moderate  PF Power: Center: 2   Endurance: Maximum contraction in seconds: 8  # of endurance contractions before fatigue: NT  Quick contraction repetitions prior to fatigue: 10 with verbal cueing   Specificity/accessory muscles: Some glutes, overall good isolation       Hip MMT 4/3/2019 Left Right   Hip Flexion  3+/5 3+/5   Hip Abduction  2/ 2/5   Hip Extension  2+/5 2+/5         PALPATION: Non-tender all superficial structures with digital evaluation    NMR (20  mins)  Biofeedback unit used to provide visualization of PF activation in multiple body positions including supine, sidelying, and sitting. Education provided on tone of pelvic floor and how this can affect symptoms.Pt working on 2s contractions 4s relax, 10 reps 2x/day. Pt performing contractions best in supine. Initiated education on proper body mechanics for lifting and the importance of core strength.  Pt provided with HEP.    Therapeutic Activity (20 min): Today's session consisted of education regarding pelvic floor muscle anatomy, normal bladder function, urge suppression techniques and/or relaxation techniques as indicated, and instruction in how to complete a bladder diary for assessment next visit.  Pt was instructed in the pathoanatomy of the pelvic floor utilizing pelvic model.  We discussed what pelvic floor physical therapy is, components of exam, and typical patient progression.  Pt was told that she was in control of exam progression, and if at any time was uncomfortable and wished to discontinue we could.       Assessment/Plan:    Patient is a 74 year old female with pelvic complaints.    Patient has the following significant findings with corresponding treatment plan.                 Diagnosis 1:  Mixed Incontinence, Pelvic Floor Dysfunction   Decreased strength - therapeutic exercise, therapeutic activities and home program  Impaired muscle performance - biofeedback, electric stimulation, neuro re-education and home program  Decreased function - therapeutic activities and home program    Therapy Evaluation Codes:   1) History comprised of:   Personal factors that impact the plan of care:      Age, Living environment, Past/current experiences and Time since onset of symptoms.    Comorbidity factors that impact the plan of care are:      Cancer, High blood pressure, Menopausal, Smoking and Weakness.     Medications impacting care: None noted.  2) Examination of Body Systems comprised of:   Body structures and functions that impact the plan of care:      Pelvis.   Activity limitations that impact the plan of care are:      Sleeping, Frequency, Stress incontinence, Urgency, Urge incontinence and Urinary incontinence.  3) Clinical presentation characteristics are:   Stable/Uncomplicated.  4) Decision-Making    Low complexity using standardized patient assessment instrument and/or measureable assessment of functional outcome.  Cumulative Therapy Evaluation is: Low complexity.    Previous and current functional limitations:  (See Goal Flow Sheet for this information)    Short term and Long term goals: (See Goal Flow Sheet for this information)     Communication ability:  Patient appears to be able to clearly communicate and understand verbal and written communication and follow directions correctly.  Treatment Explanation - The following has been discussed with the patient:   RX ordered/plan of care  Anticipated outcomes  Possible risks and side effects  This patient would benefit from PT intervention to resume normal activities.   Rehab potential is good.    Frequency:  1 X week, once daily  Duration:  for 6 weeks  Discharge Plan:  Achieve all LTG.  Independent in home treatment program.  Reach  maximal therapeutic benefit.    Please refer to the daily flowsheet for treatment today, total treatment time and time spent performing 1:1 timed codes.

## 2019-04-03 NOTE — LETTER
DEPARTMENT OF HEALTH AND HUMAN SERVICES  [facility] FOR MED[facility]ARE & MEDICAID SERVICES    PLAN/UPDATED PLAN OF PROGRESS FOR OUTPATIENT REHABILITATION    PATIENTS NAME:  Raisa Archer   : 1945  PROVIDER NUMBER:    [id number]  HICN:  [id number]   PROVIDER NAME: [facility]AP[facility]  MEDICAL RECORD NUMBER: [id number]   START OF CARE DATE:  SOC Date: 19   TYPE:  PT  PRIMARY/TREATMENT DIAGNOSIS: (Pertinent Medical Diagnosis)  Somatic dysfunction of pelvic region  Mixed incontinence  VISITS FROM START OF CARE:  Rxs Used: 1     Physical Therapy Initial Examination/Evaluation   April 3, 2019   Raisa Archer is a 74 year old female referred to physical therapy by Dr. Basilio for treatment of Mixed Incontinence, myalgia of pelvic floor, pelvic floor dysfunction  with Precautions/Restrictions/MD instructions no ricarda, internal myofascial release   Therapist Assessment:   Clinical Impression: Pt presents to [facility] with primary complaint of urinary incontinence.  Per clinical examination, pt with decreased strength in core and pelvic floor.    Pt will benefit from skilled physical therapy for strengthening program as well as education on normal voiding techniques for optimal bladder health.   Subjective: Pt reports incontinence has been going on for about 3 years now. She has also had some GI issues but that is improved. Pt reports that she tries not to drink a lot before going anyplace because it's hard to make it to the bathroom.   DOI/onset: MD order: 2019 Mechanism of injury: NA DOS NA  Related PMH: Breast CA in  with chemo, radiation ,and masectomy  Previous treatment: none  Imaging: NA    Chief Complaint: Urinary incontinence  Pain: rest 0  /10, activity 0/10  Described as: NA Alleviated by: Unknown Exacerbated by: Drinking water Progression of symptoms since initial onset: staying the same or worsening  Time of day when pain is  "worse: none noted  Sleeping: \"Depends on kitties\"; gets up to urinate when cats wake her up Social history: ; 1 daughter who lives in [place]; 2 cat; [place] grandchildren [place] Occupation: Retired  Job duties: normal housewok  Current HEP/exercise regimen: has a gym membership-sees a  1x/wk at [employer] Patient's goals are Decrease incontinence  Other pertinent PMH: past smoker, past CA, htn, post-menopausal General health as reported by patient: Good  Return to MD: May 02, 2019    Urination:  Do you leak on the way to the bathroom or with a strong urge to void? Yes    Do you leak with cough,sneeze, jumping, running?Yes   Any other activities that cause leaking? Laughing   Do you have triggers that make you feel you can't wait to go to the bathroom? No      PATIENTS NAME:  Raisa Archer   : 1945    Type of pad and number used per day? Pad and underwear-Always Discreet; wears for protection; does not need to change because they are wet  When you leak what is the amount? Small-Medium   How long can you delay the need to urinate? 1 hour.   How many times do you get up to urinate at night? Varies    Can you stop the flow of urine when on the toilet? Yes  Is the volume of urine passed usually: medium. (8sec rule=  250ml with average bladder storing  400-600ml)    Do you strain to pass urine? No  Do you have a slow or hesitant urinary stream? No  Do you have difficulty initiating the urine stream? No  Is urination painful?  No  How many bladder infections have you had in last 12 months? 0  Fluid intake(one glass is 8oz or one cup) 4-5, 16 oz bottles of water/day, 1 caffinated glasses/day  NA alcohol glasses/day.    Bowel habits:  Frequency of bowel movements? 4 times a week  Consistancy of stool? soft formed  Do you ignore the urge to defecate? Sometimes  Do you strain to pass stool? Very seldom     Pelvic Pain:  Do you have any pelvic pain with " intercourse, exams, use of tampons? No  Is initial penetration during intercourse painful? NA  Is deeper penetration painful? NA  Do you use lubricant?   NA  Given birth? Yes Any complications? No  # of vaginal delieveries? 1   # of C-sections? NA   # of episiotomies? NA  Are you sexually active?No  Have you ever been worried for your physical safety? No  Any abdominal or pelvic surgeries?  Hysterectomy at age 34 for endometriosis  Are you having any regular exercise? Yes   Have you practiced the PF(kegel) exercises for 4 or more weeks? Not currently    OBSERVATION  Posterior pelvic tilt  ROM  Flexion: 75%   Extension: 75%  Sidebendin%   Rotation: 25% bilat     MUSCLE PERFORMANCE  Baseline PF tone:WNL  PF Tone with cough: Slight bulge  Valsalva: not tested  PF Response quality: moderate  PF Power: Center: 2   Endurance: Maximum contraction in seconds: 8  # of endurance contractions before fatigue: NT  Quick contraction repetitions prior to fatigue: 10 with verbal cueing   Specificity/accessory muscles: Some glutes, overall good isolation   PATIENTS NAME:  Raisa Archer   : 1945      Hip MMT 4/3/2019 Left Right   Hip Flexion  3+/5 3+/5   Hip Abduction  2/5 2/5   Hip Extension  2+/5 2+/5     PALPATION: Non-tender all superficial structures with digital evaluation    NMR (20  mins)  Biofeedback unit used to provide visualization of PF activation in multiple body positions including supine, sidelying, and sitting. Education provided on tone of pelvic floor and how this can affect symptoms.Pt working on 2s contractions 4s relax, 10 reps 2x/day. Pt performing contractions best in supine. Initiated education on proper body mechanics for lifting and the importance of core strength.  Pt provided with HEP.    Therapeutic Activity (20 min): Today's session consisted of education regarding pelvic floor muscle anatomy, normal bladder function, urge suppression techniques and/or relaxation techniques as indicated,  and instruction in how to complete a bladder diary for assessment next visit.  Pt was instructed in the pathoanatomy of the pelvic floor utilizing pelvic model.  We discussed what pelvic floor physical therapy is, components of exam, and typical patient progression.  Pt was told that she was in control of exam progression, and if at any time was uncomfortable and wished to discontinue we could.       Assessment/Plan:    Patient is a 74 year old female with pelvic complaints.    Patient has the following significant findings with corresponding treatment plan.                Diagnosis 1:  Mixed Incontinence, Pelvic Floor Dysfunction   Decreased strength - therapeutic exercise, therapeutic activities and home program  Impaired muscle performance - biofeedback, electric stimulation, neuro re-education and home program  Decreased function - therapeutic activities and home program    Therapy Evaluation Codes:   1) History comprised of:   Personal factors that impact the plan of care:      Age, Living environment, Past/current experiences and Time since onset of   symptoms.    Comorbidity factors that impact the plan of care are:      Cancer, High blood pressure, Menopausal, Smoking and Weakness.     Medications impacting care: None noted.  2) Examination of Body Systems comprised of:   Body structures and functions that impact the plan of care:      Pelvis.   Activity limitations that impact the plan of care are:      Sleeping, Frequency, Stress incontinence, Urgency, Urge incontinence and   Urinary incontinence.  3) Clinical presentation characteristics are:   Stable/Uncomplicated.  4) Decision-Making    Low complexity using standardized patient assessment instrument and/or   measureable assessment of functional outcome.    Cumulative Therapy Evaluation is: Low complexity.  PATIENTS NAME:  Raisa Archer   : 1945    Previous and current functional limitations:  (See Goal Flow Sheet for this information)    Short  "term and Long term goals: (See Goal Flow Sheet for this information)     Communication ability:  Patient appears to be able to clearly communicate and understand verbal and written communication and follow directions correctly.  Treatment Explanation - The following has been discussed with the patient:   RX ordered/plan of care  Anticipated outcomes  Possible risks and side effects  This patient would benefit from PT intervention to resume normal activities.   Rehab potential is good.    Frequency:  1 X week, once daily  Duration:  for 6 weeks  Discharge Plan:  Achieve all LTG.  Independent in home treatment program.  Reach maximal therapeutic benefit.    Please refer to the daily flowsheet for treatment today, total treatment time and time spent performing 1:1 timed codes.             Caregiver Signature/Credentials _____________________________ Date ________        Flores Casanova, PT, DPT   I have reviewed and certified the need for these services and plan of treatment while under my care.            PHYSICIAN'S SIGNATURE:   ______________________________________ Date___________     Yaritza See Chino Basilio MD    Certification period:  Beginning of Cert date period: 04/03/19 to  End of Cert period date: 07/01/19     Functional Level Progress Report: Please see attached \"Goal Flow sheet for Functional level.\"    ____X____ Continue Services or       ________ DC Services                Service dates: From  SOC Date: 04/03/19 date to present                       "

## 2019-04-05 PROBLEM — M99.05 SOMATIC DYSFUNCTION OF PELVIC REGION: Status: ACTIVE | Noted: 2019-04-05

## 2019-04-06 ASSESSMENT — ENCOUNTER SYMPTOMS
FREQUENCY: 0
NERVOUS/ANXIOUS: 0
HEMATURIA: 0
DIZZINESS: 0
CONSTIPATION: 0
SORE THROAT: 0
HEARTBURN: 0
JOINT SWELLING: 0
PARESTHESIAS: 0
WEAKNESS: 0
HEMATOCHEZIA: 0
ABDOMINAL PAIN: 0
SHORTNESS OF BREATH: 0
CHILLS: 0
DIARRHEA: 0
BREAST MASS: 0
PALPITATIONS: 0
NAUSEA: 0
COUGH: 0
ARTHRALGIAS: 0
HEADACHES: 0
FEVER: 0
DYSURIA: 0
MYALGIAS: 0
EYE PAIN: 0

## 2019-04-06 ASSESSMENT — ACTIVITIES OF DAILY LIVING (ADL): CURRENT_FUNCTION: NO ASSISTANCE NEEDED

## 2019-04-08 ENCOUNTER — OFFICE VISIT (OUTPATIENT)
Dept: FAMILY MEDICINE | Facility: CLINIC | Age: 74
End: 2019-04-08
Payer: MEDICARE

## 2019-04-08 VITALS
RESPIRATION RATE: 18 BRPM | OXYGEN SATURATION: 95 % | SYSTOLIC BLOOD PRESSURE: 100 MMHG | DIASTOLIC BLOOD PRESSURE: 58 MMHG | HEART RATE: 102 BPM | BODY MASS INDEX: 28.01 KG/M2 | WEIGHT: 156.13 LBS | TEMPERATURE: 98.3 F

## 2019-04-08 DIAGNOSIS — J43.2 CENTRILOBULAR EMPHYSEMA (H): ICD-10-CM

## 2019-04-08 DIAGNOSIS — Z00.00 WELL ADULT EXAM: Primary | ICD-10-CM

## 2019-04-08 DIAGNOSIS — D32.9 MENINGIOMA (H): ICD-10-CM

## 2019-04-08 DIAGNOSIS — Z87.891 PERSONAL HISTORY OF TOBACCO USE: ICD-10-CM

## 2019-04-08 DIAGNOSIS — J01.00 ACUTE NON-RECURRENT MAXILLARY SINUSITIS: ICD-10-CM

## 2019-04-08 PROCEDURE — G0296 VISIT TO DETERM LDCT ELIG: HCPCS | Performed by: FAMILY MEDICINE

## 2019-04-08 PROCEDURE — 99213 OFFICE O/P EST LOW 20 MIN: CPT | Mod: 25 | Performed by: FAMILY MEDICINE

## 2019-04-08 PROCEDURE — G0439 PPPS, SUBSEQ VISIT: HCPCS | Performed by: FAMILY MEDICINE

## 2019-04-08 ASSESSMENT — ENCOUNTER SYMPTOMS
EYE PAIN: 0
HEADACHES: 0
ARTHRALGIAS: 0
FREQUENCY: 0
ABDOMINAL PAIN: 0
HEMATOCHEZIA: 0
CONSTIPATION: 0
COUGH: 0
DYSURIA: 0
PALPITATIONS: 0
BREAST MASS: 0
DIARRHEA: 0
HEMATURIA: 0
FEVER: 0
NAUSEA: 0
DIZZINESS: 0
NERVOUS/ANXIOUS: 0
SORE THROAT: 0
HEARTBURN: 0
WEAKNESS: 0
SHORTNESS OF BREATH: 0
JOINT SWELLING: 0
CHILLS: 0
PARESTHESIAS: 0
MYALGIAS: 0

## 2019-04-08 ASSESSMENT — ACTIVITIES OF DAILY LIVING (ADL): CURRENT_FUNCTION: NO ASSISTANCE NEEDED

## 2019-04-08 NOTE — PROGRESS NOTES
"SUBJECTIVE:   Raisa Archer is a 74 year old female who presents for Preventive Visit.  CV: reviewed normal lipids and glucose 2 yrs ago  Malignancy: colonoscopy due this year (she received a letter), has a h/o colon polyps, last colonoscopy was 2016 .  she is s/p hysterectomy for benign reasons >20 yrs ago, no further paps.  H/o breast cancer s/p right mastectomy; Continue left mammogram screens.  Lung cancer screening due, h/o 30 pk years, quit 5 yrs ago.  No symptoms such as fever, unintended weight loss, cough or hemoptysis.  Has imaging to monitor meningioma with her neurologist every few years.    Bone health: osteoporosis, declines treatment.   Immunizations; recommended Shingrix; otherwise up to date with tdap 2017, PCV13 and pneumovax done.  Sexual health; has intermittent vaginal itching and urinary continence issues, has seen urology and started on pelvic floor rehab.     Are you in the first 12 months of your Medicare coverage?  No    Healthy Habits:     In general, how would you rate your overall health?  Good    Frequency of exercise:  1 day/week    Duration of exercise:  Less than 15 minutes    Do you usually eat at least 4 servings of fruit and vegetables a day, include whole grains    & fiber and avoid regularly eating high fat or \"junk\" foods?  No    Taking medications regularly:  Yes    Medication side effects:  None    Ability to successfully perform activities of daily living:  No assistance needed    Home Safety:  No safety concerns identified    Hearing Impairment:  Difficulty following a conversation in a noisy restaurant or crowded room and difficulty understanding soft or whispered speech    In the past 6 months, have you been bothered by leaking of urine? Yes    In general, how would you rate your overall mental or emotional health?  Excellent      PHQ-2 Total Score: 0    Additional concerns today:  No  YES:     Sinus congestion: R>L, on and off for the past 3-4 months, she has had " repeated viral illnesses this winter including upper respiratory viruses as well as gastroenteritis. Continues to have right sided sinus pressure and congestion.  Using flonase, which is not helping much with this but usually does well for her.  No fevers or chills  No current cough.  COPD has been stable, uses combivent as needed.      Do you feel safe in your environment? No    Do you have a Health Care Directive? Yes: Advance Directive has been received and scanned.      Fall risk  Fallen 2 or more times in the past year?: No  Any fall with injury in the past year?: No    Cognitive Screening   1) Repeat 3 items (Leader, Season, Table)    2) Clock draw: NORMAL  3) 3 item recall: Recalls 3 objects  Results: 3 items recalled: COGNITIVE IMPAIRMENT LESS LIKELY    Mini-CogTM Copyright S [employer]. Licensed by the author for use in [facility]; reprinted with permission (renae@[facility]). All rights reserved.      Do you have sleep apnea, excessive snoring or daytime drowsiness?: yesterday drowsy    Reviewed and updated as needed this visit by clinical staff         Reviewed and updated as needed this visit by Provider        Social History     Tobacco Use     Smoking status: Former Smoker     Packs/day: 1.50     Years: 30.00     Pack years: 45.00     Types: Cigarettes     Start date: 1967     Last attempt to quit: 5/10/2014     Years since quittin.9     Smokeless tobacco: Never Used     Tobacco comment: Quit several times over the years including when pregnant   Substance Use Topics     Alcohol use: No     Alcohol/week: 0.0 oz         Alcohol Use 2019   Prescreen: >3 drinks/day or >7 drinks/week? Not Applicable   Prescreen: >3 drinks/day or >7 drinks/week? -               Current providers sharing in care for this patient include:   Patient Care Team:  Lyla Hawkins MD as PCP - General (Family Practice)  Lyla Hawkins MD as Assigned PCP  Yaritza Basilio MD as MD (Urology)  Jonas  Chantelle, RN as Registered Nurse (Urology)    The following health maintenance items are reviewed in Epic and correct as of today:  Health Maintenance   Topic Date Due     COPD ACTION PLAN Q1 YR  1945     ZOSTER IMMUNIZATION (3 of 3) 01/09/2019     MEDICARE ANNUAL WELLNESS VISIT  04/23/2019     FALL RISK ASSESSMENT  04/23/2019     LUNG CANCER SCREENING ANNUAL  04/26/2019     MAMMO Q1 YR  05/18/2019     COLONOSCOPY Q3 YR  05/31/2019     PHQ-2 Q1 YR  04/08/2020     LIPID SCREEN Q5 YR FEMALE (SYSTEM ASSIGNED)  04/11/2021     ADVANCE DIRECTIVE PLANNING Q5 YRS  10/04/2023     DTAP/TDAP/TD IMMUNIZATION (3 - Td) 04/10/2027     SPIROMETRY ONETIME  Completed     DEXA SCAN SCREENING (SYSTEM ASSIGNED)  Completed     INFLUENZA VACCINE  Completed     HEPATITIS C SCREENING  Completed     IPV IMMUNIZATION  Aged Out     MENINGITIS IMMUNIZATION  Aged Out     BP Readings from Last 3 Encounters:   04/08/19 100/58   01/02/19 (!) 128/98   12/17/18 116/75    Wt Readings from Last 3 Encounters:   04/08/19 70.8 kg (156 lb 2 oz)   01/02/19 74.8 kg (165 lb)   12/17/18 75.7 kg (166 lb 14.4 oz)                  Patient Active Problem List   Diagnosis     Tobacco use disorder     Malignant neoplasm of female breast (H)     Gall stones     Osteoporosis     Advanced directives, counseling/discussion     Chronic rhinitis     Hyperlipidemia LDL goal <160     Other hammer toe (acquired)     Meningioma (H)     History of colonic polyps     Centrilobular emphysema (H)     Mixed incontinence     Pelvic floor dysfunction     Myalgia of pelvic floor     Somatic dysfunction of pelvic region     Past Surgical History:   Procedure Laterality Date     ABDOMEN SURGERY       BIOPSY       BREAST SURGERY       CATARACT IOL, RT/LT       CHOLECYSTECTOMY       COLONOSCOPY       ORTHOPEDIC SURGERY       PHACOEMULSIFICATION CLEAR CORNEA WITH STANDARD INTRAOCULAR LENS IMPLANT  5/22/2014    Procedure: PHACOEMULSIFICATION CLEAR CORNEA WITH STANDARD INTRAOCULAR LENS  IMPLANT;  Surgeon: Rahul Reid MD;  Location:  [facility]     PHACOEMULSIFICATION CLEAR CORNEA WITH STANDARD INTRAOCULAR LENS IMPLANT  2014    Procedure: PHACOEMULSIFICATION CLEAR CORNEA WITH STANDARD INTRAOCULAR LENS IMPLANT;  Surgeon: Rahul Reid MD;  Location:  [facility]     SURGICAL HISTORY OF -       left foot reconstruction     SURGICAL HISTORY OF 1979    hysterectomy       Social History     Tobacco Use     Smoking status: Former Smoker     Packs/day: 1.50     Years: 30.00     Pack years: 45.00     Types: Cigarettes     Start date: 1967     Last attempt to quit: 5/10/2014     Years since quittin.9     Smokeless tobacco: Never Used     Tobacco comment: Quit several times over the years including when pregnant   Substance Use Topics     Alcohol use: No     Alcohol/week: 0.0 oz     Family History   Problem Relation Age of Onset     Breast Cancer Sister         Going thru 2nd round of breast cancer     Diabetes Sister      Cancer Brother      Asthma Brother      Other Cancer Brother         Hodkins Lymphoma/Hodkins Lymphoma/Hodkins Lymphoma/Hodkins Lymphoma     Cerebrovascular Disease Father         Had several small ones     Cancer Other         Stomach cancer- cousin     Breast Cancer Other         Aunts     Diabetes Other         Uncles and cousin/Paternal Uncles/Paternal Uncles/Paternal Uncles/Paternal Uncles     Cancer Brother      Obesity Brother      Asthma Brother      Cancer Other         Family Hx of colon cancer     Breast Cancer Other         Paternal Aunt/Paternal Aunt/Paternal Aunt/Paternal Aunt     Colon Cancer Other         Maternal Great Uncles     Breast Cancer Sister      Breast Cancer Cousin         Paternal/Paternal     Colon Cancer Other         Maternal Uncle/Maternal Great Uncles/Maternal Great Uncles/Maternal Great Uncles     Obesity Sister         On diet/On diet/On diet     Obesity Brother      Asthma Niece      Diabetes Other      Breast Cancer Other      Colon  Cancer Other          Current Outpatient Medications   Medication Sig Dispense Refill     Acetaminophen (TYLENOL PO) Take by mouth as needed for mild pain or fever       amoxicillin-clavulanate (AUGMENTIN) 875-125 MG tablet Take 1 tablet by mouth 2 times daily for 10 days 20 tablet 0     Calcium Carbonate-Vitamin D (CALCIUM + D PO) Take 2 tablets by mouth daily.       carboxymethylcellul-glycerin (OPTIVE) 0.5-0.9 % SOLN ophthalmic solution 1 drop       clotrimazole-betamethasone (LOTRISONE) cream Apply topically 2 times daily 15 g 1     fluticasone (FLONASE) 50 MCG/ACT nasal spray Spray 1-2 sprays into both nostrils daily 16 g 11     Homeopathic Products (SLEEP MEDICINE) TABS Take by mouth daily as needed       Ipratropium-Albuterol (COMBIVENT RESPIMAT)  MCG/ACT inhaler Inhale 1 puff into the lungs 4 times daily Not to exceed 6 doses per day. 1 Inhaler 3     Loratadine (CLARITIN PO) Take by mouth daily        Multiple Vitamins-Minerals (ZAHIDA MULTIVITAMIN FOR WOMEN) TABS Take 1 tablet by mouth daily.       Nutritional Supplements (OSTEO ADVANCE PO)        nystatin (MYCOSTATIN) 989552 UNIT/GM POWD Apply topically 3 times daily as needed 60 g 1     Omega-3 Fatty Acids (FISH OIL) 1200 MG CAPS Take 1 capsule by mouth daily       vitamin E 400 UNIT capsule Take 400 Units by mouth daily       Allergies   Allergen Reactions     Aspirin Nausea and Vomiting     sick     Chantix [Varenicline Tartrate] Other (See Comments)     Sees things     Glycerin Itching     LIQUID,   Reactions: itchy and redness       Sympathomimetics Other (See Comments)     Patient doesn't know why this is listed as an allergy     Wool Fiber Itching     Redness      Recent Labs   Lab Test 11/13/18  1313 04/23/18  1410  04/10/17  1438 04/11/16  1503 11/03/14  1214 12/14/11  1102   A1C  --  5.2  --  5.1 5.6  --   --    LDL  --   --   --   --  96  --  142*   HDL  --   --   --   --  59  --  61   TRIG  --   --   --   --  130  --  113   ALT 23 28  --    "--   --  36  --    CR 0.84 0.95   < >  --   --  0.96  --    GFRESTIMATED 67 58*   < >  --   --  58*  --    GFRESTBLACK 81 70   < >  --   --  70  --    POTASSIUM 3.8 4.1  --   --   --  4.2  --     < > = values in this interval not displayed.          Review of Systems   Constitutional: Negative for chills and fever.   HENT: Positive for congestion. Negative for ear pain, hearing loss and sore throat.    Eyes: Negative for pain and visual disturbance.   Respiratory: Negative for cough and shortness of breath.    Cardiovascular: Negative for chest pain, palpitations and peripheral edema.   Gastrointestinal: Negative for abdominal pain, constipation, diarrhea, heartburn, hematochezia and nausea.   Breasts:  Negative for tenderness, breast mass and discharge.   Genitourinary: Negative for dysuria, frequency, genital sores, hematuria, pelvic pain, urgency, vaginal bleeding and vaginal discharge.   Musculoskeletal: Negative for arthralgias, joint swelling and myalgias.   Skin: Negative for rash.   Neurological: Negative for dizziness, weakness, headaches and paresthesias.   Psychiatric/Behavioral: Negative for mood changes. The patient is not nervous/anxious.          OBJECTIVE:   /58   Pulse 102   Temp 98.3  F (36.8  C) (Oral)   Resp 18   Wt 70.8 kg (156 lb 2 oz)   SpO2 95%   BMI 28.01 kg/m   Estimated body mass index is 28.01 kg/m  as calculated from the following:    Height as of 1/2/19: 1.59 m (5' 2.6\").    Weight as of this encounter: 70.8 kg (156 lb 2 oz).  Physical Exam  GENERAL APPEARANCE: healthy, alert and no distress  EYES: Eyes grossly normal to inspection, PERRL and conjunctivae and sclerae normal  HENT: ear canals and TM's normal other than effusion behind right TM; right nares with purulent nasal discharge and swollen mucosa.  oropharynx clear and oral mucous membranes moist  NECK: no adenopathy, no asymmetry, masses, or scars and thyroid normal to palpation  RESP: lungs clear to auscultation - " "no rales, rhonchi or wheezes  BREAST: right breast s/p mastectomy; left breast is normal without masses, tenderness or nipple discharge and no palpable axillary masses or adenopathy bilaterally  CV: regular rate and rhythm, normal S1 S2, no S3 or S4, no murmur, click or rub, no peripheral edema and peripheral pulses strong  ABDOMEN: soft, nontender, no hepatosplenomegaly, no masses and bowel sounds normal  MS: no musculoskeletal defects are noted and gait is age appropriate without ataxia  SKIN: no suspicious lesions or rashes  NEURO: Normal strength and tone, sensory exam grossly normal, mentation intact and speech normal  PSYCH: mentation appears normal and affect normal/bright        ASSESSMENT / PLAN:   1. Well adult exam  CV: up to date on screening for ischemic heart disease.  Malignancy: continue with yearly left mammogram,she will schedule her colonoscopy this year, we will schedule her mammogram and low dose chest CT for lung cancer screening   Bone health: osteoporosis; has declined treatment  Immunizations: again recommended shingrix.       2. Acute non-recurrent maxillary sinusitis  Ok to continue flonase, recommended antibiotic treatment of sinusitis given duration.    - amoxicillin-clavulanate (AUGMENTIN) 875-125 MG tablet; Take 1 tablet by mouth 2 times daily for 10 days  Dispense: 20 tablet; Refill: 0    3. Personal history of tobacco use    - Prof Fee: Shared Decision Making Visit for Lung Cancer Screening  - CT Chest Lung Cancer Scrn Low Dose wo; Future    4. Centrilobular emphysema (H)  Stable.  I refilled her inhaler.     5. Meningioma (H)  Per neurology      End of Life Planning:  Patient currently has an advanced directive:     COUNSELING:  Reviewed preventive health counseling, as reflected in patient instructions    BP Readings from Last 1 Encounters:   04/08/19 100/58     Estimated body mass index is 28.01 kg/m  as calculated from the following:    Height as of 1/2/19: 1.59 m (5' 2.6\").    " Weight as of this encounter: 70.8 kg (156 lb 2 oz).           reports that she quit smoking about 4 years ago. Her smoking use included cigarettes. She started smoking about 52 years ago. She has a 45.00 pack-year smoking history. She has never used smokeless tobacco.      Appropriate preventive services were discussed with this patient, including applicable screening as appropriate for cardiovascular disease, diabetes, osteopenia/osteoporosis, and glaucoma.  As appropriate for age/gender, discussed screening for colorectal cancer, prostate cancer, breast cancer, and cervical cancer. Checklist reviewing preventive services available has been given to the patient.    Reviewed patients plan of care and provided an AVS. The Basic Care Plan (routine screening as documented in Health Maintenance) for Raisa meets the Care Plan requirement. This Care Plan has been established and reviewed with the Patient.    Counseling Resources:  ATP IV Guidelines  Pooled Cohorts Equation Calculator  Breast Cancer Risk Calculator  FRAX Risk Assessment  ICSI Preventive Guidelines  Dietary Guidelines for Americans, 2010  [employer]'s MyPlate  ASA Prophylaxis  Lung CA Screening    Lyla Hawkins MD  [facility]    Identified Health Risks:  Lung Cancer Screening Shared Decision Making Visit     Raisa Archer is eligible for lung cancer screening on the basis of the information provided in my signed lung cancer screening order.     I have discussed with patient the risks and benefits of screening for lung cancer with low-dose CT.     The risks include:  radiation exposure: one low dose chest CT has as much ionizing radiation as about 15 chest x-rays or 6 months of background radiation living in [place]    false positives: 96% of positive findings/nodules are NOT cancer, but some might still require additional diagnostic evaluation, including biopsy  over-diagnosis: some slow growing cancers that might never have been  clinically significant will be detected and treated unnecessarily     The benefit of early detection of lung cancer is contingent upon adherence to annual screening or more frequent follow up if indicated.     Furthermore, reaping the benefits of screening requires Raisa BOWMAN Archer to be willing and physically able to undergo diagnostic procedures, if indicated. Although no specific guide is available for determining severity of comorbidities, it is reasonable to withhold screening in patients who have greater mortality risk from other diseases.     We did discuss that the only way to prevent lung cancer is to not smoke. Smoking cessation assistance was not offered, because she has already quit smoking.    I did not offer risk estimation using a calculator such as this one:    ShouldIScreen

## 2019-04-08 NOTE — PATIENT INSTRUCTIONS

## 2019-04-11 ENCOUNTER — TELEPHONE (OUTPATIENT)
Dept: FAMILY MEDICINE | Facility: CLINIC | Age: 74
End: 2019-04-11

## 2019-04-11 DIAGNOSIS — Z12.31 ENCOUNTER FOR SCREENING MAMMOGRAM FOR BREAST CANCER: Primary | ICD-10-CM

## 2019-04-11 NOTE — TELEPHONE ENCOUNTER
Reason for Call: Request for an order or referral:    Order or referral being requested: Mammogram    Date needed: as soon as possible    Has the patient been seen by the PCP for this problem? YES    Additional comments: Patient is looking to schedule mammogram around 5/20/19. Order on file expires 5/18. Please extend order until the end of May. Thanks!    Phone number Patient can be reached at:  Cell number on file:    Telephone Information:   Mobile [phone]       Best Time:  anytime    Can we leave a detailed message on this number?  YES    Call taken on 4/11/2019 at 2:15 PM by Marisa Walker

## 2019-04-23 ENCOUNTER — DOCUMENTATION ONLY (OUTPATIENT)
Dept: CARE COORDINATION | Facility: CLINIC | Age: 74
End: 2019-04-23

## 2019-04-26 ENCOUNTER — PRE VISIT (OUTPATIENT)
Dept: UROLOGY | Facility: CLINIC | Age: 74
End: 2019-04-26

## 2019-04-26 NOTE — TELEPHONE ENCOUNTER
Reason for visit: PFPT follow up      Relevant information: mixed incontinence, myofascial tenderness of the pelvic floor, pelvic floor dysfunction    Records/imaging/labs/orders: all records avialable    Pt called: no need for a call    At Rooming: regular

## 2019-05-02 ENCOUNTER — OFFICE VISIT (OUTPATIENT)
Dept: UROLOGY | Facility: CLINIC | Age: 74
End: 2019-05-02
Payer: MEDICARE

## 2019-05-02 VITALS
BODY MASS INDEX: 27.68 KG/M2 | HEART RATE: 93 BPM | WEIGHT: 156.2 LBS | SYSTOLIC BLOOD PRESSURE: 107 MMHG | HEIGHT: 63 IN | DIASTOLIC BLOOD PRESSURE: 70 MMHG

## 2019-05-02 DIAGNOSIS — M62.89 PELVIC FLOOR DYSFUNCTION: ICD-10-CM

## 2019-05-02 DIAGNOSIS — D32.9 MENINGIOMA (H): ICD-10-CM

## 2019-05-02 DIAGNOSIS — N39.46 MIXED INCONTINENCE: Primary | ICD-10-CM

## 2019-05-02 ASSESSMENT — PAIN SCALES - GENERAL: PAINLEVEL: NO PAIN (0)

## 2019-05-02 ASSESSMENT — MIFFLIN-ST. JEOR: SCORE: 1181.3

## 2019-05-02 NOTE — NURSING NOTE
"Chief Complaint   Patient presents with     Follow Up     incontinence       Height 1.59 m (5' 2.6\"), weight 70.9 kg (156 lb 3.2 oz), not currently breastfeeding. Body mass index is 28.02 kg/m .    Patient Active Problem List   Diagnosis     Tobacco use disorder     Malignant neoplasm of female breast (H)     Gall stones     Osteoporosis     Advanced directives, counseling/discussion     Chronic rhinitis     Hyperlipidemia LDL goal <160     Other hammer toe (acquired)     Meningioma (H)     History of colonic polyps     Centrilobular emphysema (H)     Mixed incontinence     Pelvic floor dysfunction     Myalgia of pelvic floor     Somatic dysfunction of pelvic region       Allergies   Allergen Reactions     Amoxicillin Diarrhea     Aspirin Nausea and Vomiting     sick     Chantix [Varenicline Tartrate] Other (See Comments)     Sees things     Glycerin Itching     LIQUID,   Reactions: itchy and redness       Sympathomimetics Other (See Comments)     Patient doesn't know why this is listed as an allergy     Varenicline GI Disturbance, Other (See Comments) and Nausea and Vomiting     Wool Fiber Itching     Redness        Current Outpatient Medications   Medication Sig Dispense Refill     Acetaminophen (TYLENOL PO) Take by mouth as needed for mild pain or fever       Calcium Carbonate-Vitamin D (CALCIUM + D PO) Take 2 tablets by mouth daily.       carboxymethylcellul-glycerin (OPTIVE) 0.5-0.9 % SOLN ophthalmic solution 1 drop       clotrimazole-betamethasone (LOTRISONE) cream Apply topically 2 times daily 15 g 1     diphenhydrAMINE-APAP, sleep, (TYLENOL PM EXTRA STRENGTH)  MG/30ML LIQD Take 1-2 tablets by mouth       fluticasone (FLONASE) 50 MCG/ACT nasal spray Spray 1-2 sprays into both nostrils daily 16 g 11     Homeopathic Products (SLEEP MEDICINE) TABS Take by mouth daily as needed       Ipratropium-Albuterol (COMBIVENT RESPIMAT)  MCG/ACT inhaler Inhale 1 puff into the lungs 4 times daily Not to exceed 6 " doses per day. 1 Inhaler 3     Loratadine (CLARITIN PO) Take by mouth daily        Multiple Vitamins-Minerals (ZAHIDA MULTIVITAMIN FOR WOMEN) TABS Take 1 tablet by mouth daily.       nystatin (MYCOSTATIN) 915131 UNIT/GM POWD Apply topically 3 times daily as needed 60 g 1     Omega-3 Fatty Acids (FISH OIL) 1200 MG CAPS Take 1 capsule by mouth daily       vitamin E 400 UNIT capsule Take 400 Units by mouth daily       Nutritional Supplements (OSTEO ADVANCE PO)          Social History     Tobacco Use     Smoking status: Former Smoker     Packs/day: 1.50     Years: 30.00     Pack years: 45.00     Types: Cigarettes     Start date: 1967     Last attempt to quit: 5/10/2014     Years since quittin.9     Smokeless tobacco: Never Used     Tobacco comment: Quit several times over the years including when pregnant   Substance Use Topics     Alcohol use: No     Alcohol/week: 0.0 oz     Drug use: No       Krystal Lott LPN  2019  2:24 PM

## 2019-05-02 NOTE — PATIENT INSTRUCTIONS
Websites with free information:    [employer] patient website: www.voicesforpfd.org    Total Control Program: www.totalcontrolprogram.com    Please return to see us as needed    It was a pleasure meeting with you today.  Thank you for allowing me and my team the privilege of caring for you today.  YOU are the reason we are here, and I truly hope we provided you with the excellent service you deserve.  Please let us know if there is anything else we can do for you so that we can be sure you are leaving completely satisfied with your care experience.

## 2019-05-02 NOTE — LETTER
"  RE: Raisa Archer  [address] [place] [id number]     Dear Colleague,    Thank you for referring your patient, Raisa Archer, to the [facility] UROLOGY AND INST FOR PROSTATE AND UROLOGIC CANCERS at [facility]. Please see a copy of my visit note below.    May 2, 2019    Return visit    Patient returns today for follow up for mixed incontinence, myofascial tenderness and pelvic floor dysfunction.  At the time of last visit the plan had been for the patient to trial pelvic floor PT.  She attended one session one month ago however has not followed up for further sessions.  The patient reports improved incontinence in the interim noting leakage exclusively while brushing her teeth.        /70   Pulse 93   Ht 1.59 m (5' 2.6\")   Wt 70.9 kg (156 lb 3.2 oz)   BMI 28.02 kg/m     She is comfortable, in no distress, non-labored breathing.  Abdomen is soft, non-tender, non-distended.      A/P: 74 year old F with mixed incontinence, myofascial tenderness of the pelvic floor and pelvic floor dysfunction.  While the patient has not pursued a full course of pelvic floor therapy her symptoms have improved notably.      -Follow up PRN    Addendum:    The patient was seen and evaluated with the resident.  The plan was formulated in conjunction with me and I agree with the above note with changes made as necessary.    Patient is doing well and happy with the results of PFPT    She will return to see us as needed    15 minutes were spent with patient today, >50% in counseling and coordination of care    Yaritza Basilio MD MPH   of Urology    CC  Patient Care Team:  Lyla Hawkins MD as PCP - General (Family Practice)  Lyla Hawkins MD as Assigned PCP  Yaritza Basilio MD as MD (Urology)  Chantelle Mcdaniel, RN as Registered Nurse (Urology)  SELF, REFERRED  "

## 2019-05-02 NOTE — PROGRESS NOTES
"May 2, 2019    Return visit    Patient returns today for follow up for mixed incontinence, myofascial tenderness and pelvic floor dysfunction.  At the time of last visit the plan had been for the patient to trial pelvic floor PT.  She attended one session one month ago however has not followed up for further sessions.  The patient reports improved incontinence in the interim noting leakage exclusively while brushing her teeth.        /70   Pulse 93   Ht 1.59 m (5' 2.6\")   Wt 70.9 kg (156 lb 3.2 oz)   BMI 28.02 kg/m    She is comfortable, in no distress, non-labored breathing.  Abdomen is soft, non-tender, non-distended.      A/P: 74 year old F with mixed incontinence, myofascial tenderness of the pelvic floor and pelvic floor dysfunction.  While the patient has not pursued a full course of pelvic floor therapy her symptoms have improved notably.      -Follow up PRN    Addendum:    The patient was seen and evaluated with the resident.  The plan was formulated in conjunction with me and I agree with the above note with changes made as necessary.    Patient is doing well and happy with the results of PFPT    She will return to see us as needed    15 minutes were spent with patient today, >50% in counseling and coordination of care    Yaritza Basilio MD MPH   of Urology    CC  Patient Care Team:  Lyla Hawkins MD as PCP - General (Family Practice)  Lyla Hawkins MD as Assigned PCP  Yaritza Basilio MD as MD (Urology)  Chantelle Mcdaniel, RN as Registered Nurse (Urology)  SELF, REFERRED          "

## 2019-05-20 ENCOUNTER — ANCILLARY PROCEDURE (OUTPATIENT)
Dept: MAMMOGRAPHY | Facility: CLINIC | Age: 74
End: 2019-05-20
Attending: FAMILY MEDICINE
Payer: MEDICARE

## 2019-05-20 ENCOUNTER — ANCILLARY PROCEDURE (OUTPATIENT)
Dept: CT IMAGING | Facility: CLINIC | Age: 74
End: 2019-05-20
Attending: FAMILY MEDICINE
Payer: MEDICARE

## 2019-05-20 DIAGNOSIS — Z12.31 VISIT FOR SCREENING MAMMOGRAM: ICD-10-CM

## 2019-05-20 DIAGNOSIS — Z87.891 PERSONAL HISTORY OF TOBACCO USE: ICD-10-CM

## 2019-07-04 ENCOUNTER — TRANSFERRED RECORDS (OUTPATIENT)
Dept: HEALTH INFORMATION MANAGEMENT | Facility: CLINIC | Age: 74
End: 2019-07-04

## 2019-07-05 ENCOUNTER — OFFICE VISIT (OUTPATIENT)
Dept: FAMILY MEDICINE | Facility: CLINIC | Age: 74
End: 2019-07-05
Payer: MEDICARE

## 2019-07-05 VITALS
DIASTOLIC BLOOD PRESSURE: 78 MMHG | TEMPERATURE: 98.4 F | HEART RATE: 87 BPM | OXYGEN SATURATION: 95 % | RESPIRATION RATE: 18 BRPM | SYSTOLIC BLOOD PRESSURE: 121 MMHG | WEIGHT: 159 LBS | BODY MASS INDEX: 28.53 KG/M2

## 2019-07-05 DIAGNOSIS — L60.3 DYSTROPHIC NAIL: Primary | ICD-10-CM

## 2019-07-05 DIAGNOSIS — L30.9 DERMATITIS: ICD-10-CM

## 2019-07-05 PROCEDURE — 99213 OFFICE O/P EST LOW 20 MIN: CPT | Performed by: FAMILY MEDICINE

## 2019-07-05 NOTE — PROGRESS NOTES
Subjective     Raisa GRACIE Archer is a 74 year old female who presents to clinic today for the following health issues:    HPI     Follow up on sinus infection: treated with augmentin in April.  She states that the Augmentin was too much for her and caused diarrhea, so she cut it in half and finished the course.  Eventually, her sinus symptoms improved, though she is still a little stuffy on the right.  No fevers or drainage .     Right 4th fingernail appears abnormal; does not recall any trauma.  It is not painful      Pain in the perianal area, which was bad when she was having the diarrhea with the augmenting--however this did resolve.  If she has some loose bowel movements, she has discomfort in the area again.  Sitz baths help.  Preparation H did not help.  She tried applying her lotrisone and this did help.            Patient Active Problem List   Diagnosis     Tobacco use disorder     Malignant neoplasm of female breast (H)     Gall stones     Osteoporosis     Advanced directives, counseling/discussion     Chronic rhinitis     Hyperlipidemia LDL goal <160     Other hammer toe (acquired)     Meningioma (H)     History of colonic polyps     Centrilobular emphysema (H)     Mixed incontinence     Pelvic floor dysfunction     Myalgia of pelvic floor     Somatic dysfunction of pelvic region     Past Surgical History:   Procedure Laterality Date     ABDOMEN SURGERY       BIOPSY       BREAST SURGERY       CATARACT IOL, RT/LT       CHOLECYSTECTOMY       COLONOSCOPY       ORTHOPEDIC SURGERY       PHACOEMULSIFICATION CLEAR CORNEA WITH STANDARD INTRAOCULAR LENS IMPLANT  5/22/2014    Procedure: PHACOEMULSIFICATION CLEAR CORNEA WITH STANDARD INTRAOCULAR LENS IMPLANT;  Surgeon: Rahul Reid MD;  Location: [facility]     PHACOEMULSIFICATION CLEAR CORNEA WITH STANDARD INTRAOCULAR LENS IMPLANT  7/8/2014    Procedure: PHACOEMULSIFICATION CLEAR CORNEA WITH STANDARD INTRAOCULAR LENS IMPLANT;  Surgeon: Rahul Reid MD;  Location:  [facility] OR     SURGICAL HISTORY OF -       left foot reconstruction     SURGICAL HISTORY OF -       hysterectomy       Social History     Tobacco Use     Smoking status: Former Smoker     Packs/day: 1.50     Years: 30.00     Pack years: 45.00     Types: Cigarettes     Start date: 1967     Last attempt to quit: 5/10/2014     Years since quittin.1     Smokeless tobacco: Never Used     Tobacco comment: Quit several times over the years including when pregnant   Substance Use Topics     Alcohol use: No     Alcohol/week: 0.0 oz     Family History   Problem Relation Age of Onset     Breast Cancer Sister         Going thru 2nd round of breast cancer     Diabetes Sister      Cancer Brother      Asthma Brother      Other Cancer Brother         Hodkins Lymphoma/Hodkins Lymphoma/Hodkins Lymphoma/Hodkins Lymphoma     Cerebrovascular Disease Father         Had several small ones     Cancer Other         Stomach cancer- cousin     Breast Cancer Other         Aunts     Diabetes Other         Uncles and cousin/Paternal Uncles/Paternal Uncles/Paternal Uncles/Paternal Uncles     Cancer Brother      Obesity Brother      Asthma Brother      Cancer Other         Family Hx of colon cancer     Breast Cancer Other         Paternal Aunt/Paternal Aunt/Paternal Aunt/Paternal Aunt     Colon Cancer Other         Maternal Great Uncles     Breast Cancer Sister      Breast Cancer Cousin         Paternal/Paternal     Colon Cancer Other         Maternal Uncle/Maternal Great Uncles/Maternal Great Uncles/Maternal Great Uncles     Obesity Sister         On diet/On diet/On diet     Obesity Brother      Asthma Niece      Diabetes Other      Breast Cancer Other      Colon Cancer Other          Current Outpatient Medications   Medication Sig Dispense Refill     Acetaminophen (TYLENOL PO) Take by mouth as needed for mild pain or fever       Calcium Carbonate-Vitamin D (CALCIUM + D PO) Take 2 tablets by mouth daily.        carboxymethylcellul-glycerin (OPTIVE) 0.5-0.9 % SOLN ophthalmic solution 1 drop       clotrimazole-betamethasone (LOTRISONE) cream Apply topically 2 times daily 15 g 1     diphenhydrAMINE-APAP, sleep, (TYLENOL PM EXTRA STRENGTH)  MG/30ML LIQD Take 1-2 tablets by mouth       fluticasone (FLONASE) 50 MCG/ACT nasal spray Spray 1-2 sprays into both nostrils daily 16 g 11     Homeopathic Products (SLEEP MEDICINE) TABS Take by mouth daily as needed       Ipratropium-Albuterol (COMBIVENT RESPIMAT)  MCG/ACT inhaler Inhale 1 puff into the lungs 4 times daily Not to exceed 6 doses per day. 1 Inhaler 3     Loratadine (CLARITIN PO) Take by mouth daily        Multiple Vitamins-Minerals (ZAHIDA MULTIVITAMIN FOR WOMEN) TABS Take 1 tablet by mouth daily.       Nutritional Supplements (OSTEO ADVANCE PO)        nystatin (MYCOSTATIN) 264571 UNIT/GM POWD Apply topically 3 times daily as needed 60 g 1     Omega-3 Fatty Acids (FISH OIL) 1200 MG CAPS Take 1 capsule by mouth daily       vitamin E 400 UNIT capsule Take 400 Units by mouth daily       Allergies   Allergen Reactions     Amoxicillin Diarrhea     Aspirin Nausea and Vomiting     sick     Chantix [Varenicline Tartrate] Other (See Comments)     Sees things     Glycerin Itching     LIQUID,   Reactions: itchy and redness       Sympathomimetics Other (See Comments)     Patient doesn't know why this is listed as an allergy     Varenicline GI Disturbance, Other (See Comments) and Nausea and Vomiting     Wool Fiber Itching     Redness          Reviewed and updated as needed this visit by Provider         Review of Systems   ROS COMP: Constitutional, HEENT, cardiovascular, pulmonary, gi and gu systems are negative, except as otherwise noted.      Objective    /78 (BP Location: Right arm, Patient Position: Sitting, Cuff Size: Adult Regular)   Pulse 87   Temp 98.4  F (36.9  C) (Oral)   Resp 18   Wt 72.1 kg (159 lb)   SpO2 95%   BMI 28.53 kg/m    Body mass index is  "28.53 kg/m .  Physical Exam   GENERAL APPEARANCE: healthy, alert and no distress  EYES: Eyes grossly normal to inspection, PERRL and conjunctivae and sclerae normal  HENT: ear canals and TM's normal and nose and mouth without ulcers or lesions  NECK: no adenopathy, no asymmetry, masses, or scars and thyroid normal to palpation  RESP: lungs clear to auscultation - no rales, rhonchi or wheezes  CV: regular rates and rhythm, normal S1 S2, no S3 or S4 and no murmur, click or rub  SKIN:  Right fourth nail has a horizontal ridge and whitish yellow discoloration, but is not thickened.  All nails with some vertical ridging.     (female): perianal skin is irritated, mildly erythematous, a little macerated.            Assessment & Plan     1. Dystrophic nail  Seems most likely to have been some trauma maybe 3 months ago; if it doesn't grow out over the next three months, or if the discoloration spreads, consider fungal infection, thyroid disease, etc.  Patient to monitor.      2. Dermatitis  Ok to use the lotrisone as needed, if mild can just use some A&D       BMI:   Estimated body mass index is 28.53 kg/m  as calculated from the following:    Height as of 5/2/19: 1.59 m (5' 2.6\").    Weight as of this encounter: 72.1 kg (159 lb).               No follow-ups on file.    Lyla Hawkins MD  [facility]        "

## 2019-09-10 ENCOUNTER — OFFICE VISIT (OUTPATIENT)
Dept: FAMILY MEDICINE | Facility: CLINIC | Age: 74
End: 2019-09-10
Payer: MEDICARE

## 2019-09-10 VITALS
OXYGEN SATURATION: 97 % | HEIGHT: 62 IN | WEIGHT: 160 LBS | SYSTOLIC BLOOD PRESSURE: 123 MMHG | TEMPERATURE: 98.2 F | DIASTOLIC BLOOD PRESSURE: 77 MMHG | RESPIRATION RATE: 16 BRPM | HEART RATE: 88 BPM | BODY MASS INDEX: 29.44 KG/M2

## 2019-09-10 DIAGNOSIS — J31.0 CHRONIC RHINITIS: ICD-10-CM

## 2019-09-10 DIAGNOSIS — Z23 NEED FOR PROPHYLACTIC VACCINATION AND INOCULATION AGAINST INFLUENZA: ICD-10-CM

## 2019-09-10 DIAGNOSIS — L29.9 SKIN PRURITUS: ICD-10-CM

## 2019-09-10 DIAGNOSIS — R49.0 HOARSENESS OF VOICE: Primary | ICD-10-CM

## 2019-09-10 PROCEDURE — 99214 OFFICE O/P EST MOD 30 MIN: CPT | Mod: 25 | Performed by: NURSE PRACTITIONER

## 2019-09-10 PROCEDURE — G0008 ADMIN INFLUENZA VIRUS VAC: HCPCS | Performed by: NURSE PRACTITIONER

## 2019-09-10 PROCEDURE — 90662 IIV NO PRSV INCREASED AG IM: CPT | Performed by: NURSE PRACTITIONER

## 2019-09-10 ASSESSMENT — MIFFLIN-ST. JEOR: SCORE: 1189.01

## 2019-09-10 NOTE — PROGRESS NOTES
"Subjective     Raisa A Archer is a 74 year old female who presents to clinic today for the following health issues:  Chief Complaint   Patient presents with     Hoarse     3 days      Derm Problem     Flu Shot     needed today     Imm/Inj     Flu Shot         HPI   Pt states over the past 3 days her voice keeps going in and out.  No sore throat.  No excessive talking, screaming, yelling, singing, etc.  No fever, chills.  + seasonal allergies, currently with runny nose, watery eyes, plugged ears, sinusy.  Claritin and flonase which she is using regularly now.    Derm:  Ichy feeling under her skin off and on over for \"a long time.\"  Worse on her back.  + dry skin.  Hygeine/showers every other day.  Curerntly using vaseline intensive care lotion.  She is to use a lines of skin/soft product, but now that she is not taking baths (as she is unable to get in and out of the bathtub) she switched to a lotion that she puts on her back by herself.  She denies any other skin issues or concerns.  She denies any visible rashes.    Flu shot needed      Patient Active Problem List   Diagnosis     Tobacco use disorder     Malignant neoplasm of female breast (H)     Gall stones     Osteoporosis     Advanced directives, counseling/discussion     Chronic rhinitis     Hyperlipidemia LDL goal <160     Other hammer toe (acquired)     Meningioma (H)     History of colonic polyps     Centrilobular emphysema (H)     Mixed incontinence     Pelvic floor dysfunction     Myalgia of pelvic floor     Somatic dysfunction of pelvic region     Past Surgical History:   Procedure Laterality Date     ABDOMEN SURGERY       BIOPSY       BREAST SURGERY       CATARACT IOL, RT/LT       CHOLECYSTECTOMY       COLONOSCOPY       ORTHOPEDIC SURGERY       PHACOEMULSIFICATION CLEAR CORNEA WITH STANDARD INTRAOCULAR LENS IMPLANT  5/22/2014    Procedure: PHACOEMULSIFICATION CLEAR CORNEA WITH STANDARD INTRAOCULAR LENS IMPLANT;  Surgeon: Rahul Reid MD;  " Location:  [facility]     PHACOEMULSIFICATION CLEAR CORNEA WITH STANDARD INTRAOCULAR LENS IMPLANT  2014    Procedure: PHACOEMULSIFICATION CLEAR CORNEA WITH STANDARD INTRAOCULAR LENS IMPLANT;  Surgeon: Rahul Reid MD;  Location:  OR     SURGICAL HISTORY OF -       left foot reconstruction     SURGICAL HISTORY OF -       hysterectomy       Social History     Tobacco Use     Smoking status: Former Smoker     Packs/day: 1.50     Years: 30.00     Pack years: 45.00     Types: Cigarettes     Start date: 1967     Last attempt to quit: 5/10/2014     Years since quittin.3     Smokeless tobacco: Never Used     Tobacco comment: Quit several times over the years including when pregnant   Substance Use Topics     Alcohol use: No     Alcohol/week: 0.0 oz     Family History   Problem Relation Age of Onset     Breast Cancer Sister         Going thru 2nd round of breast cancer     Diabetes Sister      Cancer Brother      Asthma Brother      Other Cancer Brother         Hodkins Lymphoma/Hodkins Lymphoma/Hodkins Lymphoma/Hodkins Lymphoma     Cerebrovascular Disease Father         Had several small ones     Cancer Other         Stomach cancer- cousin     Breast Cancer Other         Aunts     Diabetes Other         Uncles and cousin/Paternal Uncles/Paternal Uncles/Paternal Uncles/Paternal Uncles     Cancer Brother      Obesity Brother      Asthma Brother      Cancer Other         Family Hx of colon cancer     Breast Cancer Other         Paternal Aunt/Paternal Aunt/Paternal Aunt/Paternal Aunt     Colon Cancer Other         Maternal Great Uncles     Breast Cancer Sister      Breast Cancer Cousin         Paternal/Paternal     Colon Cancer Other         Maternal Uncle/Maternal Great Uncles/Maternal Great Uncles/Maternal Great Uncles     Obesity Sister         On diet/On diet/On diet     Obesity Brother      Asthma Niece      Diabetes Other      Breast Cancer Other      Colon Cancer Other          Current Outpatient  Medications   Medication Sig Dispense Refill     Acetaminophen (TYLENOL PO) Take by mouth as needed for mild pain or fever       Calcium Carbonate-Vitamin D (CALCIUM + D PO) Take 2 tablets by mouth daily.       carboxymethylcellul-glycerin (OPTIVE) 0.5-0.9 % SOLN ophthalmic solution 1 drop       clotrimazole-betamethasone (LOTRISONE) cream Apply topically 2 times daily 15 g 1     diphenhydrAMINE-APAP, sleep, (TYLENOL PM EXTRA STRENGTH)  MG/30ML LIQD Take 1-2 tablets by mouth       fluticasone (FLONASE) 50 MCG/ACT nasal spray Spray 1-2 sprays into both nostrils daily 16 g 11     Homeopathic Products (SLEEP MEDICINE) TABS Take by mouth daily as needed       Ipratropium-Albuterol (COMBIVENT RESPIMAT)  MCG/ACT inhaler Inhale 1 puff into the lungs 4 times daily Not to exceed 6 doses per day. 1 Inhaler 3     Loratadine (CLARITIN PO) Take by mouth daily        Multiple Vitamins-Minerals (ZAHIDA MULTIVITAMIN FOR WOMEN) TABS Take 1 tablet by mouth daily.       Nutritional Supplements (OSTEO ADVANCE PO)        nystatin (MYCOSTATIN) 806097 UNIT/GM POWD Apply topically 3 times daily as needed 60 g 1     Omega-3 Fatty Acids (FISH OIL) 1200 MG CAPS Take 1 capsule by mouth daily       vitamin E 400 UNIT capsule Take 400 Units by mouth daily       Allergies   Allergen Reactions     Amoxicillin Diarrhea     Aspirin Nausea and Vomiting     sick     Chantix [Varenicline Tartrate] Other (See Comments)     Sees things     Glycerin Itching     LIQUID,   Reactions: itchy and redness       Sympathomimetics Other (See Comments)     Patient doesn't know why this is listed as an allergy     Varenicline GI Disturbance, Other (See Comments) and Nausea and Vomiting     Wool Fiber Itching     Redness      Recent Labs   Lab Test 11/13/18  1313 04/23/18  1410  04/10/17  1438 04/11/16  1503 11/03/14  1214 12/14/11  1102   A1C  --  5.2  --  5.1 5.6  --   --    LDL  --   --   --   --  96  --  142*   HDL  --   --   --   --  59  --  61   TRIG  " --   --   --   --  130  --  113   ALT 23 28  --   --   --  36  --    CR 0.84 0.95   < >  --   --  0.96  --    GFRESTIMATED 67 58*   < >  --   --  58*  --    GFRESTBLACK 81 70   < >  --   --  70  --    POTASSIUM 3.8 4.1  --   --   --  4.2  --     < > = values in this interval not displayed.      BP Readings from Last 3 Encounters:   09/10/19 123/77   07/05/19 121/78   05/02/19 107/70    Wt Readings from Last 3 Encounters:   09/10/19 72.6 kg (160 lb)   07/05/19 72.1 kg (159 lb)   05/02/19 70.9 kg (156 lb 3.2 oz)           Reviewed and updated as needed this visit by Provider         Review of Systems   ROS COMP: Constitutional, HEENT, cardiovascular, pulmonary, GI, , musculoskeletal, neuro, skin, endocrine and psych systems are negative, except as otherwise noted.      Objective    /77   Pulse 88   Temp 98.2  F (36.8  C)   Resp 16   Ht 1.575 m (5' 2\")   Wt 72.6 kg (160 lb)   SpO2 97%   BMI 29.26 kg/m    Body mass index is 29.26 kg/m .  Physical Exam   EXAM:  Constitutional: healthy, alert, active and no distress  Neck: Neck supple. No adenopathy.  ENT: Bilateral TM's are normal.  Posterior oropharynx is clear.  Nares clear without congestion.  Cardiovascular: S1, S2  Respiratory: Respirations easy and regular. No respiratory distress. Lungs sounds CTA.  Skin: warm and dry.  No rashes present on chest, back, arms, lower legs.  Psychiatric: mentation appears normal. and affect normal/bright      Diagnostic Test Results:  Labs reviewed in Epic          Assessment & Plan     (R49.0) Hoarseness of voice  (primary encounter diagnosis)  Comment: Likely viral or allergic  Plan: OTOLARYNGOLOGY REFERRAL        I had a discussion with the patient today about her hoarseness.  Since this is very short duration, I do not think it is worrisome.    For now, encouraged voice rest and continued allergy management.    In the event that her  voice does not completely return to normal within the next 1 to 2 weeks, I would " "like her to follow-up with ENT.    She agrees and thinks that is a good idea.    She will follow-up with ENT if any lingering concerns.      (J31.0) Chronic rhinitis  Comment: History of  Plan: Continue Claritin and Flonase.    (L29.9) Skin pruritus  Comment: On back  Plan: I encouraged to continue routine hygiene and bathing.  Instead of using lotion after she dries her back, I encouraged her to use a small amount of baby oil or [place] skin so soft on her wet skin followed by a light towel off.  I do think the added moisture with the oil locking in moisture will help within the next 1 to 2 weeks.  In the event the itching worsens in any way or does not get better, I would like her to be re-seen or consider an appointment with dermatology.  She agrees and understands.    (Z23) Need for prophylactic vaccination and inoculation against influenza  Comment: Routine  Plan: FLU VACCINE, INCREASED ANTIGEN, PRESV FREE, AGE        65+ [06578], Vaccine Administration, Initial         [19075]        Given today       BMI:   Estimated body mass index is 29.26 kg/m  as calculated from the following:    Height as of this encounter: 1.575 m (5' 2\").    Weight as of this encounter: 72.6 kg (160 lb).   Weight management plan: Discussed healthy diet and exercise guidelines        Work on weight loss  Regular exercise    Return in about 5 days (around 9/15/2019), or if symptoms worsen or fail to improve.    This note was created using the Dragon Medical Dictating Software and therefore should be read with the understanding of the potential for subtle errors in transcription.      IZABELA [facility]    "

## 2019-09-28 ENCOUNTER — HEALTH MAINTENANCE LETTER (OUTPATIENT)
Age: 74
End: 2019-09-28

## 2019-10-04 ENCOUNTER — HOSPITAL ENCOUNTER (OUTPATIENT)
Facility: CLINIC | Age: 74
Discharge: HOME | End: 2019-10-04
Attending: COLON & RECTAL SURGERY | Admitting: COLON & RECTAL SURGERY
Payer: MEDICARE

## 2019-10-04 VITALS
DIASTOLIC BLOOD PRESSURE: 64 MMHG | RESPIRATION RATE: 29 BRPM | OXYGEN SATURATION: 100 % | SYSTOLIC BLOOD PRESSURE: 95 MMHG | HEART RATE: 78 BPM

## 2019-10-04 LAB — COLONOSCOPY: NORMAL

## 2019-10-04 PROCEDURE — 99153 MOD SED SAME PHYS/QHP EA: CPT

## 2019-10-04 PROCEDURE — G0500 MOD SEDAT ENDO SERVICE >5YRS: HCPCS | Performed by: COLON & RECTAL SURGERY

## 2019-10-04 PROCEDURE — 88305 TISSUE EXAM BY PATHOLOGIST: CPT | Mod: 26 | Performed by: COLON & RECTAL SURGERY

## 2019-10-04 PROCEDURE — 45380 COLONOSCOPY AND BIOPSY: CPT | Performed by: COLON & RECTAL SURGERY

## 2019-10-04 PROCEDURE — 88305 TISSUE EXAM BY PATHOLOGIST: CPT | Performed by: COLON & RECTAL SURGERY

## 2019-10-04 PROCEDURE — 25000128 H RX IP 250 OP 636: Performed by: COLON & RECTAL SURGERY

## 2019-10-04 RX ORDER — FENTANYL CITRATE 50 UG/ML
INJECTION, SOLUTION INTRAMUSCULAR; INTRAVENOUS PRN
Status: DISCONTINUED | OUTPATIENT
Start: 2019-10-04 | End: 2019-10-04 | Stop reason: HOSPADM

## 2019-10-04 RX ORDER — ONDANSETRON 4 MG/1
4 TABLET, ORALLY DISINTEGRATING ORAL EVERY 6 HOURS PRN
Status: DISCONTINUED | OUTPATIENT
Start: 2019-10-04 | End: 2019-10-04 | Stop reason: HOSPADM

## 2019-10-04 RX ORDER — LIDOCAINE 40 MG/G
CREAM TOPICAL
Status: DISCONTINUED | OUTPATIENT
Start: 2019-10-04 | End: 2019-10-04 | Stop reason: HOSPADM

## 2019-10-04 RX ORDER — ONDANSETRON 2 MG/ML
4 INJECTION INTRAMUSCULAR; INTRAVENOUS EVERY 6 HOURS PRN
Status: DISCONTINUED | OUTPATIENT
Start: 2019-10-04 | End: 2019-10-04 | Stop reason: HOSPADM

## 2019-10-04 RX ORDER — NALOXONE HYDROCHLORIDE 0.4 MG/ML
.1-.4 INJECTION, SOLUTION INTRAMUSCULAR; INTRAVENOUS; SUBCUTANEOUS
Status: DISCONTINUED | OUTPATIENT
Start: 2019-10-04 | End: 2019-10-04 | Stop reason: HOSPADM

## 2019-10-04 RX ORDER — PROCHLORPERAZINE MALEATE 5 MG
5 TABLET ORAL EVERY 6 HOURS PRN
Status: DISCONTINUED | OUTPATIENT
Start: 2019-10-04 | End: 2019-10-04 | Stop reason: HOSPADM

## 2019-10-04 RX ORDER — FLUMAZENIL 0.1 MG/ML
0.2 INJECTION, SOLUTION INTRAVENOUS
Status: DISCONTINUED | OUTPATIENT
Start: 2019-10-04 | End: 2019-10-04 | Stop reason: HOSPADM

## 2019-10-04 RX ORDER — ONDANSETRON 2 MG/ML
4 INJECTION INTRAMUSCULAR; INTRAVENOUS
Status: DISCONTINUED | OUTPATIENT
Start: 2019-10-04 | End: 2019-10-04 | Stop reason: HOSPADM

## 2019-10-04 NOTE — OP NOTE
See Provation Note In Chart    Lucía John MD  [facility]  Office Phone # [phone]

## 2019-10-04 NOTE — H&P
Pre-Endoscopy History and Physical     Raisa Archer MRN# [id number]   YOB: 1945 Age: 74 year old     Date of Procedure: 10/04/19  Primary care provider: Lyla Hawkins  Type of Endoscopy: Colonoscopy  Reason for Procedure: personal history of polyps   Type of Anesthesia Anticipated: Moderate Sedation    HPI:    Raisa is a 74 year old female who will be undergoing the above procedure.      A history and physical has been performed. The patient's medications and allergies have been reviewed. The risks and benefits of the procedure and the sedation options and risks were discussed with the patient.  All questions were answered and informed consent was obtained.      She denies a personal or family history of anesthesia complications or bleeding disorders.     Allergies   Allergen Reactions     Amoxicillin Diarrhea     Aspirin Nausea and Vomiting     sick     Chantix [Varenicline Tartrate] Other (See Comments)     Sees things     Glycerin Itching     LIQUID,   Reactions: itchy and redness       Sympathomimetics Other (See Comments)     Patient doesn't know why this is listed as an allergy     Varenicline GI Disturbance, Other (See Comments) and Nausea and Vomiting     Wool Fiber Itching     Redness         No current facility-administered medications on file prior to encounter.   Acetaminophen (TYLENOL PO), Take by mouth as needed for mild pain or fever  [] amoxicillin-clavulanate (AUGMENTIN) 875-125 MG tablet, Take 1 tablet by mouth 2 times daily for 10 days  Calcium Carbonate-Vitamin D (CALCIUM + D PO), Take 2 tablets by mouth daily.  carboxymethylcellul-glycerin (OPTIVE) 0.5-0.9 % SOLN ophthalmic solution, 1 drop  clotrimazole-betamethasone (LOTRISONE) cream, Apply topically 2 times daily  diphenhydrAMINE-APAP, sleep, (TYLENOL PM EXTRA STRENGTH)  MG/30ML LIQD, Take 1-2 tablets by mouth  fluticasone (FLONASE) 50 MCG/ACT nasal spray, Spray 1-2 sprays into both nostrils  daily  Homeopathic Products (SLEEP MEDICINE) TABS, Take by mouth daily as needed  Ipratropium-Albuterol (COMBIVENT RESPIMAT)  MCG/ACT inhaler, Inhale 1 puff into the lungs 4 times daily Not to exceed 6 doses per day.  Loratadine (CLARITIN PO), Take by mouth daily   Multiple Vitamins-Minerals (ZAHIDA MULTIVITAMIN FOR WOMEN) TABS, Take 1 tablet by mouth daily.  Nutritional Supplements (OSTEO ADVANCE PO),   nystatin (MYCOSTATIN) 582231 UNIT/GM POWD, Apply topically 3 times daily as needed  Omega-3 Fatty Acids (FISH OIL) 1200 MG CAPS, Take 1 capsule by mouth daily  vitamin E 400 UNIT capsule, Take 400 Units by mouth daily        Patient Active Problem List   Diagnosis     Tobacco use disorder     Malignant neoplasm of female breast (H)     Gall stones     Osteoporosis     Advanced directives, counseling/discussion     Chronic rhinitis     Hyperlipidemia LDL goal <160     Other hammer toe (acquired)     Meningioma (H)     History of colonic polyps     Centrilobular emphysema (H)     Mixed incontinence     Pelvic floor dysfunction     Myalgia of pelvic floor     Somatic dysfunction of pelvic region        Past Medical History:   Diagnosis Date     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     Breast cancer, right breast (H)      Centrilobular emphysema (H)      Centrilobular emphysema (H)      Colon polyps     repeat colonoscopy 2019     Left tennis elbow      Rash     currently at masectomy site     Unspecified essential hypertension     situational and resolved        Past Surgical History:   Procedure Laterality Date     ABDOMEN SURGERY       BIOPSY       BREAST SURGERY       CATARACT IOL, RT/LT       CHOLECYSTECTOMY       COLONOSCOPY       ORTHOPEDIC SURGERY       PHACOEMULSIFICATION CLEAR CORNEA WITH STANDARD INTRAOCULAR LENS IMPLANT  5/22/2014    Procedure: PHACOEMULSIFICATION CLEAR CORNEA WITH STANDARD INTRAOCULAR LENS IMPLANT;  Surgeon: Rahul Reid MD;  Location: [facility]     PHACOEMULSIFICATION CLEAR CORNEA WITH  STANDARD INTRAOCULAR LENS IMPLANT  2014    Procedure: PHACOEMULSIFICATION CLEAR CORNEA WITH STANDARD INTRAOCULAR LENS IMPLANT;  Surgeon: Rahul Reid MD;  Location: [facility]     SURGICAL HISTORY OF -       left foot reconstruction     SURGICAL HISTORY OF 1979    hysterectomy       Social History     Tobacco Use     Smoking status: Former Smoker     Packs/day: 1.50     Years: 30.00     Pack years: 45.00     Types: Cigarettes     Start date: 1967     Last attempt to quit: 5/10/2014     Years since quittin.4     Smokeless tobacco: Never Used     Tobacco comment: Quit several times over the years including when pregnant   Substance Use Topics     Alcohol use: No     Alcohol/week: 0.0 standard drinks       Family History   Problem Relation Age of Onset     Breast Cancer Sister         Going thru 2nd round of breast cancer     Diabetes Sister      Cancer Brother      Asthma Brother      Other Cancer Brother         Hodkins Lymphoma/Hodkins Lymphoma/Hodkins Lymphoma/Hodkins Lymphoma     Cerebrovascular Disease Father         Had several small ones     Cancer Other         Stomach cancer- cousin     Breast Cancer Other         Aunts     Diabetes Other         Uncles and cousin/Paternal Uncles/Paternal Uncles/Paternal Uncles/Paternal Uncles     Cancer Brother      Obesity Brother      Asthma Brother      Cancer Other         Family Hx of colon cancer     Breast Cancer Other         Paternal Aunt/Paternal Aunt/Paternal Aunt/Paternal Aunt     Colon Cancer Other         Maternal Great Uncles     Breast Cancer Sister      Breast Cancer Cousin         Paternal/Paternal     Colon Cancer Other         Maternal Uncle/Maternal Great Uncles/Maternal Great Uncles/Maternal Great Uncles     Obesity Sister         On diet/On diet/On diet     Obesity Brother      Asthma Niece      Diabetes Other      Breast Cancer Other      Colon Cancer Other        REVIEW OF SYSTEMS:     5 point ROS negative except as noted above in  "HPI, including Gen., Resp., CV, GI &  system review.      PHYSICAL EXAM:   /65   Pulse 63   Resp 17   SpO2 97%  Estimated body mass index is 29.26 kg/m  as calculated from the following:    Height as of 9/10/19: 1.575 m (5' 2\").    Weight as of 9/10/19: 72.6 kg (160 lb).   GENERAL APPEARANCE: healthy and alert  MENTAL STATUS: alert  AIRWAY EXAM: Mallampatti Class II (visualization of the soft palate, fauces, and uvula)  RESP: lungs clear to auscultation - no rales, rhonchi or wheezes  CV: regular rates and rhythm      IMPRESSION   ASA Class 3 - Severe systemic disease, but not incapacitating        PLAN:     Plan for colonoscopy. We discussed the risks, benefits and alternatives and the patient wished to proceed.    The above has been forwarded to the consulting provider.      Lucía oJhn MD  [facility]  Phone: [phone]  Fax: [phone]  October 4, 2019    "

## 2019-10-08 LAB — COPATH REPORT: NORMAL

## 2019-12-05 PROBLEM — M99.05 SOMATIC DYSFUNCTION OF PELVIC REGION: Status: RESOLVED | Noted: 2019-04-05 | Resolved: 2019-12-03

## 2019-12-05 NOTE — PROGRESS NOTES
Patient did not return for further treatment and no additional progress was noted.  Please refer to the progress note and goal flowsheet completed on 4/3/2019 for discharge information.

## 2020-02-06 ENCOUNTER — OFFICE VISIT (OUTPATIENT)
Dept: FAMILY MEDICINE | Facility: CLINIC | Age: 75
End: 2020-02-06
Payer: MEDICARE

## 2020-02-06 ENCOUNTER — MEDICAL CORRESPONDENCE (OUTPATIENT)
Dept: HEALTH INFORMATION MANAGEMENT | Facility: CLINIC | Age: 75
End: 2020-02-06

## 2020-02-06 VITALS
DIASTOLIC BLOOD PRESSURE: 64 MMHG | BODY MASS INDEX: 30.36 KG/M2 | SYSTOLIC BLOOD PRESSURE: 118 MMHG | RESPIRATION RATE: 16 BRPM | TEMPERATURE: 97 F | HEART RATE: 72 BPM | WEIGHT: 165 LBS | HEIGHT: 62 IN

## 2020-02-06 DIAGNOSIS — J43.2 CENTRILOBULAR EMPHYSEMA (H): ICD-10-CM

## 2020-02-06 PROCEDURE — 99213 OFFICE O/P EST LOW 20 MIN: CPT | Performed by: FAMILY MEDICINE

## 2020-02-06 ASSESSMENT — MIFFLIN-ST. JEOR: SCORE: 1211.69

## 2020-02-06 NOTE — PROGRESS NOTES
Subjective         HPI     Presents to establish care.  Former PCP Dr. Hawkins.    Needs refill of her inhaler today for COPD.  Her COPD has been stable using Combivent alone.  She does not use a rescue inhaler.  She feels well controlled on her current regimen.  She has not been hospitalized for COPD exacerbation and never intubated.  She has no acute symptoms today.    Current Chronic Medical Conditions  COPD   Seasonal allergies  Urinary incontinence    Social History  Lives alone with 2 cats.  One daughter.  One grandchild and two great grandkids.    Quit smoking 6 years ago.    Patient Active Problem List   Diagnosis     Tobacco use disorder     Malignant neoplasm of female breast (H)     Gall stones     Osteoporosis     Advanced directives, counseling/discussion     Chronic rhinitis     Hyperlipidemia LDL goal <160     Other hammer toe (acquired)     Meningioma (H)     History of colonic polyps     Centrilobular emphysema (H)     Mixed incontinence     Pelvic floor dysfunction     Myalgia of pelvic floor     Past Surgical History:   Procedure Laterality Date     ABDOMEN SURGERY       BIOPSY       BREAST SURGERY       CATARACT IOL, RT/LT       CHOLECYSTECTOMY       COLONOSCOPY       COLONOSCOPY N/A 10/4/2019    Procedure: COLONOSCOPY, WITH POLYPECTOMY AND BIOPSY;  Surgeon: Lucía John MD;  [facility]     ORTHOPEDIC SURGERY       PHACOEMULSIFICATION CLEAR CORNEA WITH STANDARD INTRAOCULAR LENS IMPLANT  5/22/2014    Procedure: PHACOEMULSIFICATION CLEAR CORNEA WITH STANDARD INTRAOCULAR LENS IMPLANT;  Surgeon: Rahul Reid MD;  Location:  [facility]     PHACOEMULSIFICATION CLEAR CORNEA WITH STANDARD INTRAOCULAR LENS IMPLANT  7/8/2014    Procedure: PHACOEMULSIFICATION CLEAR CORNEA WITH STANDARD INTRAOCULAR LENS IMPLANT;  Surgeon: Rahul Reid MD;  Location:  OR     SURGICAL HISTORY OF -   8/00    left foot reconstruction     SURGICAL HISTORY OF -   1979    hysterectomy       Social History      Tobacco Use     Smoking status: Former Smoker     Packs/day: 1.50     Years: 30.00     Pack years: 45.00     Types: Cigarettes     Start date: 1967     Last attempt to quit: 5/10/2014     Years since quittin.7     Smokeless tobacco: Never Used     Tobacco comment: Quit several times over the years including when pregnant   Substance Use Topics     Alcohol use: No     Alcohol/week: 0.0 standard drinks     Family History   Problem Relation Age of Onset     Breast Cancer Sister         Going thru 2nd round of breast cancer     Diabetes Sister         overweight     Cancer Brother      Asthma Brother      Other Cancer Brother         Hodkins Lymphoma/Hodkins Lymphoma/Hodkins Lymphoma/Hodkins Lymphoma     Diabetes Brother         overweight     Cerebrovascular Disease Father         Had several small ones     Cancer Other         Stomach cancer- cousin     Breast Cancer Other         Aunts     Diabetes Other         Uncles and cousin/Paternal Uncles/Paternal Uncles/Paternal Uncles/Paternal Uncles     Cancer Brother      Obesity Brother      Asthma Brother      Cancer Other         Family Hx of colon cancer     Breast Cancer Other         Paternal Aunt/Paternal Aunt/Paternal Aunt/Paternal Aunt     Colon Cancer Other         Maternal Great Uncles     Breast Cancer Sister      Breast Cancer Cousin         Paternal/Paternal     Colon Cancer Other         Maternal Uncle/Maternal Great Uncles/Maternal Great Uncles/Maternal Great Uncles     Obesity Sister         On diet/On diet/On diet     Obesity Brother      Asthma Niece         passed away of pneumonia     Diabetes Other      Breast Cancer Other         had double masectomys     Colon Cancer Other          Current Outpatient Medications   Medication Sig Dispense Refill     Acetaminophen (TYLENOL PO) Take by mouth as needed for mild pain or fever       Calcium Carbonate-Vitamin D (CALCIUM + D PO) Take 2 tablets by mouth daily.       carboxymethylcellul-glycerin  (OPTIVE) 0.5-0.9 % SOLN ophthalmic solution 1 drop       diphenhydrAMINE-APAP, sleep, (TYLENOL PM EXTRA STRENGTH)  MG/30ML LIQD Take 1-2 tablets by mouth       fluticasone (FLONASE) 50 MCG/ACT nasal spray Spray 1-2 sprays into both nostrils daily 16 g 11     Ipratropium-Albuterol (COMBIVENT RESPIMAT)  MCG/ACT inhaler Inhale 1 puff into the lungs 4 times daily Not to exceed 6 doses per day. 1 Inhaler 3     Loratadine (CLARITIN PO) Take by mouth daily        Multiple Vitamins-Minerals (ZAHIDA MULTIVITAMIN FOR WOMEN) TABS Take 1 tablet by mouth daily.       Omega-3 Fatty Acids (FISH OIL) 1200 MG CAPS Take 1 capsule by mouth daily       vitamin E 400 UNIT capsule Take 400 Units by mouth daily       Allergies   Allergen Reactions     Amoxicillin Diarrhea     Aspirin Nausea and Vomiting     sick     Chantix [Varenicline Tartrate] Other (See Comments)     Sees things     Glycerin Itching     LIQUID,   Reactions: itchy and redness       Sympathomimetics Other (See Comments)     Patient doesn't know why this is listed as an allergy     Varenicline GI Disturbance, Other (See Comments) and Nausea and Vomiting     Wool Fiber Itching     Redness      Recent Labs   Lab Test 11/13/18  1313 04/23/18  1410  04/10/17  1438 04/11/16  1503 11/03/14  1214  12/14/11  1102   A1C  --  5.2  --  5.1 5.6  --   --   --    LDL  --   --   --   --  96  --   --  142*   HDL  --   --   --   --  59  --   --  61   TRIG  --   --   --   --  130  --   --  113   ALT 23 28  --   --   --  36  --   --    CR 0.84 0.95   < >  --   --  0.96  --   --    GFRESTIMATED 67 58*   < >  --   --  58*  --   --    GFRESTBLACK 81 70   < >  --   --  70  --   --    POTASSIUM 3.8 4.1  --   --   --  4.2   < >  --     < > = values in this interval not displayed.      BP Readings from Last 3 Encounters:   02/06/20 118/64   10/04/19 95/64   09/10/19 123/77    Wt Readings from Last 3 Encounters:   02/06/20 74.8 kg (165 lb)   09/10/19 72.6 kg (160 lb)   07/05/19 72.1 kg  (159 lb)                    Reviewed and updated as needed this visit by Provider         Review of Systems   ROS COMP: Constitutional, HEENT, cardiovascular, pulmonary, GI, , musculoskeletal, neuro, skin, endocrine and psych systems are negative, except as otherwise noted.      Objective    There were no vitals taken for this visit.  Physical Exam   GENERAL: healthy, alert and no distress  EYES: Eyes grossly normal to inspection, PERRL and conjunctivae and sclerae normal  HENT: ear canals and TM's normal, nose and mouth without ulcers or lesions  NECK: no adenopathy, no asymmetry, masses, or scars and thyroid normal to palpation  RESP: lungs clear to auscultation - no rales, rhonchi or wheezes  CV: regular rate and rhythm, normal S1 S2, no S3 or S4, no murmur, click or rub, no peripheral edema and peripheral pulses strong  ABDOMEN: soft, nontender, no hepatosplenomegaly, no masses and bowel sounds normal  MS: no gross musculoskeletal defects noted, no edema  SKIN: no suspicious lesions or rashes  NEURO: Normal strength and tone, mentation intact and speech normal  PSYCH: mentation appears normal, affect normal/bright        Assessment & Plan     1. Centrilobular emphysema (H)    - Ipratropium-Albuterol (COMBIVENT RESPIMAT)  MCG/ACT inhaler; Inhale 1 puff into the lungs 4 times daily Not to exceed 6 doses per day.  Dispense: 1 Inhaler; Refill: 3     Stable on current regimen- refilled today.    Sushma Bledsoe MD  [facility]

## 2020-03-29 ENCOUNTER — VIRTUAL VISIT (OUTPATIENT)
Dept: FAMILY MEDICINE | Facility: OTHER | Age: 75
End: 2020-03-29

## 2020-03-29 NOTE — PROGRESS NOTES
"Date: 2020 09:52:07  Clinician: Millie Lewis  Clinician NPI: [id number]  Patient: Raisa Archer  Patient : 1945  Patient Address: [address] [place] [id number]  Patient Phone: [phone]  Visit Protocol: URI  Patient Summary:  Raisa is a 75 year old ( : 1945 ) female who initiated a Visit for COVID-19 (Coronavirus) evaluation and screening. When asked the question \"Please sign me up to receive news, health information and promotions. \", Raisa responded \"No\".    Raisa states her symptoms started gradually 7-9 days ago.   Her symptoms consist of tooth pain, myalgia, a cough, and malaise. She is experiencing mild difficulty breathing with activities but can speak normally in full sentences.   Symptom details     Cough: Raisa coughs every 5-10 minutes and her cough is not more bothersome at night. Phlegm comes into her throat when she coughs. She does not believe her cough is caused by post-nasal drip. The color of the phlegm is white.     Tooth pain: The tooth pain is caused by a cavity, recent dental work, or other mouth problems.      Raisa denies having ear pain, rhinitis, headache, fever, facial pain or pressure, chills, wheezing, sore throat, and nasal congestion. She also denies having recent facial or sinus surgery in the past 60 days and double sickening (worsening symptoms after initial improvement).   Precipitating events  She has not recently been exposed to someone with influenza. Raisa has not been in close contact with any high risk individuals.   Pertinent COVID-19 (Coronavirus) information  Raisa has not traveled internationally or to the areas where COVID-19 (Coronavirus) is widespread, including cruise ship travel in the last 14 days before the start of her symptoms.   Raisa has not had a close contact with a laboratory-confirmed COVID-19 patient within 14 days of symptom onset. She also has not had a close contact with a suspected " COVID-19 patient within 14 days of symptom onset.   Raisa is not a healthcare worker or a  and does not work in a healthcare facility. She does not live with a healthcare worker.   Pertinent medical history  Raisa has taken an antibiotic medication in the past month. Antibiotic details as reported by the patient (free text): Amoxicillin 500MG Tablets   Raisa does not get yeast infections when she takes antibiotics.   Raisa does not need a return to work/school note.   Weight: 165 lbs   Raisa does not smoke or use smokeless tobacco.   Additional information as reported by the patient (free text): I have COPD due in part to smoking but on the right lung due to intense Radiation Therapy for a rare form of breast cancer 14 years ago that I use Comvivent Respimat inhaler 1-4 times a day depending on how active I am that day.   Weight: 165 lbs  A synchronous phone visit was initiated by the provider for the following reason: clarify symptoms    MEDICATIONS: Tylenol Extra Strength oral, Vicks DayQuil-NyQuil oral, ALLERGIES: Claritin, Flonase  Clinician Response:  Dear Raisa,   Based on the information you have provided, you do have symptoms that are consistent with Coronavirus (COVID-19).  The coronavirus causes mild to severe respiratory illness with the most common symptoms including fever, cough and difficulty breathing. Unfortunately, many viruses cause similar symptoms and it can be difficult to distinguish between viruses, especially in mild cases, so we are presuming that anyone with cough or fever has coronavirus at this time.  Coronavirus/COVID-19 has reached the point of community spread in [place], meaning that we are finding the virus in people with no known exposure risk for manuel the virus. Given the increasing commonness of coronavirus in the community we are no longer testing patients who are not critically ill.  If you are a health care worker, you should refer to  your employee health office for instructions about testing and returning to work.  For everyone else who has cough or fever, you should assume you are infected with coronavirus. Since you will not be tested but have symptoms that may be consistent with coronavirus, the CDC recommends you stay in self-isolation until these three things have happened:    You have had no fever for at least 72 hours (that is three full days of no fever without the use of medicine that reduces fevers)    AND   Other symptoms have improved (for example, when your cough or shortness of breath have improved)   AND   At least 7 days have passed since your symptoms first appeared.   How to Isolate:   Isolate yourself at home.  Do Not allow any visitors  Do Not go to work or school  Do Not go to [employer],  centers, shopping, or other public places.  Do Not shake hands.  Avoid close contact with others (hugging, kissing).   Protect Others:   Cover Your Mouth and Nose with a mask, disposable tissue or wash cloth to avoid spreading germs to others.  Wash your hands and face frequently with soap and water.   We know it can be scary to hear that you might have COVID-19. Our team can help track your symptoms and make sure you are doing ok over the next two weeks using a program called [employer] to keep in touch. When you receive an email from [employer], please consider enrolling in our monitoring program. There is no cost to you for monitoring. Here is a URL where you can learn more: [email or web address]/023131  Managing Symptoms:   At this time, we primarily recommend Tylenol (Acetaminophen) for fever or pain. If you have liver or kidney problems, contact your primary care provider for instructions on use of tylenol. Adults can take 650 mg (two 325 mg pills) by mouth every 4-6 hours as needed OR 1,000 mg (two 500 mg pills) every 8 hours as needed. MAXIMUM DAILY DOSE: 3,000mg. For children, refer to dosing on bottle based on age or  "weight.   If you develop significant shortness of breath that prevents you from doing normal activities, please call 911 or proceed to the nearest emergency room and alert them immediately that you have been in self-isolation for possible coronavirus.  If you have a higher risk medical condition such as cancer, heart failure, end stage renal disease on dialysis or have a transplant, please reach out to your specialist's clinic to advise them of your [facility] visit should you not improve within the next two days.   For more information about COVID19 and options for caring for yourself at home, please visit the CDC website at https://www.cdc.gov/coronavirus/2019-ncov/about/steps-when-sick.htmlFor more options for care at [facility], please visit our website at [email or web address]    Diagnosis: Cough  Diagnosis ICD: R05  Triage Notes: I reviewed the patient's history, verified their identity, and explained the Visit process.    States that she just finished her amoxicillin this morning for an infected tooth that she needs a root canal on and tooth is feeling better.  States that she has white or clear nasal drainage. States that she feels like her cough is \"annoying\" but not too bad right now. Denies difficulty with breathing or shortness of breath. Has chest tightness when taking deep breaths. Is able to speak in complete sentences without problem.  Synchronous Triage: phone, status: completed, duration: 440 seconds  "

## 2020-04-01 ENCOUNTER — NURSE TRIAGE (OUTPATIENT)
Dept: NURSING | Facility: CLINIC | Age: 75
End: 2020-04-01

## 2020-04-01 NOTE — TELEPHONE ENCOUNTER
COVID 19 Nurse Triage Plan    Please be aware that novel coronavirus (COVID-19) may be circulating in the community. If you develop symptoms such as fever, cough, or SOB or if you have concerns about the presence of another infection including coronavirus (COVID-19), please contact your health care provider or visit [email or web address].     Patient HAS NO known exposure, fever, cough or SOB in addition to reason for call.    Additional General Information About COVID-19    COVID-19 - General Information:  Regardless of if you have been tested or not:  Patient who have symptoms (cough, fever, or shortness of breath), need to isolate for 7 days from when symptoms started AND 72 hours after fever resolves (without fever reducing medications) AND improvement of respiratory symptoms (whichever is longer).      Isolate yourself at home (in own room/own bathroom if possible)    Do Not allow any visitors    Do Not go to work or school    Do Not go to Cong[employer],  centers, shopping, or other public places.    Do Not shake hands.    Avoid close and intimate contact with others (hugging, kissing).    Follow CDC recommendations for household cleaning of frequently touched services.     After the initial 7 days, continue to isolate yourself from household members as much as possible. To continue decrease the risk of community spread and exposure, you and any members of your household should limit activities in public for 14 days after starting home isolation.     You can reference the following CDC link for helpful home isolation/care tips:  https://www.cdc.gov/coronavirus/2019-ncov/downloads/10Things.pdf    COVID-19 - Symptoms:     The COVID-19 can cause a respiratory illness, such as bronchitis or pneumonia.    The most common symptoms are: cough, fever, and shortness of breath.     Other symptoms are: body aches, chills, diarrhea, fatigue, headache, runny nose, and sore throat     COVID-19 - Exposure Risk  Factors:    Exposure to a person who has been diagnosed with COVID-19 .    Travel from an area with recent local transmission of COVID-19 .    The CDC (www.cdc.gov) has the most up-to-date list of where the COVID-19 outbreak is occurring.    COVID-19 - Spreading:     The virus likely spreads through respiratory droplets produced when a person coughs or sneezes. These respiratory droplets can travel approximately 6 feet and can remain on surfaces.  Common disinfectants will kill the virus.    The CDC currently does not recommend healthy people wearing masks.    COVID-19 - Protect Yourself:     Avoid close contact with people known to have this new COVID-19 infection.    Wash hands often with soap and water or alcohol-based hand .    Avoid touching the eyes, nose or mouth.       Thank you for limiting contact with others, wearing a simple mask to cover your cough, practice good hand hygiene habits and accessing our virtual services where possible to limit the spread of this virus.    For more information about COVID19 and options for caring for yourself at home, please visit the CDC website at https://www.cdc.gov/coronavirus/2019-ncov/about/steps-when-sick.html  For more options for care at [facility], please visit our website at [email or web address]                      Additional Information    Negative: Bluish (or gray) lips or face    Negative: Severe difficulty breathing (e.g., struggling for each breath, speaks in single words)    Negative: Rapid onset of cough and has hives    Negative: Coughing started suddenly after medicine, an allergic food or bee sting    Negative: Difficulty breathing after exposure to flames, smoke, or fumes    Negative: Sounds like a life-threatening emergency to the triager    Negative: Previous asthma attacks and this feels like asthma attack    Negative: Chest pain present when not coughing    Negative: Difficulty breathing    Negative:  "Passed out (i.e., fainted, collapsed and was not responding)    Negative: Patient sounds very sick or weak to the triager    Negative: Coughed up > 1 tablespoon (15 ml) blood (Exception: blood-tinged sputum)    Negative: Fever > 103 F (39.4 C)    Negative: Fever > 101 F (38.3 C) and over 60 years of age    Negative: Fever > 100.0 F (37.8 C) and has diabetes mellitus or a weak immune system (e.g., HIV positive, cancer chemotherapy, organ transplant, splenectomy, chronic steroids)    Negative: Fever > 100.0 F (37.8 C) and bedridden (e.g., nursing home patient, stroke, chronic illness, recovering from surgery)    Negative: Increasing ankle swelling    Negative: Wheezing is present    Negative: SEVERE coughing spells (e.g., whooping sound after coughing, vomiting after coughing)    Negative: Coughing up lavelle-colored (reddish-brown) or blood-tinged sputum    Negative: Fever present > 3 days (72 hours)    Negative: Fever returns after gone for over 24 hours and symptoms worse or not improved    Negative: Using nasal washes and pain medicine > 24 hours and sinus pain persists    Negative: Known COPD or other severe lung disease (i.e., bronchiectasis, cystic fibrosis, lung surgery) and worsening symptoms (i.e., increased sputum purulence or amount, increased breathing difficulty)    Negative: Continuous (nonstop) coughing interferes with work or school and no improvement using cough treatment per Care Advice    Negative: Patient wants to be seen    Negative: Cough has been present for > 3 weeks    Negative: Allergy symptoms are also present (e.g., itchy eyes, clear nasal discharge, postnasal drip)    Negative: Nasal discharge present > 10 days    Negative: Exposure to TB (Tuberculosis)    Negative: Taking an ACE Inhibitor medication (e.g., benazepril/LOTENSIN, captopril/CAPOTEN, enalapril/VASOTEC, lisinopril/ZESTRIL)    Cough with no complications    Answer Assessment - Initial Assessment Questions  1. ONSET: \"When did the " "cough begin?\"       Past few weeks  2. SEVERITY: \"How bad is the cough today?\"       Did on care a few days ago, cough got better, and last night seemed to increase again  3. RESPIRATORY DISTRESS: \"Describe your breathing.\"       Tightness off and on  4. FEVER: \"Do you have a fever?\" If so, ask: \"What is your temperature, how was it measured, and when did it start?\"      No fevers  5. HEMOPTYSIS: \"Are you coughing up any blood?\" If so ask: \"How much?\" (flecks, streaks, tablespoons, etc.)      no  6. TREATMENT: \"What have you done so far to treat the cough?\" (e.g., meds, fluids, humidifier)      Usual COPD meds/inhalers  7. CARDIAC HISTORY: \"Do you have any history of heart disease?\" (e.g., heart attack, congestive heart failure)       no  8. LUNG HISTORY: \"Do you have any history of lung disease?\"  (e.g., pulmonary embolus, asthma, emphysema)      COPD  9. PE RISK FACTORS: \"Do you have a history of blood clots?\" (or: recent major surgery, recent prolonged travel, bedridden )      no  10. OTHER SYMPTOMS: \"Do you have any other symptoms? (e.g., runny nose, wheezing, chest pain)        Runny nose sometimes  11. PREGNANCY: \"Is there any chance you are pregnant?\" \"When was your last menstrual period?\"        no  12. TRAVEL: \"Have you traveled out of the country in the last month?\" (e.g., travel history, exposures)        no    Protocols used: COUGH-A-OH      "

## 2020-06-15 ENCOUNTER — NURSE TRIAGE (OUTPATIENT)
Dept: NURSING | Facility: CLINIC | Age: 75
End: 2020-06-15

## 2020-06-15 NOTE — TELEPHONE ENCOUNTER
Pat is calling to schedule antibody test.  Transferred caller through to scheduling.      Additional Information    Negative: Nursing judgment, per information in Reference    Negative: Information only call about a Well Adult (no illness or injury)    Negative: Nursing judgment or information in reference    Negative: Nursing judgment or information in reference    Negative: Nursing judgment or information in reference    Negative: Nursing judgment or information in reference    Negative: Nursing judgment or information in reference    Negative: Nursing judgment or information in reference    Negative: Nursing judgment or information in reference    Negative: Nursing judgment or information in reference    Negative: Nursing judgment or information in reference    Negative: Nursing judgment or information in reference    Negative: Nursing judgment or information in reference    Negative: Nursing judgment or information in reference    Negative: Nursing judgment or information in reference    Nursing judgment or information in reference    Protocols used: NO GUIDELINE [id number]

## 2020-06-16 PROCEDURE — 36415 COLL VENOUS BLD VENIPUNCTURE: CPT | Performed by: FAMILY MEDICINE

## 2020-06-16 PROCEDURE — 86769 SARS-COV-2 COVID-19 ANTIBODY: CPT | Performed by: FAMILY MEDICINE

## 2020-06-17 LAB
COVID-19 ANTIBODY IGG: NEGATIVE
LAB TEST METHOD: NORMAL

## 2020-08-06 ENCOUNTER — MYC MEDICAL ADVICE (OUTPATIENT)
Dept: FAMILY MEDICINE | Facility: CLINIC | Age: 75
End: 2020-08-06

## 2020-08-06 DIAGNOSIS — Z12.2 ENCOUNTER FOR SCREENING FOR LUNG CANCER: ICD-10-CM

## 2020-08-06 DIAGNOSIS — F17.200 TOBACCO USE DISORDER: Primary | ICD-10-CM

## 2020-08-06 DIAGNOSIS — F17.211 NICOTINE DEPENDENCE, CIGARETTES, IN REMISSION: ICD-10-CM

## 2020-08-06 ASSESSMENT — ENCOUNTER SYMPTOMS
HEMATURIA: 0
CHILLS: 0
PALPITATIONS: 0
ARTHRALGIAS: 0
SHORTNESS OF BREATH: 0
NERVOUS/ANXIOUS: 0
ABDOMINAL PAIN: 0
DIARRHEA: 0
CONSTIPATION: 0
COUGH: 0
HEARTBURN: 0
DIZZINESS: 0
BREAST MASS: 0
FEVER: 0
EYE PAIN: 0
DYSURIA: 0
FREQUENCY: 0
HEADACHES: 0
HEMATOCHEZIA: 0
MYALGIAS: 1
JOINT SWELLING: 0
WEAKNESS: 0
NAUSEA: 0
PARESTHESIAS: 0
SORE THROAT: 0

## 2020-08-06 ASSESSMENT — ACTIVITIES OF DAILY LIVING (ADL): CURRENT_FUNCTION: NO ASSISTANCE NEEDED

## 2020-08-13 ENCOUNTER — OFFICE VISIT (OUTPATIENT)
Dept: FAMILY MEDICINE | Facility: CLINIC | Age: 75
End: 2020-08-13
Payer: MEDICARE

## 2020-08-13 VITALS
TEMPERATURE: 98 F | RESPIRATION RATE: 16 BRPM | DIASTOLIC BLOOD PRESSURE: 72 MMHG | BODY MASS INDEX: 28.7 KG/M2 | OXYGEN SATURATION: 93 % | HEART RATE: 81 BPM | HEIGHT: 63 IN | SYSTOLIC BLOOD PRESSURE: 98 MMHG | WEIGHT: 162 LBS

## 2020-08-13 DIAGNOSIS — D32.9 MENINGIOMA (H): ICD-10-CM

## 2020-08-13 DIAGNOSIS — Z85.3 PERSONAL HISTORY OF MALIGNANT NEOPLASM OF BREAST: ICD-10-CM

## 2020-08-13 DIAGNOSIS — E78.5 HYPERLIPIDEMIA LDL GOAL <160: ICD-10-CM

## 2020-08-13 DIAGNOSIS — Z00.00 ENCOUNTER FOR MEDICARE ANNUAL WELLNESS EXAM: Primary | ICD-10-CM

## 2020-08-13 DIAGNOSIS — Z13.1 SCREENING FOR DIABETES MELLITUS: ICD-10-CM

## 2020-08-13 DIAGNOSIS — J43.2 CENTRILOBULAR EMPHYSEMA (H): ICD-10-CM

## 2020-08-13 PROCEDURE — 80061 LIPID PANEL: CPT | Performed by: FAMILY MEDICINE

## 2020-08-13 PROCEDURE — 36415 COLL VENOUS BLD VENIPUNCTURE: CPT | Performed by: FAMILY MEDICINE

## 2020-08-13 PROCEDURE — G0439 PPPS, SUBSEQ VISIT: HCPCS | Performed by: FAMILY MEDICINE

## 2020-08-13 PROCEDURE — 80053 COMPREHEN METABOLIC PANEL: CPT | Performed by: FAMILY MEDICINE

## 2020-08-13 ASSESSMENT — ENCOUNTER SYMPTOMS
FEVER: 0
NERVOUS/ANXIOUS: 0
HEARTBURN: 0
JOINT SWELLING: 0
CHILLS: 0
DYSURIA: 0
BREAST MASS: 0
PARESTHESIAS: 0
FREQUENCY: 0
DIZZINESS: 0
HEMATURIA: 0
HEMATOCHEZIA: 0
ABDOMINAL PAIN: 0
PALPITATIONS: 0
HEADACHES: 0
MYALGIAS: 1
EYE PAIN: 0
WEAKNESS: 0
ARTHRALGIAS: 0
SHORTNESS OF BREATH: 0
SORE THROAT: 0
DIARRHEA: 0
CONSTIPATION: 0
COUGH: 0
NAUSEA: 0

## 2020-08-13 ASSESSMENT — ACTIVITIES OF DAILY LIVING (ADL): CURRENT_FUNCTION: NO ASSISTANCE NEEDED

## 2020-08-13 ASSESSMENT — MIFFLIN-ST. JEOR: SCORE: 1208.96

## 2020-08-13 NOTE — PROGRESS NOTES
"SUBJECTIVE:   Raisa Archer is a 75 year old female who presents for Preventive Visit.  Are you in the first 12 months of your Medicare coverage?  No    Healthy Habits:     In general, how would you rate your overall health?  Good    Frequency of exercise:  1 day/week    Duration of exercise:  15-30 minutes    Do you usually eat at least 4 servings of fruit and vegetables a day, include whole grains    & fiber and avoid regularly eating high fat or \"junk\" foods?  No    Taking medications regularly:  Yes    Medication side effects:  Not applicable    Ability to successfully perform activities of daily living:  No assistance needed    Home Safety:  No safety concerns identified    Hearing Impairment:  No hearing concerns    In the past 6 months, have you been bothered by leaking of urine? Yes    In general, how would you rate your overall mental or emotional health?  Good      PHQ-2 Total Score: 2    Additional concerns today:  No     Former PCP Dr. Hawkins.     Her COPD has been stable using Combivent alone.  She does not use a rescue inhaler.  She feels well controlled on her current regimen.  She has not been hospitalized for COPD exacerbation and never intubated.  She has no acute symptoms today.     Current Chronic Medical Conditions  COPD   Seasonal allergies  Urinary incontinence  Hx breast CA 2007  s/p RIGHT mastectomy  Hx of meningioma- imaging with neurology q few years  Hx of colon polyps     Social History  Lives alone with 2 cats.  One daughter.  One grandchild and two great grandkids.     HCM  Quit smoking 6 years ago. Has mammogram and lung CT next week.  Next colonoscopy due .        Do you feel safe in your environment? Yes    Have you ever done Advance Care Planning? (For example, a Health Directive, POLST, or a discussion with a medical provider or your loved ones about your wishes): Yes, advance care planning is on file.      Fall risk  Fallen 2 or more times in the past year?: No  Any " fall with injury in the past year?: No    Cognitive Screening   1) Repeat 3 items (Leader, Season, Table)    2) Clock draw: NORMAL  3) 3 item recall: Recalls 3 objects  Results: 3 items recalled: COGNITIVE IMPAIRMENT LESS LIKELY    Mini-CogTM Copyright AN [employer]. Licensed by the author for use in [facility]; reprinted with permission (renae@[facility]). All rights reserved.      Do you have sleep apnea, excessive snoring or daytime drowsiness?: no    Reviewed and updated as needed this visit by clinical staff  Tobacco  Allergies  Meds  Med Hx  Surg Hx  Fam Hx  Soc Hx        Reviewed and updated as needed this visit by Provider        Social History     Tobacco Use     Smoking status: Former Smoker     Packs/day: 1.50     Years: 30.00     Pack years: 45.00     Types: Cigarettes     Start date: 1967     Last attempt to quit: 5/10/2014     Years since quittin.2     Smokeless tobacco: Never Used     Tobacco comment: Quit several times over the years including when pregnant   Substance Use Topics     Alcohol use: No     Alcohol/week: 0.0 standard drinks     If you drink alcohol do you typically have >3 drinks per day or >7 drinks per week? No    Alcohol Use 2020   Prescreen: >3 drinks/day or >7 drinks/week? Not Applicable   Prescreen: >3 drinks/day or >7 drinks/week? -               Current providers sharing in care for this patient include:   Patient Care Team:  No Ref-Primary, Physician as PCP - Yaritza Lepe MD as MD (Urology)  Chantelle Mcdaniel, RN as Registered Nurse (Urology)  Sushma Bledsoe MD as Assigned PCP    The following health maintenance items are reviewed in Epic and correct as of today:  Health Maintenance   Topic Date Due     COPD ACTION PLAN  1945     MEDICARE ANNUAL WELLNESS VISIT  2020     FALL RISK ASSESSMENT  2020     MAMMO SCREENING  2020     LUNG CANCER SCREENING ANNUAL  2020     INFLUENZA VACCINE (1)  09/01/2020     LIPID  04/11/2021     COLORECTAL CANCER SCREENING  10/04/2022     ADVANCE CARE PLANNING  10/04/2023     DTAP/TDAP/TD IMMUNIZATION (3 - Td) 04/10/2027     DEXA  Completed     SPIROMETRY  Completed     HEPATITIS C SCREENING  Completed     PHQ-2  Completed     PNEUMOCOCCAL IMMUNIZATION 65+ LOW/MEDIUM RISK  Completed     ZOSTER IMMUNIZATION  Completed     IPV IMMUNIZATION  Aged Out     MENINGITIS IMMUNIZATION  Aged Out     HEPATITIS B IMMUNIZATION  Aged Out     BP Readings from Last 3 Encounters:   08/13/20 98/72   02/06/20 118/64   10/04/19 95/64    Wt Readings from Last 3 Encounters:   08/13/20 73.5 kg (162 lb)   02/06/20 74.8 kg (165 lb)   09/10/19 72.6 kg (160 lb)                  Patient Active Problem List   Diagnosis     Tobacco use disorder     Malignant neoplasm of female breast (H)     Gall stones     Osteoporosis     Advanced directives, counseling/discussion     Chronic rhinitis     Hyperlipidemia LDL goal <160     Other hammer toe (acquired)     Meningioma (H)     History of colonic polyps     Centrilobular emphysema (H)     Mixed incontinence     Pelvic floor dysfunction     Myalgia of pelvic floor     Past Surgical History:   Procedure Laterality Date     ABDOMEN SURGERY       BIOPSY       BREAST SURGERY       CATARACT IOL, RT/LT       CHOLECYSTECTOMY       COLONOSCOPY       COLONOSCOPY N/A 10/4/2019    Procedure: COLONOSCOPY, WITH POLYPECTOMY AND BIOPSY;  Surgeon: Lucía John MD;  Location:  GI     ORTHOPEDIC SURGERY       PHACOEMULSIFICATION CLEAR CORNEA WITH STANDARD INTRAOCULAR LENS IMPLANT  5/22/2014    Procedure: PHACOEMULSIFICATION CLEAR CORNEA WITH STANDARD INTRAOCULAR LENS IMPLANT;  Surgeon: Rahul Reid MD;  Location:  EC     PHACOEMULSIFICATION CLEAR CORNEA WITH STANDARD INTRAOCULAR LENS IMPLANT  7/8/2014    Procedure: PHACOEMULSIFICATION CLEAR CORNEA WITH STANDARD INTRAOCULAR LENS IMPLANT;  Surgeon: Rahul Reid MD;  Location:  OR     SURGICAL HISTORY OF  -       left foot reconstruction     SURGICAL HISTORY OF -       hysterectomy       Social History     Tobacco Use     Smoking status: Former Smoker     Packs/day: 1.50     Years: 30.00     Pack years: 45.00     Types: Cigarettes     Start date: 1967     Last attempt to quit: 5/10/2014     Years since quittin.2     Smokeless tobacco: Never Used     Tobacco comment: Quit several times over the years including when pregnant   Substance Use Topics     Alcohol use: No     Alcohol/week: 0.0 standard drinks     Family History   Problem Relation Age of Onset     Breast Cancer Sister         Going thru 2nd round of breast cancer     Diabetes Sister         overweight     Cancer Brother      Asthma Brother      Other Cancer Brother         Hodkins Lymphoma/Hodkins Lymphoma/Hodkins Lymphoma/Hodkins Lymphoma     Diabetes Brother         overweight     Cerebrovascular Disease Father         Had several small ones     Cancer Other         Stomach cancer- cousin     Breast Cancer Other         Aunts     Diabetes Other         Uncles and cousin/Paternal Uncles/Paternal Uncles/Paternal Uncles/Paternal Uncles     Cancer Brother      Obesity Brother      Asthma Brother      Cancer Other         Family Hx of colon cancer     Breast Cancer Other         Paternal Aunt/Paternal Aunt/Paternal Aunt/Paternal Aunt     Colon Cancer Other         Maternal Great Uncles     Breast Cancer Sister      Breast Cancer Cousin         Paternal/Paternal     Colon Cancer Other         Maternal Uncle/Maternal Great Uncles/Maternal Great Uncles/Maternal Great Uncles     Obesity Sister         On diet/On diet/On diet     Obesity Brother      Asthma Niece         passed away of pneumonia     Diabetes Other      Breast Cancer Other         had double masectomys     Colon Cancer Other          Current Outpatient Medications   Medication Sig Dispense Refill     Acetaminophen (TYLENOL PO) Take by mouth as needed for mild pain or fever        Calcium Carbonate-Vitamin D (CALCIUM + D PO) Take 2 tablets by mouth daily.       carboxymethylcellul-glycerin (OPTIVE) 0.5-0.9 % SOLN ophthalmic solution 1 drop       diphenhydrAMINE-APAP, sleep, (TYLENOL PM EXTRA STRENGTH)  MG/30ML LIQD Take 1-2 tablets by mouth       fluticasone (FLONASE) 50 MCG/ACT nasal spray Spray 1-2 sprays into both nostrils daily 16 g 11     Ipratropium-Albuterol (COMBIVENT RESPIMAT)  MCG/ACT inhaler Inhale 1 puff into the lungs 4 times daily Not to exceed 6 doses per day. 1 Inhaler 3     Loratadine (CLARITIN PO) Take by mouth daily        Multiple Vitamins-Minerals (ZAHIDA MULTIVITAMIN FOR WOMEN) TABS Take 1 tablet by mouth daily.       Omega-3 Fatty Acids (FISH OIL) 1200 MG CAPS Take 1 capsule by mouth daily       Allergies   Allergen Reactions     Amoxicillin Diarrhea     Aspirin Nausea and Vomiting     sick     Chantix [Varenicline Tartrate] Other (See Comments)     Sees things     Glycerin Itching     LIQUID,   Reactions: itchy and redness       Sympathomimetics Other (See Comments)     Patient doesn't know why this is listed as an allergy     Varenicline GI Disturbance, Other (See Comments) and Nausea and Vomiting     Wool Fiber Itching     Redness      Recent Labs   Lab Test 11/13/18  1313 04/23/18  1410  04/10/17  1438 04/11/16  1503 11/03/14  1214   A1C  --  5.2  --  5.1 5.6  --    LDL  --   --   --   --  96  --    HDL  --   --   --   --  59  --    TRIG  --   --   --   --  130  --    ALT 23 28  --   --   --  36   CR 0.84 0.95   < >  --   --  0.96   GFRESTIMATED 67 58*   < >  --   --  58*   GFRESTBLACK 81 70   < >  --   --  70   POTASSIUM 3.8 4.1  --   --   --  4.2    < > = values in this interval not displayed.          Review of Systems   Constitutional: Negative for chills and fever.   HENT: Negative for congestion, ear pain, hearing loss and sore throat.    Eyes: Negative for pain and visual disturbance.   Respiratory: Negative for cough and shortness of breath.   "  Cardiovascular: Negative for chest pain, palpitations and peripheral edema.   Gastrointestinal: Negative for abdominal pain, constipation, diarrhea, heartburn, hematochezia and nausea.   Breasts:  Negative for tenderness, breast mass and discharge.   Genitourinary: Negative for dysuria, frequency, genital sores, hematuria, pelvic pain, urgency, vaginal bleeding and vaginal discharge.   Musculoskeletal: Positive for myalgias. Negative for arthralgias and joint swelling.   Skin: Negative for rash.   Neurological: Negative for dizziness, weakness, headaches and paresthesias.   Psychiatric/Behavioral: Negative for mood changes. The patient is not nervous/anxious.          OBJECTIVE:   There were no vitals taken for this visit. Estimated body mass index is 30.18 kg/m  as calculated from the following:    Height as of 2/6/20: 1.575 m (5' 2\").    Weight as of 2/6/20: 74.8 kg (165 lb).  Physical Exam  GENERAL: healthy, alert and no distress  EYES: Eyes grossly normal to inspection, PERRL and conjunctivae and sclerae normal  HENT: ear canals and TM's normal, nose and mouth without ulcers or lesions  NECK: no adenopathy, no asymmetry, masses, or scars and thyroid normal to palpation  RESP: lungs clear to auscultation - no rales, rhonchi or wheezes  CV: regular rate and rhythm, normal S1 S2, no S3 or S4, no murmur, click or rub, no peripheral edema and peripheral pulses strong  ABDOMEN: soft, nontender, no hepatosplenomegaly, no masses and bowel sounds normal  MS: no gross musculoskeletal defects noted, no edema  SKIN: no suspicious lesions or rashes  NEURO: Normal strength and tone, mentation intact and speech normal  PSYCH: mentation appears normal, affect normal/bright    ASSESSMENT / PLAN:   1. Encounter for Medicare annual wellness exam    HCM updated today.    2. Hyperlipidemia LDL goal <160    - Lipid panel reflex to direct LDL Fasting  - Comprehensive metabolic panel    Labs today.    3. Screening for diabetes " "mellitus    - Lipid panel reflex to direct LDL Fasting  - Comprehensive metabolic panel    Labs today.    4. Meningioma (H)    Followed by [facility].    5. Personal history of malignant neoplasm of breast    Stable with mammograms q 1 year of LEFT breast.    6. Centrilobular emphysema (H)    Stable on current regimen.    COUNSELING:  Reviewed preventive health counseling, as reflected in patient instructions       Regular exercise       Healthy diet/nutrition    Estimated body mass index is 30.18 kg/m  as calculated from the following:    Height as of 2/6/20: 1.575 m (5' 2\").    Weight as of 2/6/20: 74.8 kg (165 lb).    Weight management plan: Discussed healthy diet and exercise guidelines     reports that she quit smoking about 6 years ago. Her smoking use included cigarettes. She started smoking about 53 years ago. She has a 45.00 pack-year smoking history. She has never used smokeless tobacco.      Appropriate preventive services were discussed with this patient, including applicable screening as appropriate for cardiovascular disease, diabetes, osteopenia/osteoporosis, and glaucoma.  As appropriate for age/gender, discussed screening for colorectal cancer, prostate cancer, breast cancer, and cervical cancer. Checklist reviewing preventive services available has been given to the patient.    Reviewed patients plan of care and provided an AVS. The Basic Care Plan (routine screening as documented in Health Maintenance) for Raisa meets the Care Plan requirement. This Care Plan has been established and reviewed with the Patient.    Counseling Resources:  ATP IV Guidelines  Pooled Cohorts Equation Calculator  Breast Cancer Risk Calculator  FRAX Risk Assessment  ICSI Preventive Guidelines  Dietary Guidelines for Americans, 2010  USDA's MyPlate  ASA Prophylaxis  Lung CA Screening    Sushma Bledsoe MD  [facility]    Identified Health Risks:  "

## 2020-08-13 NOTE — PATIENT INSTRUCTIONS
Patient Education   Personalized Prevention Plan  You are due for the preventive services outlined below.  Your care team is available to assist you in scheduling these services.  If you have already completed any of these items, please share that information with your care team to update in your medical record.  Health Maintenance Due   Topic Date Due     COPD Action Plan  1945     Annual Wellness Visit  04/08/2020     FALL RISK ASSESSMENT  05/02/2020     Mammogram  05/20/2020     Lung Cancer Screening (CT Scan)  05/20/2020

## 2020-08-14 LAB
ALBUMIN SERPL-MCNC: 3.8 G/DL (ref 3.4–5)
ALP SERPL-CCNC: 103 U/L (ref 40–150)
ALT SERPL W P-5'-P-CCNC: 30 U/L (ref 0–50)
ANION GAP SERPL CALCULATED.3IONS-SCNC: 4 MMOL/L (ref 3–14)
AST SERPL W P-5'-P-CCNC: 27 U/L (ref 0–45)
BILIRUB SERPL-MCNC: 0.6 MG/DL (ref 0.2–1.3)
BUN SERPL-MCNC: 11 MG/DL (ref 7–30)
CALCIUM SERPL-MCNC: 9.3 MG/DL (ref 8.5–10.1)
CHLORIDE SERPL-SCNC: 106 MMOL/L (ref 94–109)
CHOLEST SERPL-MCNC: 211 MG/DL
CO2 SERPL-SCNC: 30 MMOL/L (ref 20–32)
CREAT SERPL-MCNC: 1 MG/DL (ref 0.52–1.04)
GFR SERPL CREATININE-BSD FRML MDRD: 56 ML/MIN/{1.73_M2}
GLUCOSE SERPL-MCNC: 94 MG/DL (ref 70–99)
HDLC SERPL-MCNC: 66 MG/DL
LDLC SERPL CALC-MCNC: 117 MG/DL
NONHDLC SERPL-MCNC: 145 MG/DL
POTASSIUM SERPL-SCNC: 4.1 MMOL/L (ref 3.4–5.3)
PROT SERPL-MCNC: 7.2 G/DL (ref 6.8–8.8)
SODIUM SERPL-SCNC: 140 MMOL/L (ref 133–144)
TRIGL SERPL-MCNC: 138 MG/DL

## 2020-08-19 ENCOUNTER — ANCILLARY PROCEDURE (OUTPATIENT)
Dept: MAMMOGRAPHY | Facility: CLINIC | Age: 75
End: 2020-08-19
Attending: FAMILY MEDICINE
Payer: MEDICARE

## 2020-08-19 ENCOUNTER — ANCILLARY PROCEDURE (OUTPATIENT)
Dept: CT IMAGING | Facility: CLINIC | Age: 75
End: 2020-08-19
Attending: FAMILY MEDICINE
Payer: MEDICARE

## 2020-08-19 DIAGNOSIS — Z12.31 VISIT FOR SCREENING MAMMOGRAM: ICD-10-CM

## 2020-08-19 DIAGNOSIS — F17.211 NICOTINE DEPENDENCE, CIGARETTES, IN REMISSION: ICD-10-CM

## 2020-08-19 DIAGNOSIS — F17.200 TOBACCO USE DISORDER: ICD-10-CM

## 2020-08-19 DIAGNOSIS — Z12.2 ENCOUNTER FOR SCREENING FOR LUNG CANCER: ICD-10-CM

## 2020-08-22 ENCOUNTER — MYC MEDICAL ADVICE (OUTPATIENT)
Dept: FAMILY MEDICINE | Facility: CLINIC | Age: 75
End: 2020-08-22

## 2020-08-24 NOTE — TELEPHONE ENCOUNTER
Pt states does not want to go to urgent care due to weather and should not wait until 9.15.20 first available in clinic, would like another opinion.

## 2020-08-25 ENCOUNTER — OFFICE VISIT (OUTPATIENT)
Dept: URGENT CARE | Facility: URGENT CARE | Age: 75
End: 2020-08-25
Payer: MEDICARE

## 2020-08-25 VITALS
OXYGEN SATURATION: 97 % | DIASTOLIC BLOOD PRESSURE: 68 MMHG | WEIGHT: 163 LBS | BODY MASS INDEX: 28.88 KG/M2 | HEIGHT: 63 IN | TEMPERATURE: 97.7 F | HEART RATE: 80 BPM | SYSTOLIC BLOOD PRESSURE: 116 MMHG

## 2020-08-25 DIAGNOSIS — S90.222A CONTUSION OF TOENAIL OF LEFT FOOT, INITIAL ENCOUNTER: Primary | ICD-10-CM

## 2020-08-25 DIAGNOSIS — B35.1 TOENAIL FUNGUS: ICD-10-CM

## 2020-08-25 PROCEDURE — 99213 OFFICE O/P EST LOW 20 MIN: CPT | Performed by: FAMILY MEDICINE

## 2020-08-25 ASSESSMENT — MIFFLIN-ST. JEOR: SCORE: 1213.49

## 2020-08-25 NOTE — PROGRESS NOTES
"  CHIEF COMPLAINT    Problems with toes.      HISTORY    Patient has noticed a change in the last 2 weeks.  There is some brownish pigment beneath the left great toenail.  She also thought the right second toe looked abnormal.    She is not having foot pain or ulcerations.  She does describe some spasms in her toes occasionally.    She has a history of emphysema.  Used to be a smoker, quit 6 years ago.  Has not had any vascular complications.      Patient Active Problem List   Diagnosis     Tobacco use disorder     Malignant neoplasm of female breast (H)     Gall stones     Osteoporosis     Advanced directives, counseling/discussion     Chronic rhinitis     Hyperlipidemia LDL goal <160     Other hammer toe (acquired)     Meningioma (H)     History of colonic polyps     Centrilobular emphysema (H)     Mixed incontinence     Pelvic floor dysfunction     Myalgia of pelvic floor       REVIEW OF SYSTEMS    Unremarkable except as above.      Past Medical History:   Diagnosis Date     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     Breast cancer, right breast (H)      Centrilobular emphysema (H)      Centrilobular emphysema (H)      Colon polyps     repeat colonoscopy 2019     Left tennis elbow      Rash     currently at masectomy site     Unspecified essential hypertension     situational and resolved         EXAM  /68   Pulse 80   Temp 97.7  F (36.5  C) (Oral)   Ht 1.6 m (5' 3\")   Wt 73.9 kg (163 lb)   SpO2 97%   BMI 28.87 kg/m      There is some brownish pigment, I suspect from an old injury beat needs the lateral aspect of the left great toenail.  Some more normal nail appears to be growing in.    There is some mild toenail fungus of right second toe.    She has good DP pulsations in both feet.  PT pulses are unable to be felt.  Capillary filling is satisfactory.  There is no skin breakdown.    I do not see any sign of COVID toes.      (S90.222A) Contusion of toenail of left foot, initial encounter  (primary encounter " diagnosis)  Comment:   Plan: Probably okay to observe on this.    (B35.1) Toenail fungus  Comment:   Plan:   Patient advised.  Treatment is a bit involved and I suggested if she considers this, to do it through her own provider.

## 2020-09-09 ENCOUNTER — ANCILLARY PROCEDURE (OUTPATIENT)
Dept: MRI IMAGING | Facility: CLINIC | Age: 75
End: 2020-09-09
Attending: STUDENT IN AN ORGANIZED HEALTH CARE EDUCATION/TRAINING PROGRAM
Payer: MEDICARE

## 2020-09-09 DIAGNOSIS — D32.9 MENINGIOMA (H): ICD-10-CM

## 2020-09-09 RX ORDER — GADOBUTROL 604.72 MG/ML
7.5 INJECTION INTRAVENOUS ONCE
Status: COMPLETED | OUTPATIENT
Start: 2020-09-09 | End: 2020-09-09

## 2020-09-09 RX ADMIN — GADOBUTROL 7.5 ML: 604.72 INJECTION INTRAVENOUS at 13:45

## 2020-09-09 NOTE — DISCHARGE INSTRUCTIONS
MRI Contrast Discharge Instructions    The IV contrast you received today will pass out of your body in your  urine. This will happen in the next 24 hours. You will not feel this process.  Your urine will not change color.    Drink at least 4 extra glasses of water or juice today (unless your doctor  has restricted your fluids). This reduces the stress on your kidneys.  You may take your regular medicines.    If you are on dialysis: It is best to have dialysis today.    If you have a reaction: Most reactions happen right away. If you have  any new symptoms after leaving the hospital (such as hives or swelling),  call your hospital at the correct number below. Or call your family doctor.  If you have breathing distress or wheezing, call 911.    Special instructions: ***    I have read and understand the above information.    Signature:______________________________________ Date:___________    Staff:__________________________________________ Date:___________     Time:__________    [facility] Departments:    ___[place]: [phone]  ___[facility]: [phone]  ___[place]: [phone] ___[facility]: [phone]  ___[facility]: [phone]  ___[facility]: [phone]  ___[phone]  ___[facility]: [phone]  ___[phone]

## 2020-09-11 ENCOUNTER — VIRTUAL VISIT (OUTPATIENT)
Dept: NEUROSURGERY | Facility: CLINIC | Age: 75
End: 2020-09-11
Payer: MEDICARE

## 2020-09-11 DIAGNOSIS — D32.9 MENINGIOMA (H): Primary | ICD-10-CM

## 2020-09-11 NOTE — LETTER
2020       RE: Raisa Archer  [address] [place] [id number]     Dear Colleague,    Thank you for referring your patient, Raisa Archer, to the [facility] NEUROSURGERY at [facility]. Please see a copy of my visit note below.      [facility]      Name: Raisa Archer  MRN: [id number]  Age: 75 year old  : 2020     Chief Complaint:   Follow up meningioma    History of Present Illness:   Raisa Archer is a pleasant 75 year old female with a history of breast cancer, tobacco use disorder, and gallstones who is seen today for a 3 year follow up. This visit is done over the telephone today due to the pandemic. She is a former patient of Dr. So, followed in the past for an incidentally discovered left frontal convexity meningioma. Her last office visit was 2017 with MRI done around that time showing stable left frontal meningioma. She is doing well today. She reports that she lives at home with her two cats. She denies new fever, headaches, vision or hearing changes, vertigo, dizziness, paresthesias, or weakness. No new trouble with her memory or changes in personality. Speech is stable.     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 She lives in [place].      Review of Systems:   Pertinent items are noted in HPI or as in patient entered ROS below, remainder of complete ROS is negative.     Physical Exam not performed today due to telephone visit.      Imaging:  We reviewed her MRI done 2020 which shows little to no change in her left frontal convexity meningioma compared to MRI done 2017. Significantly there is no flair noted.      Assessment:  Meningioma    Plan:  She is doing very well with no new concerns. We'll plan to see her back in another 3 years with brain MRI w/wo contrast prior to visit. We did discuss symptoms to watch for and warning signs  and she was encouraged to call us anytime with new concerns or questions in the interim.        Tia Moser PA-C    Total time of this telephone visit is 10 minutes.

## 2020-09-11 NOTE — PROGRESS NOTES
[facility]      Name: Raisa Archer  MRN: [id number]  Age: 75 year old  : 2020     Chief Complaint:   Follow up meningioma    History of Present Illness:   Raisa Archer is a pleasant 75 year old female with a history of breast cancer, tobacco use disorder, and gallstones who is seen today for a 3 year follow up. This visit is done over the telephone today due to the pandemic. She is a former patient of Dr. So, followed in the past for an incidentally discovered left frontal convexity meningioma. Her last office visit was 2017 with MRI done around that time showing stable left frontal meningioma. She is doing well today. She reports that she lives at home with her two cats. She denies new fever, headaches, vision or hearing changes, vertigo, dizziness, paresthesias, or weakness. No new trouble with her memory or changes in personality. Speech is stable.     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      Review of Systems:   Pertinent items are noted in HPI or as in patient entered ROS below, remainder of complete ROS is negative.     Physical Exam not performed today due to telephone visit.      Imaging:  We reviewed her MRI done 2020 which shows little to no change in her left frontal convexity meningioma compared to MRI done 2017. Significantly there is no flair noted.      Assessment:  Meningioma    Plan:  She is doing very well with no new concerns. We'll plan to see her back in another 3 years with brain MRI w/wo contrast prior to visit. We did discuss symptoms to watch for and warning signs and she was encouraged to call us anytime with new concerns or questions in the interim.        Tia Moser PA-C    Total time of this telephone visit is 10 minutes.

## 2020-09-15 ENCOUNTER — OFFICE VISIT (OUTPATIENT)
Dept: FAMILY MEDICINE | Facility: CLINIC | Age: 75
End: 2020-09-15
Payer: MEDICARE

## 2020-09-15 VITALS — DIASTOLIC BLOOD PRESSURE: 82 MMHG | SYSTOLIC BLOOD PRESSURE: 122 MMHG | HEART RATE: 76 BPM | RESPIRATION RATE: 20 BRPM

## 2020-09-15 DIAGNOSIS — Z23 NEED FOR PROPHYLACTIC VACCINATION AND INOCULATION AGAINST INFLUENZA: ICD-10-CM

## 2020-09-15 DIAGNOSIS — S90.222D: Primary | ICD-10-CM

## 2020-09-15 DIAGNOSIS — B35.1 ONYCHOMYCOSIS: ICD-10-CM

## 2020-09-15 PROCEDURE — 90662 IIV NO PRSV INCREASED AG IM: CPT | Performed by: FAMILY MEDICINE

## 2020-09-15 PROCEDURE — 99213 OFFICE O/P EST LOW 20 MIN: CPT | Mod: 25 | Performed by: FAMILY MEDICINE

## 2020-09-15 PROCEDURE — G0008 ADMIN INFLUENZA VIRUS VAC: HCPCS | Performed by: FAMILY MEDICINE

## 2020-09-15 ASSESSMENT — PAIN SCALES - GENERAL: PAINLEVEL: MILD PAIN (2)

## 2020-09-15 NOTE — PROGRESS NOTES
Subjective         HPI   Presents for recheck after being seen in UC for trauma to LEFT great toenail after dropping a 2 pound weight on it 3 weeks ago.  Still with some discoloration on lateral half of nail.  Tenderness has resolved.    Also has dystrophic RIGHT second toenail. Was told to present today to discuss treatment options.      Patient Active Problem List   Diagnosis     Tobacco use disorder     Malignant neoplasm of female breast (H)     Gall stones     Osteoporosis     Advanced directives, counseling/discussion     Chronic rhinitis     Hyperlipidemia LDL goal <160     Other hammer toe (acquired)     Meningioma (H)     History of colonic polyps     Centrilobular emphysema (H)     Mixed incontinence     Pelvic floor dysfunction     Myalgia of pelvic floor     Past Surgical History:   Procedure Laterality Date     ABDOMEN SURGERY       BIOPSY       BREAST SURGERY       CATARACT IOL, RT/LT       CHOLECYSTECTOMY       COLONOSCOPY       COLONOSCOPY N/A 10/4/2019    Procedure: COLONOSCOPY, WITH POLYPECTOMY AND BIOPSY;  Surgeon: Lucía John MD;  Location:  GI     ORTHOPEDIC SURGERY       PHACOEMULSIFICATION CLEAR CORNEA WITH STANDARD INTRAOCULAR LENS IMPLANT  2014    Procedure: PHACOEMULSIFICATION CLEAR CORNEA WITH STANDARD INTRAOCULAR LENS IMPLANT;  Surgeon: Rahul Reid MD;  Location:  EC     PHACOEMULSIFICATION CLEAR CORNEA WITH STANDARD INTRAOCULAR LENS IMPLANT  2014    Procedure: PHACOEMULSIFICATION CLEAR CORNEA WITH STANDARD INTRAOCULAR LENS IMPLANT;  Surgeon: Rahul Reid MD;  Location:  OR     SURGICAL HISTORY OF -       left foot reconstruction     SURGICAL HISTORY OF -       hysterectomy       Social History     Tobacco Use     Smoking status: Former Smoker     Packs/day: 1.50     Years: 30.00     Pack years: 45.00     Types: Cigarettes     Start date: 1967     Last attempt to quit: 5/10/2014     Years since quittin.3     Smokeless tobacco: Never Used      Tobacco comment: Quit several times over the years including when pregnant   Substance Use Topics     Alcohol use: No     Alcohol/week: 0.0 standard drinks     Family History   Problem Relation Age of Onset     Breast Cancer Sister         Going thru 2nd round of breast cancer     Diabetes Sister         overweight     Cancer Brother      Asthma Brother      Other Cancer Brother         Hodkins Lymphoma/Hodkins Lymphoma/Hodkins Lymphoma/Hodkins Lymphoma     Diabetes Brother         overweight     Cerebrovascular Disease Father         Had several small ones     Cancer Other         Stomach cancer- cousin     Breast Cancer Other         Aunts     Diabetes Other         Uncles and cousin/Paternal Uncles/Paternal Uncles/Paternal Uncles/Paternal Uncles     Cancer Brother      Obesity Brother      Asthma Brother      Cancer Other         Family Hx of colon cancer     Breast Cancer Other         Paternal Aunt/Paternal Aunt/Paternal Aunt/Paternal Aunt     Colon Cancer Other         Maternal Great Uncles     Breast Cancer Sister      Breast Cancer Cousin         Paternal/Paternal     Colon Cancer Other         Maternal Uncle/Maternal Great Uncles/Maternal Great Uncles/Maternal Great Uncles     Obesity Sister         On diet/On diet/On diet     Obesity Brother      Asthma Niece         passed away of pneumonia     Diabetes Other      Breast Cancer Other         had double masectomys     Colon Cancer Other          Current Outpatient Medications   Medication Sig Dispense Refill     Acetaminophen (TYLENOL PO) Take by mouth as needed for mild pain or fever       Calcium Carbonate-Vitamin D (CALCIUM + D PO) Take 2 tablets by mouth daily.       carboxymethylcellul-glycerin (OPTIVE) 0.5-0.9 % SOLN ophthalmic solution 1 drop       diphenhydrAMINE-APAP, sleep, (TYLENOL PM EXTRA STRENGTH)  MG/30ML LIQD Take 1-2 tablets by mouth       fluticasone (FLONASE) 50 MCG/ACT nasal spray Spray 1-2 sprays into both nostrils daily 16  g 11     Ipratropium-Albuterol (COMBIVENT RESPIMAT)  MCG/ACT inhaler Inhale 1 puff into the lungs 4 times daily Not to exceed 6 doses per day. 1 Inhaler 3     Loratadine (CLARITIN PO) Take by mouth daily        Multiple Vitamins-Minerals (ZAHIDA MULTIVITAMIN FOR WOMEN) TABS Take 1 tablet by mouth daily.       Omega-3 Fatty Acids (FISH OIL) 1200 MG CAPS Take 1 capsule by mouth daily       Allergies   Allergen Reactions     Amoxicillin Diarrhea     Aspirin Nausea and Vomiting     sick     Chantix [Varenicline Tartrate] Other (See Comments)     Sees things     Glycerin Itching     LIQUID,   Reactions: itchy and redness       Sympathomimetics Other (See Comments)     Patient doesn't know why this is listed as an allergy     Varenicline GI Disturbance, Other (See Comments) and Nausea and Vomiting     Wool Fiber Itching     Redness      Recent Labs   Lab Test 08/13/20  1212 11/13/18  1313 04/23/18  1410  04/10/17  1438 04/11/16  1503   A1C  --   --  5.2  --  5.1 5.6   *  --   --   --   --  96   HDL 66  --   --   --   --  59   TRIG 138  --   --   --   --  130   ALT 30 23 28  --   --   --    CR 1.00 0.84 0.95   < >  --   --    GFRESTIMATED 56* 67 58*   < >  --   --    GFRESTBLACK 65 81 70   < >  --   --    POTASSIUM 4.1 3.8 4.1  --   --   --     < > = values in this interval not displayed.      BP Readings from Last 3 Encounters:   09/15/20 122/82   08/25/20 116/68   08/13/20 98/72    Wt Readings from Last 3 Encounters:   08/25/20 73.9 kg (163 lb)   08/13/20 73.5 kg (162 lb)   02/06/20 74.8 kg (165 lb)                    Reviewed and updated as needed this visit by Provider         Review of Systems   ROS COMP: Constitutional, HEENT, cardiovascular, pulmonary, GI, , musculoskeletal, neuro, skin, endocrine and psych systems are negative, except as otherwise noted.      Objective    /82   Pulse 76   Resp 20     Physical Exam   GENERAL: healthy, alert and no distress  EYES: Eyes grossly normal to  inspection, PERRL and conjunctivae and sclerae normal  MS: no gross musculoskeletal defects noted, no edema  SKIN: LEFT great toe lateral half vertically is still bruised, no longer tender.  States this is site of trauma where she dropped a 2 pound weight on toe.  RIGHT 2nd toenail is dystrophic  NEURO: Normal strength and tone, mentation intact and speech normal  PSYCH: mentation appears normal, affect normal/bright    Assessment & Plan     1. Toenail bruise, left, subsequent encounter    Continue to monitor- advised may take a while to appear that it is healed as will only see improvement as toenail grows out.  May pain toenail as desired.    2. Onychomycosis    Only one toenail is affected at this time- may consider tea tree oil to base of nail nightly to see if any improvement in next 3-6 months.  Keep nail trimmed well.         Sushma Bledsoe MD  [facility]

## 2020-11-01 ENCOUNTER — MYC MEDICAL ADVICE (OUTPATIENT)
Dept: FAMILY MEDICINE | Facility: CLINIC | Age: 75
End: 2020-11-01

## 2020-12-23 NOTE — PROGRESS NOTES
"Raisa Archer is a 75 year old female who is being evaluated via a billable telephone visit.      The patient has been notified of following:     \"This telephone visit will be conducted via a call between you and your physician/provider. We have found that certain health care needs can be provided without the need for a physical exam.  This service lets us provide the care you need with a short phone conversation.  If a prescription is necessary we can send it directly to your pharmacy.  If lab work is needed we can place an order for that and you can then stop by our lab to have the test done at a later time.    Telephone visits are billed at different rates depending on your insurance coverage. During this emergency period, for some insurers they may be billed the same as an in-person visit.  Please reach out to your insurance provider with any questions.    If during the course of the call the physician/provider feels a telephone visit is not appropriate, you will not be charged for this service.\"    Patient has given verbal consent for Telephone visit?  Yes    What phone number would you like to be contacted at?     How would you like to obtain your AVS? MyChart  "

## 2021-02-26 ENCOUNTER — OFFICE VISIT (OUTPATIENT)
Dept: FAMILY MEDICINE | Facility: CLINIC | Age: 76
End: 2021-02-26
Payer: MEDICARE

## 2021-02-26 VITALS
WEIGHT: 163.2 LBS | HEART RATE: 86 BPM | DIASTOLIC BLOOD PRESSURE: 83 MMHG | SYSTOLIC BLOOD PRESSURE: 122 MMHG | TEMPERATURE: 97.8 F | OXYGEN SATURATION: 97 % | RESPIRATION RATE: 16 BRPM | BODY MASS INDEX: 28.91 KG/M2

## 2021-02-26 DIAGNOSIS — M62.830 SPASM OF BACK MUSCLES: Primary | ICD-10-CM

## 2021-02-26 DIAGNOSIS — M62.838 TRAPEZIUS MUSCLE SPASM: ICD-10-CM

## 2021-02-26 PROCEDURE — 99213 OFFICE O/P EST LOW 20 MIN: CPT | Performed by: PHYSICIAN ASSISTANT

## 2021-02-26 RX ORDER — CYCLOBENZAPRINE HCL 10 MG
10 TABLET ORAL 3 TIMES DAILY PRN
Qty: 30 TABLET | Refills: 1 | Status: SHIPPED | OUTPATIENT
Start: 2021-02-26 | End: 2021-08-16

## 2021-02-26 NOTE — PROGRESS NOTES
"    Assessment & Plan     Spasm of back muscles  Tender over paraspinal muscles and right trapezius no midline bony tenderness. Pain started after shoveling heavy snow. Try course of flexeril. Continue tylenol PRN. Follow up if not improving or worsening.  - cyclobenzaprine (FLEXERIL) 10 MG tablet; Take 1 tablet (10 mg) by mouth 3 times daily as needed for muscle spasms    Trapezius muscle spasm  - cyclobenzaprine (FLEXERIL) 10 MG tablet; Take 1 tablet (10 mg) by mouth 3 times daily as needed for muscle spasms      20 minutes spent on the date of the encounter doing chart review, history and exam, documentation and further activities as noted above     BMI:   Estimated body mass index is 28.91 kg/m  as calculated from the following:    Height as of 8/25/20: 1.6 m (5' 3\").    Weight as of this encounter: 74 kg (163 lb 3.2 oz).     Return for if not improving or worsening.    RAFITA [facility]   PAT is a 75 year old who presents for the following health issues     HPI     Musculoskeletal problem/pain  Onset/Duration: 1 week  Description  Location: shoulder and arm and left hip - left  Joint Swelling: no  Redness: no  Pain: YES  Warmth: no  Intensity:  moderate  Progression of Symptoms:  worsening  Accompanying signs and symptoms:   Fevers: no  Numbness/tingling/weakness: no  History  Trauma to the area: no    Worsening over the past week  Has tried heat, ice, and massage   Has taken tylenol which is not helping   Before this started was out shoveling snow, was especially physically shoveling end of driveway where snow plows had packed snow down   Has had this before -- reports happened in her 20s when she suffered a fall over wintertime        Review of Systems   Constitutional, HEENT, cardiovascular, pulmonary, gi and gu systems are negative, except as otherwise noted.      Objective    /83   Pulse 86   Temp 97.8  F (36.6  C) (Oral)   Resp 16   Wt 74 kg " (163 lb 3.2 oz)   SpO2 97%   BMI 28.91 kg/m    Body mass index is 28.91 kg/m .  Physical Exam  Vitals signs and nursing note reviewed.   Constitutional:       Appearance: Normal appearance.   HENT:      Head: Normocephalic and atraumatic.   Pulmonary:      Effort: Pulmonary effort is normal.   Musculoskeletal:      Cervical back: She exhibits tenderness. She exhibits no bony tenderness.      Thoracic back: She exhibits tenderness. She exhibits no bony tenderness.   Skin:     General: Skin is warm and dry.   Neurological:      General: No focal deficit present.      Mental Status: She is alert and oriented to person, place, and time.   Psychiatric:         Mood and Affect: Mood normal.         Behavior: Behavior normal.

## 2021-04-20 ENCOUNTER — OFFICE VISIT (OUTPATIENT)
Dept: FAMILY MEDICINE | Facility: CLINIC | Age: 76
End: 2021-04-20
Payer: MEDICARE

## 2021-04-20 VITALS
WEIGHT: 167 LBS | TEMPERATURE: 96.3 F | RESPIRATION RATE: 16 BRPM | HEART RATE: 92 BPM | SYSTOLIC BLOOD PRESSURE: 126 MMHG | OXYGEN SATURATION: 97 % | DIASTOLIC BLOOD PRESSURE: 78 MMHG | BODY MASS INDEX: 29.58 KG/M2

## 2021-04-20 DIAGNOSIS — J44.9 CHRONIC OBSTRUCTIVE PULMONARY DISEASE, UNSPECIFIED COPD TYPE (H): Primary | ICD-10-CM

## 2021-04-20 DIAGNOSIS — B35.1 ONYCHOMYCOSIS: ICD-10-CM

## 2021-04-20 PROCEDURE — 99213 OFFICE O/P EST LOW 20 MIN: CPT | Performed by: FAMILY MEDICINE

## 2021-04-20 RX ORDER — ALBUTEROL SULFATE 90 UG/1
2 AEROSOL, METERED RESPIRATORY (INHALATION) 4 TIMES DAILY
Qty: 18 G | Refills: 11 | Status: SHIPPED | OUTPATIENT
Start: 2021-04-20 | End: 2021-08-03

## 2021-04-20 NOTE — PROGRESS NOTES
Assessment & Plan     Chronic obstructive pulmonary disease, unspecified COPD type (H)    - ipratropium (ATROVENT HFA) 17 MCG/ACT inhaler; Inhale 2 puffs into the lungs every 6 hours  - albuterol (PROAIR HFA/PROVENTIL HFA/VENTOLIN HFA) 108 (90 Base) MCG/ACT inhaler; Inhale 2 puffs into the lungs 4 times daily    We will change to Atrovent and albuterol inhalers to most closely resemble the inhaler she has been well-managed on per her formulary changes she printed out for me.  Advised Follow-up in 4 weeks if changes are not working well for patient prn.    Onychomycosis    Continue with tea tree oil - almost resolved.      20 minutes spent on the date of the encounter doing chart review, patient visit and documentation     Sushma Bledsoe MD  [facility]    Tanna CASTELLANOS is a 76 year old who presents for the following health issues     HPI      Former PCP Dr. Hawkins.     Her COPD has been stable using Combivent alone.  She does not use a rescue inhaler.  She feels well controlled on her current regimen.  She has not been hospitalized for COPD exacerbation and never intubated.  She has no acute symptoms today.  Due to formulary changes-- needs different inhaler prescribed.    Continues with tea tree oil on toenails-- slowly it is working well!     Current Chronic Medical Conditions  COPD   Seasonal allergies  Urinary incontinence  Hx breast CA 2007  s/p RIGHT mastectomy  Hx of meningioma- imaging with neurology q few years  Hx of colon polyps     Social History  Lives alone with 2 cats.  One daughter.  One grandchild and two great grandkids.     HCM  Quit smoking 6 years ago.  Next colonoscopy due .    Review of Systems   Constitutional, HEENT, cardiovascular, pulmonary, GI, , musculoskeletal, neuro, skin, endocrine and psych systems are negative, except as otherwise noted.      Objective    /78   Pulse 92   Temp 96.3  F (35.7  C) (Tympanic)   Resp 16   Wt  75.8 kg (167 lb)   SpO2 97%   BMI 29.58 kg/m    Body mass index is 29.58 kg/m .  Physical Exam   GENERAL: healthy, alert and no distress  EYES: Eyes grossly normal to inspection, PERRL and conjunctivae and sclerae normal  HENT: ear canals and TM's normal, nose and mouth without ulcers or lesions  NECK: no adenopathy, no asymmetry, masses, or scars and thyroid normal to palpation  RESP: lungs clear to auscultation - no rales, rhonchi or wheezes  CV: regular rate and rhythm, normal S1 S2, no S3 or S4, no murmur, click or rub, no peripheral edema and peripheral pulses strong  ABDOMEN: soft, nontender, no hepatosplenomegaly, no masses and bowel sounds normal  MS: no gross musculoskeletal defects noted, no edema  PSYCH: mentation appears normal, affect normal/bright

## 2021-05-11 ENCOUNTER — OFFICE VISIT (OUTPATIENT)
Dept: FAMILY MEDICINE | Facility: CLINIC | Age: 76
End: 2021-05-11
Payer: MEDICARE

## 2021-05-11 VITALS
SYSTOLIC BLOOD PRESSURE: 129 MMHG | DIASTOLIC BLOOD PRESSURE: 75 MMHG | HEART RATE: 81 BPM | OXYGEN SATURATION: 97 % | TEMPERATURE: 97.9 F | BODY MASS INDEX: 29.42 KG/M2 | WEIGHT: 166.08 LBS

## 2021-05-11 DIAGNOSIS — J44.9 CHRONIC OBSTRUCTIVE PULMONARY DISEASE, UNSPECIFIED COPD TYPE (H): Primary | ICD-10-CM

## 2021-05-11 PROCEDURE — 99213 OFFICE O/P EST LOW 20 MIN: CPT | Performed by: STUDENT IN AN ORGANIZED HEALTH CARE EDUCATION/TRAINING PROGRAM

## 2021-05-11 NOTE — PROGRESS NOTES
Assessment & Plan     Chronic obstructive pulmonary disease, unspecified COPD type (H)  I contacted the pharmacy and cost of Combivent is over $100 due to tier 3 status. She did not like the way ipratroipium and albuterol prescribed separately made her feel. Discussed with our pharmacist and will try Anoro Ellipta. Follow up if having issues with this medication. Otherwise follow up for additional visit to establish care.   - umeclidinium-vilanterol (ANORO ELLIPTA) 62.5-25 MCG/INH oral inhaler  Dispense: 14 each; Refill: 3      26 minutes spent on the date of the encounter doing chart review, history and exam, documentation and further activities per the note       Shanna Pereyra MD  [facility]    Tanna CASTELLANOS is a 76 year old who presents for the following health issues     HPI     She has previously seen Dr. Hawkins and then Dr. Bledsoe. Here for medication concern. History of COPD with no prior hospitalization and no exacerbations in the past year. She was diagnosed with COPD in [address]2017 after PFTs showed moderately severe obstruction. She has been on Combivent as needed with good results. At her last visit in April, she was switched to albuterol and ipratropium innhalers separately as needed as insurance moved Combivent to tier 3. She tried these inhalers but noted cough, chest tightness, tight and scratchy throat after taking them. Would like to switch back to combivent or similar alternative. She quit smoking 7-8 years ago. She has baseline dyspnea with strenuous activity such as running up and down stairs. This has not changed. No new fever, cough, worsening dyspnea.      Objective    /75   Pulse 81   Temp 97.9  F (36.6  C) (Tympanic)   Wt 75.3 kg (166 lb 1.3 oz)   SpO2 97%   BMI 29.42 kg/m    Body mass index is 29.42 kg/m .  Physical Exam   GENERAL: healthy, alert and no distress  EYES: Eyes grossly normal to inspection, PERRL and conjunctivae and sclerae  normal  RESP: lungs clear to auscultation - no rales, rhonchi or wheezes  CV: regular rate and rhythm, normal S1 S2, no S3 or S4, no murmur, click or rub, no peripheral edema and peripheral pulses strong  MS: no gross musculoskeletal defects noted, no edema

## 2021-05-11 NOTE — PATIENT INSTRUCTIONS
I will contact the pharmacist to check on pricing on the Combivent and see if we can make it affordable. If not, we will work on finding you a different inhaler.     Please follow up in 1 month or sooner if needed.

## 2021-06-22 ENCOUNTER — OFFICE VISIT (OUTPATIENT)
Dept: FAMILY MEDICINE | Facility: CLINIC | Age: 76
End: 2021-06-22
Payer: MEDICARE

## 2021-06-22 VITALS
WEIGHT: 167 LBS | HEART RATE: 71 BPM | TEMPERATURE: 97.7 F | DIASTOLIC BLOOD PRESSURE: 76 MMHG | SYSTOLIC BLOOD PRESSURE: 122 MMHG | OXYGEN SATURATION: 96 % | BODY MASS INDEX: 29.58 KG/M2 | RESPIRATION RATE: 16 BRPM

## 2021-06-22 DIAGNOSIS — J44.9 CHRONIC OBSTRUCTIVE PULMONARY DISEASE, UNSPECIFIED COPD TYPE (H): Primary | ICD-10-CM

## 2021-06-22 PROCEDURE — 99207 PR NO CHARGE LOS: CPT | Performed by: FAMILY MEDICINE

## 2021-06-22 NOTE — PROGRESS NOTES
Insurance changed and needed to switch off Combivent at beginning of this year.  Was changed to Atrovent and albuterol and could not tolerate the Atrovent inhaler.  Was changed to Anoro by Dr. Pereyra but could not afford the cost.  Wants new inhaler.    Hx of COPD.    Referred to MTM to sort out- appreciate help!  No charge for visit today.  KK

## 2021-07-04 NOTE — PROGRESS NOTES
Medication Therapy Management (MTM) Encounter    ASSESSMENT:                            Medication Adherence/Access: No issues identified    COPD: Stable  Patient would benefit from the following medication changes: Start Stiolto daily --see plan  and further education of the administration technique of an aerosol inhaler (primed and also gave first dose today in the office).       PLAN:                              1. FYI--Please use the Stiolto inhaler --2 puffs once daily as your daily maintenance COPD inhaler.  Please carry your combivent/albuterol rescue inhaler with you at all times and use if needed.         Follow-up:   With Dr. Pereyra, see me if needed.     SUBJECTIVE/OBJECTIVE:                          Raisa Archer is a 76 year old female coming in for an initial visit. She was referred to me from Sushma Bledsoe; Dr. Pereyra will be her new PCP.   .      Reason for visit:     Reason for Referral:  COPD inhaler issues; combivent unaffordable. Insurance changed and needed to switch off Combivent at beginning of this year.  Was changed to Atrovent and albuterol and could not tolerate the Atrovent inhaler.  Was changed to Anoro by Dr. Pereyra but could not afford the cost.  Wants new inhaler.      Allergies/ADRs: Reviewed in chart  Tobacco: She reports that she quit smoking about 7 years ago. Her smoking use included cigarettes. She started smoking about 54 years ago. She has a 45.00 pack-year smoking history. She has never used smokeless tobacco.  Alcohol: not currently using  Caffeine: 0-1 cups/day of coffee, 0-1 cups/day of tea, diet sodas as needed.   Activity: since pandemic --no gym , sorta walks daily.   Past Medical History: Reviewed in chart      Medication Adherence/Access: no issues reported    COPD: Current medications: using combivent only when she exerts herself --1 inhaler lasts her 2 months. .     Patient is not experiencing side effects.   Patient reports the following  symptoms: none.  Patient does have an COPD Action Plan on file.   Has spirometry been completed: Yes   Inhaler/device technique reviewed: Aerosol inhaler              Today's Vitals: /85   Pulse 91   Wt 165 lb 12.8 oz (75.2 kg)   SpO2 95%   BMI 29.37 kg/m    ----------------      I spent 50 minutes with this patient today (an extra 15 minutes was spent creating the Medication Action Plan). All changes were made via collaborative practice agreement with Shanna Pereyra  . A copy of the visit note was provided to the patient's primary care provider.    The patient was given a summary of these recommendations.     Bassem Wolf [facility].  Medication Therapy Management Provider  [phone]         Medication Therapy Recommendations  Centrilobular emphysema (H)    Current Medication: tiotropium-olodaterol (STIOLTO RESPIMAT) 2.5-2.5 MCG/ACT AERS   Rationale: Preventive therapy - Needs additional medication therapy - Indication   Recommendation: Start Medication - Stiolto Respimat 2.5-2.5 MCG/ACT Aers - 2 puffs by mouth once daily.   Status: Accepted per CPA

## 2021-07-05 ENCOUNTER — OFFICE VISIT (OUTPATIENT)
Dept: PHARMACY | Facility: CLINIC | Age: 76
End: 2021-07-05

## 2021-07-05 VITALS
WEIGHT: 165.8 LBS | SYSTOLIC BLOOD PRESSURE: 125 MMHG | OXYGEN SATURATION: 95 % | HEART RATE: 91 BPM | BODY MASS INDEX: 29.37 KG/M2 | DIASTOLIC BLOOD PRESSURE: 85 MMHG

## 2021-07-05 DIAGNOSIS — J43.2 CENTRILOBULAR EMPHYSEMA (H): Primary | ICD-10-CM

## 2021-07-05 PROCEDURE — 99607 MTMS BY PHARM ADDL 15 MIN: CPT | Performed by: PHARMACIST

## 2021-07-05 PROCEDURE — 99605 MTMS BY PHARM NP 15 MIN: CPT | Performed by: PHARMACIST

## 2021-07-05 RX ORDER — MULTIVIT-MIN/IRON/FOLIC ACID/K 18-600-40
1 CAPSULE ORAL DAILY
COMMUNITY
End: 2023-06-23

## 2021-07-05 RX ORDER — TIOTROPIUM BROMIDE AND OLODATEROL 3.124; 2.736 UG/1; UG/1
2 SPRAY, METERED RESPIRATORY (INHALATION) DAILY
Qty: 4 G | Refills: 5 | Status: SHIPPED | OUTPATIENT
Start: 2021-07-05 | End: 2021-09-22

## 2021-07-05 NOTE — PATIENT INSTRUCTIONS
Recommendations from today's MTM visit:                                                    MTM (medication therapy management) is a service provided by a clinical pharmacist designed to help you get the most of out of your medicines.   Today we reviewed what your medicines are for, how to know if they are working, that your medicines are safe and how to make your medicine regimen as easy as possible.        1. FYI--Please use the Stiolto inhaler --2 puffs once daily as your daily maintenance COPD inhaler.  Please carry your combivent/albuterol rescue inhaler with you at all times and use if needed.         Follow-up:   With Dr. Pereyra, see me if needed.       It was great to speak with you today.  I value your experience and would be very thankful for your time with providing feedback on our clinic survey. You may receive a survey via email or text message in the next few days.     To schedule another MTM appointment, please call the clinic directly or you may call the MTM scheduling line at [phone] or toll-free at [phone].     My Clinical Pharmacist's contact information:                                                      Please feel free to contact me with any questions or concerns you have.      Bassem Wolf [facility].  Medication Therapy Management Provider  [phone]

## 2021-07-05 NOTE — Clinical Note
Isi--stiolto is the affordable laba/lama for her - we gave her first dose in office today .    Jose.-richard/itzel

## 2021-07-05 NOTE — LETTER
"        Date: 2021    Raisa rAcher  [address] [place] [id number]    Dear Ms. Archer,    Thank you for talking with me on 21 about your health and medications. Medicare s MTM (Medication Therapy Management) program helps you understand your medications and use them safely.      This letter includes an action plan (Medication Action Plan) and medication list (Personal Medication List). The action plan has steps you should take to help you get the best results from your medications. The medication list will help you keep track of your medications and how to use them the right way.       Have your action plan and medication list with you when you talk with your doctors, pharmacists, and other healthcare providers in your care team.     Ask your doctors, pharmacists, and other healthcare providers to update the action plan and medication list at every visit.     Take your medication list with you if you go to the hospital or emergency room.     Give a copy of the action plan and medication list to your family or caregivers.     If you want to talk about this letter or any of the papers with it, please call   [phone].We look forward to working with you, your doctors, and other healthcare providers to help you stay healthy through the [employer] [place] MTM program.    Sincerely,  Bassem Wolf [facility]    Enclosed: Medication Action Plan and Personal Medication List    MEDICATION ACTION PLAN FOR Raisa Archer,  1945     This action plan will help you get the best results from your medications if you:   1. Read \"What we talked about.\"   2. Take the steps listed in the \"What I need to do\" boxes.   3. Fill in \"What I did and when I did it.\"   4. Fill in \"My follow-up plan\" and \"Questions I want to ask.\"     Have this action plan with you when you talk with your doctors, pharmacists, and other healthcare providers in your care team. Share this with your family or " caregivers too.  DATE PREPARED: 2021  What we talked about: COPD                                                  What I need to do: start Stiolto inhaler --2 puffs by mouth daily as maintenance inhaler.   Carry albuterol in purse as rescue inhaler if needed.        What I did and when I did it:                                              My follow-up plan:                 Questions I want to ask:              If you have any questions about your action plan, call Bassem Wolf [facility], Phone: [phone] , Monday-Friday 8-4:30pm.           PERSONAL MEDICATION LIST FOR Raisa Archer,  1945     This medication list was made for you after we talked. We also used information from your doctor's chart.      Use blank rows to add new medications. Then fill in the dates you started using them.    Cross out medications when you no longer use them. Then write the date and why you stopped using them.    Ask your doctors, pharmacists, and other healthcare providers to update this list at every visit. Keep this list up-to-date with:       Prescription medications    Over the counter drugs     Herbals    Vitamins    Minerals      If you go to the hospital or emergency room, take this list with you. Share this with your family or caregivers too.     DATE PREPARED: 2021  Allergies or side effects: Amoxicillin, Aspirin, Chantix [varenicline tartrate], Glycerin, Ipratropium, Sympathomimetics, Varenicline, and Wool fiber     Medication:  ALBUTEROL SULFATE  (90 BASE) MCG/ACT IN AERS      How I use it:  Inhale 2 puffs into the lungs 4 times daily      Why I use it: Chronic obstructive pulmonary disease, unspecified COPD type (H)    Prescriber:  Sushma Bledsoe MD      Date I started using it:       Date I stopped using it:         Why I stopped using it:            Medication:  CALCIUM + D PO      How I use it:  Take 2 tablets by mouth daily.      Why I use it:  supplement    Prescriber:   Patient Reported      Date I started using it:       Date I stopped using it:         Why I stopped using it:            Medication:  CARBOXYMETHYLCELLUL-GLYCERIN 0.5-0.9 % OP SOLN      How I use it:  1 drop      Why I use it: Medicare annual wellness visit, subsequent    Prescriber:  Lyla Hawkins MD      Date I started using it:       Date I stopped using it:         Why I stopped using it:            Medication:  CLARITIN PO      How I use it:  Take by mouth daily       Why I use it:  allergy    Prescriber:  Patient Reported      Date I started using it:       Date I stopped using it:         Why I stopped using it:            Medication:  CYCLOBENZAPRINE HCL 10 MG PO TABS      How I use it:  Take 1 tablet (10 mg) by mouth 3 times daily as needed for muscle spasms      Why I use it: Spasm of back muscles; Trapezius muscle spasm    Prescriber:  Reji Sousa PA-C      Date I started using it:       Date I stopped using it:         Why I stopped using it:            Medication:  DIPHENHYDRAMINE-APAP (SLEEP)  MG/30ML PO LIQD      How I use it:  Take 1-2 tablets by mouth      Why I use it:  insomnia    Prescriber:  Patient Reported      Date I started using it:       Date I stopped using it:         Why I stopped using it:            Medication:  FISH OIL 1200 MG PO CAPS      How I use it:  Take 1 capsule by mouth daily      Why I use it:  supplement     Prescriber:  Patient Reported      Date I started using it:       Date I stopped using it:         Why I stopped using it:            Medication:  FLUTICASONE PROPIONATE 50 MCG/ACT NA SUSP      How I use it:  Spray 1-2 sprays into both nostrils daily      Why I use it: Seasonal allergic rhinitis    Prescriber:  Lyla Hawkins MD      Date I started using it:       Date I stopped using it:         Why I stopped using it:            Medication:  ZAHIDA MULTIVITAMIN FOR WOMEN PO TABS      How I use it:  Take 1 tablet by mouth daily.      Why I use it:   supplement    Prescriber:  Patient Reported      Date I started using it:       Date I stopped using it:         Why I stopped using it:            Medication:  STIOLTO RESPIMAT 2.5-2.5 MCG/ACT IN AERS      How I use it:  Inhale 2 puffs into the lungs daily      Why I use it: Centrilobular emphysema (H)    Prescriber:  Shanna Pereyra MD      Date I started using it:       Date I stopped using it:         Why I stopped using it:            Medication:  TYLENOL PO      How I use it:  Take by mouth as needed for mild pain or fever      Why I use it:  pain     Prescriber:  Patient Reported      Date I started using it:       Date I stopped using it:         Why I stopped using it:            Medication:  VITAMIN D (CHOLECALCIFEROL) 25 MCG (1000 UT) PO TABS      How I use it:  Take 1 tablet by mouth daily      Why I use it:  supplement     Prescriber:  Patient Reported      Date I started using it:       Date I stopped using it:         Why I stopped using it:            Medication:         How I use it:         Why I use it:      Prescriber:         Date I started using it:       Date I stopped using it:         Why I stopped using it:            Medication:         How I use it:         Why I use it:      Prescriber:         Date I started using it:       Date I stopped using it:         Why I stopped using it:            Medication:         How I use it:         Why I use it:      Prescriber:         Date I started using it:       Date I stopped using it:         Why I stopped using it:              Other Information:     If you have any questions about your medication list, call Bassem Wolf [facility], Phone: [phone] , Monday-Friday 8-4:30pm.    According to the Paperwork Reduction Act of 1995, no persons are required to respond to a collection of information unless it displays a valid OMB control number. The valid OMB number for this information collection is [id number]. The time required to complete this  information collection is estimated to average 40 minutes per response, including the time to review instructions, searching existing data resources, gather the data needed, and complete and review the information collection. If you have any comments concerning the accuracy of the time estimate(s) or suggestions for improving this form, please write to: CMS, Attn: JAMESON Reports Clearance Officer, [address] [place] [phone].

## 2021-07-28 ENCOUNTER — TELEPHONE (OUTPATIENT)
Dept: FAMILY MEDICINE | Facility: CLINIC | Age: 76
End: 2021-07-28

## 2021-07-28 DIAGNOSIS — Z12.31 ENCOUNTER FOR SCREENING MAMMOGRAM FOR BREAST CANCER: Primary | ICD-10-CM

## 2021-07-28 DIAGNOSIS — Z12.2 ENCOUNTER FOR SCREENING FOR MALIGNANT NEOPLASM OF LUNG: ICD-10-CM

## 2021-07-28 NOTE — TELEPHONE ENCOUNTER
Routed to     Pt is requesting referrals for mammo and lung cancer screening CT she needs these done yearly    Referrals cued    Call pt on her home phone     Katia Bardales RN   [facility]

## 2021-07-30 ENCOUNTER — MYC MEDICAL ADVICE (OUTPATIENT)
Dept: FAMILY MEDICINE | Facility: CLINIC | Age: 76
End: 2021-07-30

## 2021-08-02 NOTE — TELEPHONE ENCOUNTER
I believe this requires a quick phone visit. My understanding is that insurance requires shared decision making documentation from the provider to pay for the screen. I have not discussed this with her previously.     Can she schedule a quick phone call with me or any of the providers to get the test ordered?

## 2021-08-02 NOTE — TELEPHONE ENCOUNTER
"Dr Pereyra,    Pt asking for low dose lung cancer screen CT exam. Can you order? Last done 8/19/20    \"I just received a call about scheduling my mammogram but when I asked about the Low Dose Lung Cancer Screening CT Scan they did not have an order for it.  I always schedule these the same day at the [facility].  Please put in an order for this Scan so I can get these scheduled\"    Bebe Anderson RN, BSN  [facility]          "

## 2021-08-03 ENCOUNTER — VIRTUAL VISIT (OUTPATIENT)
Dept: FAMILY MEDICINE | Facility: CLINIC | Age: 76
End: 2021-08-03
Payer: MEDICARE

## 2021-08-03 DIAGNOSIS — Z87.891 PERSONAL HISTORY OF TOBACCO USE: Primary | ICD-10-CM

## 2021-08-03 PROCEDURE — G0296 VISIT TO DETERM LDCT ELIG: HCPCS | Performed by: FAMILY MEDICINE

## 2021-08-03 PROCEDURE — 99212 OFFICE O/P EST SF 10 MIN: CPT | Mod: 95 | Performed by: FAMILY MEDICINE

## 2021-08-03 NOTE — PATIENT INSTRUCTIONS
Please call [facility] Radiology at [phone] to schedule your lung CT.    Locations:   [facility], [facility], [place] [address] [place], [place] [address]      Lung Cancer Screening   Frequently Asked Questions  If you are at high-risk for lung cancer, getting screened with low-dose computed tomography (LDCT) every year can help save your life. This handout offers answers to some of the most common questions about lung cancer screening. If you have other questions, please call [phone] ([phone]).     What is it?  Lung cancer screening uses special X-ray technology to create an image of your lung tissue. The exam is quick and easy and takes less than 10 seconds. We don t give you any medicine or use any needles. You can eat before and after the exam. You don t need to change your clothes as long as the clothing on your chest doesn t contain metal. But, you do need to be able to hold your breath for at least 6 seconds during the exam.    What is the goal of lung cancer screening?  The goal of lung cancer screening is to save lives. Many times, lung cancer is not found until a person starts having physical symptoms. Lung cancer screening can help detect lung cancer in the earliest stages when it may be easier to treat.    Who should be screened for lung cancer?  We suggest lung cancer screening for anyone who is at high-risk for lung cancer. You are in the high-risk group if you:      are between the ages of 55 and 79, and    have smoked at least 1 pack of cigarettes a day for 30 or more years, and    still smoke or have quit within the past 15 years.    However, if you have a new cough or shortness of breath, you should talk to your doctor before being screened.    Some national lung health advocacy groups also recommend screening for people ages 50 to 79 who have smoked an average of 1 pack of cigarettes a day for 20 years. They must also  have at least 1 other risk factor for lung cancer, not including exposure to secondhand smoke. Other risk factors are having had cancer in the past, emphysema, pulmonary fibrosis, COPD, a family history of lung cancer, or exposure to certain materials such as arsenic, asbestos, beryllium, cadmium, chromium, diesel fumes, nickel, radon or silica. Your care team can help you know if you have one of these risk factors.     Why does it matter if I have symptoms?  Certain symptoms can be a sign that you have a condition in your lungs that should be checked and treated by your doctor. These symptoms include fever, chest pain, a new or changing cough, shortness of breath that you have never felt before, coughing up blood or unexplained weight loss. Having any of these symptoms can greatly affect the results of lung cancer screening.       Should all smokers get an LDCT lung cancer screening exam?  It depends. Lung cancer screening is for a very specific group of men and women who have a history of heavy smoking over a long period of time (see  Who should be screened for lung cancer  above).  I am in the high-risk group, but have been diagnosed with cancer in the past. Is LDCT lung cancer screening right for me?  In some cases, you should not have LDCT lung screening, such as when your doctor is already following your cancer with CT scan studies. Your doctor will help you decide if LDCT lung screening is right for you.  Do I need to have a screening exam every year?  Yes. If you are in the high-risk group described earlier, you should get an LDCT lung cancer screening exam every year until you are 79, or are no longer willing or able to undergo screening and possible procedures to diagnose and treat lung cancer.  How effective is LDCT at preventing death from lung cancer?  Studies have shown that LDCT lung cancer screening can lower the risk of death from lung cancer by 20 percent in people who are at high-risk.  What are  the risks?  There are some risks and limitations of LDCT lung cancer screening. We want to make sure you understand the risks and benefits, so please let us know if you have any questions. Your doctor may want to talk with you more about these risks.    Radiation exposure: As with any exam that uses radiation, there is a very small increased risk of cancer. The amount of radiation in LDCT is small--about the same amount a person would get from a mammogram. Your doctor orders the exam when he or she feels the potential benefits outweigh the risks.    False negatives: No test is perfect, including LDCT. It is possible that you may have a medical condition, including lung cancer, that is not found during your exam. This is called a false negative result.    False positives and more testing: LDCT very often finds something in the lung that could be cancer, but in fact is not. This is called a false positive result. False positive tests often cause anxiety. To make sure these findings are not cancer, you may need to have more tests. These tests will be done only if you give us permission. Sometimes patients need a treatment that can have side effects, such as a biopsy. For more information on false positives, see  What can I expect from the results?     Findings not related to lung cancer: Your LDCT exam also takes pictures of areas of your body next to your lungs. In a very small number of cases, the CT scan will show an abnormal finding in one of these areas, such as your kidneys, adrenal glands, liver or thyroid. This finding may not be serious, but you may need more tests. Your doctor can help you decide what other tests you may need, if any.  What can I expect from the results?  About 1 out of 4 LDCT exams will find something that may need more tests. Most of the time, these findings are lung nodules. Lung nodules are very small collections of tissue in the lung. These nodules are very common, and the vast  majority--more than 97 percent--are not cancer (benign). Most are normal lymph nodes or small areas of scarring from past infections.  But, if a small lung nodule is found to be cancer, the cancer can be cured more than 90 percent of the time. To know if the nodule is cancer, we may need to get more images before your next yearly screening exam. If the nodule has suspicious features (for example, it is large, has an odd shape or grows over time), we will refer you to a specialist for further testing.  Will my doctor also get the results?  Yes. Your doctor will get a copy of your results.

## 2021-08-03 NOTE — PROGRESS NOTES
Pat is a 76 year old who is being evaluated via a billable telephone visit.  She did not answer phone, call went to voicemail.  What phone number would you like to be contacted at? [phone]  How would you like to obtain your AVS? Dianahart    Assessment & Plan     Personal history of tobacco use  45 pack year history , quit 2014.  - Prof fee: Shared Decisionmaking for Lung Cancer Screening  - CT Chest Lung Cancer Scrn Low Dose wo; Future    Return in about 10 days (around 8/13/2021) for preventive visit (wellness/annual physical exam) with Dr. Pereyra.    Rocio Blevins MD  [facility]    Subjective   Pat is a 76 year old who presents for the following health issues   I called number thrice, went to voice mail both times.    HPI     Pt request CT scan order for lungs. Stated she was told to get it yearly.        Review of Systems   Not done      Objective           Vitals:  No vitals were obtained today due to virtual visit.    Physical Exam   Never contacted patient.       Lung Cancer Screening Shared Decision Making Visit     Raisa Archer is eligible for lung cancer screening on the basis of the information provided in my signed lung cancer screening order.     I have discussed with patient the risks and benefits of screening for lung cancer with low-dose CT.     The risks include:  radiation exposure: one low dose chest CT has as much ionizing radiation as about 15 chest x-rays or 6 months of background radiation living in [place]    false positives: 96% of positive findings/nodules are NOT cancer, but some might still require additional diagnostic evaluation, including biopsy  over-diagnosis: some slow growing cancers that might never have been clinically significant will be detected and treated unnecessarily     The benefit of early detection of lung cancer is contingent upon adherence to annual screening or more frequent follow up if indicated.     Furthermore, reaping  the benefits of screening requires Raisa BOWMAN Archer to be willing and physically able to undergo diagnostic procedures, if indicated. Although no specific guide is available for determining severity of comorbidities, it is reasonable to withhold screening in patients who have greater mortality risk from other diseases.     We did discuss that the only way to prevent lung cancer is to not smoke. Smoking cessation counseling was not given.      I did not offer risk estimation using a calculator such as this one:    ShouldIScreen

## 2021-08-16 ENCOUNTER — OFFICE VISIT (OUTPATIENT)
Dept: FAMILY MEDICINE | Facility: CLINIC | Age: 76
End: 2021-08-16
Payer: MEDICARE

## 2021-08-16 VITALS
WEIGHT: 167 LBS | TEMPERATURE: 97.7 F | DIASTOLIC BLOOD PRESSURE: 82 MMHG | OXYGEN SATURATION: 96 % | BODY MASS INDEX: 29.59 KG/M2 | RESPIRATION RATE: 16 BRPM | SYSTOLIC BLOOD PRESSURE: 121 MMHG | HEART RATE: 91 BPM | HEIGHT: 63 IN

## 2021-08-16 DIAGNOSIS — M62.830 SPASM OF BACK MUSCLES: ICD-10-CM

## 2021-08-16 DIAGNOSIS — Z13.1 SCREENING FOR DIABETES MELLITUS: ICD-10-CM

## 2021-08-16 DIAGNOSIS — M62.838 TRAPEZIUS MUSCLE SPASM: ICD-10-CM

## 2021-08-16 DIAGNOSIS — Z13.220 LIPID SCREENING: ICD-10-CM

## 2021-08-16 DIAGNOSIS — D32.9 MENINGIOMA (H): ICD-10-CM

## 2021-08-16 DIAGNOSIS — Z00.00 ENCOUNTER FOR MEDICARE ANNUAL WELLNESS EXAM: Primary | ICD-10-CM

## 2021-08-16 PROCEDURE — 36415 COLL VENOUS BLD VENIPUNCTURE: CPT | Performed by: NURSE PRACTITIONER

## 2021-08-16 PROCEDURE — 82947 ASSAY GLUCOSE BLOOD QUANT: CPT | Performed by: NURSE PRACTITIONER

## 2021-08-16 PROCEDURE — G0439 PPPS, SUBSEQ VISIT: HCPCS | Performed by: NURSE PRACTITIONER

## 2021-08-16 PROCEDURE — 80061 LIPID PANEL: CPT | Performed by: NURSE PRACTITIONER

## 2021-08-16 PROCEDURE — 99213 OFFICE O/P EST LOW 20 MIN: CPT | Mod: 25 | Performed by: NURSE PRACTITIONER

## 2021-08-16 RX ORDER — CYCLOBENZAPRINE HCL 10 MG
10 TABLET ORAL 3 TIMES DAILY PRN
Qty: 30 TABLET | Refills: 1 | Status: SHIPPED | OUTPATIENT
Start: 2021-08-16 | End: 2022-01-27

## 2021-08-16 ASSESSMENT — MIFFLIN-ST. JEOR: SCORE: 1226.64

## 2021-08-16 NOTE — PATIENT INSTRUCTIONS
Patient Education   Personalized Prevention Plan  You are due for the preventive services outlined below.  Your care team is available to assist you in scheduling these services.  If you have already completed any of these items, please share that information with your care team to update in your medical record.  Health Maintenance Due   Topic Date Due     FALL RISK ASSESSMENT  08/13/2021     Mammogram  08/19/2021     Lung Cancer Screening (CT Scan)  08/19/2021        
71

## 2021-08-16 NOTE — PROGRESS NOTES
"    SUBJECTIVE:   Raisa Archer is a 76 year old female who presents for Preventive Visit.      Patient has been advised of split billing requirements and indicates understanding: Yes  Are you in the first 12 months of your Medicare Part B coverage?  No    Physical Health:    In general, how would you rate your overall physical health? good    Outside of work, how many days during the week do you exercise? 2-3 days/week    Outside of work, approximately how many minutes a day do you exercise?15-30 minutes    If you drink alcohol do you typically have >3 drinks per day or >7 drinks per week? No    Do you usually eat at least 4 servings of fruit and vegetables a day, include whole grains & fiber and avoid regularly eating high fat or \"junk\" foods? NO    Do you have any problems taking medications regularly?  No    Do you have any side effects from medications? not applicable    Needs assistance for the following daily activities: no assistance needed    Which of the following safety concerns are present in your home?  throw rugs in the hallway and lack of handrails on stairs     Hearing impairment: No    In the past 6 months, have you been bothered by leaking of urine? yes    Mental Health:    In general, how would you rate your overall mental or emotional health? good  PHQ-2 Score:      Do you feel safe in your environment? Sometimes    Have you ever done Advance Care Planning? (For example, a Health Directive, POLST, or a discussion with a medical provider or your loved ones about your wishes): Yes, advance care planning is on file.    Additional concerns to address?      Musculoskeletal problem/pain      Duration: a couple of weeks     Description  Location:  Left shoulder     Intensity:  moderate    Accompanying signs and symptoms: achy and sharp pain    History  Previous similar problem: YES  Previous evaluation:  none    Precipitating or alleviating factors:  Trauma or overuse: YES- falls in her " 20's  Aggravating factors include:  Stretching arms forwards feels better    Therapies tried and outcome: rest/inactivity and cyclobenzaprine (FLEXERIL) 10 MG tablet     Has used flexeril 2-3 times a month with good relief    Fall risk:  Fallen 2 or more times in the past year?: No  Any fall with injury in the past year?: No    Cognitive Screenin) Repeat 3 items (Leader, Season, Table)    2) Clock draw: NORMAL  3) 3 item recall: \Recalls 2 objects   Results: NORMAL clock, 1-2 items recalled: COGNITIVE IMPAIRMENT LESS LIKELY    Mini-CogTM Copyright S Barrington. Licensed by the author for use in [facility]; reprinted with permission (renae@[facility]). All rights reserved.      Do you have sleep apnea, excessive snoring or daytime drowsiness?: yes      Reviewed and updated as needed this visit by clinical staff  Tobacco  Allergies  Meds  Problems  Med Hx  Surg Hx  Fam Hx  Soc Hx          Reviewed and updated as needed this visit by Provider     Problems            Social History     Tobacco Use     Smoking status: Former Smoker     Packs/day: 1.50     Years: 30.00     Pack years: 45.00     Types: Cigarettes     Start date: 1967     Quit date: 5/10/2014     Years since quittin.2     Smokeless tobacco: Never Used     Tobacco comment: Quit several times over the years including when pregnant   Substance Use Topics     Alcohol use: No     Alcohol/week: 0.0 standard drinks                           Current providers sharing in care for this patient include:   Patient Care Team:  Shanna Pereyra MD as PCP - General (Family Medicine)  Yaritza Basilio MD as MD (Urology)  Chantelle Mcdaniel RN as Registered Nurse (Urology)  Sushma Bledsoe MD as Assigned PCP  Tia Moser PA-C as Assigned Neuroscience Provider  Bassem Wolf [facility] as Pharmacist (Pharmacist)    The following health maintenance items are reviewed in Epic and correct as of today:  Health Maintenance  "  Topic Date Due     MAMMO SCREENING  08/19/2021     LUNG CANCER SCREENING ANNUAL  08/19/2021     INFLUENZA VACCINE (1) 09/01/2021     ANNUAL REVIEW OF HM ORDERS  08/03/2022     MEDICARE ANNUAL WELLNESS VISIT  08/16/2022     FALL RISK ASSESSMENT  08/16/2022     COLORECTAL CANCER SCREENING  10/04/2022     DEXA  07/26/2025     LIPID  08/16/2026     ADVANCE CARE PLANNING  08/16/2026     DTAP/TDAP/TD IMMUNIZATION (3 - Td or Tdap) 04/10/2027     SPIROMETRY  Completed     HEPATITIS C SCREENING  Completed     COPD ACTION PLAN  Completed     PHQ-2  Completed     Pneumococcal Vaccine: 65+ Years  Completed     ZOSTER IMMUNIZATION  Completed     COVID-19 Vaccine  Completed     IPV IMMUNIZATION  Aged Out     MENINGITIS IMMUNIZATION  Aged Out     HEPATITIS B IMMUNIZATION  Aged Out     Lab work is in process    ROS:  Constitutional, HEENT, cardiovascular, pulmonary, GI, , musculoskeletal, neuro, skin, endocrine and psych systems are negative, except as otherwise noted.    OBJECTIVE:   /82 (BP Location: Left arm, Patient Position: Chair, Cuff Size: Adult Regular)   Pulse 91   Temp 97.7  F (36.5  C) (Oral)   Resp 16   Ht 1.6 m (5' 3\")   Wt 75.8 kg (167 lb)   SpO2 96%   BMI 29.58 kg/m   Estimated body mass index is 29.58 kg/m  as calculated from the following:    Height as of this encounter: 1.6 m (5' 3\").    Weight as of this encounter: 75.8 kg (167 lb).  EXAM:   GENERAL APPEARANCE: healthy, alert and no distress  EYES: Eyes grossly normal to inspection, PERRL and conjunctivae and sclerae normal  HENT: ear canals and TM's normal, nose and mouth without ulcers or lesions, oropharynx clear and oral mucous membranes moist  NECK: no adenopathy, no asymmetry, masses, or scars and thyroid normal to palpation  RESP: lungs clear to auscultation - no rales, rhonchi or wheezes  CV: regular rate and rhythm, normal S1 S2, no S3 or S4, no murmur, click or rub, no peripheral edema and peripheral pulses strong  ABDOMEN: soft, " "nontender, no hepatosplenomegaly, no masses and bowel sounds normal  MS: no musculoskeletal defects are noted and gait is age appropriate without ataxia  SKIN: no suspicious lesions or rashes  NEURO: Normal strength and tone, sensory exam grossly normal, mentation intact and speech normal  PSYCH: mentation appears normal and affect normal/bright      ASSESSMENT / PLAN:       ICD-10-CM    1. Encounter for Medicare annual wellness exam  Z00.00    2. Meningioma (H)  D32.9    3. Spasm of back muscles  M62.830 cyclobenzaprine (FLEXERIL) 10 MG tablet   4. Trapezius muscle spasm  M62.838 cyclobenzaprine (FLEXERIL) 10 MG tablet   5. Screening for diabetes mellitus  Z13.1 Glucose     Glucose   6. Lipid screening  Z13.220 Lipid panel reflex to direct LDL Non-fasting     Lipid panel reflex to direct LDL Non-fasting       Patient has been advised of split billing requirements and indicates understanding: Yes    COUNSELING:  Reviewed preventive health counseling, as reflected in patient instructions    Estimated body mass index is 29.58 kg/m  as calculated from the following:    Height as of this encounter: 1.6 m (5' 3\").    Weight as of this encounter: 75.8 kg (167 lb).        She reports that she quit smoking about 7 years ago. Her smoking use included cigarettes. She started smoking about 54 years ago. She has a 45.00 pack-year smoking history. She has never used smokeless tobacco.    Appropriate preventive services were discussed with this patient, including applicable screening as appropriate for cardiovascular disease, diabetes, osteopenia/osteoporosis, and glaucoma.  As appropriate for age/gender, discussed screening for colorectal cancer, prostate cancer, breast cancer, and cervical cancer. Checklist reviewing preventive services available has been given to the patient.    Reviewed patients plan of care and provided an AVS. The Intermediate Care Plan ( asthma action plan, low back pain action plan, and migraine action " plan) for Raisa meets the Care Plan requirement. This Care Plan has been established and reviewed with the Patient.    Counseling Resources:  ATP IV Guidelines  Pooled Cohorts Equation Calculator  Breast Cancer Risk Calculator  BRCA-Related Cancer Risk Assessment: FHS-7 Tool  FRAX Risk Assessment  ICSI Preventive Guidelines  Dietary Guidelines for Americans, 2010  USDA's MyPlate  ASA Prophylaxis  Lung CA Screening    IZABELA Villanueva [facility]

## 2021-08-17 LAB
CHOLEST SERPL-MCNC: 235 MG/DL
FASTING STATUS PATIENT QL REPORTED: YES
FASTING STATUS PATIENT QL REPORTED: YES
GLUCOSE BLD-MCNC: 94 MG/DL (ref 70–99)
HDLC SERPL-MCNC: 76 MG/DL
LDLC SERPL CALC-MCNC: 137 MG/DL
NONHDLC SERPL-MCNC: 159 MG/DL
TRIGL SERPL-MCNC: 108 MG/DL

## 2021-08-24 ENCOUNTER — ANCILLARY PROCEDURE (OUTPATIENT)
Dept: MAMMOGRAPHY | Facility: CLINIC | Age: 76
End: 2021-08-24
Attending: STUDENT IN AN ORGANIZED HEALTH CARE EDUCATION/TRAINING PROGRAM
Payer: MEDICARE

## 2021-08-24 ENCOUNTER — ANCILLARY PROCEDURE (OUTPATIENT)
Dept: CT IMAGING | Facility: CLINIC | Age: 76
End: 2021-08-24
Attending: FAMILY MEDICINE
Payer: MEDICARE

## 2021-08-24 DIAGNOSIS — Z12.31 ENCOUNTER FOR SCREENING MAMMOGRAM FOR BREAST CANCER: ICD-10-CM

## 2021-08-24 DIAGNOSIS — Z87.891 PERSONAL HISTORY OF TOBACCO USE: ICD-10-CM

## 2021-08-24 PROCEDURE — G1004 CDSM NDSC: HCPCS | Performed by: RADIOLOGY

## 2021-08-24 PROCEDURE — 77063 BREAST TOMOSYNTHESIS BI: CPT | Performed by: STUDENT IN AN ORGANIZED HEALTH CARE EDUCATION/TRAINING PROGRAM

## 2021-08-24 PROCEDURE — 71271 CT THORAX LUNG CANCER SCR C-: CPT | Mod: ME | Performed by: RADIOLOGY

## 2021-08-24 PROCEDURE — 77067 SCR MAMMO BI INCL CAD: CPT | Performed by: STUDENT IN AN ORGANIZED HEALTH CARE EDUCATION/TRAINING PROGRAM

## 2021-08-26 ENCOUNTER — TELEPHONE (OUTPATIENT)
Dept: FAMILY MEDICINE | Facility: CLINIC | Age: 76
End: 2021-08-26

## 2021-08-26 NOTE — TELEPHONE ENCOUNTER
Please note there was a significant Incidental finding on CT Chest Lung Cancer Screen    2. Significant Incidental Finding(s):  Category S: Yes.  Continued right upper lobe consolidative scarring. Right mastectomy.      Please advise.  Ana Dangelo RN  [facility]

## 2021-08-27 NOTE — RESULT ENCOUNTER NOTE
Thank you for getting your CT done! Everything looks the same as last year, which is good news. Please repeat this scan every year.    If you have any questions, please contact the clinic or schedule an appointment with me, thank you!      Sincerely,  Dr. Rocio Blevins MD  8/27/2021

## 2021-08-27 NOTE — TELEPHONE ENCOUNTER
No change, no follow up needed, continue usual annual screen. See result note.  Sincerely,  Dr. Rocio Blevins MD  8/27/2021

## 2021-09-17 ENCOUNTER — MYC MEDICAL ADVICE (OUTPATIENT)
Dept: FAMILY MEDICINE | Facility: CLINIC | Age: 76
End: 2021-09-17

## 2021-09-17 DIAGNOSIS — J44.9 CHRONIC OBSTRUCTIVE PULMONARY DISEASE, UNSPECIFIED COPD TYPE (H): ICD-10-CM

## 2021-09-17 DIAGNOSIS — J43.2 CENTRILOBULAR EMPHYSEMA (H): ICD-10-CM

## 2021-09-22 RX ORDER — TIOTROPIUM BROMIDE AND OLODATEROL 3.124; 2.736 UG/1; UG/1
2 SPRAY, METERED RESPIRATORY (INHALATION) DAILY
Qty: 4 G | Refills: 5 | Status: SHIPPED | OUTPATIENT
Start: 2021-09-22 | End: 2022-02-22

## 2021-09-22 NOTE — TELEPHONE ENCOUNTER
Bassem- could you please review and provide further clarification?  1. Patient informed writer her pharmacy, [facility] on [address], told her there is a generic version available for Combivent Respimat  2. Patient reported going on her insurance profile and generic of Combivent respimate has a $7.36 copay    Writer called patient and reviewed recommendation/message per TIBURCIO Wolf RPh.    Patient verbalized understanding and stated she was told by her pharmacy and viewed on her insurance profile a generic option for Combivent Respimat.    Patient informed message will be sent to TIBURCIO oWlf RPh, for clarification.    Thank you!  CLAUDIA Ramirez, RN  [facility]

## 2021-09-22 NOTE — TELEPHONE ENCOUNTER
9-22-21    mtm saw pat 7-5-21 and changed her to affordable copd inhaler Stiolto --it cost 9.99$ /month .    Their is NO generic combivent inhaler BUT we could order her Duo nebs (same ingredients as combivent) but she would need to nebulize with that 4 x day .    mtm attempted to call pt. At home and cell --unavailable today .    Judith--please call patient and ask what she wants and offer her a follow-up MTM appt. With me if warranted.    Thank you , Bassem Wolf [facility].  Medication Therapy Management Provider  [phone]

## 2021-09-22 NOTE — TELEPHONE ENCOUNTER
Writer responded via [employer].    LISA RamirezN, RN  [facility]

## 2021-09-22 NOTE — TELEPHONE ENCOUNTER
HP Provider/Bassem-  1. Patient requesting generic of Combivent Respimat and reported NOT having allergic reaction to this inhaler   A. Reported having allergic reaction to Albuterol rescue inhaler and Atrovent HFA   B. Due to cost, patient requesting generic of Combivent Respimat inhaler-pended.  However, writer unsure if generic version is available    Thank you!  LISA RamirezN, RN  [facility]

## 2021-09-22 NOTE — TELEPHONE ENCOUNTER
9-        mtm called patient --she checked --fda has not approved the generic combivent yet and really her issue is she hasnot refilled the stiolto--she didnt realize this is a maintenance inhaler --2 puffs once daily --everyday of the year --1 inhaler per month --must refill monthly.use plain albuterol as a resuce inhaler prn.    Also she cannot get down to the [facility] to pick it up so I will re-order it to [facility] by her house.    Bassem Wolf, [facility].  Medication Therapy Management Provider  [phone]

## 2021-09-24 ENCOUNTER — IMMUNIZATION (OUTPATIENT)
Dept: FAMILY MEDICINE | Facility: CLINIC | Age: 76
End: 2021-09-24
Payer: MEDICARE

## 2021-09-24 DIAGNOSIS — Z23 NEED FOR PROPHYLACTIC VACCINATION AND INOCULATION AGAINST INFLUENZA: Primary | ICD-10-CM

## 2021-09-24 PROCEDURE — 90662 IIV NO PRSV INCREASED AG IM: CPT

## 2021-09-24 PROCEDURE — G0008 ADMIN INFLUENZA VIRUS VAC: HCPCS

## 2022-01-17 ENCOUNTER — MYC MEDICAL ADVICE (OUTPATIENT)
Dept: FAMILY MEDICINE | Facility: CLINIC | Age: 77
End: 2022-01-17
Payer: MEDICARE

## 2022-01-18 ENCOUNTER — OFFICE VISIT (OUTPATIENT)
Dept: FAMILY MEDICINE | Facility: CLINIC | Age: 77
End: 2022-01-18
Payer: MEDICARE

## 2022-01-18 VITALS
TEMPERATURE: 98.1 F | OXYGEN SATURATION: 95 % | SYSTOLIC BLOOD PRESSURE: 110 MMHG | DIASTOLIC BLOOD PRESSURE: 60 MMHG | HEART RATE: 91 BPM

## 2022-01-18 DIAGNOSIS — H66.90: Primary | ICD-10-CM

## 2022-01-18 DIAGNOSIS — H72.90: Primary | ICD-10-CM

## 2022-01-18 DIAGNOSIS — H92.02 LEFT EAR PAIN: ICD-10-CM

## 2022-01-18 DIAGNOSIS — J43.2 CENTRILOBULAR EMPHYSEMA (H): ICD-10-CM

## 2022-01-18 PROCEDURE — 99214 OFFICE O/P EST MOD 30 MIN: CPT | Performed by: FAMILY MEDICINE

## 2022-01-18 RX ORDER — DOXYCYCLINE 100 MG/1
100 CAPSULE ORAL 2 TIMES DAILY
Qty: 20 CAPSULE | Refills: 0 | Status: SHIPPED | OUTPATIENT
Start: 2022-01-18 | End: 2022-01-28

## 2022-01-18 RX ORDER — CIPROFLOXACIN 0.5 MG/.25ML
0.25 SOLUTION/ DROPS AURICULAR (OTIC) 2 TIMES DAILY
Qty: 5 ML | Refills: 0 | Status: SHIPPED | OUTPATIENT
Start: 2022-01-18 | End: 2022-01-28

## 2022-01-18 NOTE — PATIENT INSTRUCTIONS
- doxycycline hyclate (VIBRAMYCIN) 100 MG capsule; Take 1 capsule (100 mg) by mouth 2 times daily for 10 days    - ciprofloxacin (CETRAXAL) 0.2 % otic solution; Place 0.25 mLs into both ears 2 times daily for 10 days    - follow up in two weeks    intact

## 2022-01-18 NOTE — TELEPHONE ENCOUNTER
Raisa Archer is a 76 year old female with a 4 days history of problems with her left ear(s). Discomfort is present. Drainage has not been present.   Associated symptoms:  Fever: qualifiers low grade fevers  Temperature source: Oral  Rhinorrhea: absent  Other symptoms: this AM her right ear is having pain  Recent illnesses: none  Sick contacts:  none known    Plan: clinic appointment with Provider this morning    Ariana Snowden RN

## 2022-01-23 ENCOUNTER — HOSPITAL ENCOUNTER (INPATIENT)
Facility: CLINIC | Age: 77
LOS: 3 days | Discharge: NURSING, REHAB OR LONG-TERM CARE | DRG: 522 | End: 2022-01-26
Attending: EMERGENCY MEDICINE | Admitting: HOSPITALIST
Payer: MEDICARE

## 2022-01-23 ENCOUNTER — APPOINTMENT (OUTPATIENT)
Dept: GENERAL RADIOLOGY | Facility: CLINIC | Age: 77
DRG: 522 | End: 2022-01-23
Attending: EMERGENCY MEDICINE
Payer: MEDICARE

## 2022-01-23 DIAGNOSIS — S72.001A HIP FRACTURE, RIGHT, CLOSED, INITIAL ENCOUNTER (H): ICD-10-CM

## 2022-01-23 LAB
ABO/RH(D): NORMAL
ANION GAP SERPL CALCULATED.3IONS-SCNC: 4 MMOL/L (ref 3–14)
ANTIBODY SCREEN: NEGATIVE
APTT PPP: 30 SECONDS (ref 22–38)
ATRIAL RATE - MUSE: 75 BPM
BASOPHILS # BLD AUTO: 0.1 10E3/UL (ref 0–0.2)
BASOPHILS NFR BLD AUTO: 1 %
BUN SERPL-MCNC: 19 MG/DL (ref 7–30)
CALCIUM SERPL-MCNC: 9 MG/DL (ref 8.5–10.1)
CHLORIDE BLD-SCNC: 103 MMOL/L (ref 94–109)
CO2 SERPL-SCNC: 28 MMOL/L (ref 20–32)
CREAT SERPL-MCNC: 0.75 MG/DL (ref 0.52–1.04)
DIASTOLIC BLOOD PRESSURE - MUSE: NORMAL MMHG
EOSINOPHIL # BLD AUTO: 0 10E3/UL (ref 0–0.7)
EOSINOPHIL NFR BLD AUTO: 0 %
ERYTHROCYTE [DISTWIDTH] IN BLOOD BY AUTOMATED COUNT: 12.6 % (ref 10–15)
GFR SERPL CREATININE-BSD FRML MDRD: 83 ML/MIN/1.73M2
GLUCOSE BLD-MCNC: 102 MG/DL (ref 70–99)
HCT VFR BLD AUTO: 41.5 % (ref 35–47)
HGB BLD-MCNC: 12.8 G/DL (ref 11.7–15.7)
IMM GRANULOCYTES # BLD: 0 10E3/UL
IMM GRANULOCYTES NFR BLD: 0 %
INR PPP: 0.98 (ref 0.85–1.15)
INTERPRETATION ECG - MUSE: NORMAL
LYMPHOCYTES # BLD AUTO: 0.7 10E3/UL (ref 0.8–5.3)
LYMPHOCYTES NFR BLD AUTO: 11 %
MCH RBC QN AUTO: 28.1 PG (ref 26.5–33)
MCHC RBC AUTO-ENTMCNC: 30.8 G/DL (ref 31.5–36.5)
MCV RBC AUTO: 91 FL (ref 78–100)
MONOCYTES # BLD AUTO: 0.3 10E3/UL (ref 0–1.3)
MONOCYTES NFR BLD AUTO: 5 %
NEUTROPHILS # BLD AUTO: 5.2 10E3/UL (ref 1.6–8.3)
NEUTROPHILS NFR BLD AUTO: 83 %
NRBC # BLD AUTO: 0 10E3/UL
NRBC BLD AUTO-RTO: 0 /100
P AXIS - MUSE: 69 DEGREES
PLATELET # BLD AUTO: 171 10E3/UL (ref 150–450)
POTASSIUM BLD-SCNC: 4 MMOL/L (ref 3.4–5.3)
PR INTERVAL - MUSE: 152 MS
QRS DURATION - MUSE: 74 MS
QT - MUSE: 398 MS
QTC - MUSE: 444 MS
R AXIS - MUSE: 23 DEGREES
RBC # BLD AUTO: 4.55 10E6/UL (ref 3.8–5.2)
SARS-COV-2 RNA RESP QL NAA+PROBE: NEGATIVE
SODIUM SERPL-SCNC: 135 MMOL/L (ref 133–144)
SPECIMEN EXPIRATION DATE: NORMAL
SYSTOLIC BLOOD PRESSURE - MUSE: NORMAL MMHG
T AXIS - MUSE: 45 DEGREES
VENTRICULAR RATE- MUSE: 75 BPM
WBC # BLD AUTO: 6.2 10E3/UL (ref 4–11)

## 2022-01-23 PROCEDURE — C9803 HOPD COVID-19 SPEC COLLECT: HCPCS

## 2022-01-23 PROCEDURE — 258N000003 HC RX IP 258 OP 636: Performed by: HOSPITALIST

## 2022-01-23 PROCEDURE — 86901 BLOOD TYPING SEROLOGIC RH(D): CPT | Performed by: EMERGENCY MEDICINE

## 2022-01-23 PROCEDURE — 99285 EMERGENCY DEPT VISIT HI MDM: CPT | Mod: 25

## 2022-01-23 PROCEDURE — 96376 TX/PRO/DX INJ SAME DRUG ADON: CPT

## 2022-01-23 PROCEDURE — 96374 THER/PROPH/DIAG INJ IV PUSH: CPT

## 2022-01-23 PROCEDURE — 36415 COLL VENOUS BLD VENIPUNCTURE: CPT | Performed by: EMERGENCY MEDICINE

## 2022-01-23 PROCEDURE — 85610 PROTHROMBIN TIME: CPT | Performed by: EMERGENCY MEDICINE

## 2022-01-23 PROCEDURE — 250N000011 HC RX IP 250 OP 636: Performed by: HOSPITALIST

## 2022-01-23 PROCEDURE — 250N000013 HC RX MED GY IP 250 OP 250 PS 637: Performed by: HOSPITALIST

## 2022-01-23 PROCEDURE — 80048 BASIC METABOLIC PNL TOTAL CA: CPT | Performed by: EMERGENCY MEDICINE

## 2022-01-23 PROCEDURE — 120N000001 HC R&B MED SURG/OB

## 2022-01-23 PROCEDURE — 250N000011 HC RX IP 250 OP 636: Performed by: EMERGENCY MEDICINE

## 2022-01-23 PROCEDURE — 73502 X-RAY EXAM HIP UNI 2-3 VIEWS: CPT

## 2022-01-23 PROCEDURE — 93005 ELECTROCARDIOGRAM TRACING: CPT

## 2022-01-23 PROCEDURE — 85025 COMPLETE CBC W/AUTO DIFF WBC: CPT | Performed by: EMERGENCY MEDICINE

## 2022-01-23 PROCEDURE — 87635 SARS-COV-2 COVID-19 AMP PRB: CPT | Performed by: EMERGENCY MEDICINE

## 2022-01-23 PROCEDURE — 85730 THROMBOPLASTIN TIME PARTIAL: CPT | Performed by: EMERGENCY MEDICINE

## 2022-01-23 PROCEDURE — 99223 1ST HOSP IP/OBS HIGH 75: CPT | Mod: AI | Performed by: HOSPITALIST

## 2022-01-23 PROCEDURE — 82306 VITAMIN D 25 HYDROXY: CPT | Performed by: PHYSICIAN ASSISTANT

## 2022-01-23 RX ORDER — POLYETHYLENE GLYCOL 3350 17 G/17G
17 POWDER, FOR SOLUTION ORAL DAILY PRN
Status: DISCONTINUED | OUTPATIENT
Start: 2022-01-23 | End: 2022-01-26 | Stop reason: HOSPADM

## 2022-01-23 RX ORDER — CYCLOBENZAPRINE HCL 5 MG
5 TABLET ORAL EVERY 8 HOURS PRN
Status: DISCONTINUED | OUTPATIENT
Start: 2022-01-23 | End: 2022-01-26 | Stop reason: HOSPADM

## 2022-01-23 RX ORDER — OXYCODONE HYDROCHLORIDE 5 MG/1
5 TABLET ORAL EVERY 4 HOURS PRN
Status: DISCONTINUED | OUTPATIENT
Start: 2022-01-23 | End: 2022-01-24

## 2022-01-23 RX ORDER — LIDOCAINE 40 MG/G
CREAM TOPICAL
Status: DISCONTINUED | OUTPATIENT
Start: 2022-01-23 | End: 2022-01-26 | Stop reason: HOSPADM

## 2022-01-23 RX ORDER — NALOXONE HYDROCHLORIDE 0.4 MG/ML
0.4 INJECTION, SOLUTION INTRAMUSCULAR; INTRAVENOUS; SUBCUTANEOUS
Status: DISCONTINUED | OUTPATIENT
Start: 2022-01-23 | End: 2022-01-26 | Stop reason: HOSPADM

## 2022-01-23 RX ORDER — SODIUM CHLORIDE 9 MG/ML
INJECTION, SOLUTION INTRAVENOUS CONTINUOUS
Status: DISCONTINUED | OUTPATIENT
Start: 2022-01-24 | End: 2022-01-25

## 2022-01-23 RX ORDER — HYDROMORPHONE HCL IN WATER/PF 6 MG/30 ML
.2-.4 PATIENT CONTROLLED ANALGESIA SYRINGE INTRAVENOUS
Status: DISCONTINUED | OUTPATIENT
Start: 2022-01-23 | End: 2022-01-24

## 2022-01-23 RX ORDER — NALOXONE HYDROCHLORIDE 0.4 MG/ML
0.2 INJECTION, SOLUTION INTRAMUSCULAR; INTRAVENOUS; SUBCUTANEOUS
Status: DISCONTINUED | OUTPATIENT
Start: 2022-01-23 | End: 2022-01-26 | Stop reason: HOSPADM

## 2022-01-23 RX ORDER — FENTANYL CITRATE 50 UG/ML
50 INJECTION, SOLUTION INTRAMUSCULAR; INTRAVENOUS
Status: DISCONTINUED | OUTPATIENT
Start: 2022-01-23 | End: 2022-01-23

## 2022-01-23 RX ORDER — ACETAMINOPHEN 500 MG
1000 TABLET ORAL
Status: ON HOLD | COMMUNITY
End: 2022-01-26

## 2022-01-23 RX ORDER — ACETAMINOPHEN 325 MG/1
975 TABLET ORAL EVERY 8 HOURS
Status: COMPLETED | OUTPATIENT
Start: 2022-01-23 | End: 2022-01-24

## 2022-01-23 RX ADMIN — SODIUM CHLORIDE: 9 INJECTION, SOLUTION INTRAVENOUS at 18:52

## 2022-01-23 RX ADMIN — FENTANYL CITRATE 50 MCG: 50 INJECTION, SOLUTION INTRAMUSCULAR; INTRAVENOUS at 17:07

## 2022-01-23 RX ADMIN — ACETAMINOPHEN 975 MG: 325 TABLET, FILM COATED ORAL at 18:49

## 2022-01-23 RX ADMIN — FENTANYL CITRATE 50 MCG: 50 INJECTION, SOLUTION INTRAMUSCULAR; INTRAVENOUS at 15:56

## 2022-01-23 RX ADMIN — HYDROMORPHONE HYDROCHLORIDE 0.4 MG: 0.2 INJECTION, SOLUTION INTRAMUSCULAR; INTRAVENOUS; SUBCUTANEOUS at 18:50

## 2022-01-23 RX ADMIN — OXYCODONE HYDROCHLORIDE 5 MG: 5 TABLET ORAL at 21:48

## 2022-01-23 ASSESSMENT — ACTIVITIES OF DAILY LIVING (ADL)
TOILETING_ISSUES: NO
ADLS_ACUITY_SCORE: 5
WALKING_OR_CLIMBING_STAIRS_DIFFICULTY: YES
DRESSING/BATHING_DIFFICULTY: NO
ADLS_ACUITY_SCORE: 7
ADLS_ACUITY_SCORE: 6
CONCENTRATING,_REMEMBERING_OR_MAKING_DECISIONS_DIFFICULTY: NO
DIFFICULTY_EATING/SWALLOWING: NO
DIFFICULTY_COMMUNICATING: NO
ADLS_ACUITY_SCORE: 5
FALL_HISTORY_WITHIN_LAST_SIX_MONTHS: NO
VISION_MANAGEMENT: READING GLASSES
ADLS_ACUITY_SCORE: 5
DOING_ERRANDS_INDEPENDENTLY_DIFFICULTY: NO
HEARING_DIFFICULTY_OR_DEAF: NO
WHICH_OF_THE_ABOVE_FUNCTIONAL_RISKS_HAD_A_RECENT_ONSET_OR_CHANGE?: AMBULATION
ADLS_ACUITY_SCORE: 5
WEAR_GLASSES_OR_BLIND: YES
ADLS_ACUITY_SCORE: 7
WALKING_OR_CLIMBING_STAIRS: AMBULATION DIFFICULTY, ASSISTANCE 1 PERSON

## 2022-01-23 ASSESSMENT — ENCOUNTER SYMPTOMS
ARTHRALGIAS: 1
NUMBNESS: 0

## 2022-01-23 NOTE — ED NOTES
[facility]  ED Nurse Handoff Report    ED Chief complaint: Fall and Hip Pain      ED Diagnosis:   Final diagnoses:   None       Code Status: to assess upon IP arrival    Allergies:   Allergies   Allergen Reactions     Amoxicillin Diarrhea     Aspirin Nausea and Vomiting     sick     Chantix [Varenicline Tartrate] Other (See Comments)     Sees things     Glycerin Itching     LIQUID,   Reactions: itchy and redness       Ipratropium Other (See Comments)     atrovent inhaler caused sore, tight throat , chest hurt, breathless.      Sympathomimetics Other (See Comments)     Patient doesn't know why this is listed as an allergy     Varenicline GI Disturbance, Other (See Comments) and Nausea and Vomiting     Wool Fiber Itching     Redness        Patient Story: Patient to Ed from home where patient fell and landed on her plastic shovel. Not on blood thinners. C/o right hip pain. No head/neck pain, did not hit head, and not LOC. Hx breast CA, in remission since 2008, and unable to use right arm due to breast CA hx.   Focused Assessment:    Labs Ordered and Resulted from Time of ED Arrival to Time of ED Departure   BASIC METABOLIC PANEL - Abnormal       Result Value    Sodium 135      Potassium 4.0      Chloride 103      Carbon Dioxide (CO2) 28      Anion Gap 4      Urea Nitrogen 19      Creatinine 0.75      Calcium 9.0      Glucose 102 (*)     GFR Estimate 83     CBC WITH PLATELETS AND DIFFERENTIAL - Abnormal    WBC Count 6.2      RBC Count 4.55      Hemoglobin 12.8      Hematocrit 41.5      MCV 91      MCH 28.1      MCHC 30.8 (*)     RDW 12.6      Platelet Count 171      % Neutrophils 83      % Lymphocytes 11      % Monocytes 5      % Eosinophils 0      % Basophils 1      % Immature Granulocytes 0      NRBCs per 100 WBC 0      Absolute Neutrophils 5.2      Absolute Lymphocytes 0.7 (*)     Absolute Monocytes 0.3      Absolute Eosinophils 0.0      Absolute Basophils 0.1      Absolute Immature Granulocytes 0.0       Absolute NRBCs 0.0     COVID-19 VIRUS (CORONAVIRUS) BY PCR - Normal    SARS CoV2 PCR Negative     INR - Normal    INR 0.98     PARTIAL THROMBOPLASTIN TIME - Normal    aPTT 30     TYPE AND SCREEN, ADULT    ABO/RH(D) A POS      Antibody Screen Negative      SPECIMEN EXPIRATION DATE [id number]     ABO/RH TYPE AND SCREEN     XR Pelvis w Hip Right 1 View   Final Result   IMPRESSION: There is a displaced transverse fracture of the right femoral neck with a typical varus angulation at the fracture site. Mild bilateral hip degenerative changes. Degenerative changes in the lower lumbar spine.            Treatments and/or interventions provided: IV by EMS, labs, monitors, xray, and pain control. NPO.  Patient's response to treatments and/or interventions: tolerated, but difficult to control pain    To be done/followed up on inpatient unit:  pain control management and NPO per MD orders for possible surgery    Does this patient have any cognitive concerns?: n/a    Activity level - Baseline/Home:  Unknown  Activity Level - Current:   unable to ambulate due to right hip pain    Patient's Preferred language: English   Needed?: No    Isolation: None  Infection: Not Applicable  Patient tested for COVID 19 prior to admission: YES  Bariatric?: No    Vital Signs:   Vitals:    01/23/22 1534 01/23/22 1559 01/23/22 1600 01/23/22 1636   BP:   (!) 142/64    Pulse:   83    Resp:       Temp:       TempSrc:       SpO2: 94% 95% 93% 94%   Weight:           Cardiac Rhythm:     Was the PSS-3 completed:   Yes  What interventions are required if any?               Family Comments: n/a  OBS brochure/video discussed/provided to patient/family: N/A      For the majority of the shift this patient's behavior was Green.   Behavioral interventions performed were n/a.    ED NURSE PHONE NUMBER: *[phone]

## 2022-01-23 NOTE — PHARMACY-ADMISSION MEDICATION HISTORY
Pharmacy Medication History  Admission medication history interview status for the 1/23/2022  admission is complete. See EPIC admission navigator for prior to admission medications     Location of Interview: Patient room  Medication history sources: Patient and Surescripts    Significant changes made to the medication list:  Edited last doses    In the past week, patient estimated taking medication this percent of the time: greater than 90%    Additional medication history information:   Pt holds all the vitamins because she is currently on antibiotics (started Friday) for a severe ear infection. She reports good adherence and no concerns.    Medication reconciliation completed by provider prior to medication history? No    Time spent in this activity: 15min    Prior to Admission medications    Medication Sig Last Dose Taking? Auth Provider   acetaminophen (TYLENOL) 500 MG tablet Take 1,000 mg by mouth once as needed for mild pain Past Week at Unknown time Yes Unknown, Entered By History   Calcium Carbonate-Vitamin D (CALCIUM + D PO) Take 2 tablets by mouth daily. held at held Yes Reported, Patient   carboxymethylcellul-glycerin (OPTIVE) 0.5-0.9 % SOLN ophthalmic solution 1 drop Past Week at Unknown time Yes Lyla Hawkins MD   ciprofloxacin (CETRAXAL) 0.2 % otic solution Place 0.25 mLs into both ears 2 times daily for 10 days 1/23/2022 at Unknown time Yes Sophie Geiger MD   cyclobenzaprine (FLEXERIL) 10 MG tablet Take 1 tablet (10 mg) by mouth 3 times daily as needed for muscle spasms prn at prn Yes Audrey Holder APRN CNP   diphenhydrAMINE-APAP, sleep, (TYLENOL PM EXTRA STRENGTH)  MG/30ML LIQD Take 1-2 tablets by mouth 1/22/2022 at Unknown time Yes Reported, Patient   doxycycline hyclate (VIBRAMYCIN) 100 MG capsule Take 1 capsule (100 mg) by mouth 2 times daily for 10 days 1/23/2022 at Unknown time Yes Sophie Geiger MD   fluticasone (FLONASE) 50 MCG/ACT nasal spray Spray 1-2 sprays into  both nostrils daily 1/23/2022 at Unknown time Yes Lyla Hawkins MD   Loratadine (CLARITIN PO) Take by mouth daily  1/23/2022 at Unknown time Yes Reported, Patient   Multiple Vitamins-Minerals (ZAHIDA MULTIVITAMIN FOR WOMEN) TABS Take 1 tablet by mouth daily. held at held Yes Reported, Patient   Omega-3 Fatty Acids (FISH OIL) 1200 MG CAPS Take 1 capsule by mouth daily held at held Yes Reported, Patient   tiotropium-olodaterol (STIOLTO RESPIMAT) 2.5-2.5 MCG/ACT AERS Inhale 2 puffs into the lungs daily 1/23/2022 at Unknown time Yes Shanna Pereyra MD   Vitamin D, Cholecalciferol, 25 MCG (1000 UT) TABS Take 1 tablet by mouth daily held at held  Reported, Patient       The information provided in this note is only as accurate as the sources available at the time of update(s)

## 2022-01-23 NOTE — PROGRESS NOTES
RECEIVING UNIT ED HANDOFF REVIEW    ED Nurse Handoff Report was reviewed by: Nate Camarena RN on January 23, 2022 at 5:47 PM

## 2022-01-23 NOTE — H&P
[facility]  History and Physical - Hospitalist Service       Date of Admission:  1/23/2022    Assessment & Plan      Raisa Archer is a 76 year old female with medical history significant for breast cancer in remission, COPD, muscle spasms, urinary incontinence, hypertension was brought to the ER by EMS for evaluation after fall and right Hip pain. She was found to have right femoral neck fracture and is being admitted on 1/23/2022 for further management    Right femoral neck fracture, closed  Mechanical fall  Patient slipped on the stairs and fell, landed on her right hip and started having pain. Hip x-ray in ER shows right femoral neck fracture. Labs including CBC BMP unremarkable.  -Admit to inpatient  -Orthopedic surgery consult  -Regular diet now, n.p.o. after midnight for possible surgery tomorrow, Start IVF in a.m.  -Patient without any active cardiorespiratory symptoms, EKG sinus without ischemic changes, no further work-up indicated prior to surgery. Medically stable for surgery.  -Pain control-scheduled Tylenol for next 24 hours, as needed p.o. oxycodone and IV hydromorphone ordered      Other stable medical conditions: Resume PTA meds when verified  -Left otitis media, on antibiotic doxycycline p.o. and topical ciprofloxacin since 1/18, plan was 10 days of doxycycline  -COPD: Resume PTA inhaler when verified  -HTN, not on medication  -Muscle spasms: Continue Flexeril    COVID 19 PCR negative in ER  She reports she is vaccinated and has also received booster dose just before Thanksgiving.        Diet: NPO for Medical/Clinical Reasons Except for: Meds    DVT Prophylaxis: Pneumatic Compression Devices, post op per ortho  Baltazar Catheter: Not present  Central Lines: None  Cardiac Monitoring: None  Code Status:   Full code, discussed with patient    Clinically Significant Risk Factors Present on Admission                    Disposition Plan   Expected Discharge:  3-4 days    Anticipated discharge location:  Awaiting care coordination huddle  Delays:              The patient's care was discussed with the Bedside Nurse, Patient and ER physician. Called her daughter, was unavailable.    Aga Nicholas MD  Hospitalist Service  [facility]  Securely message with the Vocera Web Console (learn more here)  Text page via [facility] Paging/Directory         ______________________________________________________________________    Chief Complaint   Right hip pain after fall    History is obtained from the patient, chart review, discussion with ER physician    History of Present Illness   Raisa Archer is a 76 year old female with medical history significant for breast cancer in remission, COPD, muscle spasms, urinary incontinence, hypertension was brought to the ER by EMS for evaluation after fall and right knee pain.    Patient reports that she shoveled some snow and was going to the stairs, when she slipped and fell, landed over the shovel, on her Rt hip. Was unable to get up. Immediately started having Rt hip pain. Neighbor was also shoveling and heared her yelling for help, came over and called 911, and was brought to the ER by EMS.    Patient denies hitting her head or LOC. Denies pain anywhere else.   No other symptoms including chest pain or dyspnea, no fever, no urinary symptoms.  Patient has received COVID 19 vaccine and booster as well- which was just prior to thanks giving.  She lives by herself with 2 cats, is independent with her needs/activities, denies chest pain or dyspnea with usual activities. Sometimes uses cane for walking.     In ER, patient was evaluated by Dr. York. Vitals stable. Labs CBC BMP INR unremarkable. Pelvic x-ray showed right femoral neck fracture. Patient received IV fentanyl in ER. An admission was requested for further management.    Review of Systems    The 10 point Review of Systems is negative other than noted in the HPI or  here.      Past Medical History    I have reviewed this patient's medical history and updated it with pertinent information if needed.   Past Medical History:   Diagnosis Date     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     Breast cancer, right breast (H)      Centrilobular emphysema (H)      Centrilobular emphysema (H)      Colon polyps     repeat colonoscopy      Left tennis elbow      Rash     currently at masectomy site     Unspecified essential hypertension     situational and resolved       Past Surgical History   I have reviewed this patient's surgical history and updated it with pertinent information if needed.  Past Surgical History:   Procedure Laterality Date     ABDOMEN SURGERY       BIOPSY       BREAST SURGERY       CATARACT IOL, RT/LT       CHOLECYSTECTOMY       COLONOSCOPY       COLONOSCOPY N/A 10/4/2019    Procedure: COLONOSCOPY, WITH POLYPECTOMY AND BIOPSY;  Surgeon: Lucía John MD;  Location:  GI     ORTHOPEDIC SURGERY       PHACOEMULSIFICATION CLEAR CORNEA WITH STANDARD INTRAOCULAR LENS IMPLANT  2014    Procedure: PHACOEMULSIFICATION CLEAR CORNEA WITH STANDARD INTRAOCULAR LENS IMPLANT;  Surgeon: Rahul Reid MD;  Location:  EC     PHACOEMULSIFICATION CLEAR CORNEA WITH STANDARD INTRAOCULAR LENS IMPLANT  2014    Procedure: PHACOEMULSIFICATION CLEAR CORNEA WITH STANDARD INTRAOCULAR LENS IMPLANT;  Surgeon: Rahul Reid MD;  Location:  OR     SURGICAL HISTORY OF -       left foot reconstruction     SURGICAL HISTORY OF -       hysterectomy       Social History   I have reviewed this patient's social history and updated it with pertinent information if needed.  Social History     Tobacco Use     Smoking status: Former Smoker     Packs/day: 1.50     Years: 30.00     Pack years: 45.00     Types: Cigarettes     Start date: 1967     Quit date: 5/10/2014     Years since quittin.7     Smokeless tobacco: Never Used     Tobacco comment: Quit several times over the years  including when pregnant   Substance Use Topics     Alcohol use: No     Alcohol/week: 0.0 standard drinks     Drug use: No       Family History   I have reviewed this patient's family history and updated it with pertinent information if needed.  Family History   Problem Relation Age of Onset     Breast Cancer Sister         Going thru 2nd round of breast cancer     Diabetes Sister         overweight     Cancer Brother      Asthma Brother      Other Cancer Brother         Hodkins Lymphoma/Hodkins Lymphoma/Hodkins Lymphoma/Hodkins Lymphoma     Diabetes Brother         overweight     Cerebrovascular Disease Father         Had several small ones     Cancer Other         Stomach cancer- cousin     Breast Cancer Other         Aunts     Diabetes Other         Uncles and cousin/Paternal Uncles/Paternal Uncles/Paternal Uncles/Paternal Uncles     Cancer Brother      Obesity Brother      Asthma Brother      Cancer Other         Family Hx of colon cancer     Breast Cancer Other         Paternal Aunt/Paternal Aunt/Paternal Aunt/Paternal Aunt     Colon Cancer Other         Maternal Great Uncles     Breast Cancer Sister      Breast Cancer Cousin         Paternal/Paternal     Colon Cancer Other         Maternal Uncle/Maternal Great Uncles/Maternal Great Uncles/Maternal Great Uncles     Obesity Sister         On diet/On diet/On diet     Obesity Brother      Asthma Niece         passed away of pneumonia     Diabetes Other      Breast Cancer Other         had double masectomys     Colon Cancer Other        Prior to Admission Medications   Prior to Admission Medications   Prescriptions Last Dose Informant Patient Reported? Taking?   Acetaminophen (TYLENOL PO)   Yes No   Sig: Take by mouth as needed for mild pain or fever   Calcium Carbonate-Vitamin D (CALCIUM + D PO)   Yes No   Sig: Take 2 tablets by mouth daily.   Loratadine (CLARITIN PO)   Yes No   Sig: Take by mouth daily    Multiple Vitamins-Minerals (ZAHIDA MULTIVITAMIN FOR WOMEN)  TABS   Yes No   Sig: Take 1 tablet by mouth daily.   Omega-3 Fatty Acids (FISH OIL) 1200 MG CAPS   Yes No   Sig: Take 1 capsule by mouth daily   Vitamin D, Cholecalciferol, 25 MCG (1000 UT) TABS   Yes No   Sig: Take 1 tablet by mouth daily   carboxymethylcellul-glycerin (OPTIVE) 0.5-0.9 % SOLN ophthalmic solution   Yes No   Si drop   ciprofloxacin (CETRAXAL) 0.2 % otic solution   No No   Sig: Place 0.25 mLs into both ears 2 times daily for 10 days   cyclobenzaprine (FLEXERIL) 10 MG tablet   No No   Sig: Take 1 tablet (10 mg) by mouth 3 times daily as needed for muscle spasms   diphenhydrAMINE-APAP, sleep, (TYLENOL PM EXTRA STRENGTH)  MG/30ML LIQD   Yes No   Sig: Take 1-2 tablets by mouth   doxycycline hyclate (VIBRAMYCIN) 100 MG capsule   No No   Sig: Take 1 capsule (100 mg) by mouth 2 times daily for 10 days   fluticasone (FLONASE) 50 MCG/ACT nasal spray   No No   Sig: Spray 1-2 sprays into both nostrils daily   tiotropium-olodaterol (STIOLTO RESPIMAT) 2.5-2.5 MCG/ACT AERS   No No   Sig: Inhale 2 puffs into the lungs daily      Facility-Administered Medications: None     Allergies   Allergies   Allergen Reactions     Amoxicillin Diarrhea     Aspirin Nausea and Vomiting     sick     Chantix [Varenicline Tartrate] Other (See Comments)     Sees things     Glycerin Itching     LIQUID,   Reactions: itchy and redness       Ipratropium Other (See Comments)     atrovent inhaler caused sore, tight throat , chest hurt, breathless.      Sympathomimetics Other (See Comments)     Patient doesn't know why this is listed as an allergy     Varenicline GI Disturbance, Other (See Comments) and Nausea and Vomiting     Wool Fiber Itching     Redness        Physical Exam   Vital Signs: Temp: 97.9  F (36.6  C) Temp src: Oral BP: (!) 142/64 Pulse: 83   Resp: 16 SpO2: 94 % O2 Device: None (Room air)    Weight: 160 lbs 0 oz    General: AAOx3, very pleasant, appears comfortable mostly although having episodic spasm and appears  uncomfortable during these spells.  HEENT: PERRLA EOMI. Mucosa moist.   Lungs: Bilateral equal air entry. Clear to auscultation, normal work of breathing.   CVS: S1S2 regular, no tachycardia or murmur.   Abdomen: Soft, NT, ND. BS heard.  MSK: No edema or deformities.  Neuro: AAOX3. CN 2-12 normal. Strength symmetrical.  Skin: No rash.       Data   Data reviewed today: I reviewed all medications, new labs and imaging results over the last 24 hours. I personally reviewed the EKG tracing showing Sinus rhythm, no ischemic changes. Hip x-ray shows right femoral neck fracture.    Recent Labs   Lab 01/23/22  1552   WBC 6.2   HGB 12.8   MCV 91      INR 0.98      POTASSIUM 4.0   CHLORIDE 103   CO2 28   BUN 19   CR 0.75   ANIONGAP 4   JOJO 9.0   *     Recent Results (from the past 24 hour(s))   XR Pelvis w Hip Right 1 View    Narrative    EXAM: XR PELVIS AND HIP RIGHT 1 VIEW  LOCATION: [facility]  DATE/TIME: 1/23/2022 4:01 PM    INDICATION: Pain.  COMPARISON: None.      Impression    IMPRESSION: There is a displaced transverse fracture of the right femoral neck with a typical varus angulation at the fracture site. Mild bilateral hip degenerative changes. Degenerative changes in the lower lumbar spine.

## 2022-01-23 NOTE — ED TRIAGE NOTES
Patient to ED via EMS from home after she slipped and fell while shoveling. States she was on the stairs and fell onto the plastic shovel onto her back. Denies LOC or blood thinners or hitting head. C/o right hip pain. Denies neck pain. EMS placed 18G to LAC and administered 50mcg fentanyl for pain along with 300cc NS.  Pain went from 10 to 7 following fentanyl. CMS intact to RLE per EMS. Unable to use right arm due to hx breast CA - in remission since 2008.

## 2022-01-23 NOTE — ED PROVIDER NOTES
"  History   Chief Complaint:  Fall and Hip Pain       The history is provided by the patient.      Raisa Archer is a 76 year old female with history of COPD, hypertension, and breast cancer who presents with EMS for evaluation of a fall and right hip pain. The patient is fully vaccinated with a booster against COVID-19. Earlier today, the patient was shoveling her stairs when she slipped and had a mechanical fall. She landed on the shovel with her right hip. She denies any head trauma or loss of consciousness. Pat was unable to ambulate after the fall, so EMS was called. En route to the emergency department, EMS gave her 50 mcg of Fentanyl along with 300 mL of NS. The pain in her right hip decreased from \"10/10 to 7/10\" following the Fentanyl. Here, she describes pain radiating from her right hip into her leg. She denies any numbness or history of blood pressure problems or diabetes mellitus.     Review of Systems   Musculoskeletal: Positive for arthralgias (right hip) and gait problem.   Neurological: Negative for numbness.        No head trauma  No loss of consciousness    All other systems reviewed and are negative.    Allergies:  Amoxicillin  Aspirin  Chantix [Varenicline Tartrate]  Glycerin  Ipratropium  Sympathomimetics  Varenicline  Wool Fiber    Medications:  Acetaminophen (TYLENOL PO)  Calcium Carbonate-Vitamin D (CALCIUM + D PO)  carboxymethylcellul-glycerin (OPTIVE) 0.5-0.9 % SOLN ophthalmic solution  ciprofloxacin (CETRAXAL) 0.2 % otic solution  cyclobenzaprine (FLEXERIL) 10 MG tablet  diphenhydrAMINE-APAP, sleep, (TYLENOL PM EXTRA STRENGTH)  MG/30ML LIQD  doxycycline hyclate (VIBRAMYCIN) 100 MG capsule  fluticasone (FLONASE) 50 MCG/ACT nasal spray  Loratadine (CLARITIN PO)  Multiple Vitamins-Minerals (ZAHIDA MULTIVITAMIN FOR WOMEN) TABS  Omega-3 Fatty Acids (FISH OIL) 1200 MG CAPS  tiotropium-olodaterol (STIOLTO RESPIMAT) 2.5-2.5 MCG/ACT AERS  Vitamin D, Cholecalciferol, 25 MCG (1000 UT) " TABS    Past Medical History:     Arthritis   Centrilobular emphysema   Colon polyps   Left tennis elbow   Rash   Unspecified essential hypertension   Tobacco use disorder  Malignant neoplasm of female breast   Osteoporosis  Advanced directives, counseling/discussion  Chronic rhinitis  Hyperlipidemia LDL goal <160  Other hammer toe (acquired)  Meningioma  Mixed incontinence  Pelvic floor dysfunction  Myalgia of pelvic floor  Nicotine dependence  Cholelithiasis    COPD     Past Surgical History:    Abdomen surgery  Biopsy   Breast surgery   Cataract IOL, bilateral   Cholecystectomy  Colonoscopy x2  Orthopedic surgery  Phacoemulsification clear cornea with standard intraocular lens implant x2  Left foot reconstruction   Hysterectomy      Family History:    Breast cancer (sister, sister)   Diabetes mellitus (sister, brother)   Cancer (brother, brother)   Asthma (brothe, brother)   Hodgkin's lymphoma (brother)   Cerebrovascular disease (father)   Obesity (brother, sister, brother)     Social History:  The patient denies any current alcohol or tobacco use.   She lives alone with her two cats.     Physical Exam     Patient Vitals for the past 24 hrs:   BP Temp Temp src Pulse Resp SpO2 Weight   01/23/22 1702 135/58 -- -- 86 -- 95 % --   01/23/22 1636 -- -- -- -- -- 94 % --   01/23/22 1600 (!) 142/64 -- -- 83 -- 93 % --   01/23/22 1559 -- -- -- -- -- 95 % --   01/23/22 1534 -- -- -- -- -- 94 % --   01/23/22 1533 -- -- -- -- -- 95 % --   01/23/22 1530 135/72 -- -- 84 -- 96 % --   01/23/22 1525 (!) 146/60 97.9  F (36.6  C) Oral 89 16 94 % 72.6 kg (160 lb)       Physical Exam    General: Alert, interactive in mild distress  Head:  Scalp is atraumatic  Eyes:  The pupils are equal, round, and reactive to light    EOM's intact    No scleral icterus  ENT:      Nose:  The external nose is normal  Ears:  External ears are normal  Mouth/Throat: The oropharynx is normal    Mucus membranes are moist       Neck:  Normal range of motion.       There is no rigidity.    Trachea is in the midline         CV:  Regular rate and rhythm    No murmur     2+ dp pulses in bilateral lower extremities  Resp:  Breath sounds are clear bilaterally    Non-labored, no retractions or accessory muscle use      GI:  Abdomen is soft, no distension, no tenderness.       MS:  Tenderness in the right hip, limited range of motion, hip and knee held preferentially flexed. No midline cervical tenderness  Skin:  Warm and dry, No rash or lesions noted.  Neuro: Strength 5/5 x4.  Sensation intact  In all 4 extremities.      GCS: 15  Psych:  Awake. Alert.  Normal affect.      Appropriate interactions.    Emergency Department Course   ECG  ECG obtained at 1551, ECG read at 1613  Sinus rhythm with premature supraventricular complexes  Low voltage QRS  Borderline ECG    No significant change as compared to prior, dated 5/19/14.  Rate 75 bpm. SD interval 152 ms. QRS duration 74 ms. QT/QTc 398/444 ms. P-R-T axes 69 23 45.     Imaging:  XR Pelvis w Hip Right 1 View   Final Result   IMPRESSION: There is a displaced transverse fracture of the right femoral neck with a typical varus angulation at the fracture site. Mild bilateral hip degenerative changes. Degenerative changes in the lower lumbar spine.        Report per radiology    Laboratory:  Labs Ordered and Resulted from Time of ED Arrival to Time of ED Departure   BASIC METABOLIC PANEL - Abnormal       Result Value    Sodium 135      Potassium 4.0      Chloride 103      Carbon Dioxide (CO2) 28      Anion Gap 4      Urea Nitrogen 19      Creatinine 0.75      Calcium 9.0      Glucose 102 (*)     GFR Estimate 83     CBC WITH PLATELETS AND DIFFERENTIAL - Abnormal    WBC Count 6.2      RBC Count 4.55      Hemoglobin 12.8      Hematocrit 41.5      MCV 91      MCH 28.1      MCHC 30.8 (*)     RDW 12.6      Platelet Count 171      % Neutrophils 83      % Lymphocytes 11      % Monocytes 5      % Eosinophils 0      % Basophils 1      % Immature  Granulocytes 0      NRBCs per 100 WBC 0      Absolute Neutrophils 5.2      Absolute Lymphocytes 0.7 (*)     Absolute Monocytes 0.3      Absolute Eosinophils 0.0      Absolute Basophils 0.1      Absolute Immature Granulocytes 0.0      Absolute NRBCs 0.0     COVID-19 VIRUS (CORONAVIRUS) BY PCR - Normal    SARS CoV2 PCR Negative     INR - Normal    INR 0.98     PARTIAL THROMBOPLASTIN TIME - Normal    aPTT 30     TYPE AND SCREEN, ADULT    ABO/RH(D) A POS      Antibody Screen Negative      SPECIMEN EXPIRATION DATE [id number]     ABO/RH TYPE AND SCREEN      Emergency Department Course:  Reviewed:  I reviewed nursing notes, vitals, past medical history, Care Everywhere and [facility]    Assessments:  1531 I obtained history and examined the patient as noted above.   1640 I rechecked the patient and explained findings.     Interventions:  1556: fentaNYL (PF) (SUBLIMAZE) injection 50 mcg, IV   1707: fentaNYL (PF) (SUBLIMAZE) injection 50 mcg, IV     Disposition:  The patient was admitted to the hospital under the care of Dr. Nicholas.     Impression & Plan     Medical Decision Making:  Following presentation history and physical examination were performed, the above work-up was undertaken demonstrating a right-sided hip fracture as noted above.  Head to toe trauma examination demonstrates no secondary injury.  Laboratory work-up is reassuring.  Pain was managed with medications as noted above.  She will be admitted to the hospitalist service for operative planning.  She did not strike her head or lose consciousness there is no signs of acute intracranial hemorrhage I do not believe she needs any further advanced imaging.    Diagnosis:    ICD-10-CM    1. Hip fracture, right, closed, initial encounter (H)  S72.001A      Scribe Disclosure:  Maura RALPH, am serving as a scribe at 3:31 PM on 1/23/2022 to document services personally performed by Ramon York MD based on my observations and the provider's statements  to me.      Ramon York MD  01/23/22 9974

## 2022-01-24 ENCOUNTER — ANESTHESIA (OUTPATIENT)
Dept: SURGERY | Facility: CLINIC | Age: 77
DRG: 522 | End: 2022-01-24
Payer: MEDICARE

## 2022-01-24 ENCOUNTER — ANESTHESIA EVENT (OUTPATIENT)
Dept: SURGERY | Facility: CLINIC | Age: 77
DRG: 522 | End: 2022-01-24
Payer: MEDICARE

## 2022-01-24 ENCOUNTER — APPOINTMENT (OUTPATIENT)
Dept: GENERAL RADIOLOGY | Facility: CLINIC | Age: 77
DRG: 522 | End: 2022-01-24
Attending: HOSPITALIST
Payer: MEDICARE

## 2022-01-24 LAB
ANION GAP SERPL CALCULATED.3IONS-SCNC: 4 MMOL/L (ref 3–14)
BUN SERPL-MCNC: 16 MG/DL (ref 7–30)
CALCIUM SERPL-MCNC: 8.7 MG/DL (ref 8.5–10.1)
CHLORIDE BLD-SCNC: 103 MMOL/L (ref 94–109)
CO2 SERPL-SCNC: 26 MMOL/L (ref 20–32)
CREAT SERPL-MCNC: 0.69 MG/DL (ref 0.52–1.04)
GFR SERPL CREATININE-BSD FRML MDRD: >90 ML/MIN/1.73M2
GLUCOSE BLD-MCNC: 107 MG/DL (ref 70–99)
POTASSIUM BLD-SCNC: 3.7 MMOL/L (ref 3.4–5.3)
SODIUM SERPL-SCNC: 133 MMOL/L (ref 133–144)

## 2022-01-24 PROCEDURE — C1776 JOINT DEVICE (IMPLANTABLE): HCPCS | Performed by: ORTHOPAEDIC SURGERY

## 2022-01-24 PROCEDURE — 250N000011 HC RX IP 250 OP 636: Performed by: PHYSICIAN ASSISTANT

## 2022-01-24 PROCEDURE — 99231 SBSQ HOSP IP/OBS SF/LOW 25: CPT | Performed by: INTERNAL MEDICINE

## 2022-01-24 PROCEDURE — 36415 COLL VENOUS BLD VENIPUNCTURE: CPT | Performed by: HOSPITALIST

## 2022-01-24 PROCEDURE — 258N000003 HC RX IP 258 OP 636: Performed by: HOSPITALIST

## 2022-01-24 PROCEDURE — C1713 ANCHOR/SCREW BN/BN,TIS/BN: HCPCS | Performed by: ORTHOPAEDIC SURGERY

## 2022-01-24 PROCEDURE — 250N000013 HC RX MED GY IP 250 OP 250 PS 637: Performed by: INTERNAL MEDICINE

## 2022-01-24 PROCEDURE — 250N000009 HC RX 250: Performed by: REGISTERED NURSE

## 2022-01-24 PROCEDURE — 250N000009 HC RX 250: Performed by: PHYSICIAN ASSISTANT

## 2022-01-24 PROCEDURE — 999N000141 HC STATISTIC PRE-PROCEDURE NURSING ASSESSMENT: Performed by: ORTHOPAEDIC SURGERY

## 2022-01-24 PROCEDURE — 250N000011 HC RX IP 250 OP 636: Performed by: REGISTERED NURSE

## 2022-01-24 PROCEDURE — 0SR901Z REPLACEMENT OF RIGHT HIP JOINT WITH METAL SYNTHETIC SUBSTITUTE, OPEN APPROACH: ICD-10-PCS | Performed by: ORTHOPAEDIC SURGERY

## 2022-01-24 PROCEDURE — 258N000003 HC RX IP 258 OP 636: Performed by: ANESTHESIOLOGY

## 2022-01-24 PROCEDURE — 360N000077 HC SURGERY LEVEL 4, PER MIN: Performed by: ORTHOPAEDIC SURGERY

## 2022-01-24 PROCEDURE — 250N000013 HC RX MED GY IP 250 OP 250 PS 637: Performed by: HOSPITALIST

## 2022-01-24 PROCEDURE — 258N000003 HC RX IP 258 OP 636: Performed by: PHYSICIAN ASSISTANT

## 2022-01-24 PROCEDURE — 999N000063 XR PELVIS PORT 1/2 VIEWS

## 2022-01-24 PROCEDURE — 710N000009 HC RECOVERY PHASE 1, LEVEL 1, PER MIN: Performed by: ORTHOPAEDIC SURGERY

## 2022-01-24 PROCEDURE — 120N000001 HC R&B MED SURG/OB

## 2022-01-24 PROCEDURE — 250N000013 HC RX MED GY IP 250 OP 250 PS 637: Performed by: PHYSICIAN ASSISTANT

## 2022-01-24 PROCEDURE — 258N000003 HC RX IP 258 OP 636: Performed by: REGISTERED NURSE

## 2022-01-24 PROCEDURE — 250N000025 HC SEVOFLURANE, PER MIN: Performed by: ORTHOPAEDIC SURGERY

## 2022-01-24 PROCEDURE — 370N000017 HC ANESTHESIA TECHNICAL FEE, PER MIN: Performed by: ORTHOPAEDIC SURGERY

## 2022-01-24 PROCEDURE — 272N000001 HC OR GENERAL SUPPLY STERILE: Performed by: ORTHOPAEDIC SURGERY

## 2022-01-24 PROCEDURE — 250N000011 HC RX IP 250 OP 636: Performed by: HOSPITALIST

## 2022-01-24 PROCEDURE — 250N000009 HC RX 250: Performed by: ORTHOPAEDIC SURGERY

## 2022-01-24 PROCEDURE — 80048 BASIC METABOLIC PNL TOTAL CA: CPT | Performed by: HOSPITALIST

## 2022-01-24 PROCEDURE — 250N000013 HC RX MED GY IP 250 OP 250 PS 637: Performed by: ORTHOPAEDIC SURGERY

## 2022-01-24 PROCEDURE — 258N000001 HC RX 258: Performed by: ORTHOPAEDIC SURGERY

## 2022-01-24 DEVICE — APEX HOLE ELIMINATOR - PS
Type: IMPLANTABLE DEVICE | Site: HIP | Status: FUNCTIONAL
Brand: APEX

## 2022-01-24 DEVICE — PINNACLE CANCELLOUS BONE SCREW 6.5MM X 25MM
Type: IMPLANTABLE DEVICE | Site: HIP | Status: FUNCTIONAL
Brand: PINNACLE

## 2022-01-24 DEVICE — PINNACLE HIP SOLUTIONS ALTRX POLYETHYLENE ACETABULAR LINER +4 NEUTRAL 36MM ID 52MM OD
Type: IMPLANTABLE DEVICE | Site: HIP | Status: FUNCTIONAL
Brand: PINNACLE ALTRX

## 2022-01-24 DEVICE — TRI-LOCK BPS FEMORAL STEM 12/14 TAPER TRI-LOCK BPS W/GRIPTION SIZE 3 HI 101MM
Type: IMPLANTABLE DEVICE | Site: HIP | Status: FUNCTIONAL
Brand: TRI-LOCK GRIPTION

## 2022-01-24 DEVICE — PINNACLE CANCELLOUS BONE SCREW 6.5MM X 15MM
Type: IMPLANTABLE DEVICE | Site: HIP | Status: FUNCTIONAL
Brand: PINNACLE

## 2022-01-24 DEVICE — PINNACLE POROCOAT ACETABULAR SHELL SECTOR II 52MM OD
Type: IMPLANTABLE DEVICE | Site: HIP | Status: FUNCTIONAL
Brand: PINNACLE POROCOAT

## 2022-01-24 DEVICE — M-SPEC METAL FEMORAL HEAD 12/14 TAPER DIAMETER 36MM +1.5: Type: IMPLANTABLE DEVICE | Site: HIP | Status: FUNCTIONAL

## 2022-01-24 RX ORDER — FLUTICASONE PROPIONATE 50 MCG
1-2 SPRAY, SUSPENSION (ML) NASAL DAILY
Status: DISCONTINUED | OUTPATIENT
Start: 2022-01-24 | End: 2022-01-26 | Stop reason: HOSPADM

## 2022-01-24 RX ORDER — CEFAZOLIN SODIUM 1 G/3ML
1 INJECTION, POWDER, FOR SOLUTION INTRAMUSCULAR; INTRAVENOUS EVERY 8 HOURS
Status: COMPLETED | OUTPATIENT
Start: 2022-01-24 | End: 2022-01-25

## 2022-01-24 RX ORDER — LIDOCAINE 40 MG/G
CREAM TOPICAL
Status: DISCONTINUED | OUTPATIENT
Start: 2022-01-24 | End: 2022-01-24

## 2022-01-24 RX ORDER — FENTANYL CITRATE 0.05 MG/ML
25 INJECTION, SOLUTION INTRAMUSCULAR; INTRAVENOUS
Status: DISCONTINUED | OUTPATIENT
Start: 2022-01-24 | End: 2022-01-24 | Stop reason: HOSPADM

## 2022-01-24 RX ORDER — HYDROMORPHONE HCL IN WATER/PF 6 MG/30 ML
0.4 PATIENT CONTROLLED ANALGESIA SYRINGE INTRAVENOUS
Status: DISCONTINUED | OUTPATIENT
Start: 2022-01-24 | End: 2022-01-25

## 2022-01-24 RX ORDER — SODIUM CHLORIDE, SODIUM LACTATE, POTASSIUM CHLORIDE, CALCIUM CHLORIDE 600; 310; 30; 20 MG/100ML; MG/100ML; MG/100ML; MG/100ML
INJECTION, SOLUTION INTRAVENOUS CONTINUOUS
Status: DISCONTINUED | OUTPATIENT
Start: 2022-01-24 | End: 2022-01-24 | Stop reason: HOSPADM

## 2022-01-24 RX ORDER — ACETAMINOPHEN 325 MG/1
650 TABLET ORAL EVERY 4 HOURS PRN
Status: DISCONTINUED | OUTPATIENT
Start: 2022-01-27 | End: 2022-01-26 | Stop reason: HOSPADM

## 2022-01-24 RX ORDER — BISACODYL 10 MG
10 SUPPOSITORY, RECTAL RECTAL DAILY PRN
Status: DISCONTINUED | OUTPATIENT
Start: 2022-01-24 | End: 2022-01-26 | Stop reason: HOSPADM

## 2022-01-24 RX ORDER — FENTANYL CITRATE 50 UG/ML
INJECTION, SOLUTION INTRAMUSCULAR; INTRAVENOUS PRN
Status: DISCONTINUED | OUTPATIENT
Start: 2022-01-24 | End: 2022-01-24

## 2022-01-24 RX ORDER — LORATADINE 10 MG/1
10 TABLET ORAL DAILY
Status: DISCONTINUED | OUTPATIENT
Start: 2022-01-24 | End: 2022-01-26 | Stop reason: HOSPADM

## 2022-01-24 RX ORDER — OXYCODONE HYDROCHLORIDE 5 MG/1
5 TABLET ORAL EVERY 4 HOURS PRN
Status: DISCONTINUED | OUTPATIENT
Start: 2022-01-24 | End: 2022-01-25

## 2022-01-24 RX ORDER — TRANEXAMIC ACID 10 MG/ML
1 INJECTION, SOLUTION INTRAVENOUS ONCE
Status: COMPLETED | OUTPATIENT
Start: 2022-01-24 | End: 2022-01-24

## 2022-01-24 RX ORDER — MAGNESIUM HYDROXIDE 1200 MG/15ML
LIQUID ORAL PRN
Status: DISCONTINUED | OUTPATIENT
Start: 2022-01-24 | End: 2022-01-24 | Stop reason: HOSPADM

## 2022-01-24 RX ORDER — ONDANSETRON 4 MG/1
4 TABLET, ORALLY DISINTEGRATING ORAL EVERY 30 MIN PRN
Status: DISCONTINUED | OUTPATIENT
Start: 2022-01-24 | End: 2022-01-24 | Stop reason: HOSPADM

## 2022-01-24 RX ORDER — AMOXICILLIN 250 MG
1 CAPSULE ORAL 2 TIMES DAILY
Status: DISCONTINUED | OUTPATIENT
Start: 2022-01-24 | End: 2022-01-26 | Stop reason: HOSPADM

## 2022-01-24 RX ORDER — LIDOCAINE HYDROCHLORIDE 20 MG/ML
INJECTION, SOLUTION INFILTRATION; PERINEURAL PRN
Status: DISCONTINUED | OUTPATIENT
Start: 2022-01-24 | End: 2022-01-24

## 2022-01-24 RX ORDER — DOXYCYCLINE 100 MG/1
100 CAPSULE ORAL 2 TIMES DAILY
Status: DISCONTINUED | OUTPATIENT
Start: 2022-01-24 | End: 2022-01-26 | Stop reason: HOSPADM

## 2022-01-24 RX ORDER — HYDROXYZINE HYDROCHLORIDE 10 MG/1
10 TABLET, FILM COATED ORAL EVERY 6 HOURS PRN
Status: DISCONTINUED | OUTPATIENT
Start: 2022-01-24 | End: 2022-01-25

## 2022-01-24 RX ORDER — ONDANSETRON 2 MG/ML
INJECTION INTRAMUSCULAR; INTRAVENOUS PRN
Status: DISCONTINUED | OUTPATIENT
Start: 2022-01-24 | End: 2022-01-24

## 2022-01-24 RX ORDER — SODIUM CHLORIDE, SODIUM LACTATE, POTASSIUM CHLORIDE, CALCIUM CHLORIDE 600; 310; 30; 20 MG/100ML; MG/100ML; MG/100ML; MG/100ML
INJECTION, SOLUTION INTRAVENOUS CONTINUOUS
Status: DISCONTINUED | OUTPATIENT
Start: 2022-01-24 | End: 2022-01-25

## 2022-01-24 RX ORDER — ONDANSETRON 2 MG/ML
4 INJECTION INTRAMUSCULAR; INTRAVENOUS EVERY 30 MIN PRN
Status: DISCONTINUED | OUTPATIENT
Start: 2022-01-24 | End: 2022-01-24 | Stop reason: HOSPADM

## 2022-01-24 RX ORDER — ONDANSETRON 2 MG/ML
4 INJECTION INTRAMUSCULAR; INTRAVENOUS EVERY 6 HOURS PRN
Status: DISCONTINUED | OUTPATIENT
Start: 2022-01-24 | End: 2022-01-26 | Stop reason: HOSPADM

## 2022-01-24 RX ORDER — DEXMEDETOMIDINE HYDROCHLORIDE 4 UG/ML
INJECTION, SOLUTION INTRAVENOUS PRN
Status: DISCONTINUED | OUTPATIENT
Start: 2022-01-24 | End: 2022-01-24

## 2022-01-24 RX ORDER — GLYCOPYRROLATE 0.2 MG/ML
INJECTION, SOLUTION INTRAMUSCULAR; INTRAVENOUS PRN
Status: DISCONTINUED | OUTPATIENT
Start: 2022-01-24 | End: 2022-01-24

## 2022-01-24 RX ORDER — MEPERIDINE HYDROCHLORIDE 25 MG/ML
12.5 INJECTION INTRAMUSCULAR; INTRAVENOUS; SUBCUTANEOUS
Status: DISCONTINUED | OUTPATIENT
Start: 2022-01-24 | End: 2022-01-24 | Stop reason: HOSPADM

## 2022-01-24 RX ORDER — HYDROMORPHONE HCL IN WATER/PF 6 MG/30 ML
0.2 PATIENT CONTROLLED ANALGESIA SYRINGE INTRAVENOUS EVERY 5 MIN PRN
Status: DISCONTINUED | OUTPATIENT
Start: 2022-01-24 | End: 2022-01-24 | Stop reason: HOSPADM

## 2022-01-24 RX ORDER — ACETAMINOPHEN 325 MG/1
975 TABLET ORAL EVERY 8 HOURS
Status: DISCONTINUED | OUTPATIENT
Start: 2022-01-24 | End: 2022-01-26 | Stop reason: HOSPADM

## 2022-01-24 RX ORDER — PROCHLORPERAZINE MALEATE 5 MG
5 TABLET ORAL EVERY 6 HOURS PRN
Status: DISCONTINUED | OUTPATIENT
Start: 2022-01-24 | End: 2022-01-26 | Stop reason: HOSPADM

## 2022-01-24 RX ORDER — POLYETHYLENE GLYCOL 3350 17 G/17G
17 POWDER, FOR SOLUTION ORAL DAILY
Status: DISCONTINUED | OUTPATIENT
Start: 2022-01-25 | End: 2022-01-26 | Stop reason: HOSPADM

## 2022-01-24 RX ORDER — CEFAZOLIN SODIUM/WATER 2 G/20 ML
2 SYRINGE (ML) INTRAVENOUS
Status: COMPLETED | OUTPATIENT
Start: 2022-01-24 | End: 2022-01-24

## 2022-01-24 RX ORDER — CEFAZOLIN SODIUM/WATER 2 G/20 ML
2 SYRINGE (ML) INTRAVENOUS SEE ADMIN INSTRUCTIONS
Status: DISCONTINUED | OUTPATIENT
Start: 2022-01-24 | End: 2022-01-24

## 2022-01-24 RX ORDER — CEFAZOLIN SODIUM/WATER 2 G/20 ML
2 SYRINGE (ML) INTRAVENOUS
Status: DISCONTINUED | OUTPATIENT
Start: 2022-01-24 | End: 2022-01-24

## 2022-01-24 RX ORDER — PROPOFOL 10 MG/ML
INJECTION, EMULSION INTRAVENOUS CONTINUOUS PRN
Status: DISCONTINUED | OUTPATIENT
Start: 2022-01-24 | End: 2022-01-24

## 2022-01-24 RX ORDER — DEXAMETHASONE SODIUM PHOSPHATE 4 MG/ML
INJECTION, SOLUTION INTRA-ARTICULAR; INTRALESIONAL; INTRAMUSCULAR; INTRAVENOUS; SOFT TISSUE PRN
Status: DISCONTINUED | OUTPATIENT
Start: 2022-01-24 | End: 2022-01-24

## 2022-01-24 RX ORDER — PROPOFOL 10 MG/ML
INJECTION, EMULSION INTRAVENOUS PRN
Status: DISCONTINUED | OUTPATIENT
Start: 2022-01-24 | End: 2022-01-24

## 2022-01-24 RX ORDER — TRANEXAMIC ACID 10 MG/ML
1 INJECTION, SOLUTION INTRAVENOUS ONCE
Status: DISCONTINUED | OUTPATIENT
Start: 2022-01-24 | End: 2022-01-25

## 2022-01-24 RX ORDER — FENTANYL CITRATE 0.05 MG/ML
25 INJECTION, SOLUTION INTRAMUSCULAR; INTRAVENOUS EVERY 5 MIN PRN
Status: DISCONTINUED | OUTPATIENT
Start: 2022-01-24 | End: 2022-01-24 | Stop reason: HOSPADM

## 2022-01-24 RX ORDER — ONDANSETRON 4 MG/1
4 TABLET, ORALLY DISINTEGRATING ORAL EVERY 6 HOURS PRN
Status: DISCONTINUED | OUTPATIENT
Start: 2022-01-24 | End: 2022-01-26 | Stop reason: HOSPADM

## 2022-01-24 RX ORDER — TRANEXAMIC ACID 10 MG/ML
1 INJECTION, SOLUTION INTRAVENOUS ONCE
Status: DISCONTINUED | OUTPATIENT
Start: 2022-01-24 | End: 2022-01-24 | Stop reason: HOSPADM

## 2022-01-24 RX ORDER — HYDROMORPHONE HCL IN WATER/PF 6 MG/30 ML
0.2 PATIENT CONTROLLED ANALGESIA SYRINGE INTRAVENOUS
Status: DISCONTINUED | OUTPATIENT
Start: 2022-01-24 | End: 2022-01-25

## 2022-01-24 RX ORDER — OXYCODONE HYDROCHLORIDE 5 MG/1
10 TABLET ORAL EVERY 4 HOURS PRN
Status: DISCONTINUED | OUTPATIENT
Start: 2022-01-24 | End: 2022-01-25

## 2022-01-24 RX ORDER — NEOSTIGMINE METHYLSULFATE 1 MG/ML
VIAL (ML) INJECTION PRN
Status: DISCONTINUED | OUTPATIENT
Start: 2022-01-24 | End: 2022-01-24

## 2022-01-24 RX ORDER — CIPROFLOXACIN 0.5 MG/.25ML
0.25 SOLUTION/ DROPS AURICULAR (OTIC) 2 TIMES DAILY
Status: DISCONTINUED | OUTPATIENT
Start: 2022-01-24 | End: 2022-01-26 | Stop reason: HOSPADM

## 2022-01-24 RX ORDER — CEFAZOLIN SODIUM/WATER 2 G/20 ML
2 SYRINGE (ML) INTRAVENOUS SEE ADMIN INSTRUCTIONS
Status: DISCONTINUED | OUTPATIENT
Start: 2022-01-24 | End: 2022-01-24 | Stop reason: HOSPADM

## 2022-01-24 RX ADMIN — GLYCOPYRROLATE 0.6 MG: 0.2 INJECTION, SOLUTION INTRAMUSCULAR; INTRAVENOUS at 14:40

## 2022-01-24 RX ADMIN — PHENYLEPHRINE HYDROCHLORIDE 100 MCG: 10 INJECTION INTRAVENOUS at 14:22

## 2022-01-24 RX ADMIN — DEXAMETHASONE SODIUM PHOSPHATE 4 MG: 4 INJECTION, SOLUTION INTRA-ARTICULAR; INTRALESIONAL; INTRAMUSCULAR; INTRAVENOUS; SOFT TISSUE at 13:04

## 2022-01-24 RX ADMIN — ACETAMINOPHEN 975 MG: 325 TABLET, FILM COATED ORAL at 04:22

## 2022-01-24 RX ADMIN — TRANEXAMIC ACID 1 G: 10 INJECTION, SOLUTION INTRAVENOUS at 14:28

## 2022-01-24 RX ADMIN — ACETAMINOPHEN 975 MG: 325 TABLET, FILM COATED ORAL at 10:22

## 2022-01-24 RX ADMIN — PROPOFOL 150 MG: 10 INJECTION, EMULSION INTRAVENOUS at 12:35

## 2022-01-24 RX ADMIN — UMECLIDINIUM BROMIDE AND VILANTEROL TRIFENATATE 1 PUFF: 62.5; 25 POWDER RESPIRATORY (INHALATION) at 10:53

## 2022-01-24 RX ADMIN — RIVAROXABAN 10 MG: 10 TABLET, FILM COATED ORAL at 18:41

## 2022-01-24 RX ADMIN — FLUTICASONE PROPIONATE 1 SPRAY: 50 SPRAY, METERED NASAL at 10:53

## 2022-01-24 RX ADMIN — TRANEXAMIC ACID 1 G: 10 INJECTION, SOLUTION INTRAVENOUS at 12:29

## 2022-01-24 RX ADMIN — HYDROMORPHONE HYDROCHLORIDE 0.5 MG: 1 INJECTION, SOLUTION INTRAMUSCULAR; INTRAVENOUS; SUBCUTANEOUS at 13:13

## 2022-01-24 RX ADMIN — DEXMEDETOMIDINE HYDROCHLORIDE 12 MCG: 200 INJECTION INTRAVENOUS at 13:04

## 2022-01-24 RX ADMIN — NEOSTIGMINE METHYLSULFATE 4 MG: 1 INJECTION, SOLUTION INTRAVENOUS at 14:40

## 2022-01-24 RX ADMIN — FENTANYL CITRATE 100 MCG: 50 INJECTION, SOLUTION INTRAMUSCULAR; INTRAVENOUS at 12:35

## 2022-01-24 RX ADMIN — CIPROFLOXACIN 0.25 ML: 0.5 SOLUTION/ DROPS AURICULAR (OTIC) at 20:49

## 2022-01-24 RX ADMIN — HYDROMORPHONE HYDROCHLORIDE 0.2 MG: 0.2 INJECTION, SOLUTION INTRAMUSCULAR; INTRAVENOUS; SUBCUTANEOUS at 08:02

## 2022-01-24 RX ADMIN — PROPOFOL 30 MCG/KG/MIN: 10 INJECTION, EMULSION INTRAVENOUS at 12:45

## 2022-01-24 RX ADMIN — SODIUM CHLORIDE: 9 INJECTION, SOLUTION INTRAVENOUS at 04:21

## 2022-01-24 RX ADMIN — PHENYLEPHRINE HYDROCHLORIDE 0.5 MCG/KG/MIN: 10 INJECTION INTRAVENOUS at 12:45

## 2022-01-24 RX ADMIN — SENNOSIDES AND DOCUSATE SODIUM 1 TABLET: 50; 8.6 TABLET ORAL at 20:45

## 2022-01-24 RX ADMIN — PHENYLEPHRINE HYDROCHLORIDE 150 MCG: 10 INJECTION INTRAVENOUS at 12:40

## 2022-01-24 RX ADMIN — ROCURONIUM BROMIDE 10 MG: 50 INJECTION, SOLUTION INTRAVENOUS at 13:00

## 2022-01-24 RX ADMIN — SODIUM CHLORIDE, POTASSIUM CHLORIDE, SODIUM LACTATE AND CALCIUM CHLORIDE: 600; 310; 30; 20 INJECTION, SOLUTION INTRAVENOUS at 11:32

## 2022-01-24 RX ADMIN — ROCURONIUM BROMIDE 30 MG: 50 INJECTION, SOLUTION INTRAVENOUS at 12:35

## 2022-01-24 RX ADMIN — Medication 2 G: at 12:20

## 2022-01-24 RX ADMIN — CEFAZOLIN 1 G: 1 INJECTION, POWDER, FOR SOLUTION INTRAMUSCULAR; INTRAVENOUS at 20:43

## 2022-01-24 RX ADMIN — ROCURONIUM BROMIDE 10 MG: 50 INJECTION, SOLUTION INTRAVENOUS at 13:16

## 2022-01-24 RX ADMIN — HYDROMORPHONE HYDROCHLORIDE 0.5 MG: 1 INJECTION, SOLUTION INTRAMUSCULAR; INTRAVENOUS; SUBCUTANEOUS at 14:14

## 2022-01-24 RX ADMIN — OXYCODONE HYDROCHLORIDE 5 MG: 5 TABLET ORAL at 21:53

## 2022-01-24 RX ADMIN — LIDOCAINE HYDROCHLORIDE 80 MG: 20 INJECTION, SOLUTION INFILTRATION; PERINEURAL at 12:35

## 2022-01-24 RX ADMIN — ACETAMINOPHEN 975 MG: 325 TABLET, FILM COATED ORAL at 18:41

## 2022-01-24 RX ADMIN — OXYCODONE HYDROCHLORIDE 5 MG: 5 TABLET ORAL at 01:42

## 2022-01-24 RX ADMIN — DEXMEDETOMIDINE HYDROCHLORIDE 8 MCG: 200 INJECTION INTRAVENOUS at 13:13

## 2022-01-24 RX ADMIN — ONDANSETRON 4 MG: 2 INJECTION INTRAMUSCULAR; INTRAVENOUS at 14:34

## 2022-01-24 ASSESSMENT — ACTIVITIES OF DAILY LIVING (ADL)
ADLS_ACUITY_SCORE: 7
ADLS_ACUITY_SCORE: 7
ADLS_ACUITY_SCORE: 9
ADLS_ACUITY_SCORE: 9
ADLS_ACUITY_SCORE: 7
ADLS_ACUITY_SCORE: 6
ADLS_ACUITY_SCORE: 7
ADLS_ACUITY_SCORE: 6
ADLS_ACUITY_SCORE: 9
ADLS_ACUITY_SCORE: 9
ADLS_ACUITY_SCORE: 7
ADLS_ACUITY_SCORE: 6
ADLS_ACUITY_SCORE: 9
ADLS_ACUITY_SCORE: 7
ADLS_ACUITY_SCORE: 6
ADLS_ACUITY_SCORE: 9

## 2022-01-24 ASSESSMENT — COPD QUESTIONNAIRES: COPD: 1

## 2022-01-24 NOTE — OR NURSING
Transfer criteria met.  Dr Alcazar at bedside to assess; order received to transfer back to [facility] for continued recovery.  Awaiting availability of RN for hand-off report.

## 2022-01-24 NOTE — OP NOTE
Procedure Date: 01/24/2022    PREOPERATIVE DIAGNOSIS:  Displaced right femoral neck fracture.    POSTOPERATIVE DIAGNOSIS:  Displaced right femoral neck fracture.    PROCEDURE PERFORMED:  Right total hip arthroplasty using a DePuy size 3 high offset Tri-Lock stem with a 52 mm cup and a 36 mm head with a +4 neutral liner for a 52 mm cup and a 36 mm head.    INDICATIONS FOR PROCEDURE:  The patient is a 76-year-old community ambulator who fell while shoveling and sustained the above stated injury.  I discussed treatment options with the patient.  I explained the risks, benefits, and potential complications of total hip arthroplasty.  The discussion included but was not specific to infection, vascular, neurologic complications, DVT, leg length inequality, instability of the hip, fracture, septic and aseptic loosening, arthrofibrosis, and the possible need for further revision surgery.  After this discussion, the patient wanted to proceed with surgery.    ANESTHETIC USED:  General.    ASSISTANT:  Wolf Zhang PA-C    DESCRIPTION OF PROCEDURE:  The patient was taken to the operating room and placed on the operating table in supine position.  After adequate induction of general anesthetic, transferred to lateral position and held this way with a Moise hip prince, held with the right hip up.  Axillary roll was placed.  Care was taken to pad all bony prominences.  We then performed sterile prep and drape of the right hip and right lower extremity.  After sterile prep and drape, decision was made to proceed with anterolateral approach to the hip.  Identified the abductors, took down the anterior one-third of the abductors, tagged and retracted them medially.  We did a T capsulotomy, preserving the capsule for later repair.  We were then able to remove the femoral head and cut the femoral neck approximately 1 cm proximal to the lesser trochanter.  We then placed retractors anteriorly, superiorly and posteriorly to expose the  acetabulum and reamed to 51 mm, eventually put in a 52 mm cup.  Prior to placing the cup, we took reamings of the femoral head and used as bone graft.  Once the cup was fully seated, 3 screws were applied for additional fixation.  We then put in a manhole cover, followed by a +4 neutral liner for a 52 mm cup, 36 mm head.  We then directed our attention to the femur.  We used a broach only technique, broached to a size 4 stem.  We then reduced the hip, found it to be very stable in full extension, external rotation, flexion, adduction, internal rotation with restoration of leg length and offset.  We then dislocated the hip, removed the trial components, irrigated with pulse jet lavage and put down the actual stem.  Once the stem was fully seated, we applied a cobalt chrome head, +1.5 neck, 36 mm head.  Again, we reduced the hip, again found it to be very stable in full extension, external rotation, flexion, adduction, internal rotation with restoration of leg length and offset.  We then soaked the hip in a dilute solution of Betadine for 3 minutes and irrigated with pulse jet lavage; we used approximately 3 liters of antibiotic saline and pulse jet lavage.  We then repaired the capsule using 0 Ethibond.  We repaired the abductors using #2 Ethibond, fascial layer was closed using 0 Ethibond, was oversewn using 0 Stratafix, deep subQ was closed using 0 Vicryl, superficial subQ was closed with 2-0 Vicryl and skin was closed with a running 3-0 Monocryl subcuticular stitch, followed by Prineo dressing.  Sterile dressing applied, followed by an abduction pillow.  The patient tolerated the procedure.  There were no intraoperative complications.  The patient [facility] to the [facility] in [facility] condition.  The plan will be for the patient to get 24 hours of Ancef for infection prophylaxis, 30 days of aspirin for DVT prophylaxis.    Maddy Arango MD        D: 01/24/2022   T: 01/24/2022   MT: [id number]    Name:     LENARD OCASIO  SUDHA  MRN:      [id number]        Account:        [id number]   :      1945           Procedure Date: 2022     Document: [id number]

## 2022-01-24 NOTE — PROGRESS NOTES
A/OX4, NPO from midnight. Bedrest, IVF infusing. IV dilaudid given this morning for pain. Surgical wipes done, repost given to pre op nurse.Pt went down for surgery.

## 2022-01-24 NOTE — PLAN OF CARE
Summary: Pt is A&OX4, VSS on RA, rt hip pain controlled with cold compresses,Tylenol, oxy.Strict bedrest with weight shift assistance,PCDs on.NPO except meds, PIV with Nacl cont@100, pure wick in place and draining clear darrin urine,ortho surgery consult pending with plans for surgery today possibly around noon.

## 2022-01-24 NOTE — OR NURSING
Hand-off report given to Gema ROBERTO.  To 2301 per bed.  Will transport with Capnography monitoring.

## 2022-01-24 NOTE — CONSULTS
"[facility]    Orthopedic Consultation    Raisa Archer MRN# [id number]   Age: 76 year old YOB: 1945     Date of Admission:  1/23/2022    Reason for consult: Right hip fracture       Requesting physician: Dr. Nicholas       Level of consult: Consult, follow and place orders           Assessment and Plan:   Assessment:   Right femoral neck fracture, fall from standing      Plan:   OR today 1/24 for right JUANITA with Dr. Arango  NPO   Bed rest  Prefer pure wick over orourke if at all possible  Pain medication as needed, limit narcotics as able  Type and cross  Vit D lab  Pre-op optimization per hospitalist           Chief Complaint:   Right hip pain/fracture         History of Present Illness:   This patient is a 76 year old female who presents with the following condition requiring a hospital admission:    Per ED: Earlier today, the patient was shoveling her stairs when she slipped and had a mechanical fall. She landed on the shovel with her right hip. She denies any head trauma or loss of consciousness. Pat was unable to ambulate after the fall, so EMS was called. En route to the emergency department, EMS gave her 50 mcg of Fentanyl along with 300 mL of NS. The pain in her right hip decreased from \"10/10 to 7/10\" following the Fentanyl. Here, she describes pain radiating from her right hip into her leg. She denies any numbness or history of blood pressure problems or diabetes mellitus.   She is not chronically anticoagulated.           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     Breast cancer, right breast (H)      Centrilobular emphysema (H)      Centrilobular emphysema (H)      Colon polyps     repeat colonoscopy 2019     Left tennis elbow      Rash     currently at masectomy site     Unspecified essential hypertension     situational and resolved             Past Surgical History:     Past Surgical History:   Procedure " Laterality Date     ABDOMEN SURGERY       BIOPSY       BREAST SURGERY       CATARACT IOL, RT/LT       CHOLECYSTECTOMY       COLONOSCOPY       COLONOSCOPY N/A 10/4/2019    Procedure: COLONOSCOPY, WITH POLYPECTOMY AND BIOPSY;  Surgeon: Lucía John MD;  Location:  GI     ORTHOPEDIC SURGERY       PHACOEMULSIFICATION CLEAR CORNEA WITH STANDARD INTRAOCULAR LENS IMPLANT  2014    Procedure: PHACOEMULSIFICATION CLEAR CORNEA WITH STANDARD INTRAOCULAR LENS IMPLANT;  Surgeon: Rahul Reid MD;  Location:  [facility]     PHACOEMULSIFICATION CLEAR CORNEA WITH STANDARD INTRAOCULAR LENS IMPLANT  2014    Procedure: PHACOEMULSIFICATION CLEAR CORNEA WITH STANDARD INTRAOCULAR LENS IMPLANT;  Surgeon: Rahul Reid MD;  Location:  OR     SURGICAL HISTORY OF -       left foot reconstruction     SURGICAL HISTORY OF 1979    hysterectomy             Social History:     Social History     Tobacco Use     Smoking status: Former Smoker     Packs/day: 1.50     Years: 30.00     Pack years: 45.00     Types: Cigarettes     Start date: 1967     Quit date: 5/10/2014     Years since quittin.7     Smokeless tobacco: Never Used     Tobacco comment: Quit several times over the years including when pregnant   Substance Use Topics     Alcohol use: No     Alcohol/week: 0.0 standard drinks             Family History:     Family History   Problem Relation Age of Onset     Breast Cancer Sister         Going thru 2nd round of breast cancer     Diabetes Sister         overweight     Cancer Brother      Asthma Brother      Other Cancer Brother         Hodkins Lymphoma/Hodkins Lymphoma/Hodkins Lymphoma/Hodkins Lymphoma     Diabetes Brother         overweight     Cerebrovascular Disease Father         Had several small ones     Cancer Other         Stomach cancer- cousin     Breast Cancer Other         Aunts     Diabetes Other         Uncles and cousin/Paternal Uncles/Paternal Uncles/Paternal Uncles/Paternal Uncles     Cancer  Brother      Obesity Brother      Asthma Brother      Cancer Other         Family Hx of colon cancer     Breast Cancer Other         Paternal Aunt/Paternal Aunt/Paternal Aunt/Paternal Aunt     Colon Cancer Other         Maternal Great Uncles     Breast Cancer Sister      Breast Cancer Cousin         Paternal/Paternal     Colon Cancer Other         Maternal Uncle/Maternal Great Uncles/Maternal Great Uncles/Maternal Great Uncles     Obesity Sister         On diet/On diet/On diet     Obesity Brother      Asthma Niece         passed away of pneumonia     Diabetes Other      Breast Cancer Other         had double masectomys     Colon Cancer Other              Immunizations:     VACCINE/DOSE   Diptheria   DPT   DTAP   HBIG   Hepatitis A   Hepatitis B   HIB   Influenza   Measles   Meningococcal   MMR   Mumps   Pneumococcal   Polio   Rubella   Small Pox   TDAP   Varicella   Zoster             Allergies:     Allergies   Allergen Reactions     Amoxicillin Diarrhea     Aspirin Nausea and Vomiting     sick     Chantix [Varenicline Tartrate] Other (See Comments)     Sees things     Glycerin Itching     LIQUID,   Reactions: itchy and redness       Ipratropium Other (See Comments)     atrovent inhaler caused sore, tight throat , chest hurt, breathless.      Sympathomimetics Other (See Comments)     Patient doesn't know why this is listed as an allergy     Varenicline GI Disturbance, Other (See Comments) and Nausea and Vomiting     Wool Fiber Itching     Redness              Medications:     Current Facility-Administered Medications   Medication     ciprofloxacin (CETRAXAL) 0.2 % otic solution 0.25 mL     cyclobenzaprine (FLEXERIL) tablet 5 mg     doxycycline hyclate (VIBRAMYCIN) capsule 100 mg     fluticasone (FLONASE) 50 MCG/ACT spray 1-2 spray     HYDROmorphone (DILAUDID) injection 0.2-0.4 mg     lidocaine (LMX4) cream     lidocaine 1 % 0.1-1 mL     loratadine (CLARITIN) tablet 10 mg     melatonin tablet 1 mg     naloxone  (NARCAN) injection 0.2 mg    Or     naloxone (NARCAN) injection 0.4 mg    Or     naloxone (NARCAN) injection 0.2 mg    Or     naloxone (NARCAN) injection 0.4 mg     oxyCODONE (ROXICODONE) tablet 5 mg     polyethylene glycol (MIRALAX) Packet 17 g     sodium chloride (PF) 0.9% PF flush 3 mL     sodium chloride (PF) 0.9% PF flush 3 mL     sodium chloride 0.9% infusion     umeclidinium-vilanterol (ANORO ELLIPTA) 62.5-25 MCG/INH oral inhaler 1 puff             Review of Systems:   CV: NEGATIVE for chest pain, palpitations or peripheral edema  C: NEGATIVE for fever, chills, change in weight  E/M: NEGATIVE for ear, mouth and throat problems  R: NEGATIVE for significant cough or SOB          Physical Exam:   All vitals have been reviewed  Patient Vitals for the past 24 hrs:   BP Temp Temp src Pulse Resp SpO2 Weight   01/24/22 0802 113/65 98.2  F (36.8  C) Oral 82 18 92 % --   01/24/22 0142 105/62 98.3  F (36.8  C) Oral 84 20 93 % --   01/23/22 1825 (!) 160/79 97.6  F (36.4  C) Oral 90 16 94 % --   01/23/22 1808 -- -- -- -- -- 95 % --   01/23/22 1800 132/67 -- -- 81 -- 94 % --   01/23/22 1749 -- -- -- -- -- 93 % --   01/23/22 1730 137/61 -- -- 86 -- -- --   01/23/22 1729 -- -- -- -- -- 94 % --   01/23/22 1702 135/58 -- -- 86 -- 95 % --   01/23/22 1636 -- -- -- -- -- 94 % --   01/23/22 1600 (!) 142/64 -- -- 83 -- 93 % --   01/23/22 1559 -- -- -- -- -- 95 % --   01/23/22 1534 -- -- -- -- -- 94 % --   01/23/22 1533 -- -- -- -- -- 95 % --   01/23/22 1530 135/72 -- -- 84 -- 96 % --   01/23/22 1525 (!) 146/60 97.9  F (36.6  C) Oral 89 16 94 % 72.6 kg (160 lb)       Intake/Output Summary (Last 24 hours) at 1/24/2022 1055  Last data filed at 1/24/2022 0600  Gross per 24 hour   Intake --   Output 550 ml   Net -550 ml     Patient laying comfortably in bed  Right LE shortened and internally rotated  Minimal erythema of the surrounding skin.   Bilateral calves are soft, non-tender.  Bilateral lower extremity is NVI.  Sensation intact  bilateral lower extremities  5/5 motor with resisted dorsi and plantar flexion bilaterally  +Dp pulse            Data:   All laboratory data reviewed  Results for orders placed or performed during the hospital encounter of 01/23/22   XR Pelvis w Hip Right 1 View     Status: None    Narrative    EXAM: XR PELVIS AND HIP RIGHT 1 VIEW  LOCATION: [facility]  DATE/TIME: 1/23/2022 4:01 PM    INDICATION: Pain.  COMPARISON: None.      Impression    IMPRESSION: There is a displaced transverse fracture of the right femoral neck with a typical varus angulation at the fracture site. Mild bilateral hip degenerative changes. Degenerative changes in the lower lumbar spine.   Asymptomatic COVID-19 Virus (Coronavirus) by PCR Nasopharyngeal     Status: Normal    Specimen: Nasopharyngeal; Swab   Result Value Ref Range    SARS CoV2 PCR Negative Negative    Narrative    Testing was performed using the [place]  SARS-CoV-2 & Influenza A/B Assay on the [place]  [employer]  System.  This test should be ordered for the detection of SARS-COV-2 in individuals who meet SARS-CoV-2 clinical and/or epidemiological criteria. Test performance is unknown in asymptomatic patients.  This test is for in vitro diagnostic use under the FDA EUA for laboratories certified under CLIA to perform moderate and/or high complexity testing. This test has not been FDA cleared or approved.  A negative test does not rule out the presence of PCR inhibitors in the specimen or target RNA in concentration below the limit of detection for the assay. The possibility of a false negative should be considered if the patient's recent exposure or clinical presentation suggests COVID-19.  [facility] are certified under the Clinical Laboratory Improvement Amendments of 1988 (CLIA-88) as qualified to perform moderate and/or high complexity laboratory testing.   INR     Status: Normal   Result Value Ref Range    INR 0.98 0.85 - 1.15   Partial  thromboplastin time     Status: Normal   Result Value Ref Range    aPTT 30 22 - 38 Seconds   Basic metabolic panel     Status: Abnormal   Result Value Ref Range    Sodium 135 133 - 144 mmol/L    Potassium 4.0 3.4 - 5.3 mmol/L    Chloride 103 94 - 109 mmol/L    Carbon Dioxide (CO2) 28 20 - 32 mmol/L    Anion Gap 4 3 - 14 mmol/L    Urea Nitrogen 19 7 - 30 mg/dL    Creatinine 0.75 0.52 - 1.04 mg/dL    Calcium 9.0 8.5 - 10.1 mg/dL    Glucose 102 (H) 70 - 99 mg/dL    GFR Estimate 83 >60 mL/min/1.73m2   CBC with platelets and differential     Status: Abnormal   Result Value Ref Range    WBC Count 6.2 4.0 - 11.0 10e3/uL    RBC Count 4.55 3.80 - 5.20 10e6/uL    Hemoglobin 12.8 11.7 - 15.7 g/dL    Hematocrit 41.5 35.0 - 47.0 %    MCV 91 78 - 100 fL    MCH 28.1 26.5 - 33.0 pg    MCHC 30.8 (L) 31.5 - 36.5 g/dL    RDW 12.6 10.0 - 15.0 %    Platelet Count 171 150 - 450 10e3/uL    % Neutrophils 83 %    % Lymphocytes 11 %    % Monocytes 5 %    % Eosinophils 0 %    % Basophils 1 %    % Immature Granulocytes 0 %    NRBCs per 100 WBC 0 <1 /100    Absolute Neutrophils 5.2 1.6 - 8.3 10e3/uL    Absolute Lymphocytes 0.7 (L) 0.8 - 5.3 10e3/uL    Absolute Monocytes 0.3 0.0 - 1.3 10e3/uL    Absolute Eosinophils 0.0 0.0 - 0.7 10e3/uL    Absolute Basophils 0.1 0.0 - 0.2 10e3/uL    Absolute Immature Granulocytes 0.0 <=0.4 10e3/uL    Absolute NRBCs 0.0 10e3/uL   Basic metabolic panel     Status: Abnormal   Result Value Ref Range    Sodium 133 133 - 144 mmol/L    Potassium 3.7 3.4 - 5.3 mmol/L    Chloride 103 94 - 109 mmol/L    Carbon Dioxide (CO2) 26 20 - 32 mmol/L    Anion Gap 4 3 - 14 mmol/L    Urea Nitrogen 16 7 - 30 mg/dL    Creatinine 0.69 0.52 - 1.04 mg/dL    Calcium 8.7 8.5 - 10.1 mg/dL    Glucose 107 (H) 70 - 99 mg/dL    GFR Estimate >90 >60 mL/min/1.73m2   EKG 12-lead, tracing only     Status: None   Result Value Ref Range    Systolic Blood Pressure  mmHg    Diastolic Blood Pressure  mmHg    Ventricular Rate 75 BPM    Atrial Rate 75  BPM    MA Interval 152 ms    QRS Duration 74 ms     ms    QTc 444 ms    P Axis 69 degrees    R AXIS 23 degrees    T Axis 45 degrees    Interpretation ECG       Sinus rhythm with Premature supraventricular complexes  Low voltage QRS  Borderline ECG  When compared with ECG of 06-NOV-2006 11:52,  Premature supraventricular complexes are now Present  Confirmed by GENERATED REPORT, COMPUTER ([id number]),  KATHY CORRALES ([id number]) on 1/23/2022 3:53:25 PM     Adult Type and Screen     Status: None   Result Value Ref Range    ABO/RH(D) A POS     Antibody Screen Negative Negative    SPECIMEN EXPIRATION DATE [id number]    CBC with platelets differential     Status: Abnormal    Narrative    The following orders were created for panel order CBC with platelets differential.  Procedure                               Abnormality         Status                     ---------                               -----------         ------                     CBC with platelets and d...[[id number]]  Abnormal            Final result                 Please view results for these tests on the individual orders.   ABO/Rh type and screen     Status: None    Narrative    The following orders were created for panel order ABO/Rh type and screen.  Procedure                               Abnormality         Status                     ---------                               -----------         ------                     Adult Type and Screen[[id number]]                            Final result                 Please view results for these tests on the individual orders.          Attestation:  I have reviewed today's vital signs, notes, medications, labs and imaging with Dr. Arango.  Amount of time performed on this consult: 30 minutes.    Lucía Roca PA-C

## 2022-01-24 NOTE — PROVIDER NOTIFICATION
Called paged to Luda Bloom regarding pt allergy to Aspirin and PA ordered Xarelto 10 mg daily and discontinued Aspirin.

## 2022-01-24 NOTE — PLAN OF CARE
Date/Time: January 23, 2022 1900-2300  Reason for Admission: Right femoral neck fracture    Cognitive/Mentation: AOx4  Neuros/CMS: pt states numbness & tingling   VS:VSS, on RA. B/P: 160/79, T: 97.6, P: 90, R: 16     GI: Denies n/v.  : Purewick in place  Pain: Pt c/o of pain, oxycodone covering.       Skin:Scatted bruising.   Activity: Bedrest  Diet: NPO      Discharge: Pending.     End of shift summary: Q2h repos. No other events overnight

## 2022-01-24 NOTE — ANESTHESIA CARE TRANSFER NOTE
Patient: Raisa Archer    Procedure: Procedure(s):  ARTHROPLASTY, HIP, TOTAL       Diagnosis: Hip fracture (H) [S72.009A]  Diagnosis Additional Information: No value filed.    Anesthesia Type:   General     Note:    Oropharynx: oropharynx clear of all foreign objects  Level of Consciousness: awake  Oxygen Supplementation: nasal cannula  Level of Supplemental Oxygen (L/min / FiO2): 2  Independent Airway: airway patency satisfactory and stable  Dentition: dentition unchanged  Vital Signs Stable: post-procedure vital signs reviewed and stable  Report to RN Given: handoff report given  Patient transferred to: PACU    Handoff Report: Identifed the Patient, Identified the Reponsible Provider, Reviewed the pertinent medical history, Discussed the surgical course, Reviewed Intra-OP anesthesia mangement and issues during anesthesia, Set expectations for post-procedure period and Allowed opportunity for questions and acknowledgement of understanding      Vitals:  Vitals Value Taken Time   /68 01/24/22 1513   Temp     Pulse 97 01/24/22 1516   Resp 14 01/24/22 1516   SpO2 95 % 01/24/22 1516   Vitals shown include unvalidated device data.    Electronically Signed By: IZABELA Martin CRNA  January 24, 2022  3:17 PM

## 2022-01-24 NOTE — ANESTHESIA POSTPROCEDURE EVALUATION
Patient: Raisa Archer    Procedure: Procedure(s):  ARTHROPLASTY, HIP, TOTAL       Diagnosis:Hip fracture (H) [S72.009A]  Diagnosis Additional Information: No value filed.    Anesthesia Type:  General    Note:  Disposition: Outpatient   Postop Pain Control: Uneventful            Sign Out: Well controlled pain   PONV: No   Neuro/Psych: Uneventful            Sign Out: Acceptable/Baseline neuro status   Airway/Respiratory: Uneventful            Sign Out: Acceptable/Baseline resp. status   CV/Hemodynamics: Uneventful            Sign Out: Acceptable CV status   Other NRE: NONE   DID A NON-ROUTINE EVENT OCCUR? No           Last vitals:  Vitals Value Taken Time   /59 01/24/22 1545   Temp 36.8  C (98.3  F) 01/24/22 1545   Pulse 96 01/24/22 1549   Resp 20 01/24/22 1549   SpO2 96 % 01/24/22 1549   Vitals shown include unvalidated device data.    Electronically Signed By: Jacob Alcazar MD  January 24, 2022  3:50 PM

## 2022-01-24 NOTE — PROGRESS NOTES
Ortho treatment plan    Aware of patient with Right femoral neck fracture    Plan  OR today 1/24 for right JUANITA with Dr. Arango  NPO   Bed rest  Prefer pure wick over orourke if at all possible  Pain medication as needed, limit narcotics as able  Type and cross  Vit D lab  Pre-op optimization per hospitalist    Call with questions or concerns    Lucía Roca PAC

## 2022-01-24 NOTE — ANESTHESIA PREPROCEDURE EVALUATION
Anesthesia Pre-Procedure Evaluation    Patient: Raisa Archer   MRN: [id number] : 1945        Preoperative Diagnosis: Hip fracture (H) [S72.009A]    Procedure : Procedure(s):  ARTHROPLASTY, HIP, TOTAL          Past Medical History:   Diagnosis Date     Arthritis     In fingers     Breast cancer, right breast (H)      Centrilobular emphysema (H)      Centrilobular emphysema (H)      Colon polyps     repeat colonoscopy      Left tennis elbow      Rash     currently at masectomy site     Unspecified essential hypertension     situational and resolved      Past Surgical History:   Procedure Laterality Date     ABDOMEN SURGERY       BIOPSY       BREAST SURGERY       CATARACT IOL, RT/LT       CHOLECYSTECTOMY       COLONOSCOPY       COLONOSCOPY N/A 10/4/2019    Procedure: COLONOSCOPY, WITH POLYPECTOMY AND BIOPSY;  Surgeon: Lucía John MD;  Location:  GI     ORTHOPEDIC SURGERY       PHACOEMULSIFICATION CLEAR CORNEA WITH STANDARD INTRAOCULAR LENS IMPLANT  2014    Procedure: PHACOEMULSIFICATION CLEAR CORNEA WITH STANDARD INTRAOCULAR LENS IMPLANT;  Surgeon: Rahul Reid MD;  Location:  [facility]     PHACOEMULSIFICATION CLEAR CORNEA WITH STANDARD INTRAOCULAR LENS IMPLANT  2014    Procedure: PHACOEMULSIFICATION CLEAR CORNEA WITH STANDARD INTRAOCULAR LENS IMPLANT;  Surgeon: Rahul Reid MD;  Location:  OR     SURGICAL HISTORY OF -       left foot reconstruction     SURGICAL HISTORY OF -       hysterectomy      Allergies   Allergen Reactions     Amoxicillin Diarrhea     Aspirin Nausea and Vomiting     sick     Chantix [Varenicline Tartrate] Other (See Comments)     Sees things     Glycerin Itching     LIQUID,   Reactions: itchy and redness       Ipratropium Other (See Comments)     atrovent inhaler caused sore, tight throat , chest hurt, breathless.      Sympathomimetics Other (See Comments)     Patient doesn't know why this is listed as an allergy     Varenicline GI  Disturbance, Other (See Comments) and Nausea and Vomiting     Wool Fiber Itching     Redness       Social History     Tobacco Use     Smoking status: Former Smoker     Packs/day: 1.50     Years: 30.00     Pack years: 45.00     Types: Cigarettes     Start date: 1967     Quit date: 5/10/2014     Years since quittin.7     Smokeless tobacco: Never Used     Tobacco comment: Quit several times over the years including when pregnant   Substance Use Topics     Alcohol use: No     Alcohol/week: 0.0 standard drinks      Wt Readings from Last 1 Encounters:   22 72.6 kg (160 lb)        Anesthesia Evaluation   Pt has had prior anesthetic.     No history of anesthetic complications       ROS/MED HX  ENT/Pulmonary: Comment: Left otitis media    (+) COPD,     Neurologic:  - neg neurologic ROS     Cardiovascular:     (+) hypertension-----    METS/Exercise Tolerance:     Hematologic:  - neg hematologic  ROS     Musculoskeletal: Comment: Muscle spasms. - neg musculoskeletal ROS     GI/Hepatic:  - neg GI/hepatic ROS     Renal/Genitourinary:  - neg Renal ROS     Endo:  - neg endo ROS     Psychiatric/Substance Use:  - neg psychiatric ROS     Infectious Disease:  - neg infectious disease ROS     Malignancy: Comment: Breast cancer, right breast (H)  (+) Malignancy, History of Breast.    Other:            Physical Exam    Airway        Mallampati: II   TM distance: > 3 FB   Neck ROM: full   Mouth opening: > 3 cm    Respiratory Devices and Support         Dental  no notable dental history         Cardiovascular   cardiovascular exam normal       Rhythm and rate: regular and normal     Pulmonary   pulmonary exam normal                OUTSIDE LABS:  CBC:   Lab Results   Component Value Date    WBC 6.2 2022    WBC 4.5 2018    HGB 12.8 2022    HGB 13.7 2018    HCT 41.5 2022    HCT 43.0 2018     2022     2018     BMP:   Lab Results   Component Value Date      01/24/2022     01/23/2022    POTASSIUM 3.7 01/24/2022    POTASSIUM 4.0 01/23/2022    CHLORIDE 103 01/24/2022    CHLORIDE 103 01/23/2022    CO2 26 01/24/2022    CO2 28 01/23/2022    BUN 16 01/24/2022    BUN 19 01/23/2022    CR 0.69 01/24/2022    CR 0.75 01/23/2022     (H) 01/24/2022     (H) 01/23/2022     COAGS:   Lab Results   Component Value Date    PTT 30 01/23/2022    INR 0.98 01/23/2022     POC:   Lab Results   Component Value Date    BGM 94 12/22/2006     HEPATIC:   Lab Results   Component Value Date    ALBUMIN 3.8 08/13/2020    PROTTOTAL 7.2 08/13/2020    ALT 30 08/13/2020    AST 27 08/13/2020    ALKPHOS 103 08/13/2020    BILITOTAL 0.6 08/13/2020     OTHER:   Lab Results   Component Value Date    A1C 5.2 04/23/2018    JOJO 8.7 01/24/2022    LIPASE 105 05/16/2018    AMYLASE 48 02/06/2009    TSH 1.07 07/15/2009    SED 12 07/05/2013       Anesthesia Plan    ASA Status:  2      Anesthesia Type: General.     - Airway: ETT   Induction: Intravenous.   Maintenance: Balanced.   Techniques and Equipment:     - Airway: Video-Laryngoscope         Consents    Anesthesia Plan(s) and associated risks, benefits, and realistic alternatives discussed. Questions answered and patient/representative(s) expressed understanding.    - Discussed:     - Discussed with:  Patient         Postoperative Care    Pain management: IV analgesics, Multi-modal analgesia.   PONV prophylaxis: Ondansetron (or other 5HT-3), Dexamethasone or Solumedrol     Comments:                Jacob Alcazar MD

## 2022-01-24 NOTE — ANESTHESIA PROCEDURE NOTES
Airway         Procedure Start/Stop Times: 1/24/2022 12:40 PM  Staff -        Anesthesiologist:  Jacob Alcazar MD       CRNA: Fidencio Garnett APRN CRNA       Performed By: CRNA  Consent for Airway        Urgency: elective  Indications and Patient Condition       Indications for airway management: jerry-procedural       Induction type:intravenous       Mask difficulty assessment: 2 - vent by mask + OA or adjuvant +/- NMBA    Final Airway Details       Final airway type: endotracheal airway       Successful airway: ETT - single  Endotracheal Airway Details        ETT size (mm): 7.0       Cuffed: yes       Cuff volume (mL): 10       Successful intubation technique: video laryngoscopy       VL Blade Size: Pitts 3       Grade View of Cords: 1       Adjucts: stylet       Position: Right       Measured from: lips       Secured at (cm): 21       Bite block used: None    Post intubation assessment        Placement verified by: capnometry, equal breath sounds and chest rise        Number of attempts at approach: 1       Number of other approaches attempted: 0       Secured with: pink tape       Ease of procedure: easy       Dentition: Intact and Unchanged

## 2022-01-24 NOTE — BRIEF OP NOTE
[facility]    Brief Operative Note    Pre-operative diagnosis: * No pre-op diagnosis entered *  Post-operative diagnosis Same as pre-operative diagnosis    Procedure: * No procedures listed *  Surgeon: * No surgeons found in log *  Anesthesia: * No anesthesia type entered *   Estimated Blood Loss: Less than 50 ml    Drains: None  Specimens: * No specimens in log *  Findings:   None.  Complications: None.  Implants: * No implants in log *

## 2022-01-24 NOTE — CONSULTS
Consult Date: 2022    ORTHOPEDIC CONSULTATION    REASON FOR CONSULTATION:  I was asked to provide Orthopedic consultation for this 76-year-old female who fell in her home while shoveling yesterday and sustained a displaced right femoral neck fracture.  She has a history of hip pain prior to this and has known osteoarthritis of her right hip.  She is otherwise relatively healthy.  She does have a history of COPD.  She is on an inhaler for that.  She has had breast cancer in the past and has had breast surgery.  She also had a cholecystectomy.  She has had no difficulty with her surgeries.     You can refer to her admission history and physical for the specifics of her past medical history and medications.    REVIEW OF SYSTEMS:  She denied recent fever, chills, nausea, vomiting, diarrhea, chest pain, shortness of breath.    PHYSICAL EXAMINATION:    GENERAL:  She is a healthy-appearing 76-year-old, who is alert and oriented x 3.  She is pleasant, in no acute distress.  Her skin is intact in both lower extremities.  Both lower extremities are well perfused.  LUNGS:  Clear to auscultation bilaterally.    CARDIOVASCULAR:  Regular rate and rhythm.  ABDOMEN:  Benign.   EXTREMITIES:  She is able to flex and extend the ankle and toes bilaterally.    RADIOGRAPHS:  X-rays were reviewed; AP pelvis and lateral of the right hip show a displaced right femoral neck fracture with joint space narrowing consistent with osteoarthritis.    IMPRESSION:  Healthy 76-year-old community ambulator with a displaced right femoral neck fracture in the setting of osteoarthritis.      PLAN:  I discussed the diagnosis and treatment options and recommended total hip arthroplasty.  I explained the risks, benefits, and potential complications.  The plan is to proceed today.    Maddy Arango MD        D: 2022   T: 2022   MT: KATHY    Name:     LENARD OCASIO  MRN:      [id number]        Account:      [id number]   :       1945           Consult Date: 01/24/2022     Document: [id number]

## 2022-01-25 ENCOUNTER — APPOINTMENT (OUTPATIENT)
Dept: PHYSICAL THERAPY | Facility: CLINIC | Age: 77
DRG: 522 | End: 2022-01-25
Attending: PHYSICIAN ASSISTANT
Payer: MEDICARE

## 2022-01-25 ENCOUNTER — APPOINTMENT (OUTPATIENT)
Dept: OCCUPATIONAL THERAPY | Facility: CLINIC | Age: 77
DRG: 522 | End: 2022-01-25
Attending: PHYSICIAN ASSISTANT
Payer: MEDICARE

## 2022-01-25 LAB
ERYTHROCYTE [DISTWIDTH] IN BLOOD BY AUTOMATED COUNT: 12.5 % (ref 10–15)
GLUCOSE BLDC GLUCOMTR-MCNC: 100 MG/DL (ref 70–99)
HCT VFR BLD AUTO: 35.9 % (ref 35–47)
HGB BLD-MCNC: 11.1 G/DL (ref 11.7–15.7)
MCH RBC QN AUTO: 28.3 PG (ref 26.5–33)
MCHC RBC AUTO-ENTMCNC: 30.9 G/DL (ref 31.5–36.5)
MCV RBC AUTO: 92 FL (ref 78–100)
PLATELET # BLD AUTO: 145 10E3/UL (ref 150–450)
RBC # BLD AUTO: 3.92 10E6/UL (ref 3.8–5.2)
WBC # BLD AUTO: 10.3 10E3/UL (ref 4–11)

## 2022-01-25 PROCEDURE — 250N000013 HC RX MED GY IP 250 OP 250 PS 637: Performed by: PHYSICIAN ASSISTANT

## 2022-01-25 PROCEDURE — 250N000011 HC RX IP 250 OP 636: Performed by: PHYSICIAN ASSISTANT

## 2022-01-25 PROCEDURE — 97165 OT EVAL LOW COMPLEX 30 MIN: CPT | Mod: GO | Performed by: OCCUPATIONAL THERAPIST

## 2022-01-25 PROCEDURE — 250N000013 HC RX MED GY IP 250 OP 250 PS 637: Performed by: INTERNAL MEDICINE

## 2022-01-25 PROCEDURE — 97116 GAIT TRAINING THERAPY: CPT | Mod: GP | Performed by: PHYSICAL THERAPIST

## 2022-01-25 PROCEDURE — 85041 AUTOMATED RBC COUNT: CPT | Performed by: INTERNAL MEDICINE

## 2022-01-25 PROCEDURE — 120N000001 HC R&B MED SURG/OB

## 2022-01-25 PROCEDURE — 97535 SELF CARE MNGMENT TRAINING: CPT | Mod: GO | Performed by: OCCUPATIONAL THERAPIST

## 2022-01-25 PROCEDURE — 97530 THERAPEUTIC ACTIVITIES: CPT | Mod: GO | Performed by: OCCUPATIONAL THERAPIST

## 2022-01-25 PROCEDURE — 99231 SBSQ HOSP IP/OBS SF/LOW 25: CPT | Performed by: INTERNAL MEDICINE

## 2022-01-25 PROCEDURE — 97161 PT EVAL LOW COMPLEX 20 MIN: CPT | Mod: GP | Performed by: PHYSICAL THERAPIST

## 2022-01-25 PROCEDURE — 36415 COLL VENOUS BLD VENIPUNCTURE: CPT | Performed by: INTERNAL MEDICINE

## 2022-01-25 PROCEDURE — 97530 THERAPEUTIC ACTIVITIES: CPT | Mod: GP | Performed by: PHYSICAL THERAPIST

## 2022-01-25 RX ORDER — OXYCODONE HYDROCHLORIDE 5 MG/1
10 TABLET ORAL
Status: DISCONTINUED | OUTPATIENT
Start: 2022-01-25 | End: 2022-01-26 | Stop reason: HOSPADM

## 2022-01-25 RX ORDER — HYDROXYZINE HYDROCHLORIDE 25 MG/1
25 TABLET, FILM COATED ORAL EVERY 6 HOURS PRN
Status: DISCONTINUED | OUTPATIENT
Start: 2022-01-25 | End: 2022-01-26 | Stop reason: HOSPADM

## 2022-01-25 RX ORDER — POLYETHYLENE GLYCOL 3350 17 G/17G
17 POWDER, FOR SOLUTION ORAL DAILY PRN
DISCHARGE
Start: 2022-01-25 | End: 2022-07-25

## 2022-01-25 RX ORDER — HYDROMORPHONE HYDROCHLORIDE 1 MG/ML
0.3 INJECTION, SOLUTION INTRAMUSCULAR; INTRAVENOUS; SUBCUTANEOUS
Status: DISCONTINUED | OUTPATIENT
Start: 2022-01-25 | End: 2022-01-26

## 2022-01-25 RX ORDER — KETOROLAC TROMETHAMINE 15 MG/ML
15 INJECTION, SOLUTION INTRAMUSCULAR; INTRAVENOUS EVERY 6 HOURS
Status: DISPENSED | OUTPATIENT
Start: 2022-01-25 | End: 2022-01-26

## 2022-01-25 RX ORDER — OXYCODONE HYDROCHLORIDE 5 MG/1
5 TABLET ORAL
Status: DISCONTINUED | OUTPATIENT
Start: 2022-01-25 | End: 2022-01-26 | Stop reason: HOSPADM

## 2022-01-25 RX ORDER — OXYCODONE HYDROCHLORIDE 5 MG/1
5 TABLET ORAL EVERY 4 HOURS PRN
Qty: 25 TABLET | Refills: 0 | Status: SHIPPED | OUTPATIENT
Start: 2022-01-25 | End: 2022-02-10

## 2022-01-25 RX ORDER — HYDROMORPHONE HYDROCHLORIDE 1 MG/ML
0.5 INJECTION, SOLUTION INTRAMUSCULAR; INTRAVENOUS; SUBCUTANEOUS
Status: DISCONTINUED | OUTPATIENT
Start: 2022-01-25 | End: 2022-01-26

## 2022-01-25 RX ADMIN — SENNOSIDES AND DOCUSATE SODIUM 1 TABLET: 50; 8.6 TABLET ORAL at 08:48

## 2022-01-25 RX ADMIN — UMECLIDINIUM BROMIDE AND VILANTEROL TRIFENATATE 1 PUFF: 62.5; 25 POWDER RESPIRATORY (INHALATION) at 10:56

## 2022-01-25 RX ADMIN — CIPROFLOXACIN 0.25 ML: 0.5 SOLUTION/ DROPS AURICULAR (OTIC) at 11:03

## 2022-01-25 RX ADMIN — OXYCODONE HYDROCHLORIDE 10 MG: 5 TABLET ORAL at 18:47

## 2022-01-25 RX ADMIN — ACETAMINOPHEN 975 MG: 325 TABLET, FILM COATED ORAL at 02:11

## 2022-01-25 RX ADMIN — SENNOSIDES AND DOCUSATE SODIUM 1 TABLET: 50; 8.6 TABLET ORAL at 20:35

## 2022-01-25 RX ADMIN — CIPROFLOXACIN 0.25 ML: 0.5 SOLUTION/ DROPS AURICULAR (OTIC) at 20:42

## 2022-01-25 RX ADMIN — CEFAZOLIN 1 G: 1 INJECTION, POWDER, FOR SOLUTION INTRAMUSCULAR; INTRAVENOUS at 04:56

## 2022-01-25 RX ADMIN — POLYETHYLENE GLYCOL 3350 17 G: 17 POWDER, FOR SOLUTION ORAL at 08:48

## 2022-01-25 RX ADMIN — OXYCODONE HYDROCHLORIDE 5 MG: 5 TABLET ORAL at 08:48

## 2022-01-25 RX ADMIN — OXYCODONE HYDROCHLORIDE 5 MG: 5 TABLET ORAL at 02:10

## 2022-01-25 RX ADMIN — RIVAROXABAN 10 MG: 10 TABLET, FILM COATED ORAL at 11:02

## 2022-01-25 RX ADMIN — DOXYCYCLINE 100 MG: 100 CAPSULE ORAL at 08:48

## 2022-01-25 RX ADMIN — OXYCODONE HYDROCHLORIDE 10 MG: 5 TABLET ORAL at 15:23

## 2022-01-25 RX ADMIN — DOXYCYCLINE 100 MG: 100 CAPSULE ORAL at 20:35

## 2022-01-25 RX ADMIN — LORATADINE 10 MG: 10 TABLET ORAL at 08:48

## 2022-01-25 RX ADMIN — ACETAMINOPHEN 975 MG: 325 TABLET, FILM COATED ORAL at 11:01

## 2022-01-25 RX ADMIN — ACETAMINOPHEN 975 MG: 325 TABLET, FILM COATED ORAL at 17:21

## 2022-01-25 RX ADMIN — FLUTICASONE PROPIONATE 2 SPRAY: 50 SPRAY, METERED NASAL at 10:57

## 2022-01-25 RX ADMIN — ONDANSETRON 4 MG: 4 TABLET, ORALLY DISINTEGRATING ORAL at 06:06

## 2022-01-25 ASSESSMENT — ACTIVITIES OF DAILY LIVING (ADL)
ADLS_ACUITY_SCORE: 13
ADLS_ACUITY_SCORE: 8
ADLS_ACUITY_SCORE: 8
ADLS_ACUITY_SCORE: 9
ADLS_ACUITY_SCORE: 13
PREVIOUS_RESPONSIBILITIES: MEAL PREP;HOUSEKEEPING;LAUNDRY;SHOPPING;YARDWORK;MEDICATION MANAGEMENT;FINANCES;DRIVING
ADLS_ACUITY_SCORE: 8
ADLS_ACUITY_SCORE: 9
ADLS_ACUITY_SCORE: 12
ADLS_ACUITY_SCORE: 9
ADLS_ACUITY_SCORE: 8
ADLS_ACUITY_SCORE: 9
ADLS_ACUITY_SCORE: 13
ADLS_ACUITY_SCORE: 9
ADLS_ACUITY_SCORE: 9
ADLS_ACUITY_SCORE: 8
ADLS_ACUITY_SCORE: 8
ADLS_ACUITY_SCORE: 13
ADLS_ACUITY_SCORE: 8
ADLS_ACUITY_SCORE: 8
ADLS_ACUITY_SCORE: 9

## 2022-01-25 NOTE — PLAN OF CARE
[id number] 01/25/2022  Summary: POD1. Pt A&Ox4. VSS on RA. Only OOB with PT/OT - Ax1/W/G. Dressing on R hip was marked, scant serosanguineous drainage.  R dorsalis pulse is weak. Denies numbness or tingling. Pain relief with oxycodone 5mg x1. Adequate urine output via purewick. Tolerating regular diet. IV removed, okay per hospitalist to leave out. Discussing possible discharge to TCU tomorrow.

## 2022-01-25 NOTE — PLAN OF CARE
Post op day 1 ,pain managed ,no bleeding -surgical drsg remains clean ,dry and intact.Voiding -VSS -no changes in LOC.No s/s of hypo/hyperglycemia present.

## 2022-01-25 NOTE — PROGRESS NOTES
Care Transitions Team: Following for CC, discharge planning, and disposition.       Per [facility] Trauma Liaison Handoff:   Writer spoke with Raisa via phone introduced myself and explained my role in her plan of care. Discussed therapy recommendations for TCU stay and she is agreeable. Provide medicare star ratings. Linda would like a double room and her first preference is [facility]. Writer would appreciate if care coordination team would send referrals to the facilities listed below and follow-up with be availability and time of discharge.    Living situation:   Support: Daughter    Home environment: Lives alone   Increase service or equipment needs: walker    Current home care services: None    Family/patient discharge goal: Return to baseline level of function.    Barriers to Discharge: Medically stable  Discharge Plan of care: TCU stay   TCU - Medicare compare TCU list provided - CM discussed Medicare compare website and star ratings.  1.[facility]    2. [facility]   3. [facility]  4. [facility] .      Orders needed for services: Post Op Plan of Care - TCU stay - PT/OT to evaluate and treat.    Transportation: Daughter will provide        Will follow in collaboration with O CM  Yaquelin Wilburn -[phone] [facility] for discharge planning.

## 2022-01-25 NOTE — PROGRESS NOTES
[facility]    Medicine Progress Note - Hospitalist Service    Date of Admission:  1/23/2022    Assessment & Plan          76-year-old female with COPD, hypertension, urinary incontinence, muscle spasms, previous breast cancer, currently in remission, presented with right femoral neck fracture due to mechanical fall after she slipped on ice while shoveling her driveway.  Underwent right total hip arthroplasty on 1/24.  Prior to admission she lived independently at home and would sometimes use uses cane for ambulation.    #.  Acute closed right femoral neck fracture-secondary to mechanical fall, now s/p right total hip arthroplasty, 1/24/2022  -Doing well postoperatively  -Continue pain control    #.  Bilateral otitis media-on p.o. doxycycline and ciprofloxacin eardrops for 10 days.   -Continue p.o. doxycycline until 1/28    #. COPD with centrilobular emphysema without acute exacerbation  -Continue PTA inhaler    #.  Essential hypertension-not currently on medications.  Blood pressure in normal range    #.  Muscle spasms-on Flexeril PTA    #.  COVID-19 PCR negative on admission.  Patient is vaccinated and boosted    #.  Breast cancer, s/p right mastectomy over 15 years ago.  No recurrence       Diet: Advance Diet as Tolerated: Regular Diet Adult  Diet    DVT Prophylaxis: Paroxetine 10 mg daily   Baltazar Catheter: Not present  Central Lines: None  Cardiac Monitoring: None  Code Status: Full Code      Disposition Plan   Expected Discharge: 01/26/2022     Anticipated discharge location: home  Versus TCU  Delays:            The patient's care was discussed with the Bedside Nurse and Patient.    Tangela Swenson MD  Hospitalist Service  [facility]  Securely message with the Vocera Web Console (learn more here)  Text page via [facility] Paging/Directory         Clinically Significant Risk Factors Present on Admission                   ______________________________________________________________________    Interval History   Reports some postoperative pain after physical therapy   Denies dizziness, chest pain or shortness of breath   Had an episode of emesis today.  Feels better now   No abdominal pain or diarrhea   No dysuria   Vital signs are stable    Data reviewed today: I reviewed all medications, new labs and imaging results over the last 24 hours. I personally reviewed the EKG tracing showing Normal sinus rhythm and the Pelvic x-ray image(s) showing Right Femoral neck fracture.    X-ray pelvis with hip 1/23/2022  There is a displaced transverse fracture of the right femoral neck with a typical varus angulation at the fracture site. Mild bilateral hip degenerative changes. Degenerative changes in the lower lumbar spine.    Physical Exam   Vital Signs: Temp: 98.3  F (36.8  C) Temp src: Oral BP: 137/88 Pulse: 86   Resp: 15 SpO2: 93 % O2 Device: None (Room air) Oxygen Delivery: 2 LPM  Weight: 160 lbs 0 oz   Physical Exam  Constitutional:       General: She is not in acute distress.  HENT:      Head: Normocephalic and atraumatic.   Cardiovascular:      Rate and Rhythm: Normal rate and regular rhythm.      Heart sounds: No murmur heard.      Pulmonary:      Effort: Pulmonary effort is normal.      Breath sounds: Normal breath sounds. No wheezing or rhonchi.   Abdominal:      General: There is no distension.      Palpations: Abdomen is soft.      Tenderness: There is no abdominal tenderness.   Musculoskeletal:         General: No swelling.   Skin:     General: Skin is warm and dry.   Neurological:      General: No focal deficit present.      Mental Status: She is alert. Mental status is at baseline.   Psychiatric:         Mood and Affect: Mood normal.           Data   Recent Labs   Lab 01/25/22  0840 01/25/22  0504 01/24/22  0752 01/23/22  1552   WBC 10.3  --   --  6.2   HGB 11.1*  --   --  12.8   MCV 92  --   --  91   *  --   --  171    INR  --   --   --  0.98   NA  --   --  133 135   POTASSIUM  --   --  3.7 4.0   CHLORIDE  --   --  103 103   CO2  --   --  26 28   BUN  --   --  16 19   CR  --   --  0.69 0.75   ANIONGAP  --   --  4 4   JOJO  --   --  8.7 9.0   GLC  --  100* 107* 102*     Medications     lactated ringers Stopped (01/25/22 0847)       acetaminophen  975 mg Oral Q8H     ciprofloxacin  0.25 mL Both Ears BID     doxycycline hyclate  100 mg Oral BID     fluticasone  1-2 spray Both Nostrils Daily     loratadine  10 mg Oral Daily     polyethylene glycol  17 g Oral Daily     rivaroxaban ANTICOAGULANT  10 mg Oral Daily with supper     senna-docusate  1 tablet Oral BID     sodium chloride (PF)  3 mL Intracatheter Q8H     umeclidinium-vilanterol  1 puff Inhalation Daily

## 2022-01-25 NOTE — PROGRESS NOTES
"   01/25/22 0800   Quick Adds   Type of Visit Initial PT Evaluation       Present no   Living Environment   People in home alone   Current Living Arrangements house   Home Accessibility stairs to enter home   Number of Stairs, Main Entrance 6   Stair Railings, Main Entrance railings on both sides of stairs   Transportation Anticipated family or friend will provide   Living Environment Comments Pt reports living alone in a house. Pt reports needing to negotiate stairs to enter the home but once inside, all needs met on the main floor. Pt reports her daughter will pick her up upon discharge. Pt reports she will return home alone and will not have 24/7 assist.   Self-Care   Usual Activity Tolerance good   Current Activity Tolerance moderate   Regular Exercise No   Equipment Currently Used at Home none   Activity/Exercise/Self-Care Comment Pt reports being IND at baseline with all ADLs. Pt reports ambulating without an AD at baseline and does not have a FWW at home. Pt reports stairs are difficult for her. Pt reports she was able to ambulate ~1 block without an AD before needing a rest break. Pt drives and does errands IND.   Disability/Function   Hearing Difficulty or Deaf no   Wear Glasses or Blind yes   Vision Management reading glasses   Concentrating, Remembering or Making Decisions Difficulty no   Difficulty Communicating no   Walking or Climbing Stairs ambulation difficulty, requires equipment   Doing Errands Independently Difficulty (such as shopping) no   Fall history within last six months yes   Number of times patient has fallen within last six months 1   Change in Functional Status Since Onset of Current Illness/Injury yes   General Information   Onset of Illness/Injury or Date of Surgery 01/25/22   Referring Physician Izaiah Zhang PA-C   Patient/Family Therapy Goals Statement (PT) \"To go home\"   Pertinent History of Current Problem (include personal factors and/or " comorbidities that impact the POC) s/p R JUANITA POD#1   Existing Precautions/Restrictions no active hip ABD;fall   Weight-Bearing Status - LLE full weight-bearing   Weight-Bearing Status - RLE weight-bearing as tolerated   Cognition   Orientation Status (Cognition) oriented x 3   Pain Assessment   Patient Currently in Pain Yes, see Vital Sign flowsheet  (2/10)   Integumentary/Edema   Integumentary/Edema Comments Incision on R hip with dressing intact.   Posture    Posture Forward head position;Protracted shoulders   Range of Motion (ROM)   ROM Quick Adds ROM WFL;ROM deficits secondary to surgical procedure;ROM deficits secondary to pain;ROM deficits secondary to swelling;ROM deficits secondary to weakness   Strength   Manual Muscle Testing Quick Adds Deficits observed during functional mobility;Able to perform L SLR   Bed Mobility   Comment (Bed Mobility) Supine>sit w/ mod A x 1   Transfers   Transfer Safety Comments Sit>stand w/ FWW and min A X 1   Gait/Stairs (Locomotion)   [place] Level (Gait) contact guard   Assistive Device (Gait) walker, front-wheeled   Distance in Feet (Required for LE Total Joints) 30'  (10' eval)   Comment (Gait/Stairs) Pt ambulated ~30' for eval w/ FWW and CGA. Pt ambulated with significant decrease in gait speed, antalgic gait, but steady.   Balance   Balance Comments Pt able to sit at EOB unsupported without LOB. Pt ambulates using a FWW for added stability and support.   Sensory Examination   Sensory Perception patient reports no sensory changes   Clinical Impression   Criteria for Skilled Therapeutic Intervention yes, treatment indicated   PT Diagnosis (PT) Impaired gait   Influenced by the following impairments Increased pain; decreased activity tolerance; decreased balance; decreased strength   Functional limitations due to impairments Impaired functional mobility   Clinical Presentation Stable/Uncomplicated   Clinical Presentation Rationale Clinical Judgement   Clinical Decision  Making (Complexity) low complexity   Therapy Frequency (PT) 5x/week   Predicted Duration of Therapy Intervention (days/wks) 7 days   Planned Therapy Interventions (PT) balance training;bed mobility training;gait training;home exercise program;patient/family education;stair training;strengthening;transfer training   Risk & Benefits of therapy have been explained evaluation/treatment results reviewed;care plan/treatment goals reviewed;risks/benefits reviewed;current/potential barriers reviewed;participants voiced agreement with care plan;participants included;patient   PT Discharge Planning    PT Discharge Recommendation (DC Rec) Transitional Care Facility;home with home care physical therapy;home with assist   PT Rationale for DC Rec Pt is below baseline. Pt requiring assist with all functional mobility. Pt currently presents with deficits in activity tolerance, balance, and strength. Due to these deficits, pt is a high falls risk and unsafe to return home alone at this time. Pt would benefit from continued skilled PT services via TCU to address deficits and improve IND with safety and functional mobility. If pt were to return home, pt would require 24/7 A x 1 with all functional mobility and ADLs, along with HHPT.   PT Brief overview of current status  Supine>sit w/ mod A x 1; sit>stand w/ FWW and min A x 1; gait w/ FWW and CGA   Total Evaluation Time   Total Evaluation Time (Minutes) 10

## 2022-01-25 NOTE — PLAN OF CARE
Patient A&0x4.  Dressing on R hip is CDI. DP and PT pulses are +1, Denies  numbness. Good bhavya/planter flexion. Oxycodone 5mg given 1x . Bladder scanned for 524. Urinated 250ml. PureWIck in place. Tolerating regular diet.  IV infusing. Full code.

## 2022-01-25 NOTE — PROGRESS NOTES
01/25/22 1000   Quick Adds   Type of Visit Initial Occupational Therapy Evaluation   Living Environment   People in home alone   Current Living Arrangements house   Home Accessibility stairs to enter home;stairs within home   Number of Stairs, Main Entrance 6   Stair Railings, Main Entrance railings on both sides of stairs   Number of Stairs, Within Home, Primary greater than 10 stairs   Stair Railings, Within Home, Primary railings safe and in good condition   Transportation Anticipated family or friend will provide   Living Environment Comments Pt reports laundry in basement, everything else on main level. Pt has tub shower, standard toilets.    Self-Care   Usual Activity Tolerance good   Current Activity Tolerance moderate   Regular Exercise No   Equipment Currently Used at Home none   Activity/Exercise/Self-Care Comment Pt uses cane to navigate outside steps for stability, does not use other gait aids at baseline. Independent with aDLs at baseline   Instrumental Activities of Daily Living (IADL)   Previous Responsibilities meal prep;housekeeping;laundry;shopping;yardwork;medication management;finances;driving   IADL Comments Independent at baseline, would not have 24/7 assist   Disability/Function   Fall history within last six months yes   Number of times patient has fallen within last six months 1   Change in Functional Status Since Onset of Current Illness/Injury yes   General Information   Onset of Illness/Injury or Date of Surgery 01/24/22   Referring Physician Izaiah Zhang PA-C   Cognitive Status Examination   Orientation Status orientation to person, place and time   Affect/Mental Status (Cognitive) WNL   Follows Commands WNL   Sensory   Sensory Comments Intact per pt report   Pain Assessment   Patient Currently in Pain Yes, see Vital Sign flowsheet   Posture   Posture Comments WFL   Range of Motion Comprehensive   Comment, General Range of Motion R LE ROM limited due to precautiosn and pain,  LLE and BUE appear WFL, pt reports difficulty with hip flexion due to abdominal hernia   Strength Comprehensive (MMT)   Comment, General Manual Muscle Testing (MMT) Assessment General weakness due to pain   Coordination   Coordination Comments Limited due to pain   Bed Mobility   Comment (Bed Mobility) Min assist   Transfers   Transfer Comments Min assist with walker   Balance   Balance Comments Intact with gait aid   Activities of Daily Living   BADL Assessment bathing;lower body dressing;toileting   Bathing Assessment   Comment (Bathing) Max assist LB bathing without AE   Lower Body Dressing Assessment   Comment (Lower Body Dressing) Max assist without AE   Toileting   Comment (Toileting) Min assist for transfer   Clinical Impression   Criteria for Skilled Therapeutic Interventions Met (OT) yes;meets criteria;skilled treatment is necessary   OT Diagnosis Decline in ADL independence and safety   OT Problem List-Impairments impacting ADL problems related to;activity tolerance impaired;coordination;mobility;strength;pain;post-surgical precautions   Assessment of Occupational Performance 5 or more Performance Deficits   Identified Performance Deficits Impaired dressing, bathing and toileting independence    Planned Therapy Interventions (OT) ADL retraining;progressive activity/exercise   Clinical Decision Making Complexity (OT) low complexity   Therapy Frequency (OT) 5x/week   Predicted Duration of Therapy 3 days   Anticipated Equipment Needs Upon Discharge (OT)   (TBD)   Risk & Benefits of therapy have been explained evaluation/treatment results reviewed;care plan/treatment goals reviewed;risks/benefits reviewed;current/potential barriers reviewed;participants voiced agreement with care plan;participants included;patient;daughter   OT Discharge Planning    OT Discharge Recommendation (DC Rec) Transitional Care Facility   OT Rationale for DC Rec Pt presenting below baseline with ADLs and functional mobility. Pt lives  alone and would be unable to complete stairs into her house/complete ADLs at home. Pt would benefit from continued OT in TCU setting to decrease risk off fall and rehospitalization   OT Brief overview of current status  Min assist to stand EOB   Total Evaluation Time (Minutes)   Total Evaluation Time (Minutes) 9

## 2022-01-25 NOTE — PROGRESS NOTES
Orthopedic Surgery  Raisa Archer  2022  Admit Date:  2022  POD 1 Day Post-Op  S/P Procedure(s):  ARTHROPLASTY, HIP, TOTAL    Patient resting comfortably in bed.    Pain controlled but creeping up between doses  Tolerating oral intake.    Denies nausea or vomiting  Denies chest pain or shortness of breath  No events overnight.     Alert and orient to person, place, and time.  Vital Sign Ranges  Temperature Temp  Av.2  F (36.8  C)  Min: 97.7  F (36.5  C)  Max: 98.3  F (36.8  C)   Blood pressure Systolic (24hrs), Av , Min:104 , Max:144        Diastolic (24hrs), Av, Min:58, Max:93      Pulse Pulse  Av.1  Min: 63  Max: 100   Respirations Resp  Av.3  Min: 7  Max: 19   Pulse oximetry SpO2  Av.1 %  Min: 90 %  Max: 97 %       Dressing is clean, dry, and intact.   Minimal erythema of the surrounding skin.   Bilateral calves are soft, non-tender.  Bilateral lower extremity is NVI.  Sensation intact bilateral lower extremities  5/5 motor with resisted dorsi and plantar flexion bilaterally  +Dp pulse    Labs:  Recent Labs   Lab Test 22  0752 22  1552 20  1212   POTASSIUM 3.7 4.0 4.1     Recent Labs   Lab Test 22  0840 22  1552 18  1313   HGB 11.1* 12.8 13.7     Recent Labs   Lab Test 22  1552   INR 0.98     Recent Labs   Lab Test 22  0840 22  1552 18  1313   * 171 196       A/P  1. Right femoral neck fracture, right total hip arthroplasty   Continue Xarelto for DVT prophylaxis.     Mobilize with PT/OT    WBAT with walker   No active abduction   Leave dressing intact.     Continue current pain regiment.    2. Disposition   Anticipate d/c to TCU based on progress and medical clearance.    Lucía Roca PA-C

## 2022-01-25 NOTE — PLAN OF CARE
Patient vital signs are at baseline: Yes  Patient able to ambulate as they were prior to admission or with assist devices provided by therapies during their stay:  No,  Reason:  not out of the bed yet.  Patient MUST void prior to discharge:  No,  Reason:  dtv  Patient able to tolerate oral intake: yes  Pain has adequate pain control using Oral analgesics:  Yes    A/OX4, cms intact.Denies pain.Not OOB yet. Pure wick in place.  Dreg CDI. 2l NC. On CAPNO. IVF infusing. Regular diet. Will continue to monitor.

## 2022-01-26 ENCOUNTER — APPOINTMENT (OUTPATIENT)
Dept: OCCUPATIONAL THERAPY | Facility: CLINIC | Age: 77
DRG: 522 | End: 2022-01-26
Payer: MEDICARE

## 2022-01-26 ENCOUNTER — LAB REQUISITION (OUTPATIENT)
Dept: LAB | Facility: CLINIC | Age: 77
End: 2022-01-26

## 2022-01-26 ENCOUNTER — PATIENT OUTREACH (OUTPATIENT)
Dept: CARE COORDINATION | Facility: CLINIC | Age: 77
End: 2022-01-26
Payer: MEDICARE

## 2022-01-26 VITALS
OXYGEN SATURATION: 95 % | TEMPERATURE: 97.5 F | DIASTOLIC BLOOD PRESSURE: 71 MMHG | SYSTOLIC BLOOD PRESSURE: 120 MMHG | RESPIRATION RATE: 18 BRPM | HEART RATE: 82 BPM

## 2022-01-26 VITALS
BODY MASS INDEX: 28.34 KG/M2 | RESPIRATION RATE: 16 BRPM | DIASTOLIC BLOOD PRESSURE: 56 MMHG | WEIGHT: 160 LBS | OXYGEN SATURATION: 98 % | SYSTOLIC BLOOD PRESSURE: 97 MMHG | TEMPERATURE: 97.8 F | HEART RATE: 73 BPM

## 2022-01-26 DIAGNOSIS — Z00.01 ENCOUNTER FOR GENERAL ADULT MEDICAL EXAMINATION WITH ABNORMAL FINDINGS: ICD-10-CM

## 2022-01-26 LAB
DEPRECATED CALCIDIOL+CALCIFEROL SERPL-MC: <55 UG/L (ref 20–75)
GLUCOSE BLD-MCNC: 111 MG/DL (ref 70–99)
GLUCOSE BLDC GLUCOMTR-MCNC: 97 MG/DL (ref 70–99)
HGB BLD-MCNC: 10.2 G/DL (ref 11.7–15.7)
POTASSIUM BLD-SCNC: 4.2 MMOL/L (ref 3.4–5.3)
VITAMIN D2 SERPL-MCNC: <5 UG/L
VITAMIN D3 SERPL-MCNC: 50 UG/L

## 2022-01-26 PROCEDURE — 36415 COLL VENOUS BLD VENIPUNCTURE: CPT | Performed by: HOSPITALIST

## 2022-01-26 PROCEDURE — 250N000013 HC RX MED GY IP 250 OP 250 PS 637: Performed by: INTERNAL MEDICINE

## 2022-01-26 PROCEDURE — 97535 SELF CARE MNGMENT TRAINING: CPT | Mod: GO | Performed by: OCCUPATIONAL THERAPIST

## 2022-01-26 PROCEDURE — 250N000013 HC RX MED GY IP 250 OP 250 PS 637: Performed by: PHYSICIAN ASSISTANT

## 2022-01-26 PROCEDURE — 99239 HOSP IP/OBS DSCHRG MGMT >30: CPT | Performed by: INTERNAL MEDICINE

## 2022-01-26 PROCEDURE — 85018 HEMOGLOBIN: CPT | Performed by: PHYSICIAN ASSISTANT

## 2022-01-26 PROCEDURE — 84132 ASSAY OF SERUM POTASSIUM: CPT | Performed by: HOSPITALIST

## 2022-01-26 PROCEDURE — 82947 ASSAY GLUCOSE BLOOD QUANT: CPT | Performed by: HOSPITALIST

## 2022-01-26 PROCEDURE — U0003 INFECTIOUS AGENT DETECTION BY NUCLEIC ACID (DNA OR RNA); SEVERE ACUTE RESPIRATORY SYNDROME CORONAVIRUS 2 (SARS-COV-2) (CORONAVIRUS DISEASE [COVID-19]), AMPLIFIED PROBE TECHNIQUE, MAKING USE OF HIGH THROUGHPUT TECHNOLOGIES AS DESCRIBED BY CMS-2020-01-R: HCPCS | Performed by: NURSE PRACTITIONER

## 2022-01-26 PROCEDURE — 97530 THERAPEUTIC ACTIVITIES: CPT | Mod: GO | Performed by: OCCUPATIONAL THERAPIST

## 2022-01-26 RX ORDER — HYDROMORPHONE HCL IN WATER/PF 6 MG/30 ML
0.3 PATIENT CONTROLLED ANALGESIA SYRINGE INTRAVENOUS
Status: DISCONTINUED | OUTPATIENT
Start: 2022-01-26 | End: 2022-01-26 | Stop reason: HOSPADM

## 2022-01-26 RX ORDER — HYDROMORPHONE HCL IN WATER/PF 6 MG/30 ML
0.2 PATIENT CONTROLLED ANALGESIA SYRINGE INTRAVENOUS
Status: DISCONTINUED | OUTPATIENT
Start: 2022-01-26 | End: 2022-01-26

## 2022-01-26 RX ORDER — HYDROMORPHONE HCL IN WATER/PF 6 MG/30 ML
0.5 PATIENT CONTROLLED ANALGESIA SYRINGE INTRAVENOUS
Status: DISCONTINUED | OUTPATIENT
Start: 2022-01-26 | End: 2022-01-26 | Stop reason: HOSPADM

## 2022-01-26 RX ORDER — HYDROMORPHONE HYDROCHLORIDE 1 MG/ML
0.3 INJECTION, SOLUTION INTRAMUSCULAR; INTRAVENOUS; SUBCUTANEOUS
Status: DISCONTINUED | OUTPATIENT
Start: 2022-01-26 | End: 2022-01-26

## 2022-01-26 RX ORDER — ACETAMINOPHEN 500 MG
1000 TABLET ORAL EVERY 6 HOURS PRN
Start: 2022-01-26 | End: 2023-06-23

## 2022-01-26 RX ADMIN — OXYCODONE HYDROCHLORIDE 5 MG: 5 TABLET ORAL at 08:54

## 2022-01-26 RX ADMIN — OXYCODONE HYDROCHLORIDE 5 MG: 5 TABLET ORAL at 03:09

## 2022-01-26 RX ADMIN — UMECLIDINIUM BROMIDE AND VILANTEROL TRIFENATATE 1 PUFF: 62.5; 25 POWDER RESPIRATORY (INHALATION) at 09:05

## 2022-01-26 RX ADMIN — POLYETHYLENE GLYCOL 3350 17 G: 17 POWDER, FOR SOLUTION ORAL at 08:54

## 2022-01-26 RX ADMIN — SENNOSIDES AND DOCUSATE SODIUM 1 TABLET: 50; 8.6 TABLET ORAL at 08:54

## 2022-01-26 RX ADMIN — DOXYCYCLINE 100 MG: 100 CAPSULE ORAL at 08:54

## 2022-01-26 RX ADMIN — CIPROFLOXACIN 0.25 ML: 0.5 SOLUTION/ DROPS AURICULAR (OTIC) at 09:05

## 2022-01-26 RX ADMIN — ACETAMINOPHEN 975 MG: 325 TABLET, FILM COATED ORAL at 03:09

## 2022-01-26 RX ADMIN — LORATADINE 10 MG: 10 TABLET ORAL at 08:54

## 2022-01-26 RX ADMIN — ACETAMINOPHEN 975 MG: 325 TABLET, FILM COATED ORAL at 09:04

## 2022-01-26 RX ADMIN — OXYCODONE HYDROCHLORIDE 10 MG: 5 TABLET ORAL at 12:59

## 2022-01-26 RX ADMIN — FLUTICASONE PROPIONATE 1 SPRAY: 50 SPRAY, METERED NASAL at 09:04

## 2022-01-26 ASSESSMENT — ACTIVITIES OF DAILY LIVING (ADL)
ADLS_ACUITY_SCORE: 13

## 2022-01-26 NOTE — DISCHARGE SUMMARY
[facility]  Hospitalist Discharge Summary      Date of Admission:  1/23/2022  Date of Discharge:  1/26/2022  Discharging Provider: Tangela Swenson MD  Discharge Service: Hospitalist Service    Discharge Diagnoses     Principal diagnosis    #.  Acute closed right femoral neck fracture-secondary to mechanical fall    #. s/p right total hip arthroplasty, 1/24/2022    Additional diagnoses    #.  Acute blood loss anemia due to surgical blood loss  -Hemoglobin decreased from 12.8 to 10.2.    #.  Bilateral otitis media-on p.o. doxycycline and ciprofloxacin eardrops for 10 days.      #. COPD with centrilobular emphysema without acute exacerbation     #.  Essential hypertension-not currently on medications.  Blood pressure in normal range     #.  Muscle spasms-on Flexeril PTA     #.  COVID-19 PCR negative on admission.  Patient is vaccinated and boosted     #.  Breast cancer, s/p right mastectomy over 15 years ago.  No recurrence    Follow-ups Needed After Discharge   Follow-up Appointments     Follow Up and recommended labs and tests      Follow up with Dr. Arango/Wolf Zhang PAC , at (location with clinic name   or city[facility], within 14 days  to evaluate after   surgery. No follow up labs or test are needed prior to visit. At this   visit x-rays will be taken, sutures/staples removed, and questions to be   answered.  Bring any needed forms with to appointment.  Call for appointment (Padmini Solo- Patient Coordinator): [phone]  After hours: [phone]             Unresulted Labs Ordered in the Past 30 Days of this Admission     Date and Time Order Name Status Description    1/24/2022  7:43 AM 25 Hydroxyvitamin D2 and D3 In process       These results will be followed up by Shanna Pereyra     Discharge Disposition   Discharged to short-term care facility  Condition at discharge: Stable      Hospital Course     76-year-old female with COPD, hypertension, urinary  incontinence, muscle spasms, previous breast cancer, currently in remission, presented with right femoral neck fracture due to mechanical fall after she slipped on ice while shoveling her driveway.      She was seen in consultation with orthopedics and underwent right total hip arthroplasty on 1/24.  Prior to admission she lived independently at home and would sometimes use uses cane for ambulation.    She did well postoperatively.  She was seen by physical therapy.  She was discharged to skilled nursing facility for rehab.    She had acute blood loss anemia due to surgical blood loss and required daily monitoring of hemoglobin.    Prior to admission, she was diagnosed with right otitis media for which she was on p.o. doxycycline and ciprofloxacin eyedrops which were continued.    Other chronic medical problems remained stable.  On her medications were resumed at discharge    Discharge to skilled nursing facility  Follow-up with orthopedics in clinic       Consultations This Hospital Stay   CARE MANAGEMENT / SOCIAL WORK IP CONSULT  ORTHOPEDIC SURGERY IP CONSULT  PHYSICAL THERAPY ADULT IP CONSULT  OCCUPATIONAL THERAPY ADULT IP CONSULT  PHYSICAL THERAPY ADULT IP CONSULT  OCCUPATIONAL THERAPY ADULT IP CONSULT    Code Status   Full Code    Time Spent on this Encounter   ITangela MD, personally saw the patient today and spent greater than 30 minutes discharging this patient.       Tangela Swenson MD  [facility]  [address] [place] [id number]  Phone: [phone]  Fax: [phone]  ______________________________________________________________________    Physical Exam   Vital Signs: Temp: 97.8  F (36.6  C) Temp src: Oral BP: 97/56 Pulse: 73   Resp: 16 SpO2: 98 % O2 Device: Nasal cannula Oxygen Delivery: 1 LPM  Weight: 160 lbs 0 oz       Constitutional: awake, alert, cooperative, no apparent distress, and appears stated age  Respiratory: No increased work of breathing,  good air exchange, clear to auscultation bilaterally, no crackles or wheezing  Cardiovascular: Normal apical impulse, regular rate and rhythm, normal S1 and S2, no murmur noted  GI: normal bowel sounds, soft, non-distended, non-tender, no masses palpated, no hepatosplenomegally  Neurologic: Awake, alert, oriented to name, place and time.  Follows commands, speech is normal.  No focal deficit         Primary Care Physician   Shanna Pereyra    Discharge Orders      General info for SNF    Length of Stay Estimate: Short Term Care: Estimated # of Days <30  Condition at Discharge: Improving  Level of care:skilled   Rehabilitation Potential: Excellent  Admission H&P remains valid and up-to-date: Yes  Recent Chemotherapy: N/A  Use Nursing Home Standing Orders: Yes     Mantoux instructions    Give two-step Mantoux (PPD) Per Facility Policy Yes     Follow Up and recommended labs and tests    Follow up with Dr. Arango/Wolf Zhang PAC , at (location with clinic name or city[facility], within 14 days  to evaluate after surgery. No follow up labs or test are needed prior to visit. At this visit x-rays will be taken, sutures/staples removed, and questions to be answered.  Bring any needed forms with to appointment.  Call for appointment (Padmini Solo- Patient Coordinator): [phone]  After hours: [phone]     Reason for your hospital stay    Right femoral neck fracture, right total hip arthroplasty     Activity - Up with assistive device     Weight bearing status    WBAT with walker     Wound care    Site:   Right lateral hip  Instructions:  Leave dressing intact for 2 weeks, call if becomes overly saturated (>50%); do not soak or submerge, ok to shower, reinforce if needed     Physical Therapy Adult Consult    Evaluate and treat as clinically indicated.    Reason:  Right femoral neck fracture, right total hip arthroplasty     Occupational Therapy Adult Consult    Evaluate and treat as clinically  indicated.    Reason:  Right femoral neck fracture, right total hip arthroplasty     Fall precautions     Hip precautions    No active hip abduction     Walker DME    : DME Documentation: Describe the reason for need to support medical necessity: Impaired gait status post hip surgery. I, the undersigned, certify that the above prescribed supplies are medically necessary for this patient and is both reasonable and necessary in reference to accepted standards of medical practice in the treatment of this patient's condition and is not prescribed as a convenience.     Diet    Follow this diet upon discharge: Orders Placed This Encounter      Advance Diet as Tolerated: Regular Diet Adult       Significant Results and Procedures     X-ray pelvis with hip 1/23/2022  There is a displaced transverse fracture of the right femoral neck with a typical varus angulation at the fracture site. Mild bilateral hip degenerative changes. Degenerative changes in the lower lumbar spine.    Discharge Medications   Current Discharge Medication List      START taking these medications    Details   oxyCODONE (ROXICODONE) 5 MG tablet Take 1 tablet (5 mg) by mouth every 4 hours as needed for moderate to severe pain  Qty: 25 tablet, Refills: 0    Associated Diagnoses: Hip fracture, right, closed, initial encounter (H)      polyethylene glycol (MIRALAX) 17 g packet Take 17 g by mouth daily as needed for constipation    Associated Diagnoses: Hip fracture, right, closed, initial encounter (H)      rivaroxaban ANTICOAGULANT (XARELTO) 10 MG TABS tablet Take 1 tablet (10 mg) by mouth daily (with dinner)    Associated Diagnoses: Hip fracture, right, closed, initial encounter (H)         CONTINUE these medications which have CHANGED    Details   acetaminophen (TYLENOL) 500 MG tablet Take 2 tablets (1,000 mg) by mouth every 6 hours as needed for mild pain    Associated Diagnoses: Hip fracture, right, closed, initial encounter (H)         CONTINUE these  medications which have NOT CHANGED    Details   Calcium Carbonate-Vitamin D (CALCIUM + D PO) Take 2 tablets by mouth daily.      carboxymethylcellul-glycerin (OPTIVE) 0.5-0.9 % SOLN ophthalmic solution 1 drop    Associated Diagnoses: Medicare annual wellness visit, subsequent      ciprofloxacin (CETRAXAL) 0.2 % otic solution Place 0.25 mLs into both ears 2 times daily for 10 days  Qty: 5 mL, Refills: 0    Associated Diagnoses: Otitis media with rupture of tympanic membrane, unspecified laterality      cyclobenzaprine (FLEXERIL) 10 MG tablet Take 1 tablet (10 mg) by mouth 3 times daily as needed for muscle spasms  Qty: 30 tablet, Refills: 1    Associated Diagnoses: Spasm of back muscles; Trapezius muscle spasm      diphenhydrAMINE-APAP, sleep, (TYLENOL PM EXTRA STRENGTH)  MG/30ML LIQD Take 1-2 tablets by mouth      doxycycline hyclate (VIBRAMYCIN) 100 MG capsule Take 1 capsule (100 mg) by mouth 2 times daily for 10 days  Qty: 20 capsule, Refills: 0    Associated Diagnoses: Otitis media with rupture of tympanic membrane, unspecified laterality      fluticasone (FLONASE) 50 MCG/ACT nasal spray Spray 1-2 sprays into both nostrils daily  Qty: 16 g, Refills: 11    Associated Diagnoses: Seasonal allergic rhinitis      Loratadine (CLARITIN PO) Take by mouth daily       Multiple Vitamins-Minerals (ZAHIDA MULTIVITAMIN FOR WOMEN) TABS Take 1 tablet by mouth daily.      Omega-3 Fatty Acids (FISH OIL) 1200 MG CAPS Take 1 capsule by mouth daily      tiotropium-olodaterol (STIOLTO RESPIMAT) 2.5-2.5 MCG/ACT AERS Inhale 2 puffs into the lungs daily  Qty: 4 g, Refills: 5    Associated Diagnoses: Centrilobular emphysema (H)      Vitamin D, Cholecalciferol, 25 MCG (1000 UT) TABS Take 1 tablet by mouth daily           Allergies   Allergies   Allergen Reactions     Amoxicillin Diarrhea     Aspirin Nausea and Vomiting     sick     Chantix [Varenicline Tartrate] Other (See Comments)     Sees things     Glycerin Itching     LIQUID,    Reactions: itchy and redness       Ipratropium Other (See Comments)     atrovent inhaler caused sore, tight throat , chest hurt, breathless.      Sympathomimetics Other (See Comments)     Patient doesn't know why this is listed as an allergy     Varenicline GI Disturbance, Other (See Comments) and Nausea and Vomiting     Wool Fiber Itching     Redness

## 2022-01-26 NOTE — PLAN OF CARE
Occupational Therapy Discharge Summary    Reason for therapy discharge:    Discharged to transitional care facility.    Progress towards therapy goal(s). See goals on Care Plan in [facility] electronic health record for goal details.  Goals partially met.  Barriers to achieving goals:   discharge from facility.    Therapy recommendation(s):    Continued therapy is recommended.  Rationale/Recommendations:  Patient is significantly below baseline level of function and would benefit from continued OT in TCU setting to return to baseline and prevent further functional decline.

## 2022-01-26 NOTE — PLAN OF CARE
[id number] 01/26/2022    POD2. Pt A&Ox4. VSS on RA. OOB to chair with OT - Ax1/W/G. Dressing on R hip is CDI, besides marking from yesterday. RLE pulses 2+. Denies numbness or tingling. Pain relief with oxycodone 5mg x1, 10mg x1 & scheduled tylenol. Adequate urine output via purewick. Tolerating regular diet. No IV access. Packet ready with checklist complete. All belongings with patient. Discharge to [facility] 01/26/2022 to semi-private room at 1500.

## 2022-01-26 NOTE — PROGRESS NOTES
A&OX4. Pleasant. Eating and drinking well. No N/V/D. R hip pain well managed with PRN and scheduled oral agents. Dressing C/D/I. Pure-wick in place with AUOP. Plan is to discharge to a TCU when one is available. No fever. AVSS. Latest Vitals: Blood pressure 104/77, pulse 90, temperature 98.4  F (36.9  C), temperature source Oral, resp. rate 16, weight 72.6 kg (160 lb), SpO2 95 %, not currently breastfeeding.

## 2022-01-26 NOTE — CONSULTS
Care Management Initial Consult    General Information  Assessment completed with: Patient,    Type of CM/SW Visit: Initial Assessment    Primary Care Provider verified and updated as needed: Yes   Readmission within the last 30 days: no previous admission in last 30 days      Reason for Consult: discharge planning  Advance Care Planning: Advance Care Planning Reviewed: present on chart        Communication Assessment  Patient's communication style: spoken language (English or Bilingual)    Hearing Difficulty or Deaf: no   Wear Glasses or Blind: yes    Cognitive  Cognitive/Neuro/Behavioral: WDL  Level of Consciousness: alert  Arousal Level: opens eyes spontaneously  Orientation: oriented x 4     Best Language: 0 - No aphasia  Speech: clear    Living Environment:   People in home: alone     Current living Arrangements: house      Able to return to prior arrangements: other (see comments) (TCU recommended )     Family/Social Support:  Care provided by: self  Provides care for: no one  Marital Status:   Children          Description of Support System: Supportive,Involved    Support Assessment: Adequate social supports,Adequate family and caregiver support    Current Resources:   Patient receiving home care services:  no  Community Resources:    Equipment currently used at home: cane, straight  Supplies currently used at home:      Employment/Financial:  Employment Status: retired     Financial Concerns:       Lifestyle & Psychosocial Needs:  Social Determinants of Health     Tobacco Use: Medium Risk     Smoking Tobacco Use: Former Smoker     Smokeless Tobacco Use: Never Used   Alcohol Use: Not on file   Financial Resource Strain: Not on file   Food Insecurity: Not on file   Transportation Needs: Not on file   Physical Activity: Not on file   Stress: Not on file   Social Connections: Not on file   Intimate Partner Violence: Not on file   Depression: Not at risk     PHQ-2 Score: 2   Housing Stability: Not on file      Functional Status:  Prior to admission patient needed assistance: w/ cane   Mental Health Status:  Chemical Dependency Status:  Values/Beliefs:  Spiritual, Cultural Beliefs, Gnosticist Practices, Values that affect care:               Additional Information:  Per care management/ social work consult for discharge planning SW met with patient. Patient was admitted 1/23 for hip fracture and has an expected discharge date of 1/26-1/27. Patient shared that she lives alone and would like to stay in the [place] area. Patient stated she has relatives in [place] and in [place]; patient is open to any TCU's within [place], [place], and [place]. Patient prefers a semi-private room. Transportation was discussed and patient acknowledges w/c transportation pricing. Depending on where patient is accepted, transportation may be arranged.     SW will continue to follow.     Addendum:  Patient was accepted to [facility] for 1/26, semi-private room. [facility] will provide transportation at 1500. Hospitalist notified.     BEENA Clark, MARILYNN

## 2022-01-26 NOTE — PROGRESS NOTES
Clinic Care Coordination Contact  Care Team Conversations    Situation: RN Clinical Product Navigator  following patient through TCU care progression.     Background: Patient was inpatient at [facility] 1/23/22-1/25/22 for acute closed right femoral neck fracture-secondary to mechanical fall s/p right total hip arthroplasty (1/24/22), acute blood loss anemia due to surgical blood loss (hgb decreased from 12.8 to 10.2), bilateral otitis media (on oral doxycycline and ciprofloxacin ear drops).  Patient will discharge to [facility] TCU.    Assessment: Prior to admission patient was living alone in a home with stairs at the main entrance.  Patient was independent at baseline.  Significant medical history includes malignant neoplasm of left breast with right mastectomy 15 years ago without recurrence and centrilobular emphysema.  Patient to have a follow-up with [facility] within 14 days.    Plan/Recommendations: RN Clinical Product Navigator will send TCU Care Team Care Management hand in communication and request involvement in discharge planning and coordination of care.      Melissa Behl BSN, RN, PHN, CCM  RN Clinical Product Navigator  [phone]

## 2022-01-26 NOTE — PROGRESS NOTES
Pt is alert and oriented x4, VSS, CMS intact. Right hip dressing C/D/I. Pt c/o pain in right hip, PRN pain meds: oxycodone and tylenol, given as ordered (See MAR). Pt sleeping upon pain reassessment. Pt slept throughout night without difficulty. Using  purewick overnight, Voiding without difficulty. Plan to discharge to TCU when able to find placement. Continue to monitor for pain. Continue POC.

## 2022-01-26 NOTE — PLAN OF CARE
Physical Therapy Discharge Summary    Reason for therapy discharge:    Discharged to transitional care facility.    Progress towards therapy goal(s). See goals on Care Plan in [facility] electronic health record for goal details.  Goals partially met.  Barriers to achieving goals:   discharge from facility.    Therapy recommendation(s):    Continued therapy is recommended.  Rationale/Recommendations:  Patient is significantly below baseline level of function and would benefit from continued PT in TCU setting to return to improve IND with safety, functional mobility, and prevent further functional decline.

## 2022-01-26 NOTE — PROGRESS NOTES
Orthopedic Surgery  Raisa Archer  2022  Admit Date:  2022  POD: 2 Days Post-Op   Procedure(s):  ARTHROPLASTY, HIP, TOTAL    Alert and oriented.   Patient resting comfortably in chair.    Pain controlled.  Tolerating oral intake.    Denies nausea or vomiting  Denies chest pain or shortness of breath    Vital Sign Ranges  Temperature Temp  Av.1  F (36.7  C)  Min: 97.8  F (36.6  C)  Max: 98.4  F (36.9  C)   Blood pressure Systolic (24hrs), Av , Min:97 , Max:106        Diastolic (24hrs), Av, Min:56, Max:77      Pulse Pulse  Av.7  Min: 73  Max: 90   Respirations Resp  Av  Min: 16  Max: 16   Pulse oximetry SpO2  Av.8 %  Min: 87 %  Max: 98 %       Dressing is clean, dry, and intact. (ABD and tape)  Minimal erythema of the surrounding skin.   Bilateral calves are soft, non-tender.  Right lower extremity is NVI.  Sensation intact bilateral lower extremities  Patient able to resist dorsi and plantar flexion bilaterally  +Dp pulse    Labs:  Recent Labs   Lab Test 22  0840 22  1552 18  1313   WBC 10.3 6.2 4.5     Recent Labs   Lab Test 22  0701 22  0840 22  1552   HGB 10.2* 11.1* 12.8     Recent Labs   Lab Test 22  1552   INR 0.98     Recent Labs   Lab Test 22  0840 22  1552 18  1313   * 171 196        A/P  1. Right femoral neck fracture, right total hip arthroplasty              Continue Xarelto for DVT prophylaxis.                Mobilize with PT/OT               WBAT with walker              No active abduction              Leave dressing intact.                Continue current pain regiment.     2. Disposition              Anticipate d/c to TCU based on progress and medical clearance.  Ortho stable.     Pasty Gudino PA-C

## 2022-01-27 ENCOUNTER — LAB REQUISITION (OUTPATIENT)
Dept: LAB | Facility: CLINIC | Age: 77
End: 2022-01-27

## 2022-01-27 ENCOUNTER — TRANSITIONAL CARE UNIT VISIT (OUTPATIENT)
Dept: GERIATRICS | Facility: CLINIC | Age: 77
End: 2022-01-27
Payer: MEDICARE

## 2022-01-27 DIAGNOSIS — Z79.01 ANTICOAGULATED: ICD-10-CM

## 2022-01-27 DIAGNOSIS — J43.2 CENTRILOBULAR EMPHYSEMA (H): Primary | ICD-10-CM

## 2022-01-27 DIAGNOSIS — F51.01 PRIMARY INSOMNIA: ICD-10-CM

## 2022-01-27 DIAGNOSIS — Z00.01 ENCOUNTER FOR GENERAL ADULT MEDICAL EXAMINATION WITH ABNORMAL FINDINGS: ICD-10-CM

## 2022-01-27 DIAGNOSIS — H66.90 ACUTE OTITIS MEDIA, UNSPECIFIED OTITIS MEDIA TYPE: ICD-10-CM

## 2022-01-27 DIAGNOSIS — K59.03 DRUG-INDUCED CONSTIPATION: ICD-10-CM

## 2022-01-27 DIAGNOSIS — R52 PAIN: ICD-10-CM

## 2022-01-27 DIAGNOSIS — Z96.641 HISTORY OF TOTAL RIGHT HIP REPLACEMENT: ICD-10-CM

## 2022-01-27 DIAGNOSIS — Z71.89 OTHER SPECIFIED COUNSELING: ICD-10-CM

## 2022-01-27 LAB — SARS-COV-2 RNA RESP QL NAA+PROBE: NEGATIVE

## 2022-01-27 PROCEDURE — P9604 ONE-WAY ALLOW PRORATED TRIP: HCPCS | Performed by: NURSE PRACTITIONER

## 2022-01-27 PROCEDURE — 99310 SBSQ NF CARE HIGH MDM 45: CPT | Performed by: NURSE PRACTITIONER

## 2022-01-27 PROCEDURE — 36415 COLL VENOUS BLD VENIPUNCTURE: CPT | Performed by: NURSE PRACTITIONER

## 2022-01-27 PROCEDURE — 86481 TB AG RESPONSE T-CELL SUSP: CPT | Performed by: NURSE PRACTITIONER

## 2022-01-27 PROCEDURE — U0005 INFEC AGEN DETEC AMPLI PROBE: HCPCS | Performed by: NURSE PRACTITIONER

## 2022-01-27 RX ORDER — METHOCARBAMOL 500 MG/1
500 TABLET, FILM COATED ORAL 3 TIMES DAILY PRN
COMMUNITY
End: 2022-02-17

## 2022-01-27 RX ORDER — HYDROXYZINE PAMOATE 25 MG/1
25 CAPSULE ORAL 3 TIMES DAILY
COMMUNITY
End: 2022-01-31

## 2022-01-27 RX ORDER — AMOXICILLIN 250 MG
1 CAPSULE ORAL 2 TIMES DAILY
COMMUNITY
End: 2022-02-15

## 2022-01-27 NOTE — PROGRESS NOTES
[place] [facility]  INITIAL VISIT NOTE  January 28, 2022    PRIMARY CARE PROVIDER AND CLINIC:  Shanna Pereyra [address] [facility]     CHIEF COMPLAINT:  Hospital follow-up/Initial visit    HPI:    Raisa Archer is a 76 year old  (1945) female who was seen at [facility] on January 28, 2022 for an initial visit.     Medical history is notable for COPD, dyslipidemia, breast cancer, meningioma, mixed urinary incontinence, colon polyps, and osteoporosis.      Summary of hospital course:  Patient was hospitalized at [facility] from January 23 through January 26, 2022 for right hip fracture sustained after mechanical fall. EKG showed normal sinus rhythm with premature supraventricular complexes.  Imaging study demonstrated displaced transverse fracture of the right femoral neck.  CBC and BMP were unremarkable.  Screening for COVID-19 was negative.  She was evaluated by orthopedic service and underwent right total hip arthroplasty on January 24, 2022.  EBL was less than 50 mL.  Postop hemoglobin dropped to 10.2 from initial 12.8.   DVT prophylaxis was initiated with rivaroxaban. TCU was recommended per therapies.     Patient is admitted to this facility for medical management, nursing care, and rehab.     Of note, history was obtained from patient, facility RN, and extensive review of the chart.    Today's visit:  Patient was seen in her room, while lying in bed.  She appears comfortable.  Earlier today she had pain, rated at 10 out of 10. in her right hip which subsided after taking analgesic.  She reports no bowel movement for several days.  She denies fever, chills, chest pain, palpitation, dyspnea, nausea, vomiting, abdominal pain, or urinary symptoms.  She reports no pain in her ears      CODE STATUS:   CPR/Full code     PAST MEDICAL HISTORY:   Fall resulting in displaced right femoral neck fracture, s/p right total hip arthroplasty on  January 24, 2022  Bilateral otitis media  COPD (centrilobular emphysema)  Dyslipidemia  Right breast cancer, s/p right mastectomy with axillary node dissection in March 2007  Meningioma  Mixed urinary incontinence  Colon polyps  Osteoporosis      Past Medical History:   Diagnosis Date     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     Breast cancer, right breast (H)      Centrilobular emphysema (H)      Centrilobular emphysema (H)      Colon polyps     repeat colonoscopy 2019     Left tennis elbow      Rash     currently at masectomy site     Unspecified essential hypertension     situational and resolved       PAST SURGICAL HISTORY:   Past Surgical History:   Procedure Laterality Date     ABDOMEN SURGERY       ARTHROPLASTY HIP Right 1/24/2022    Procedure: ARTHROPLASTY, HIP, TOTAL;  Surgeon: Maddy Arango MD;  Location:  OR     BIOPSY       BREAST SURGERY       CATARACT IOL, RT/LT       CHOLECYSTECTOMY       COLONOSCOPY       COLONOSCOPY N/A 10/4/2019    Procedure: COLONOSCOPY, WITH POLYPECTOMY AND BIOPSY;  Surgeon: Lucía John MD;  Location:  GI     ORTHOPEDIC SURGERY       PHACOEMULSIFICATION CLEAR CORNEA WITH STANDARD INTRAOCULAR LENS IMPLANT  5/22/2014    Procedure: PHACOEMULSIFICATION CLEAR CORNEA WITH STANDARD INTRAOCULAR LENS IMPLANT;  Surgeon: Rahul Reid MD;  Location:  EC     PHACOEMULSIFICATION CLEAR CORNEA WITH STANDARD INTRAOCULAR LENS IMPLANT  7/8/2014    Procedure: PHACOEMULSIFICATION CLEAR CORNEA WITH STANDARD INTRAOCULAR LENS IMPLANT;  Surgeon: Rahul Reid MD;  Location:  OR     SURGICAL HISTORY OF -   8/00    left foot reconstruction     SURGICAL HISTORY OF -   1979    hysterectomy       FAMILY HISTORY:   Family History   Problem Relation Age of Onset     Breast Cancer Sister         Going thru 2nd round of breast cancer     Diabetes Sister         overweight     Cancer Brother      Asthma Brother      Other Cancer Brother         Hodkins Lymphoma/Hodkins Lymphoma/Hodkins  Lymphoma/Hodkins Lymphoma     Diabetes Brother         overweight     Cerebrovascular Disease Father         Had several small ones     Cancer Other         Stomach cancer- cousin     Breast Cancer Other         Aunts     Diabetes Other         Uncles and cousin/Paternal Uncles/Paternal Uncles/Paternal Uncles/Paternal Uncles     Cancer Brother      Obesity Brother      Asthma Brother      Cancer Other         Family Hx of colon cancer     Breast Cancer Other         Paternal Aunt/Paternal Aunt/Paternal Aunt/Paternal Aunt     Colon Cancer Other         Maternal Great Uncles     Breast Cancer Sister      Breast Cancer Cousin         Paternal/Paternal     Colon Cancer Other         Maternal Uncle/Maternal Great Uncles/Maternal Great Uncles/Maternal Great Uncles     Obesity Sister         On diet/On diet/On diet     Obesity Brother      Asthma Niece         passed away of pneumonia     Diabetes Other      Breast Cancer Other         had double masectomys     Colon Cancer Other        SOCIAL HISTORY:  Social History     Tobacco Use     Smoking status: Former Smoker     Packs/day: 1.50     Years: 30.00     Pack years: 45.00     Types: Cigarettes     Start date: 1967     Quit date: 5/10/2014     Years since quittin.7     Smokeless tobacco: Never Used     Tobacco comment: Quit several times over the years including when pregnant   Substance Use Topics     Alcohol use: No     Alcohol/week: 0.0 standard drinks       MEDICATIONS:  Current Outpatient Medications   Medication Sig Dispense Refill     acetaminophen (TYLENOL) 500 MG tablet Take 2 tablets (1,000 mg) by mouth every 6 hours as needed for mild pain (Patient taking differently: Take 1,000 mg by mouth 3 times daily )       Calcium Carbonate-Vitamin D (CALCIUM + D PO) Take 2 tablets by mouth daily Total 1200/800mg daily       carboxymethylcellul-glycerin (OPTIVE) 0.5-0.9 % SOLN ophthalmic solution 1 drop       ciprofloxacin (CETRAXAL) 0.2 % otic solution Place  0.25 mLs into both ears 2 times daily for 10 days (Patient taking differently: Place 0.25 mLs into both ears 2 times daily Complete on 2/5) 5 mL 0     diphenhydrAMINE-APAP, sleep, (TYLENOL PM EXTRA STRENGTH)  MG/30ML LIQD Take 1-2 tablets by mouth (Patient not taking: Reported on 1/27/2022)       doxycycline hyclate (VIBRAMYCIN) 100 MG capsule Take 1 capsule (100 mg) by mouth 2 times daily for 10 days (Patient taking differently: Take 100 mg by mouth 2 times daily Complete on 2/5) 20 capsule 0     fluticasone (FLONASE) 50 MCG/ACT nasal spray Spray 1-2 sprays into both nostrils daily (Patient taking differently: Spray 2 sprays into both nostrils daily ) 16 g 11     hydrOXYzine (VISTARIL) 25 MG capsule Take 25 mg by mouth 3 times daily       Loratadine (CLARITIN PO) Take by mouth daily as needed        methocarbamol (ROBAXIN) 500 MG tablet Take 500 mg by mouth 3 times daily       Multiple Vitamins-Minerals (ZAHIDA MULTIVITAMIN FOR WOMEN) TABS Take 1 tablet by mouth daily. (Patient not taking: Reported on 1/27/2022)       Omega-3 Fatty Acids (FISH OIL) 1200 MG CAPS Take 1 capsule by mouth daily (Patient not taking: Reported on 1/27/2022)       oxyCODONE (ROXICODONE) 5 MG tablet Take 1 tablet (5 mg) by mouth every 4 hours as needed for moderate to severe pain 25 tablet 0     polyethylene glycol (MIRALAX) 17 g packet Take 17 g by mouth daily as needed for constipation (Patient taking differently: Take 17 g by mouth daily )       rivaroxaban ANTICOAGULANT (XARELTO) 10 MG TABS tablet Take 1 tablet (10 mg) by mouth daily (with dinner)       senna-docusate (SENOKOT-S/PERICOLACE) 8.6-50 MG tablet Take 1 tablet by mouth 2 times daily       tiotropium-olodaterol (STIOLTO RESPIMAT) 2.5-2.5 MCG/ACT AERS Inhale 2 puffs into the lungs daily 4 g 5     Vitamin D, Cholecalciferol, 25 MCG (1000 UT) TABS Take 1 tablet by mouth daily         Post Discharge Medication Reconciliation Status: discharge medications reconciled, continue  "medications without change.        ALLERGIES:  Allergies   Allergen Reactions     Amoxicillin Diarrhea     Aspirin Nausea and Vomiting     sick     Chantix [Varenicline Tartrate] Other (See Comments)     Sees things     Glycerin Itching     LIQUID,   Reactions: itchy and redness       Ipratropium Other (See Comments)     atrovent inhaler caused sore, tight throat , chest hurt, breathless.      Sympathomimetics Other (See Comments)     Patient doesn't know why this is listed as an allergy     Varenicline GI Disturbance, Other (See Comments) and Nausea and Vomiting     Wool Fiber Itching     Redness        ROS:  10 point ROS were negative other than the symptoms noted above in the HPI.    PHYSICAL EXAM:  Vital signs were reviewed in the chart.  Vital Signs: /68   Pulse 95   Temp 97.3  F (36.3  C)   Resp 16   Ht 1.6 m (5' 3\")   Wt 72.6 kg (160 lb)   SpO2 94%   BMI 28.34 kg/m    General: Comfortable and in no acute distress  HEENT: Mild conjunctival pallor  Cardiovascular: Normal S1, S2, RRR  Respiratory: Lungs clear to auscultation bilaterally  GI: Abdomen soft, non-tender, non-distended, +BS  Extremities: No LE edema  Neuro: CX II-XII grossly intact; ROM in all four extremities grossly intact  Psych: Alert and oriented x3; normal affect  Skin: Right hip surgical wound is dressed and clean    LABORATORY/IMAGING DATA:  All relevant labs and imaging data were reviewed personally today.      Most Recent 3 CBC's:Recent Labs   Lab Test 01/26/22  0701 01/25/22  0840 01/23/22  1552 11/13/18  1313   WBC  --  10.3 6.2 4.5   HGB 10.2* 11.1* 12.8 13.7   MCV  --  92 91 91   PLT  --  145* 171 196     Most Recent 3 BMP's:Recent Labs   Lab Test 01/26/22  0701 01/26/22  0549 01/25/22  0504 01/24/22  0752 01/23/22  1552 08/16/21  1421 08/13/20  1212   NA  --   --   --  133 135  --  140   POTASSIUM 4.2  --   --  3.7 4.0  --  4.1   CHLORIDE  --   --   --  103 103  --  106   CO2  --   --   --  26 28  --  30   BUN  --   --   " --  16 19  --  11   CR  --   --   --  0.69 0.75  --  1.00   ANIONGAP  --   --   --  4 4  --  4   JOJO  --   --   --  8.7 9.0  --  9.3   * 97 100* 107* 102*   < > 94    < > = values in this interval not displayed.         ASSESSMENT/PLAN:  Mechanical fall, subsequent encounter,  Displaced right femoral neck fracture, s/p right total hip arthroplasty on January 24, 2022,  Age-related osteoporosis with current pathological fracture,  Physical deconditioning.  Patient is hemodynamically stable.  Patient complains of moderate to severe pain with movement.  Plan:  Fall precautions  Continue DVT prophylaxis with Xarelto 10 mg p.o. daily  Continue pain management with acetaminophen and oxycodone as ordered  Continue methocarbamol for muscle spasms  Continue calcium and vitamin D supplements  WBAT with walker  Mobilize with PT/OT  Follow-up with Ortho as instructed    Acute blood loss anemia of surgery.  Postop hemoglobin dropped to 10.2 from initial 12.8.  Plan:  Monitor hemoglobin periodically  Next CBC is scheduled for January 31    Recent history of acute otitis media.  Patient is afebrile and reports no complaints today.  Plan:  Continue doxycycline 100 g p.o. twice daily for total of 10 days as ordered  Continue ciprofloxacin 0.2%, 0.25 mL into both ears twice a day for total of 10 days as ordered    COPD (centrilobular emphysema).  Stable from a respiratory standpoint.  Plan:  Continue Stiolto Respimat 2 puffs daily  Monitor respiratory status    Dyslipidemia.  Plan:  Continue omega-3 fatty acids 1200 mg p.o. daily    Slow transit constipation.  No bowel movement for several days.  Plan:  Continue the bowel regimen including Senokot-S and MiraLAX    History of right breast cancer, s/p right mastectomy with axillary node dissection in March 2007.  Not an active issue at this juncture.  Plan:  Follow-up as outpatient        Orders written by provider at facility:  None.          Electronically signed by:  Paula  ROSA MARIA Baeza MD

## 2022-01-27 NOTE — PROGRESS NOTES
[facility] GERIATRICS    PRIMARY CARE PROVIDER AND CLINIC:  Shanna Pereyra MD, [address] [facility]   Chief Complaint   Patient presents with     Hospital F/U      [place] Medical Record Number:  [id number]  Place of Service where encounter took place:  [facility] ([facility]) [[id number]]    Raisa Archer  is a 76 year old  (1945), admitted to the above facility from  [facility]. Hospital stay 1/23/22 through 1/26/22..   HPI:    This is a 76 you female who presented after mechanical fall and found to have right femoral neck fracture, then underwent surgical repair 1/24.  She was given Tylenol and oxycodone for pain control, weightbearing as tolerated, sustained ABLA with a discharge hemoglobin of 10.2 and started on Xarelto 10 mg at dinner for DVT prophylaxis.  She also developed right otitis media, given doxycycline and ciprofloxacin eyedrops at discharge.  No other changes noted so was discharged to [facility] for rehab.    CODE STATUS/ADVANCE DIRECTIVES DISCUSSION:  Prior  CPR/Full code   ALLERGIES:   Allergies   Allergen Reactions     Amoxicillin Diarrhea     Aspirin Nausea and Vomiting     sick     Chantix [Varenicline Tartrate] Other (See Comments)     Sees things     Glycerin Itching     LIQUID,   Reactions: itchy and redness       Ipratropium Other (See Comments)     atrovent inhaler caused sore, tight throat , chest hurt, breathless.      Sympathomimetics Other (See Comments)     Patient doesn't know why this is listed as an allergy     Varenicline GI Disturbance, Other (See Comments) and Nausea and Vomiting     Wool Fiber Itching     Redness       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     Breast cancer, right breast (H)      Centrilobular emphysema (H)      Centrilobular emphysema (H)      Colon polyps     repeat colonoscopy 2019     Left tennis elbow      Rash     currently at  masectomy site     Unspecified essential hypertension     situational and resolved      PAST SURGICAL HISTORY:   has a past surgical history that includes surgical history of -  (8/00); surgical history of -  (1979); Breast surgery; Cholecystectomy; Phacoemulsification clear cornea with standard intraocular lens implant (5/22/2014); biopsy; colonoscopy; orthopedic surgery; Phacoemulsification clear cornea with standard intraocular lens implant (7/8/2014); cataract iol, rt/lt; Abdomen surgery; Colonoscopy (N/A, 10/4/2019); and Arthroplasty hip (Right, 1/24/2022).  FAMILY HISTORY: family history includes Asthma in her brother, brother, and niece; Breast Cancer in her cousin, sister, sister, and other family members; Cancer in her brother, brother and other family members; Cerebrovascular Disease in her father; Colon Cancer in some other family members; Diabetes in her brother, sister, and other family members; Obesity in her brother, brother, and sister; Other Cancer in her brother.  SOCIAL HISTORY:   reports that she quit smoking about 7 years ago. Her smoking use included cigarettes. She started smoking about 55 years ago. She has a 45.00 pack-year smoking history. She has never used smokeless tobacco. She reports that she does not drink alcohol and does not use drugs.  Patient's living condition: lives alone    Post Discharge Medication Reconciliation Status: discharge medications reconciled and changed, per note/orders  Current Outpatient Medications   Medication Sig     hydrOXYzine (VISTARIL) 25 MG capsule Take 25 mg by mouth 3 times daily     methocarbamol (ROBAXIN) 500 MG tablet Take 500 mg by mouth 3 times daily     senna-docusate (SENOKOT-S/PERICOLACE) 8.6-50 MG tablet Take 1 tablet by mouth 2 times daily     acetaminophen (TYLENOL) 500 MG tablet Take 2 tablets (1,000 mg) by mouth every 6 hours as needed for mild pain (Patient taking differently: Take 1,000 mg by mouth 3 times daily )     Calcium  Carbonate-Vitamin D (CALCIUM + D PO) Take 2 tablets by mouth daily Total 1200/800mg daily     carboxymethylcellul-glycerin (OPTIVE) 0.5-0.9 % SOLN ophthalmic solution 1 drop     ciprofloxacin (CETRAXAL) 0.2 % otic solution Place 0.25 mLs into both ears 2 times daily for 10 days (Patient taking differently: Place 0.25 mLs into both ears 2 times daily Complete on 2/5)     diphenhydrAMINE-APAP, sleep, (TYLENOL PM EXTRA STRENGTH)  MG/30ML LIQD Take 1-2 tablets by mouth (Patient not taking: Reported on 1/27/2022)     doxycycline hyclate (VIBRAMYCIN) 100 MG capsule Take 1 capsule (100 mg) by mouth 2 times daily for 10 days (Patient taking differently: Take 100 mg by mouth 2 times daily Complete on 2/5)     fluticasone (FLONASE) 50 MCG/ACT nasal spray Spray 1-2 sprays into both nostrils daily (Patient taking differently: Spray 2 sprays into both nostrils daily )     Loratadine (CLARITIN PO) Take by mouth daily as needed      Multiple Vitamins-Minerals (ZAHIDA MULTIVITAMIN FOR WOMEN) TABS Take 1 tablet by mouth daily. (Patient not taking: Reported on 1/27/2022)     Omega-3 Fatty Acids (FISH OIL) 1200 MG CAPS Take 1 capsule by mouth daily (Patient not taking: Reported on 1/27/2022)     oxyCODONE (ROXICODONE) 5 MG tablet Take 1 tablet (5 mg) by mouth every 4 hours as needed for moderate to severe pain     polyethylene glycol (MIRALAX) 17 g packet Take 17 g by mouth daily as needed for constipation (Patient taking differently: Take 17 g by mouth daily )     rivaroxaban ANTICOAGULANT (XARELTO) 10 MG TABS tablet Take 1 tablet (10 mg) by mouth daily (with dinner)     tiotropium-olodaterol (STIOLTO RESPIMAT) 2.5-2.5 MCG/ACT AERS Inhale 2 puffs into the lungs daily     Vitamin D, Cholecalciferol, 25 MCG (1000 UT) TABS Take 1 tablet by mouth daily     No current facility-administered medications for this visit.       ROS:  10 point ROS of systems including Constitutional, Eyes, Respiratory, Cardiovascular, Gastroenterology,  Genitourinary, Integumentary, Musculoskeletal, Psychiatric were all negative except for pertinent positives noted in my HPI.    Vitals:  /71   Pulse 82   Temp 97.5  F (36.4  C)   Resp 18   SpO2 95%   Exam:  GENERAL APPEARANCE:  Alert, oriented, cooperative, R hip pain  ENT:  Mouth and posterior oropharynx normal, moist mucous membranes, normal hearing acuity  NECK:  No adenopathy,masses or thyromegaly  RESP:  respiratory effort and palpation of chest normal, lungs clear to auscultation , no respiratory distress  CV:  Palpation and auscultation of heart done , regular rate and rhythm, no murmur, rub, or gallop, no edema  ABDOMEN:  normal bowel sounds, soft, nontender, no hepatosplenomegaly or other masses, has constipation  M/S:   WBAT, albe to move all extremities  SKIN:  staples intact in R hip  NEURO:   No focal deficits  PSYCH:  oriented X 3, memory impaired     Lab/Diagnostic data:    Most Recent 3 CBC's:Recent Labs   Lab Test 01/26/22  0701 01/25/22  0840 01/23/22  1552 11/13/18  1313   WBC  --  10.3 6.2 4.5   HGB 10.2* 11.1* 12.8 13.7   MCV  --  92 91 91   PLT  --  145* 171 196     Most Recent 3 BMP's:Recent Labs   Lab Test 01/26/22  0701 01/26/22  0549 01/25/22  0504 01/24/22  0752 01/23/22  1552 08/16/21  1421 08/13/20  1212   NA  --   --   --  133 135  --  140   POTASSIUM 4.2  --   --  3.7 4.0  --  4.1   CHLORIDE  --   --   --  103 103  --  106   CO2  --   --   --  26 28  --  30   BUN  --   --   --  16 19  --  11   CR  --   --   --  0.69 0.75  --  1.00   ANIONGAP  --   --   --  4 4  --  4   JOJO  --   --   --  8.7 9.0  --  9.3   * 97 100* 107* 102*   < > 94    < > = values in this interval not displayed.       ASSESSMENT/PLAN:    (Z96.641) History of total right hip replacement  Underwent ORIF, given weightbearing as tolerated, follow-up with Ortho as directed    (R52) Pain  Increase Tylenol to 1000 mg 3 times daily, start Vistaril 25 mg 3 times daily, DC Flexeril, start methocarbamol 500 mg  3 times daily, continue oxycodone 5 mg every 4 hours as needed, encourage icing    (Z79.01) Anticoagulated  Continue rivaroxaban 10 mg at dinnertime, duration per Ortho    (H66.90) Acute otitis media, unspecified otitis media type  Continue ciprofloxacin otic solution twice daily x10 days with doxycycline 100 mg twice daily, complete on 2/5    (J43.2) Centrilobular emphysema (H)  (primary encounter diagnosis)  Continue Flonase 2 sprays daily, change loratadine to as needed, continue LABA/LAMA 2 puffs daily. RA    ABLA  Last Hgb 10.2, recheck CBC on 1/31    (F51.01) Primary insomnia  Hold tylenol PM. Start melatonin 6mg at HS    (K59.03) Drug-induced constipation  Change MiraLAX to daily, start senna S1 tab twice daily    Orders:  Increase Tylenol, start Vistaril, start methocarbamol, start MiraLAX and senna S, BP monitoring, lab recheck    Total time spent with patient visit at the [facility] nursing facility was 36 including patient visit and review of past records. Greater than 50% of total time spent with counseling and coordinating care due to increased Tylenol, start Vistaril, start methocarbamol, continue as needed oxycodone with icing for pain control, start MiraLAX and senna S for constipation, BP monitoring for retention, CBC/BMP recheck per ABLA and renal function, therapy which lasted 20 minutes.     Electronically signed by:  Jacob Jo NP

## 2022-01-28 ENCOUNTER — TRANSITIONAL CARE UNIT VISIT (OUTPATIENT)
Dept: GERIATRICS | Facility: CLINIC | Age: 77
End: 2022-01-28
Payer: MEDICARE

## 2022-01-28 VITALS
WEIGHT: 160 LBS | BODY MASS INDEX: 28.35 KG/M2 | OXYGEN SATURATION: 94 % | HEART RATE: 95 BPM | RESPIRATION RATE: 16 BRPM | TEMPERATURE: 97.3 F | SYSTOLIC BLOOD PRESSURE: 134 MMHG | DIASTOLIC BLOOD PRESSURE: 68 MMHG | HEIGHT: 63 IN

## 2022-01-28 DIAGNOSIS — K59.01 SLOW TRANSIT CONSTIPATION: ICD-10-CM

## 2022-01-28 DIAGNOSIS — D62 ACUTE BLOOD LOSS ANEMIA: ICD-10-CM

## 2022-01-28 DIAGNOSIS — M80.00XD AGE-RELATED OSTEOPOROSIS WITH CURRENT PATHOLOGICAL FRACTURE WITH ROUTINE HEALING, SUBSEQUENT ENCOUNTER: ICD-10-CM

## 2022-01-28 DIAGNOSIS — Z85.3 HISTORY OF BREAST CANCER: ICD-10-CM

## 2022-01-28 DIAGNOSIS — J43.2 CENTRILOBULAR EMPHYSEMA (H): ICD-10-CM

## 2022-01-28 DIAGNOSIS — S72.001D CLOSED FRACTURE OF NECK OF RIGHT FEMUR WITH ROUTINE HEALING, SUBSEQUENT ENCOUNTER: Primary | ICD-10-CM

## 2022-01-28 DIAGNOSIS — H66.90 ACUTE OTITIS MEDIA, UNSPECIFIED OTITIS MEDIA TYPE: ICD-10-CM

## 2022-01-28 DIAGNOSIS — E78.5 DYSLIPIDEMIA: ICD-10-CM

## 2022-01-28 DIAGNOSIS — R53.81 PHYSICAL DECONDITIONING: ICD-10-CM

## 2022-01-28 LAB — SARS-COV-2 RNA RESP QL NAA+PROBE: NEGATIVE

## 2022-01-28 PROCEDURE — 99305 1ST NF CARE MODERATE MDM 35: CPT | Performed by: INTERNAL MEDICINE

## 2022-01-28 ASSESSMENT — MIFFLIN-ST. JEOR: SCORE: 1194.89

## 2022-01-28 NOTE — LETTER
1/28/2022        RE: Raisa Archer  [address] [place] [id number]        [facility]  INITIAL VISIT NOTE  January 28, 2022    PRIMARY CARE PROVIDER AND CLINIC:  Shanna Pereyra [facility]     CHIEF COMPLAINT:  Hospital follow-up/Initial visit    HPI:    Raisa Archer is a 76 year old  (1945) female who was seen at [place] [facility] on January 28, 2022 for an initial visit.     Medical history is notable for COPD, dyslipidemia, breast cancer, meningioma, mixed urinary incontinence, colon polyps, and osteoporosis.      Summary of hospital course:  Patient was hospitalized at [facility] from January 23 through January 26, 2022 for right hip fracture sustained after mechanical fall. EKG showed normal sinus rhythm with premature supraventricular complexes.  Imaging study demonstrated displaced transverse fracture of the right femoral neck.  CBC and BMP were unremarkable.  Screening for COVID-19 was negative.  She was evaluated by orthopedic service and underwent right total hip arthroplasty on January 24, 2022.  EBL was less than 50 mL.  Postop hemoglobin dropped to 10.2 from initial 12.8.   DVT prophylaxis was initiated with rivaroxaban. TCU was recommended per therapies.     Patient is admitted to this facility for medical management, nursing care, and rehab.     Of note, history was obtained from patient, facility RN, and extensive review of the chart.    Today's visit:  Patient was seen in her room, while lying in bed.  She appears comfortable.  Earlier today she had pain, rated at 10 out of 10. in her right hip which subsided after taking analgesic.  She reports no bowel movement for several days.  She denies fever, chills, chest pain, palpitation, dyspnea, nausea, vomiting, abdominal pain, or urinary symptoms.  She reports no pain in her ears      CODE STATUS:   CPR/Full code     PAST MEDICAL  HISTORY:   Fall resulting in displaced right femoral neck fracture, s/p right total hip arthroplasty on January 24, 2022  Bilateral otitis media  COPD (centrilobular emphysema)  Dyslipidemia  Right breast cancer, s/p right mastectomy with axillary node dissection in March 2007  Meningioma  Mixed urinary incontinence  Colon polyps  Osteoporosis      Past Medical History:   Diagnosis Date     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     Breast cancer, right breast (H)      Centrilobular emphysema (H)      Centrilobular emphysema (H)      Colon polyps     repeat colonoscopy 2019     Left tennis elbow      Rash     currently at masectomy site     Unspecified essential hypertension     situational and resolved       PAST SURGICAL HISTORY:   Past Surgical History:   Procedure Laterality Date     ABDOMEN SURGERY       ARTHROPLASTY HIP Right 1/24/2022    Procedure: ARTHROPLASTY, HIP, TOTAL;  Surgeon: Maddy Arango MD;  Location:  OR     BIOPSY       BREAST SURGERY       CATARACT IOL, RT/LT       CHOLECYSTECTOMY       COLONOSCOPY       COLONOSCOPY N/A 10/4/2019    Procedure: COLONOSCOPY, WITH POLYPECTOMY AND BIOPSY;  Surgeon: Lucía John MD;  Location:  GI     ORTHOPEDIC SURGERY       PHACOEMULSIFICATION CLEAR CORNEA WITH STANDARD INTRAOCULAR LENS IMPLANT  5/22/2014    Procedure: PHACOEMULSIFICATION CLEAR CORNEA WITH STANDARD INTRAOCULAR LENS IMPLANT;  Surgeon: Rahul Reid MD;  Location:  EC     PHACOEMULSIFICATION CLEAR CORNEA WITH STANDARD INTRAOCULAR LENS IMPLANT  7/8/2014    Procedure: PHACOEMULSIFICATION CLEAR CORNEA WITH STANDARD INTRAOCULAR LENS IMPLANT;  Surgeon: Rahul Reid MD;  Location:  OR     SURGICAL HISTORY OF -   8/00    left foot reconstruction     SURGICAL HISTORY OF -   1979    hysterectomy       FAMILY HISTORY:   Family History   Problem Relation Age of Onset     Breast Cancer Sister         Going thru 2nd round of breast cancer     Diabetes Sister         overweight     Cancer  Brother      Asthma Brother      Other Cancer Brother         Hodkins Lymphoma/Hodkins Lymphoma/Hodkins Lymphoma/Hodkins Lymphoma     Diabetes Brother         overweight     Cerebrovascular Disease Father         Had several small ones     Cancer Other         Stomach cancer- cousin     Breast Cancer Other         Aunts     Diabetes Other         Uncles and cousin/Paternal Uncles/Paternal Uncles/Paternal Uncles/Paternal Uncles     Cancer Brother      Obesity Brother      Asthma Brother      Cancer Other         Family Hx of colon cancer     Breast Cancer Other         Paternal Aunt/Paternal Aunt/Paternal Aunt/Paternal Aunt     Colon Cancer Other         Maternal Great Uncles     Breast Cancer Sister      Breast Cancer Cousin         Paternal/Paternal     Colon Cancer Other         Maternal Uncle/Maternal Great Uncles/Maternal Great Uncles/Maternal Great Uncles     Obesity Sister         On diet/On diet/On diet     Obesity Brother      Asthma Niece         passed away of pneumonia     Diabetes Other      Breast Cancer Other         had double masectomys     Colon Cancer Other        SOCIAL HISTORY:  Social History     Tobacco Use     Smoking status: Former Smoker     Packs/day: 1.50     Years: 30.00     Pack years: 45.00     Types: Cigarettes     Start date: 1967     Quit date: 5/10/2014     Years since quittin.7     Smokeless tobacco: Never Used     Tobacco comment: Quit several times over the years including when pregnant   Substance Use Topics     Alcohol use: No     Alcohol/week: 0.0 standard drinks       MEDICATIONS:  Current Outpatient Medications   Medication Sig Dispense Refill     acetaminophen (TYLENOL) 500 MG tablet Take 2 tablets (1,000 mg) by mouth every 6 hours as needed for mild pain (Patient taking differently: Take 1,000 mg by mouth 3 times daily )       Calcium Carbonate-Vitamin D (CALCIUM + D PO) Take 2 tablets by mouth daily Total 1200/800mg daily       carboxymethylcellul-glycerin  (OPTIVE) 0.5-0.9 % SOLN ophthalmic solution 1 drop       ciprofloxacin (CETRAXAL) 0.2 % otic solution Place 0.25 mLs into both ears 2 times daily for 10 days (Patient taking differently: Place 0.25 mLs into both ears 2 times daily Complete on 2/5) 5 mL 0     diphenhydrAMINE-APAP, sleep, (TYLENOL PM EXTRA STRENGTH)  MG/30ML LIQD Take 1-2 tablets by mouth (Patient not taking: Reported on 1/27/2022)       doxycycline hyclate (VIBRAMYCIN) 100 MG capsule Take 1 capsule (100 mg) by mouth 2 times daily for 10 days (Patient taking differently: Take 100 mg by mouth 2 times daily Complete on 2/5) 20 capsule 0     fluticasone (FLONASE) 50 MCG/ACT nasal spray Spray 1-2 sprays into both nostrils daily (Patient taking differently: Spray 2 sprays into both nostrils daily ) 16 g 11     hydrOXYzine (VISTARIL) 25 MG capsule Take 25 mg by mouth 3 times daily       Loratadine (CLARITIN PO) Take by mouth daily as needed        methocarbamol (ROBAXIN) 500 MG tablet Take 500 mg by mouth 3 times daily       Multiple Vitamins-Minerals (ZAHIDA MULTIVITAMIN FOR WOMEN) TABS Take 1 tablet by mouth daily. (Patient not taking: Reported on 1/27/2022)       Omega-3 Fatty Acids (FISH OIL) 1200 MG CAPS Take 1 capsule by mouth daily (Patient not taking: Reported on 1/27/2022)       oxyCODONE (ROXICODONE) 5 MG tablet Take 1 tablet (5 mg) by mouth every 4 hours as needed for moderate to severe pain 25 tablet 0     polyethylene glycol (MIRALAX) 17 g packet Take 17 g by mouth daily as needed for constipation (Patient taking differently: Take 17 g by mouth daily )       rivaroxaban ANTICOAGULANT (XARELTO) 10 MG TABS tablet Take 1 tablet (10 mg) by mouth daily (with dinner)       senna-docusate (SENOKOT-S/PERICOLACE) 8.6-50 MG tablet Take 1 tablet by mouth 2 times daily       tiotropium-olodaterol (STIOLTO RESPIMAT) 2.5-2.5 MCG/ACT AERS Inhale 2 puffs into the lungs daily 4 g 5     Vitamin D, Cholecalciferol, 25 MCG (1000 UT) TABS Take 1 tablet by  "mouth daily         Post Discharge Medication Reconciliation Status: discharge medications reconciled, continue medications without change.        ALLERGIES:  Allergies   Allergen Reactions     Amoxicillin Diarrhea     Aspirin Nausea and Vomiting     sick     Chantix [Varenicline Tartrate] Other (See Comments)     Sees things     Glycerin Itching     LIQUID,   Reactions: itchy and redness       Ipratropium Other (See Comments)     atrovent inhaler caused sore, tight throat , chest hurt, breathless.      Sympathomimetics Other (See Comments)     Patient doesn't know why this is listed as an allergy     Varenicline GI Disturbance, Other (See Comments) and Nausea and Vomiting     Wool Fiber Itching     Redness        ROS:  10 point ROS were negative other than the symptoms noted above in the HPI.    PHYSICAL EXAM:  Vital signs were reviewed in the chart.  Vital Signs: /68   Pulse 95   Temp 97.3  F (36.3  C)   Resp 16   Ht 1.6 m (5' 3\")   Wt 72.6 kg (160 lb)   SpO2 94%   BMI 28.34 kg/m    General: Comfortable and in no acute distress  HEENT: Mild conjunctival pallor  Cardiovascular: Normal S1, S2, RRR  Respiratory: Lungs clear to auscultation bilaterally  GI: Abdomen soft, non-tender, non-distended, +BS  Extremities: No LE edema  Neuro: CX II-XII grossly intact; ROM in all four extremities grossly intact  Psych: Alert and oriented x3; normal affect  Skin: Right hip surgical wound is dressed and clean    LABORATORY/IMAGING DATA:  All relevant labs and imaging data were reviewed personally today.      Most Recent 3 CBC's:Recent Labs   Lab Test 01/26/22  0701 01/25/22  0840 01/23/22  1552 11/13/18  1313   WBC  --  10.3 6.2 4.5   HGB 10.2* 11.1* 12.8 13.7   MCV  --  92 91 91   PLT  --  145* 171 196     Most Recent 3 BMP's:Recent Labs   Lab Test 01/26/22  0701 01/26/22  0549 01/25/22  0504 01/24/22  0752 01/23/22  1552 08/16/21  1421 08/13/20  1212   NA  --   --   --  133 135  --  140   POTASSIUM 4.2  --   --  " 3.7 4.0  --  4.1   CHLORIDE  --   --   --  103 103  --  106   CO2  --   --   --  26 28  --  30   BUN  --   --   --  16 19  --  11   CR  --   --   --  0.69 0.75  --  1.00   ANIONGAP  --   --   --  4 4  --  4   JOJO  --   --   --  8.7 9.0  --  9.3   * 97 100* 107* 102*   < > 94    < > = values in this interval not displayed.         ASSESSMENT/PLAN:  Mechanical fall, subsequent encounter,  Displaced right femoral neck fracture, s/p right total hip arthroplasty on January 24, 2022,  Age-related osteoporosis with current pathological fracture,  Physical deconditioning.  Patient is hemodynamically stable.  Patient complains of moderate to severe pain with movement.  Plan:  Fall precautions  Continue DVT prophylaxis with Xarelto 10 mg p.o. daily  Continue pain management with acetaminophen and oxycodone as ordered  Continue methocarbamol for muscle spasms  Continue calcium and vitamin D supplements  WBAT with walker  Mobilize with PT/OT  Follow-up with Ortho as instructed    Acute blood loss anemia of surgery.  Postop hemoglobin dropped to 10.2 from initial 12.8.  Plan:  Monitor hemoglobin periodically  Next CBC is scheduled for January 31    Recent history of acute otitis media.  Patient is afebrile and reports no complaints today.  Plan:  Continue doxycycline 100 g p.o. twice daily for total of 10 days as ordered  Continue ciprofloxacin 0.2%, 0.25 mL into both ears twice a day for total of 10 days as ordered    COPD (centrilobular emphysema).  Stable from a respiratory standpoint.  Plan:  Continue Stiolto Respimat 2 puffs daily  Monitor respiratory status    Dyslipidemia.  Plan:  Continue omega-3 fatty acids 1200 mg p.o. daily    Slow transit constipation.  No bowel movement for several days.  Plan:  Continue the bowel regimen including Senokot-S and MiraLAX    History of right breast cancer, s/p right mastectomy with axillary node dissection in March 2007.  Not an active issue at this juncture.  Plan:  Follow-up  as outpatient        Orders written by provider at facility:  None.          Electronically signed by:  Paula Baeza MD                          Sincerely,        Paula Baeza MD

## 2022-01-29 LAB
QUANTIFERON MITOGEN: 2.82 IU/ML
QUANTIFERON NIL TUBE: 0.05 IU/ML
QUANTIFERON TB1 TUBE: 0.17 IU/ML
QUANTIFERON TB2 TUBE: 0.63

## 2022-01-30 ENCOUNTER — LAB REQUISITION (OUTPATIENT)
Dept: LAB | Facility: CLINIC | Age: 77
End: 2022-01-30

## 2022-01-30 VITALS
TEMPERATURE: 97.3 F | RESPIRATION RATE: 14 BRPM | OXYGEN SATURATION: 99 % | SYSTOLIC BLOOD PRESSURE: 111 MMHG | DIASTOLIC BLOOD PRESSURE: 78 MMHG | BODY MASS INDEX: 28.34 KG/M2 | HEART RATE: 77 BPM | WEIGHT: 160 LBS

## 2022-01-30 DIAGNOSIS — D64.9 ANEMIA, UNSPECIFIED: ICD-10-CM

## 2022-01-31 ENCOUNTER — TRANSITIONAL CARE UNIT VISIT (OUTPATIENT)
Dept: GERIATRICS | Facility: CLINIC | Age: 77
End: 2022-01-31
Payer: MEDICARE

## 2022-01-31 DIAGNOSIS — Z96.641 HISTORY OF TOTAL RIGHT HIP REPLACEMENT: ICD-10-CM

## 2022-01-31 DIAGNOSIS — R52 PAIN: Primary | ICD-10-CM

## 2022-01-31 DIAGNOSIS — K59.03 DRUG-INDUCED CONSTIPATION: ICD-10-CM

## 2022-01-31 DIAGNOSIS — F51.01 PRIMARY INSOMNIA: ICD-10-CM

## 2022-01-31 DIAGNOSIS — Z79.01 ANTICOAGULATED: ICD-10-CM

## 2022-01-31 DIAGNOSIS — H66.90 ACUTE OTITIS MEDIA, UNSPECIFIED OTITIS MEDIA TYPE: ICD-10-CM

## 2022-01-31 LAB
ANION GAP SERPL CALCULATED.3IONS-SCNC: 3 MMOL/L (ref 3–14)
BUN SERPL-MCNC: 15 MG/DL (ref 7–30)
CALCIUM SERPL-MCNC: 8.9 MG/DL (ref 8.5–10.1)
CHLORIDE BLD-SCNC: 102 MMOL/L (ref 94–109)
CO2 SERPL-SCNC: 30 MMOL/L (ref 20–32)
CREAT SERPL-MCNC: 0.71 MG/DL (ref 0.52–1.04)
ERYTHROCYTE [DISTWIDTH] IN BLOOD BY AUTOMATED COUNT: 12.8 % (ref 10–15)
GAMMA INTERFERON BACKGROUND BLD IA-ACNC: 0.05 IU/ML
GFR SERPL CREATININE-BSD FRML MDRD: 89 ML/MIN/1.73M2
GLUCOSE BLD-MCNC: 108 MG/DL (ref 70–99)
HCT VFR BLD AUTO: 33.9 % (ref 35–47)
HGB BLD-MCNC: 11.1 G/DL (ref 11.7–15.7)
M TB IFN-G BLD-IMP: POSITIVE
M TB IFN-G CD4+ BCKGRND COR BLD-ACNC: 2.77 IU/ML
MCH RBC QN AUTO: 29.3 PG (ref 26.5–33)
MCHC RBC AUTO-ENTMCNC: 32.7 G/DL (ref 31.5–36.5)
MCV RBC AUTO: 89 FL (ref 78–100)
MITOGEN IGNF BCKGRD COR BLD-ACNC: 0.12 IU/ML
MITOGEN IGNF BCKGRD COR BLD-ACNC: 0.58 IU/ML
PLATELET # BLD AUTO: 296 10E3/UL (ref 150–450)
POTASSIUM BLD-SCNC: 3.6 MMOL/L (ref 3.4–5.3)
RBC # BLD AUTO: 3.79 10E6/UL (ref 3.8–5.2)
SODIUM SERPL-SCNC: 135 MMOL/L (ref 133–144)
WBC # BLD AUTO: 7.3 10E3/UL (ref 4–11)

## 2022-01-31 PROCEDURE — 99309 SBSQ NF CARE MODERATE MDM 30: CPT | Performed by: NURSE PRACTITIONER

## 2022-01-31 PROCEDURE — 36415 COLL VENOUS BLD VENIPUNCTURE: CPT | Performed by: NURSE PRACTITIONER

## 2022-01-31 PROCEDURE — 85049 AUTOMATED PLATELET COUNT: CPT | Performed by: NURSE PRACTITIONER

## 2022-01-31 PROCEDURE — P9604 ONE-WAY ALLOW PRORATED TRIP: HCPCS | Performed by: NURSE PRACTITIONER

## 2022-01-31 PROCEDURE — 80048 BASIC METABOLIC PNL TOTAL CA: CPT | Performed by: NURSE PRACTITIONER

## 2022-01-31 NOTE — PROGRESS NOTES
[facility]    Chief Complaint   Patient presents with     RECHECK     HPI:  Raisa Archer is a 76 year old  (1945), who is being seen today for an episodic care visit at: [facility] ([facility]) [[id number]].     This is a 76 you female who presented after mechanical fall and found to have right femoral neck fracture, then underwent surgical repair 1/24.  She was given Tylenol and oxycodone for pain control, weightbearing as tolerated, sustained ABLA with a discharge hemoglobin of 10.2 and started on Xarelto 10 mg at dinner for DVT prophylaxis.  She also developed right otitis media, given doxycycline and ciprofloxacin eyedrops at discharge.  No other changes noted so was discharged to [facility] for rehab.    Today's concern is:   Pain control, dizziness    Allergies, and PMH/PSH reviewed in [facility] today.  REVIEW OF SYSTEMS:  4 point ROS including Respiratory, CV, GI and , other than that noted in the HPI,  is negative    Objective:   /78   Pulse 77   Temp 97.3  F (36.3  C)   Resp 14   Wt 72.6 kg (160 lb)   SpO2 99%   BMI 28.34 kg/m    GENERAL APPEARANCE:  Alert, oriented, cooperative, R hip pain slightly improved  RESP:  respiratory effort and palpation of chest normal, lungs clear to auscultation , no respiratory distress  CV:  Palpation and auscultation of heart done , regular rate and rhythm, no murmur, rub, or gallop, no edema  SKIN:  staples intact in R hip  PSYCH:  oriented X 3, memory impaired       Most Recent 3 CBC's:  Recent Labs   Lab Test 01/26/22  0701 01/25/22  0840 01/23/22  1552 11/13/18  1313   WBC  --  10.3 6.2 4.5   HGB 10.2* 11.1* 12.8 13.7   MCV  --  92 91 91   PLT  --  145* 171 196     Most Recent 3 BMP's:  Recent Labs   Lab Test 01/26/22  0701 01/26/22  0549 01/25/22  0504 01/24/22  0752 01/23/22  1552 08/16/21  1421 08/13/20  1212   NA  --   --   --  133 135  --  140   POTASSIUM 4.2  --   --  3.7 4.0  --  4.1   CHLORIDE  --   --   --  103 103  --  106   CO2   --   --   --  26 28  --  30   BUN  --   --   --  16 19  --  11   CR  --   --   --  0.69 0.75  --  1.00   ANIONGAP  --   --   --  4 4  --  4   JOJO  --   --   --  8.7 9.0  --  9.3   * 97 100* 107* 102*   < > 94    < > = values in this interval not displayed.       Assessment/Plan:    (Z96.641) History of total right hip replacement  Underwent ORIF, given weightbearing as tolerated, follow-up with Ortho with Lucía Ellis CNP on 2/10     (R52) Pain  Continue tylenol 1000 mg 3 times daily, methocarbamol 500 mg 3 times daily, continue oxycodone 5 mg every 4 hours as needed (<1x/day), encourage icing. Per dizziness will discontinue vistaril     (Z79.01) Anticoagulated  Continue rivaroxaban 10 mg at dinnertime, duration per Ortho     (H66.90) Acute otitis media, unspecified otitis media type  Continue ciprofloxacin otic solution twice daily x10 days with doxycycline 100 mg twice daily, complete on 2/5     (J43.2) Centrilobular emphysema (H)  (primary encounter diagnosis)  Continue Flonase 1 spray in each nosil daily, changed loratadine to as needed, continue LABA/LAMA 2 puffs daily. RA     JESSEA  Last Hgb 11.1 on 1/31     (F51.01) Primary insomnia  Hold tylenol PM. Using melatonin 6mg at HS     (K59.03) Drug-induced constipation  Continue miralax daily and senna S1 tab twice daily    Therapy  Ambulating 30ft with FWW, CGA    Orders:  Discontinue vistaril    Electronically signed by: Jacob Jo NP

## 2022-02-02 ENCOUNTER — PATIENT OUTREACH (OUTPATIENT)
Dept: CARE COORDINATION | Facility: CLINIC | Age: 77
End: 2022-02-02
Payer: MEDICARE

## 2022-02-02 NOTE — PROGRESS NOTES
Clinic Care Coordination Contact  Care Conference    S-(situation): Patient currently at [facility] for rehabilitation.    B-(background): Patient was inpatient at [facility] 1/23/22-1/25/22 for acute closed right femoral neck fracture-secondary to mechanical fall s/p right total hip arthroplasty 1/24/22.      A-(assessment): Clinical Product Navigator attended TCU Care Conference via phone with patient and TCU interdisciplinary team.  Future appointments: 2/4/22 with PCP for ear pain and 2/10/22 with [facility] and 2/7/22 X-ray.  The TCU staff will look into the 2/4/22 appointment and likely cancel, as patient is receiving visits from the TCU provider team.  Vitals are 120's/70's, pain waxes and wanes.  Utilizing oxycodone prn, not always prior to therapy.  TCU will try to synchronize oxycodone and therapies.  Therapies: Working on leg strength, walking and balance.  Transfers: CGA, sometimes needing reminders of where to put hands, walking 10-30 ft with walker, CGA, limited by pain, so scheduling with pain medications will be very beneficial.  There are 6 steps to get in home, have not attempted yet due to increased pain and difficulty with walking.  Biggest barriers and recommendations are tolerance for walking, need to buy a walker prior to leaving, managing stairs.  SLUMS: 26/30, CPT: 5.25/5.6, very mild deficits, able to live alone with weekly checks.  Benefiting from raised toilet seat; has one at home.  Utilizing adaptive equipment to dress lower body.        R-(recommendations/plan): Patient needs to be able to ambulate more, navigate 6 stairs prior to discharge.  Patient would have a daily co-pay on 2/25/22.  Plan would be to go home with home care services, SN, PT, OT, HHA.    RN CPN will contact patient within 1-2 business days of TCU discharge.    Melissa Behl BSN, RN, PHN, [facility]  Clinical Product Navigator  [phone]

## 2022-02-03 ENCOUNTER — LAB REQUISITION (OUTPATIENT)
Dept: LAB | Facility: CLINIC | Age: 77
End: 2022-02-03

## 2022-02-03 VITALS
RESPIRATION RATE: 18 BRPM | DIASTOLIC BLOOD PRESSURE: 64 MMHG | WEIGHT: 160 LBS | HEART RATE: 90 BPM | OXYGEN SATURATION: 96 % | SYSTOLIC BLOOD PRESSURE: 99 MMHG | BODY MASS INDEX: 28.34 KG/M2 | TEMPERATURE: 97.8 F

## 2022-02-03 DIAGNOSIS — Z00.01 ENCOUNTER FOR GENERAL ADULT MEDICAL EXAMINATION WITH ABNORMAL FINDINGS: ICD-10-CM

## 2022-02-03 PROCEDURE — U0003 INFECTIOUS AGENT DETECTION BY NUCLEIC ACID (DNA OR RNA); SEVERE ACUTE RESPIRATORY SYNDROME CORONAVIRUS 2 (SARS-COV-2) (CORONAVIRUS DISEASE [COVID-19]), AMPLIFIED PROBE TECHNIQUE, MAKING USE OF HIGH THROUGHPUT TECHNOLOGIES AS DESCRIBED BY CMS-2020-01-R: HCPCS | Performed by: NURSE PRACTITIONER

## 2022-02-04 ENCOUNTER — TRANSITIONAL CARE UNIT VISIT (OUTPATIENT)
Dept: GERIATRICS | Facility: CLINIC | Age: 77
End: 2022-02-04
Payer: MEDICARE

## 2022-02-04 DIAGNOSIS — F51.01 PRIMARY INSOMNIA: ICD-10-CM

## 2022-02-04 DIAGNOSIS — Z79.01 ANTICOAGULATED: ICD-10-CM

## 2022-02-04 DIAGNOSIS — Z96.641 HISTORY OF TOTAL RIGHT HIP REPLACEMENT: ICD-10-CM

## 2022-02-04 DIAGNOSIS — R52 PAIN: Primary | ICD-10-CM

## 2022-02-04 DIAGNOSIS — M80.00XD AGE-RELATED OSTEOPOROSIS WITH CURRENT PATHOLOGICAL FRACTURE WITH ROUTINE HEALING, SUBSEQUENT ENCOUNTER: ICD-10-CM

## 2022-02-04 LAB — SARS-COV-2 RNA RESP QL NAA+PROBE: NOT DETECTED

## 2022-02-04 PROCEDURE — 99309 SBSQ NF CARE MODERATE MDM 30: CPT | Performed by: NURSE PRACTITIONER

## 2022-02-04 NOTE — PROGRESS NOTES
[facility]    Chief Complaint   Patient presents with     RECHECK     HPI:  Raisa Archer is a 76 year old  (1945), who is being seen today for an episodic care visit at: [facility] ([facility]) [[id number]].     This is a 76 you female who presented after mechanical fall and found to have right femoral neck fracture, then underwent surgical repair 1/24.  She was given Tylenol and oxycodone for pain control, weightbearing as tolerated, sustained ABLA with a discharge hemoglobin of 10.2 and started on Xarelto 10 mg at dinner for DVT prophylaxis.  She also developed right otitis media, given doxycycline and ciprofloxacin eyedrops at discharge.  No other changes noted so was discharged to [facility] for rehab.    Today's concern is:   Pain control    Allergies, and PMH/PSH reviewed in [facility] today.  REVIEW OF SYSTEMS:  4 point ROS including Respiratory, CV, GI and , other than that noted in the HPI,  is negative    Objective:   BP 99/64   Pulse 90   Temp 97.8  F (36.6  C)   Resp 18   Wt 72.6 kg (160 lb)   SpO2 96%   BMI 28.34 kg/m    GENERAL APPEARANCE:  Alert, oriented, cooperative, R hip pain slightly improved  RESP:  respiratory effort and palpation of chest normal, lungs clear to auscultation , no respiratory distress  CV:  Palpation and auscultation of heart done , regular rate and rhythm, no murmur, rub, or gallop, no edema  SKIN:  staples intact in R hip  PSYCH:  oriented X 3, memory impaired. SLUMS 26/30, Safety Q 20.5/22      Most Recent 3 CBC's:  Recent Labs   Lab Test 01/26/22  0701 01/25/22  0840 01/23/22  1552 11/13/18  1313   WBC  --  10.3 6.2 4.5   HGB 10.2* 11.1* 12.8 13.7   MCV  --  92 91 91   PLT  --  145* 171 196     Most Recent 3 BMP's:  Recent Labs   Lab Test 01/26/22  0701 01/26/22  0549 01/25/22  0504 01/24/22  0752 01/23/22  1552 08/16/21  1421 08/13/20  1212   NA  --   --   --  133 135  --  140   POTASSIUM 4.2  --   --  3.7 4.0  --  4.1   CHLORIDE  --   --   --  103  103  --  106   CO2  --   --   --  26 28  --  30   BUN  --   --   --  16 19  --  11   CR  --   --   --  0.69 0.75  --  1.00   ANIONGAP  --   --   --  4 4  --  4   JOJO  --   --   --  8.7 9.0  --  9.3   * 97 100* 107* 102*   < > 94    < > = values in this interval not displayed.       Assessment/Plan:    (Z96.641) History of total right hip replacement  Underwent ORIF, given weightbearing as tolerated, follow-up with Ortho with Lucía Ellis CNP on 2/10     (R52) Pain  Today increase to tylenol 1000 mg 4 times daily, methocarbamol 500 mg 3 times daily, continue oxycodone 5 mg every 4 hours as needed (<1x/day), encourage icing. Per dizziness have discontinued vistaril     (Z79.01) Anticoagulated  Continue rivaroxaban 10 mg at dinnertime, duration per Ortho     (H66.90) Acute otitis media, unspecified otitis media type  Continue ciprofloxacin otic solution twice daily x10 days with doxycycline 100 mg twice daily, complete on 2/5     (J43.2) Centrilobular emphysema (H)  (primary encounter diagnosis)  Continue Flonase 2 sprays in each nosil daily, changed loratadine to as needed, continue LABA/LAMA 2 puffs daily. RA     ABLA  Last Hgb 11.1 on 1/31     (F51.01) Primary insomnia  Holding tylenol PM. Using melatonin 6mg at HS     (K59.03) Drug-induced constipation  Continue miralax daily and senna S1 tab twice daily     Therapy  Ambulating 30ft with FWW, CGA    Orders:  Increase tylenol, APM    Electronically signed by: Jacob Jo NP

## 2022-02-06 ENCOUNTER — LAB REQUISITION (OUTPATIENT)
Dept: LAB | Facility: CLINIC | Age: 77
End: 2022-02-06

## 2022-02-06 DIAGNOSIS — D64.9 ANEMIA, UNSPECIFIED: ICD-10-CM

## 2022-02-07 VITALS
DIASTOLIC BLOOD PRESSURE: 59 MMHG | RESPIRATION RATE: 18 BRPM | WEIGHT: 156.2 LBS | BODY MASS INDEX: 27.67 KG/M2 | TEMPERATURE: 98.1 F | OXYGEN SATURATION: 98 % | SYSTOLIC BLOOD PRESSURE: 99 MMHG | HEART RATE: 76 BPM

## 2022-02-07 LAB
ANION GAP SERPL CALCULATED.3IONS-SCNC: 6 MMOL/L (ref 3–14)
BUN SERPL-MCNC: 10 MG/DL (ref 7–30)
CALCIUM SERPL-MCNC: 9.2 MG/DL (ref 8.5–10.1)
CHLORIDE BLD-SCNC: 103 MMOL/L (ref 94–109)
CO2 SERPL-SCNC: 30 MMOL/L (ref 20–32)
CREAT SERPL-MCNC: 0.73 MG/DL (ref 0.52–1.04)
ERYTHROCYTE [DISTWIDTH] IN BLOOD BY AUTOMATED COUNT: 13.2 % (ref 10–15)
GFR SERPL CREATININE-BSD FRML MDRD: 86 ML/MIN/1.73M2
GLUCOSE BLD-MCNC: 86 MG/DL (ref 70–99)
HCT VFR BLD AUTO: 34.6 % (ref 35–47)
HGB BLD-MCNC: 10.8 G/DL (ref 11.7–15.7)
MCH RBC QN AUTO: 28.5 PG (ref 26.5–33)
MCHC RBC AUTO-ENTMCNC: 31.2 G/DL (ref 31.5–36.5)
MCV RBC AUTO: 91 FL (ref 78–100)
PLATELET # BLD AUTO: 342 10E3/UL (ref 150–450)
POTASSIUM BLD-SCNC: 3.3 MMOL/L (ref 3.4–5.3)
RBC # BLD AUTO: 3.79 10E6/UL (ref 3.8–5.2)
SODIUM SERPL-SCNC: 139 MMOL/L (ref 133–144)
WBC # BLD AUTO: 5.4 10E3/UL (ref 4–11)

## 2022-02-07 PROCEDURE — 82310 ASSAY OF CALCIUM: CPT | Performed by: NURSE PRACTITIONER

## 2022-02-07 PROCEDURE — 36415 COLL VENOUS BLD VENIPUNCTURE: CPT | Performed by: NURSE PRACTITIONER

## 2022-02-07 PROCEDURE — 85027 COMPLETE CBC AUTOMATED: CPT | Performed by: NURSE PRACTITIONER

## 2022-02-08 ENCOUNTER — LAB REQUISITION (OUTPATIENT)
Dept: LAB | Facility: CLINIC | Age: 77
End: 2022-02-08

## 2022-02-08 ENCOUNTER — TRANSITIONAL CARE UNIT VISIT (OUTPATIENT)
Dept: GERIATRICS | Facility: CLINIC | Age: 77
End: 2022-02-08
Payer: MEDICARE

## 2022-02-08 DIAGNOSIS — I10 ESSENTIAL (PRIMARY) HYPERTENSION: ICD-10-CM

## 2022-02-08 DIAGNOSIS — E87.6 HYPOKALEMIA: ICD-10-CM

## 2022-02-08 DIAGNOSIS — R52 PAIN: Primary | ICD-10-CM

## 2022-02-08 DIAGNOSIS — Z79.01 ANTICOAGULATED: ICD-10-CM

## 2022-02-08 DIAGNOSIS — F51.01 PRIMARY INSOMNIA: ICD-10-CM

## 2022-02-08 DIAGNOSIS — K59.03 DRUG-INDUCED CONSTIPATION: ICD-10-CM

## 2022-02-08 PROCEDURE — 99309 SBSQ NF CARE MODERATE MDM 30: CPT | Performed by: NURSE PRACTITIONER

## 2022-02-08 NOTE — PROGRESS NOTES
[facility]ERIATRICS    Chief Complaint   Patient presents with     RECHECK     HPI:  Raisa Archer is a 76 year old  (1945), who is being seen today for an episodic care visit at: [facility] ([facility]) [[id number]].     This is a 76 you female who presented after mechanical fall and found to have right femoral neck fracture, then underwent surgical repair 1/24.  She was given Tylenol and oxycodone for pain control, weightbearing as tolerated, sustained ABLA with a discharge hemoglobin of 10.2 and started on Xarelto 10 mg at dinner for DVT prophylaxis.  She also developed right otitis media, given doxycycline and ciprofloxacin eyedrops at discharge.  No other changes noted so was discharged to [facility] for rehab.    Today's concern is:   Hypokalemia, therapy progress    Allergies, and PMH/PSH reviewed in [facility] today.  REVIEW OF SYSTEMS:  4 point ROS including Respiratory, CV, GI and , other than that noted in the HPI,  is negative    Objective:   BP 99/59   Pulse 76   Temp 98.1  F (36.7  C)   Resp 18   Wt 70.9 kg (156 lb 3.2 oz)   SpO2 98%   BMI 27.67 kg/m    GENERAL APPEARANCE:  Alert, oriented, cooperative, R hip pain improved  RESP:  respiratory effort and palpation of chest normal, lungs clear to auscultation , no respiratory distress  CV:  Palpation and auscultation of heart done , regular rate and rhythm, no murmur, rub, or gallop, no edema  SKIN:  staples intact in R hip  PSYCH:  oriented X 3, memory impaired. SLUMS 26/30, Safety Q 20.5/22      Most Recent 3 CBC's:  Recent Labs   Lab Test 01/26/22  0701 01/25/22  0840 01/23/22  1552 11/13/18  1313   WBC  --  10.3 6.2 4.5   HGB 10.2* 11.1* 12.8 13.7   MCV  --  92 91 91   PLT  --  145* 171 196     Most Recent 3 BMP's:  Recent Labs   Lab Test 01/26/22  0701 01/26/22  0549 01/25/22  0504 01/24/22  0752 01/23/22  1552 08/16/21  1421 08/13/20  1212   NA  --   --   --  133 135  --  140   POTASSIUM 4.2  --   --  3.7 4.0  --  4.1   CHLORIDE  --    --   --  103 103  --  106   CO2  --   --   --  26 28  --  30   BUN  --   --   --  16 19  --  11   CR  --   --   --  0.69 0.75  --  1.00   ANIONGAP  --   --   --  4 4  --  4   JOJO  --   --   --  8.7 9.0  --  9.3   * 97 100* 107* 102*   < > 94    < > = values in this interval not displayed.       Assessment/Plan:    (Z96.641) History of total right hip replacement  Underwent ORIF, given weightbearing as tolerated, follow-up with Ortho with Lucía Ellis CNP on 2/10     (R52) Pain  Continue tylenol 1000 mg 4 times daily, methocarbamol 500 mg 3 times daily, continue oxycodone 5 mg every 4 hours as needed (<1x/day), encourage icing. Per dizziness have  discontinued vistaril. Pain controlled     (Z79.01) Anticoagulated  Continue rivaroxaban 10 mg at dinnertime, duration per Ortho     (H66.90) Acute otitis media, unspecified otitis media type  Continue ciprofloxacin otic solution twice daily x10 days with doxycycline 100 mg twice daily, completed on 2/5     (J43.2) Centrilobular emphysema (H)  (primary encounter diagnosis)  Continue Flonase 2 sprays in each nosil daily, changed loratadine to as needed, continue LABA/LAMA 2 puffs daily. RA    Hypokalemia  Last K 3.3, replaced potassium. Recheck mag on 2/11     ABLA  Last Hgb 10.8 on 2/7, recheck CBC on 2/11     (F51.01) Primary insomnia  Holding tylenol PM. Using melatonin 6mg at HS     (K59.03) Drug-induced constipation  Continue miralax daily and senna S1 tab twice daily     Therapy  Ambulating 30ft with FWW, CGA    Orders:  BMP/CBC/mag recheck    Electronically signed by: Jacob Jo NP

## 2022-02-09 VITALS
HEART RATE: 83 BPM | RESPIRATION RATE: 16 BRPM | WEIGHT: 156.2 LBS | OXYGEN SATURATION: 97 % | BODY MASS INDEX: 27.67 KG/M2 | TEMPERATURE: 97.4 F | SYSTOLIC BLOOD PRESSURE: 101 MMHG | DIASTOLIC BLOOD PRESSURE: 67 MMHG

## 2022-02-09 LAB
ANION GAP SERPL CALCULATED.3IONS-SCNC: 7 MMOL/L (ref 3–14)
BUN SERPL-MCNC: 10 MG/DL (ref 7–30)
CALCIUM SERPL-MCNC: 9.8 MG/DL (ref 8.5–10.1)
CHLORIDE BLD-SCNC: 99 MMOL/L (ref 94–109)
CO2 SERPL-SCNC: 28 MMOL/L (ref 20–32)
CREAT SERPL-MCNC: 0.77 MG/DL (ref 0.52–1.04)
ERYTHROCYTE [DISTWIDTH] IN BLOOD BY AUTOMATED COUNT: 13.2 % (ref 10–15)
GFR SERPL CREATININE-BSD FRML MDRD: 80 ML/MIN/1.73M2
GLUCOSE BLD-MCNC: 98 MG/DL (ref 70–99)
HCT VFR BLD AUTO: 38.5 % (ref 35–47)
HGB BLD-MCNC: 11.7 G/DL (ref 11.7–15.7)
MAGNESIUM SERPL-MCNC: 1.6 MG/DL (ref 1.8–2.6)
MCH RBC QN AUTO: 28.3 PG (ref 26.5–33)
MCHC RBC AUTO-ENTMCNC: 30.4 G/DL (ref 31.5–36.5)
MCV RBC AUTO: 93 FL (ref 78–100)
PLATELET # BLD AUTO: 378 10E3/UL (ref 150–450)
POTASSIUM BLD-SCNC: 3.6 MMOL/L (ref 3.4–5.3)
RBC # BLD AUTO: 4.13 10E6/UL (ref 3.8–5.2)
SODIUM SERPL-SCNC: 134 MMOL/L (ref 133–144)
WBC # BLD AUTO: 4.6 10E3/UL (ref 4–11)

## 2022-02-09 PROCEDURE — 36415 COLL VENOUS BLD VENIPUNCTURE: CPT | Performed by: NURSE PRACTITIONER

## 2022-02-09 PROCEDURE — 85027 COMPLETE CBC AUTOMATED: CPT | Performed by: NURSE PRACTITIONER

## 2022-02-09 PROCEDURE — 80048 BASIC METABOLIC PNL TOTAL CA: CPT | Performed by: NURSE PRACTITIONER

## 2022-02-09 PROCEDURE — 83735 ASSAY OF MAGNESIUM: CPT | Performed by: NURSE PRACTITIONER

## 2022-02-09 PROCEDURE — P9604 ONE-WAY ALLOW PRORATED TRIP: HCPCS | Performed by: NURSE PRACTITIONER

## 2022-02-10 ENCOUNTER — TRANSITIONAL CARE UNIT VISIT (OUTPATIENT)
Dept: GERIATRICS | Facility: CLINIC | Age: 77
End: 2022-02-10
Payer: MEDICARE

## 2022-02-10 ENCOUNTER — LAB REQUISITION (OUTPATIENT)
Dept: LAB | Facility: CLINIC | Age: 77
End: 2022-02-10

## 2022-02-10 DIAGNOSIS — Z96.641 HISTORY OF TOTAL RIGHT HIP REPLACEMENT: ICD-10-CM

## 2022-02-10 DIAGNOSIS — Z79.01 ANTICOAGULATED: ICD-10-CM

## 2022-02-10 DIAGNOSIS — R52 PAIN: Primary | ICD-10-CM

## 2022-02-10 DIAGNOSIS — E83.42 HYPOMAGNESEMIA: ICD-10-CM

## 2022-02-10 DIAGNOSIS — K59.03 DRUG-INDUCED CONSTIPATION: ICD-10-CM

## 2022-02-10 DIAGNOSIS — E87.6 HYPOKALEMIA: ICD-10-CM

## 2022-02-10 DIAGNOSIS — Z00.01 ENCOUNTER FOR GENERAL ADULT MEDICAL EXAMINATION WITH ABNORMAL FINDINGS: ICD-10-CM

## 2022-02-10 PROCEDURE — 99309 SBSQ NF CARE MODERATE MDM 30: CPT | Performed by: NURSE PRACTITIONER

## 2022-02-10 PROCEDURE — U0005 INFEC AGEN DETEC AMPLI PROBE: HCPCS | Performed by: NURSE PRACTITIONER

## 2022-02-10 RX ORDER — CALCIUM CARBONATE/VITAMIN D3 500-10/5ML
LIQUID (ML) ORAL 2 TIMES DAILY
COMMUNITY
End: 2022-07-25

## 2022-02-10 NOTE — PROGRESS NOTES
[facility] GERIATRICS    Chief Complaint   Patient presents with     RECHECK     HPI:  Raisa Archer is a 76 year old  (1945), who is being seen today for an episodic care visit at: [facility] ([facility]) [[id number]].     This is a 76 you female who presented after mechanical fall and found to have right femoral neck fracture, then underwent surgical repair 1/24.  She was given Tylenol and oxycodone for pain control, weightbearing as tolerated, sustained ABLA with a discharge hemoglobin of 10.2 and started on Xarelto 10 mg at dinner for DVT prophylaxis.  She also developed right otitis media, given doxycycline and ciprofloxacin eyedrops at discharge.  No other changes noted so was discharged to [facility] for rehab.    Today's concern is:   Hypomagnesia, pain    Allergies, and PMH/PSH reviewed in [facility] today.  REVIEW OF SYSTEMS:  4 point ROS including Respiratory, CV, GI and , other than that noted in the HPI,  is negative    Objective:   /67   Pulse 83   Temp 97.4  F (36.3  C)   Resp 16   Wt 70.9 kg (156 lb 3.2 oz)   SpO2 97%   BMI 27.67 kg/m    GENERAL APPEARANCE:  Alert, oriented, cooperative, R hip pain improved  RESP:  respiratory effort and palpation of chest normal, lungs clear to auscultation , no respiratory distress  CV:  Palpation and auscultation of heart done , regular rate and rhythm, no murmur, rub, or gallop, no edema  SKIN:  staples intact in R hip  PSYCH:  oriented X 3, memory impaired. SLUMS 26/30, Safety Q 20.5/22      Most Recent 3 CBC's:  Recent Labs   Lab Test 01/26/22  0701 01/25/22  0840 01/23/22  1552 11/13/18  1313   WBC  --  10.3 6.2 4.5   HGB 10.2* 11.1* 12.8 13.7   MCV  --  92 91 91   PLT  --  145* 171 196     Most Recent 3 BMP's:  Recent Labs   Lab Test 01/26/22  0701 01/26/22  0549 01/25/22  0504 01/24/22  0752 01/23/22  1552 08/16/21  1421 08/13/20  1212   NA  --   --   --  133 135  --  140   POTASSIUM 4.2  --   --  3.7 4.0  --  4.1   CHLORIDE  --   --   --   103 103  --  106   CO2  --   --   --  26 28  --  30   BUN  --   --   --  16 19  --  11   CR  --   --   --  0.69 0.75  --  1.00   ANIONGAP  --   --   --  4 4  --  4   JOJO  --   --   --  8.7 9.0  --  9.3   * 97 100* 107* 102*   < > 94    < > = values in this interval not displayed.       Assessment/Plan:    (Z96.641) History of total right hip replacement  Underwent ORIF, given weightbearing as tolerated, follow-up with Ortho with Lucía Ellis CNP on 2/10     (R52) Pain  Continue tylenol 1000 mg 4 times daily, methocarbamol 500 mg 3 times daily. Today discontinue oxycodone with prior vistaril     (Z79.01) Anticoagulated  Continue rivaroxaban 10 mg at dinnertime, duration per Ortho     (H66.90) Acute otitis media, unspecified otitis media type  Continue ciprofloxacin otic solution twice daily x10 days with doxycycline 100 mg twice daily, completed on 2/5     (J43.2) Centrilobular emphysema (H)  (primary encounter diagnosis)  Continue Flonase 2 sprays in each nosil daily, changed loratadine to as needed, continue LABA/LAMA 2 puffs daily. RA     Hypokalemia  Last K 3.6 on 2/9, replaced potassium prior    Hypomagnesia  Last 1.6 on 2/9, start magnesium oxide 400mg BID     ABLA  Last Hgb 11.7 on 2/9     (F51.01) Primary insomnia  Holding tylenol PM. Using melatonin 6mg at HS     (K59.03) Drug-induced constipation  Continue miralax daily and senna S1 tab twice daily     Therapy  Ambulating 180ft with FWW, SBA. TUG 48.15    Orders:  Start mag oxide, discontinue oxycodone    Electronically signed by: Jacob Jo NP

## 2022-02-11 LAB — SARS-COV-2 RNA RESP QL NAA+PROBE: NEGATIVE

## 2022-02-14 ENCOUNTER — LAB REQUISITION (OUTPATIENT)
Dept: LAB | Facility: CLINIC | Age: 77
End: 2022-02-14

## 2022-02-14 VITALS
HEART RATE: 88 BPM | RESPIRATION RATE: 16 BRPM | SYSTOLIC BLOOD PRESSURE: 98 MMHG | BODY MASS INDEX: 28.13 KG/M2 | WEIGHT: 158.8 LBS | DIASTOLIC BLOOD PRESSURE: 63 MMHG | OXYGEN SATURATION: 96 % | TEMPERATURE: 97.5 F

## 2022-02-14 DIAGNOSIS — Z00.01 ENCOUNTER FOR GENERAL ADULT MEDICAL EXAMINATION WITH ABNORMAL FINDINGS: ICD-10-CM

## 2022-02-14 PROCEDURE — U0003 INFECTIOUS AGENT DETECTION BY NUCLEIC ACID (DNA OR RNA); SEVERE ACUTE RESPIRATORY SYNDROME CORONAVIRUS 2 (SARS-COV-2) (CORONAVIRUS DISEASE [COVID-19]), AMPLIFIED PROBE TECHNIQUE, MAKING USE OF HIGH THROUGHPUT TECHNOLOGIES AS DESCRIBED BY CMS-2020-01-R: HCPCS | Performed by: NURSE PRACTITIONER

## 2022-02-15 ENCOUNTER — LAB REQUISITION (OUTPATIENT)
Dept: LAB | Facility: CLINIC | Age: 77
End: 2022-02-15

## 2022-02-15 ENCOUNTER — TRANSITIONAL CARE UNIT VISIT (OUTPATIENT)
Dept: GERIATRICS | Facility: CLINIC | Age: 77
End: 2022-02-15
Payer: MEDICARE

## 2022-02-15 DIAGNOSIS — D62 ACUTE BLOOD LOSS ANEMIA: ICD-10-CM

## 2022-02-15 DIAGNOSIS — K59.01 SLOW TRANSIT CONSTIPATION: ICD-10-CM

## 2022-02-15 DIAGNOSIS — H66.90 ACUTE OTITIS MEDIA, UNSPECIFIED OTITIS MEDIA TYPE: ICD-10-CM

## 2022-02-15 DIAGNOSIS — S72.001D CLOSED FRACTURE OF NECK OF RIGHT FEMUR WITH ROUTINE HEALING, SUBSEQUENT ENCOUNTER: Primary | ICD-10-CM

## 2022-02-15 DIAGNOSIS — E83.42 HYPOMAGNESEMIA: ICD-10-CM

## 2022-02-15 DIAGNOSIS — F51.01 PRIMARY INSOMNIA: ICD-10-CM

## 2022-02-15 DIAGNOSIS — M80.00XD AGE-RELATED OSTEOPOROSIS WITH CURRENT PATHOLOGICAL FRACTURE WITH ROUTINE HEALING, SUBSEQUENT ENCOUNTER: ICD-10-CM

## 2022-02-15 DIAGNOSIS — D64.9 ANEMIA, UNSPECIFIED: ICD-10-CM

## 2022-02-15 DIAGNOSIS — E87.6 HYPOKALEMIA: ICD-10-CM

## 2022-02-15 DIAGNOSIS — J43.2 CENTRILOBULAR EMPHYSEMA (H): ICD-10-CM

## 2022-02-15 DIAGNOSIS — R53.81 PHYSICAL DECONDITIONING: ICD-10-CM

## 2022-02-15 LAB — SARS-COV-2 RNA RESP QL NAA+PROBE: NEGATIVE

## 2022-02-15 PROCEDURE — 99309 SBSQ NF CARE MODERATE MDM 30: CPT | Performed by: NURSE PRACTITIONER

## 2022-02-15 RX ORDER — LANOLIN ALCOHOL/MO/W.PET/CERES
6 CREAM (GRAM) TOPICAL AT BEDTIME
COMMUNITY
End: 2022-07-25

## 2022-02-15 NOTE — PROGRESS NOTES
[facility] GERIATRICS    Chief Complaint   Patient presents with     RECHECK     HPI:  Raisa Archer is a 76 year old  (1945), who is being seen today for an episodic care visit at: [facility] (TCU) [[id number]].     Per recent TCU provider progress notes:   76 you female hospitalized after mechanical fall and found to have right femoral neck fracture, then underwent surgical repair 1/24.  She was given Tylenol and oxycodone for pain control, WBAT, sustained ABLA with a discharge hemoglobin of 10.2 and started on Xarelto 10 mg at dinner for DVT prophylaxis. She also developed right otitis media, given doxycycline PO and ciprofloxacin drops at discharge.  No other changes noted so was discharged to [facility] for rehab further care.    Today's concern is: seen for episodic TCU visit. Reports loose stools over the weekend, discussed decreasing laxatives. Pain managed adequately. No headaches, dizziness, chest pain, dyspnea, bladder issues. Weight down 5 lbs since admission. BP range /63-74 (not on meds). Sats 96% room air. SLUMS 26/30. Walks 170 ft x 2 with FWW. Goal is to discharge home this weekend.     Allergies, and PMH/PSH reviewed in [facility] today.  REVIEW OF SYSTEMS:  4 point ROS including Respiratory, CV, GI and , other than that noted in the HPI,  is negative    Objective:   BP 98/63   Pulse 88   Temp 97.5  F (36.4  C)   Resp 16   Wt 72 kg (158 lb 12.8 oz)   SpO2 96%   BMI 28.13 kg/m    GENERAL APPEARANCE:  Alert, in no distress, pleasant, cooperative, oriented x 3  EYES:  EOM, lids, pupils and irises normal, sclera clear and conjunctiva normal, no discharge or mattering on lids or lashes noted  ENT:  Mouth normal, moist mucous membranes, nose normal without drainage or crusting, external ears without lesions, hearing acuity intact  NECK: supple, symmetrical, trachea midline  RESP:  respiratory effort normal, no chest wall tenderness, no respiratory distress, Lung sounds clear,  patient is on room air  CV:  Auscultation of heart done, rate and rhythm controlled and regular, no murmur, no rub or gallop. Edema none bilateral lower extremities.   ABDOMEN:  normal bowel sounds, soft, nontender, no palpable masses.  M/S:   Gait and station walks with assist , no tenderness or swelling of the joints; able to move all extremities, digits normal  SKIN:  Inspection and palpation of skin and subcutaneous tissue: skin on extremites warm, dry and intact without rashes  NEURO: cranial nerves 2-12 grossly intact, no facial asymmetry, no speech deficits and able to follow directions, moves all extremities symmetrically  PSYCH:  insight and judgement and memory intact, affect and mood normal     Most Recent 3 CBC's:  Recent Labs   Lab Test 01/26/22  0701 01/25/22  0840 01/23/22  1552 11/13/18  1313   WBC  --  10.3 6.2 4.5   HGB 10.2* 11.1* 12.8 13.7   MCV  --  92 91 91   PLT  --  145* 171 196     Most Recent 3 BMP's:  Recent Labs   Lab Test 01/26/22  0701 01/26/22  0549 01/25/22  0504 01/24/22  0752 01/23/22  1552 08/16/21  1421 08/13/20  1212   NA  --   --   --  133 135  --  140   POTASSIUM 4.2  --   --  3.7 4.0  --  4.1   CHLORIDE  --   --   --  103 103  --  106   CO2  --   --   --  26 28  --  30   BUN  --   --   --  16 19  --  11   CR  --   --   --  0.69 0.75  --  1.00   ANIONGAP  --   --   --  4 4  --  4   JOJO  --   --   --  8.7 9.0  --  9.3   * 97 100* 107* 102*   < > 94    < > = values in this interval not displayed.     Assessment/Plan:  Closed fracture of neck of right femur s/p surgery  Physical deconditioning  Acute injury now s/p surgery. WBAT, pain managed well with tylenol, methocarbamol. Continue rivaroxaban 10 mg at dinnertime, duration per Ortho. Therapies eval and treat - f/u next visit. Ortho f/u as planned.      Acute otitis media, unspecified otitis media type  Resolved. Completed ciprofloxacin otic solution and doxycycline course. Monitor for recurrence.      Centrilobular  emphysema (H)   Stable. Continue Flonase 2 sprays in each nostril daily, LABA/LAMA 2 puffs daily. Monitor respiratory status.      Hypokalemia  Hypomagnesia  Both replaced and normalized. Monitor levels PRN.      ABLA  Last Hgb 11.7 on 2/9, recheck level this week.      Primary insomnia  Doing well with melatonin 6mg at HS     Drug-induced constipation  Recent loose stools. Continue Miralax daily and stop senna S. Monitor.      Orders:  1. discontinue senna s  2. Check Hgb 2/16    Electronically signed by: IZABELA Ellis CNP

## 2022-02-16 VITALS
WEIGHT: 158.8 LBS | RESPIRATION RATE: 18 BRPM | DIASTOLIC BLOOD PRESSURE: 53 MMHG | OXYGEN SATURATION: 94 % | HEART RATE: 80 BPM | SYSTOLIC BLOOD PRESSURE: 105 MMHG | TEMPERATURE: 97.1 F | BODY MASS INDEX: 28.13 KG/M2

## 2022-02-16 LAB — HGB BLD-MCNC: 10.7 G/DL (ref 11.7–15.7)

## 2022-02-16 PROCEDURE — P9604 ONE-WAY ALLOW PRORATED TRIP: HCPCS | Performed by: NURSE PRACTITIONER

## 2022-02-16 PROCEDURE — 36415 COLL VENOUS BLD VENIPUNCTURE: CPT | Performed by: NURSE PRACTITIONER

## 2022-02-16 PROCEDURE — 85018 HEMOGLOBIN: CPT | Performed by: NURSE PRACTITIONER

## 2022-02-16 NOTE — PROGRESS NOTES
[facility]ATR[facility] DISCHARGE SUMMARY  PATIENT'S NAME: Raisa Archer  YOB: 1945  MEDICAL RECORD NUMBER:  [id number]  Place of Service where encounter took place:  [facility] (TCU) [[id number]]    PRIMARY CARE PROVIDER AND CLINIC RESPONSIBLE AFTER TRANSFER:   Shanna Pereyra MD, [address] [facility] Provider     Transferring providers: IZABELA Ellis CNP, Dr. Felisha MD  Recent Hospitalization/ED:  [facility] stay 1/23/22 to 1/26/22.  Date of SNF Admission: 1/26/22  Date of SNF (anticipated) Discharge: 2/20/22  Discharged to: previous independent home  Cognitive Scores: SLUMS: 26/30  Physical Function: Ambulating 170 ft with 2ww  DME: No new DME needed    CODE STATUS/ADVANCE DIRECTIVES DISCUSSION:  Prior   ALLERGIES: Amoxicillin, Aspirin, Chantix [varenicline tartrate], Glycerin, Ipratropium, Sympathomimetics, Varenicline, and Wool fiber    NURSING FACILITY COURSE   Medication Changes/Rationale:     Stopped senna s per patient request    Changed robaxin to PRN spasms    Stopped oxycodone    Started mag oxide due to hypomagnesemia    Scheduled tylenol due to pain    Added Miralax due to constipation    Per recent TCU provider progress notes:   76 you female hospitalized after mechanical fall and found to have right femoral neck fracture, then underwent surgical repair 1/24.  She was given Tylenol and oxycodone for pain control, WBAT, sustained ABLA with a discharge hemoglobin of 10.2 and started on Xarelto 10 mg at dinner for DVT prophylaxis. She also developed right otitis media, given doxycycline PO and ciprofloxacin drops at discharge.  No other changes noted so [facility].    Patient seen for discharge visit.  No new concerns. Pain managed well with scheduled tylenol and robaxin (will change robaxin to PRN starting today). Ready to discharge home with home care as  noted below.     Summary of nursing facility stay:   Closed fracture of neck of right femur s/p surgery  Osteoporosis  Physical deconditioning  Acute injury now s/p surgery. WBAT, pain managed well with tylenol, PRN methocarbamol. Continue rivaroxaban 10 mg at dinnertime through 2/24/22 for DVT prophylaxis. Ortho f/u as planned. Home with home care.      Acute otitis media, unspecified otitis media type  Resolved. Completed ciprofloxacin otic solution and doxycycline course. Monitor for recurrence.      Centrilobular emphysema (H)   Stable. Continue Flonase 2 sprays in each nostril daily, LABA/LAMA 2 puffs daily. Monitor respiratory status.      Hypokalemia  Hypomagnesia  Both replaced and normalized. Monitor levels PRN.      ABLA  Last Hgb 10.7 on 2/16, recheck level PRN.      Primary insomnia  Doing well with melatonin 6 mg at HS     Drug-induced constipation  Recent loose stools. Continued Miralax daily and stopped senna S.      Discharge Medications:  Current Outpatient Medications   Medication Sig Dispense Refill     acetaminophen (TYLENOL) 500 MG tablet Take 2 tablets (1,000 mg) by mouth every 6 hours as needed for mild pain (Patient taking differently: Take 1,000 mg by mouth 4 times daily )       Calcium Carbonate-Vitamin D (CALCIUM + D PO) Take 2 tablets by mouth daily Total 1200/800mg daily       fluticasone (FLONASE) 50 MCG/ACT nasal spray Spray 1-2 sprays into both nostrils daily (Patient taking differently: Spray 2 sprays into both nostrils daily ) 16 g 11     magnesium oxide 400 MG CAPS Take by mouth 2 times daily       melatonin 3 MG tablet Take 6 mg by mouth At Bedtime       polyethylene glycol (MIRALAX) 17 g packet Take 17 g by mouth daily as needed for constipation (Patient taking differently: Take 17 g by mouth daily )       tiotropium-olodaterol (STIOLTO RESPIMAT) 2.5-2.5 MCG/ACT AERS Inhale 2 puffs into the lungs daily 4 g 5     Vitamin D, Cholecalciferol, 25 MCG (1000 UT) TABS Take 1 tablet by  mouth daily       methocarbamol (ROBAXIN) 500 MG tablet Take 1 tablet (500 mg) by mouth 3 times daily as needed for muscle spasms 15 tablet 0     Multiple Vitamins-Minerals (ZAHIDA MULTIVITAMIN FOR WOMEN) TABS Take 1 tablet by mouth daily. 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  GENERAL APPEARANCE:  Alert, in no distress, pleasant, cooperative, oriented x 3  EYES:  EOM, lids, pupils and irises normal, sclera clear and conjunctiva normal, no discharge or mattering on lids or lashes noted  ENT:  Mouth normal, moist mucous membranes, nose normal without drainage or crusting, external ears without lesions, hearing acuity intact  RESP:  respiratory effort normal, no respiratory distress, patient is on room air  CV: Edema none bilateral lower extremities.   M/S:   Gait and station walks with assist, no tenderness or swelling of the joints; able to move all extremities, digits normal  NEURO: cranial nerves 2-12 grossly intact, no facial asymmetry, no speech deficits and able to follow directions, moves all extremities symmetrically  PSYCH:   insight and judgement and memory intact, affect and mood normal     SNF labs:   Hgb 10.7 on 2/16/22  Most Recent 3 CBC's:  Recent Labs   Lab Test 01/26/22  0701 01/25/22  0840 01/23/22  1552 11/13/18  1313   WBC  --  10.3 6.2 4.5   HGB 10.2* 11.1* 12.8 13.7   MCV  --  92 91 91   PLT  --  145* 171 196     Most Recent 3 BMP's:  Recent Labs   Lab Test 01/26/22  0701 01/26/22  0549 01/25/22  0504 01/24/22  0752 01/23/22  1552 08/16/21  1421 08/13/20  1212   NA  --   --   --  133 135  --  140   POTASSIUM 4.2  --   --  3.7 4.0  --  4.1   CHLORIDE  --   --   --  103 103  --  106   CO2  --   --   --  26 28  --  30   BUN  --   --   --  16 19  --  11   CR  --   --   --  0.69 0.75  --  1.00   ANIONGAP  --   --   --  4 4  --  4   JOJO  --   --   --  8.7 9.0  --  9.3   * 97 100* 107* 102*   < > 94    < > = values in this interval not displayed.     DISCHARGE PLAN:    Follow up labs: No labs orders/due    Medical Follow Up:      Follow up with primary care provider in 2-3 weeks  Follow up with specialist ortho as planned     Current [facility] scheduled appointments:  Next 5 appointments (look out 90 days)    Feb 25, 2022 11:30 AM  (Arrive by 11:10 AM)  Provider Visit with Sophie Geiger MD  [facility] ([facility] ) [address] [place] [address] [phone]           Discharge Services: Home Care:  Occupational Therapy, Physical Therapy, Registered Nurse, Home Health Aide and From:  [facility]    Discharge Instructions Verbalized to Patient at Discharge:     WBAT    TOTAL DISCHARGE TIME:   Greater than 30 minutes  Electronically signed by:  IZABELA Ellis CNP     Home care Face to Face documentation done in [facility] attached to Home care orders for [facility].

## 2022-02-17 ENCOUNTER — LAB REQUISITION (OUTPATIENT)
Dept: LAB | Facility: CLINIC | Age: 77
End: 2022-02-17

## 2022-02-17 ENCOUNTER — DISCHARGE SUMMARY NURSING HOME (OUTPATIENT)
Dept: GERIATRICS | Facility: CLINIC | Age: 77
End: 2022-02-17
Payer: MEDICARE

## 2022-02-17 DIAGNOSIS — E87.6 HYPOKALEMIA: ICD-10-CM

## 2022-02-17 DIAGNOSIS — S72.001D CLOSED FRACTURE OF NECK OF RIGHT FEMUR WITH ROUTINE HEALING, SUBSEQUENT ENCOUNTER: Primary | ICD-10-CM

## 2022-02-17 DIAGNOSIS — F51.01 PRIMARY INSOMNIA: ICD-10-CM

## 2022-02-17 DIAGNOSIS — Z00.01 ENCOUNTER FOR GENERAL ADULT MEDICAL EXAMINATION WITH ABNORMAL FINDINGS: ICD-10-CM

## 2022-02-17 DIAGNOSIS — E83.42 HYPOMAGNESEMIA: ICD-10-CM

## 2022-02-17 DIAGNOSIS — R53.81 PHYSICAL DECONDITIONING: ICD-10-CM

## 2022-02-17 DIAGNOSIS — D62 ACUTE BLOOD LOSS ANEMIA: ICD-10-CM

## 2022-02-17 DIAGNOSIS — K59.01 SLOW TRANSIT CONSTIPATION: ICD-10-CM

## 2022-02-17 DIAGNOSIS — S72.001A HIP FRACTURE, RIGHT, CLOSED, INITIAL ENCOUNTER (H): ICD-10-CM

## 2022-02-17 DIAGNOSIS — M80.00XD AGE-RELATED OSTEOPOROSIS WITH CURRENT PATHOLOGICAL FRACTURE WITH ROUTINE HEALING, SUBSEQUENT ENCOUNTER: ICD-10-CM

## 2022-02-17 DIAGNOSIS — H66.90 ACUTE OTITIS MEDIA, UNSPECIFIED OTITIS MEDIA TYPE: ICD-10-CM

## 2022-02-17 DIAGNOSIS — J43.2 CENTRILOBULAR EMPHYSEMA (H): ICD-10-CM

## 2022-02-17 LAB — SARS-COV-2 RNA RESP QL NAA+PROBE: NEGATIVE

## 2022-02-17 PROCEDURE — 99316 NF DSCHRG MGMT 30 MIN+: CPT | Performed by: NURSE PRACTITIONER

## 2022-02-17 PROCEDURE — U0003 INFECTIOUS AGENT DETECTION BY NUCLEIC ACID (DNA OR RNA); SEVERE ACUTE RESPIRATORY SYNDROME CORONAVIRUS 2 (SARS-COV-2) (CORONAVIRUS DISEASE [COVID-19]), AMPLIFIED PROBE TECHNIQUE, MAKING USE OF HIGH THROUGHPUT TECHNOLOGIES AS DESCRIBED BY CMS-2020-01-R: HCPCS | Performed by: NURSE PRACTITIONER

## 2022-02-17 RX ORDER — METHOCARBAMOL 500 MG/1
500 TABLET, FILM COATED ORAL 3 TIMES DAILY PRN
Qty: 15 TABLET | Refills: 0 | Status: SHIPPED | OUTPATIENT
Start: 2022-02-17 | End: 2022-07-25

## 2022-02-21 ENCOUNTER — PATIENT OUTREACH (OUTPATIENT)
Dept: CARE COORDINATION | Facility: CLINIC | Age: 77
End: 2022-02-21
Payer: MEDICARE

## 2022-02-21 DIAGNOSIS — J43.2 CENTRILOBULAR EMPHYSEMA (H): ICD-10-CM

## 2022-02-21 ASSESSMENT — ACTIVITIES OF DAILY LIVING (ADL): DEPENDENT_IADLS:: CLEANING;LAUNDRY;SHOPPING;TRANSPORTATION

## 2022-02-21 NOTE — LETTER
[facility]  Patient Centered Plan of Care  About Me:        Patient Name:  Raisa Archer    YOB: 1945  Age:         76 year old   [place] MRN:    [id number] Telephone Information:  Home Phone [phone]   Mobile [phone]       Address:  [address] [place] [address] Email address:  [email or web address]      Emergency Contact(s)    Name Relationship Lgl Grd Work Phone Home Phone Mobile Phone   CHERYL GANDARA Daughter   [phone]            Primary language:  English     needed? No   [place] [facility]:  [phone] op. 1  Other communication barriers:None    Preferred Method of Communication:  Mail  Current living arrangement: I live alone    Mobility Status/ Medical Equipment: Independent w/Device        Health Maintenance  Health Maintenance Reviewed: Up to date      My Access Plan  Medical Emergency 911   Primary Clinic Line [facility] [phone]   24 Hour Appointment Line [phone] or  [phone]) (toll-free)   24 Hour Nurse Line [phone] (toll-free)   Preferred Urgent Care [facility], [phone]     [facility]  [phone]     Preferred Pharmacy [id number] [address] AT [address]     Behavioral Health Crisis Line The National Suicide Prevention Lifeline at 1-938.320.4589 or 911             My Care Team Members  Patient Care Team       Relationship Specialty Notifications Start End    Shanna Pereyra MD PCP - General Family Medicine  7/5/21     Phone: [phone] Fax: [phone] [address] [facility] [address]    Yaritza Basilio MD MD Urology  11/17/18     Phone: [phone] Fax: [phone]         [address] [place] [address]    Jonas  FELISA Lockhart Registered Nurse Urology  11/17/18     Phone: [phone] Pager: [phone]        Tia Moser PA-C Assigned Neuroscience Provider   3/17/21     Phone: [phone] Fax: [phone] [address] [facility] [address]    Bassem Wolf [facility] Pharmacist Pharmacist  7/5/21      [address] [facility] [address]    Shanna Pereyra MD Assigned PCP   8/1/21     Phone: [phone] Fax: [phone] [address] [facility] [address]    Behl, Melissa K, RN Other (see comments) Primary Care - CC Admissions 1/26/22     RN Clinical Product Navigator    Phone: [phone] Fax: [phone]        (Fgs), Danielle [facility]    1/26/22     Phone: [phone] Fax: [phone] [address] [facility] [address]            My Care Plans  Self Management and Treatment Plan  Goals and (Comments)  Goals        General     1. Transportation (pt-stated)      Notes - Note edited  2/22/2022  6:37 AM by Behl, Melissa K, RN     Goal Statement: I need transportation until I can drive again.  Date Goal set: 2/21/2022   Barriers: right hip surgery/pain  Strengths: motivated, care coordination  Date to Achieve By: 5/21/22  Patient expressed understanding of goal: yes  Action steps to achieve this goal:  1. I will review transportation resources sent by Care Coordinator.  2. I will call Care Coordinator with any questions.         2. Healthy Eating (pt-stated)      Notes - Note edited  2/22/2022  6:37 AM by Behl, Melissa K, RN     Goal Statement: I need help getting groceries until I can drive.  Date Goal set: 2/21/2022   Barriers: hip surgery/pain  Strengths: motivated, care coordination  Date to Achieve By: 5/21/22  Patient expressed understanding of goal: yes  Action steps to achieve this goal:  1. I will review resources sent by Care Coordination.  2. I will call Care Coordination with any questions.         3. Financial Wellbeing  (pt-stated)      Notes - Note created  2/22/2022  6:39 AM by Behl, Melissa K, RN     Goal Statement: I need resources for purchasing my groceries.  Date Goal set: 2/21/2022   Barriers: financial  Strengths: motivated, Care Coordination  Date to Achieve By: 5/21/22  Patient expressed understanding of goal: yes  Action steps to achieve this goal:  1. I will review resources sent by Care Coordination.  2. I will call Care Coordination with any questions.               Action Plans on File:                       Advance Care Plans/Directives Type:   Advanced Directive - On File      My Medical and Care Information  Problem List   Patient Active Problem List   Diagnosis     Tobacco use disorder     Malignant neoplasm of female breast (H)     Osteoporosis     Advanced directives, counseling/discussion     Chronic rhinitis     Hyperlipidemia LDL goal <160     Other hammer toe (acquired)     Meningioma (H)     History of colonic polyps     Centrilobular emphysema (H)     Mixed incontinence     Pelvic floor dysfunction     Myalgia of pelvic floor     Nicotine dependence     Cholelithiasis     History of total right hip replacement     Pain     Anticoagulated     Acute otitis media     Primary insomnia     Drug-induced constipation     Hypokalemia     Hypomagnesemia      Current Medications and Allergies:  See printed Medication Report.    Care Coordination Start Date: No linked episodes   Frequency of Care Coordination: 2 weeks     Form Last Updated: 02/22/2022

## 2022-02-21 NOTE — PROGRESS NOTES
Clinic Care Coordination Contact    Clinic Care Coordination Contact  OUTREACH    Referral Information:  Referral Source: Focused Project    Primary Diagnosis: Injury/Fall    Chief Complaint   Patient presents with     Clinic Care Coordination - Initial     RN CPN        Universal Utilization:   Clinic Utilization  Difficulty keeping appointments:: No  Compliance Concerns: No  No-Show Concerns: No  No PCP office visit in Past Year: No  Utilization    Hospital Admissions  1             ED Visits  1             No Show Count (past year)  0                Current as of: 2/21/2022  1:22 PM              Clinical Concerns:  Writer spoke with patient for assessment.  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  Patient also experienced acute blood loss anemia due to surgical blood loss and hgb decreased from 12.8 to 10.2  Patient also developed bilateral otitis media.  [facility].  Patient will be receiving home care services through [facility], however, has not received a call.  Writer advised patient to contact writer if she does not hear from them by 2/22/22.  Patient verbalized understanding.  Patient reports fatigue since being home, but attributes this to doing too much since being home yesterday.  Patient reports pain last night of 9/10.  She took two Tylenol PM and states pain resolved.  Patient reports minimal pain this morning.  Patient is using her 2ww while at home.    Patient has a follow-up with primary care scheduled for 2/25/22, however, this is listed for her ear.  Patient states her ear has healed and she will cancel this appointment.  Patient states she will call back to schedule her TCU appointment within the 2-3 timeframe, as she has a calendar she needs to find with her availability.  Patient  states she has her orthopedic appointment already scheduled for the middle of March.    Current Behavioral Concerns: n/a      Education Provided to patient: Writer reviewed TCU discharge instructions, Care Coordination services, transportation, grocery and county resources.     Pain  Pain (GOAL):: Yes  Type: Acute (<3mo) (right leg)  Location of chronic pain:: acute - right leg  Radiating: No  Progression: Waxing and Waning  Description of pain: Aching  Chronic pain severity:: 1 (9/10 last evening)  Alleviating Factors: Rest, Pain Medication  Aggravating Factors: Activity, Positioning  Health Maintenance Reviewed: Up to date  Clinical Pathway: None    Medication Management:  Medication review status: Medications reviewed and no changes reported per patient.        Patient is out of some OTC, but states he daughter can pick these up for her.     Functional Status:  Dependent ADLs:: Ambulation-cane, Ambulation-walker, Bathing  Dependent IADLs:: Cleaning, Laundry, Shopping, Transportation  Bed or wheelchair confined:: No  Mobility Status: Independent w/Device  Fallen 2 or more times in the past year?: No  Any fall with injury in the past year?: Yes    Living Situation:  Current living arrangement:: I live alone  [facility]    Lifestyle & Psychosocial Needs:    Social Determinants of Health     Tobacco Use: Medium Risk     Smoking Tobacco Use: Former Smoker     Smokeless Tobacco Use: Never Used   Alcohol Use: Not on file   Financial Resource Strain: Medium Risk     Difficulty of Paying Living Expenses: Somewhat hard   Food Insecurity: Food Insecurity Present     Worried About Running Out of Food in the Last Year: Sometimes true     Ran Out of Food in the Last Year: Sometimes true   Transportation Needs: No Transportation Needs     Lack of Transportation (Medical): No     Lack of Transportation (Non-Medical): No   Physical Activity: Not on file   Stress: Not on file   Social Connections:  Not on file   Intimate Partner Violence: Not on file   Depression: Not at risk     PHQ-2 Score: 2   Housing Stability: Low Risk      Unable to Pay for Housing in the Last Year: No     Number of Places Lived in the Last Year: 1     Unstable Housing in the Last Year: No     Diet:: Regular  Inadequate nutrition (GOAL):: No  Tube Feeding: No  Inadequate activity/exercise (GOAL):: No  Significant changes in sleep pattern (GOAL): No  Transportation means:: Regular car     Uatsdin or spiritual beliefs that impact treatment:: No  Mental health DX:: No  Mental health management concern (GOAL):: No  Chemical Dependency Status: No Current Concerns  Informal Support system:: Children, Neighbors        Patient reports she normally drives, however, since she will be recovering she will have no transportation.  Patient states her daughter and son-in-law work.  Patient's brother offered to take her to appointments, however, she states his health is not well and she would prefer to have other transportation instead.    Patient informed writer she has challenges at times with affording groceries.  Patient has a neighbor who will  groceries for her.  Writer will provider [facility] for grocery assistance as well as other potential grocery delivery options.  Patient requests the resources to be sent to her via e-mail, as she does not always check [employer]. Writer communicated the risks of unencrypted electronic communication and the patient and/or patient representative has agreed to accept the risks and receive unencrypted communication related to the information or resources we have discussed. We reviewed that no PHI will be included.  Email address verified with the patient.  Will include electronic communication form for patient to complete.        Resources and Interventions:  Current Resources:   Skilled Home Care Services: Skilled Nursing, Home Health Aid, Physicial Therapy, Occupational Therapy  Community  Resources: Home Care     Equipment Currently Used at Home: cane, straight  Employment Status: retired         Advance Care Plan/Directive  Advanced Care Plans/Directives on file:: Yes  Type Advanced Care Plans/Directives: Advanced Directive - On File  Advanced Care Plan/Directive Status: Not Applicable    Referrals Placed: Transportation, Senior Linkage Line     Goals:   Goals        General     1. Transportation (pt-stated)      Notes - Note edited  2/22/2022  6:37 AM by Behl, Melissa K, RN     Goal Statement: I need transportation until I can drive again.  Date Goal set: 2/21/2022   Barriers: right hip surgery/pain  Strengths: motivated, care coordination  Date to Achieve By: 5/21/22  Patient expressed understanding of goal: yes  Action steps to achieve this goal:  1. I will review transportation resources sent by Care Coordinator.  2. I will call Care Coordinator with any questions.         2. Healthy Eating (pt-stated)      Notes - Note edited  2/22/2022  6:37 AM by Behl, Melissa K, RN     Goal Statement: I need help getting groceries until I can drive.  Date Goal set: 2/21/2022   Barriers: hip surgery/pain  Strengths: motivated, care coordination  Date to Achieve By: 5/21/22  Patient expressed understanding of goal: yes  Action steps to achieve this goal:  1. I will review resources sent by Care Coordination.  2. I will call Care Coordination with any questions.         3. Financial Wellbeing (pt-stated)      Notes - Note created  2/22/2022  6:39 AM by Behl, Melissa K, RN     Goal Statement: I need resources for purchasing my groceries.  Date Goal set: 2/21/2022   Barriers: financial  Strengths: motivated, Care Coordination  Date to Achieve By: 5/21/22  Patient expressed understanding of goal: yes  Action steps to achieve this goal:  1. I will review resources sent by Care Coordination.  2. I will call Care Coordination with any questions.              Patient/Caregiver understanding: Patient verbalized  understanding.    Outreach Frequency: 2 weeks  Future Appointments              In 3 days Sophie Geiger MD [facility]          Plan:   1. Patient will schedule her TCU follow-up with her PCP and follow-up with orthopedics as scheduled.  2. Patient will contact writer if she does not hear from home care by 2/22/22.  3. Writer will send patient resources for transportation, grocery delivery & county resources.  4. Writer will send care coordination introduction letter & Patient Centered Plan of Care.  5. Writer will follow-up with patient in 2-3 weeks.    Melissa Behl BSN, RN, PHN, CCM  RN Clinical Product Navigator  [phone]

## 2022-02-21 NOTE — LETTER
[facility] CARE COORDINATION  [address] [facility] [address]   February 21, 2022    Raisa Archer  [address] [place] [address]      Dear Raisa,    I am a clinic care coordinator who works with [facility]. I wanted to thank you for spending the time to talk with me.  Below is a description of clinic care coordination and how I can further assist you.      The clinic care coordination team is made up of a registered nurse,  and community health worker who understand the health care system. The goal of clinic care coordination is to help you manage your health and improve access to the health care system in the most efficient manner. The team can assist you in meeting your health care goals by providing education, coordinating services, strengthening the communication among your providers and supporting you with any resource needs.    Please feel free to contact me at [phone]. with any questions or concerns. We are focused on providing you with the highest-quality healthcare experience possible and that all starts with you.     Sincerely,     Melissa Behl BSN, RN, PHN, [facility]  RN Clinical Product Navigator  [phone]         Enclosed: I have enclosed a copy of the Patient Centered Plan of Care. This has helpful information and goals that we have talked about. Please keep this in an easy to access place to use as needed.

## 2022-02-22 RX ORDER — TIOTROPIUM BROMIDE AND OLODATEROL 3.124; 2.736 UG/1; UG/1
2 SPRAY, METERED RESPIRATORY (INHALATION) DAILY
Qty: 4 G | Refills: 1 | Status: SHIPPED | OUTPATIENT
Start: 2022-02-22 | End: 2022-05-20

## 2022-02-22 SDOH — ECONOMIC STABILITY: TRANSPORTATION INSECURITY
IN THE PAST 12 MONTHS, HAS THE LACK OF TRANSPORTATION KEPT YOU FROM MEDICAL APPOINTMENTS OR FROM GETTING MEDICATIONS?: NO

## 2022-02-22 SDOH — ECONOMIC STABILITY: FOOD INSECURITY: WITHIN THE PAST 12 MONTHS, THE FOOD YOU BOUGHT JUST DIDN'T LAST AND YOU DIDN'T HAVE MONEY TO GET MORE.: SOMETIMES TRUE

## 2022-02-22 SDOH — ECONOMIC STABILITY: TRANSPORTATION INSECURITY
IN THE PAST 12 MONTHS, HAS LACK OF TRANSPORTATION KEPT YOU FROM MEETINGS, WORK, OR FROM GETTING THINGS NEEDED FOR DAILY LIVING?: NO

## 2022-02-22 SDOH — ECONOMIC STABILITY: FOOD INSECURITY: WITHIN THE PAST 12 MONTHS, YOU WORRIED THAT YOUR FOOD WOULD RUN OUT BEFORE YOU GOT MONEY TO BUY MORE.: SOMETIMES TRUE

## 2022-02-22 SDOH — ECONOMIC STABILITY: INCOME INSECURITY: IN THE LAST 12 MONTHS, WAS THERE A TIME WHEN YOU WERE NOT ABLE TO PAY THE MORTGAGE OR RENT ON TIME?: NO

## 2022-02-22 ASSESSMENT — SOCIAL DETERMINANTS OF HEALTH (SDOH): HOW HARD IS IT FOR YOU TO PAY FOR THE VERY BASICS LIKE FOOD, HOUSING, MEDICAL CARE, AND HEATING?: SOMEWHAT HARD

## 2022-02-22 NOTE — TELEPHONE ENCOUNTER
Routing refill request to provider for review/approval because:  Long-Acting Beta Agonist Inhalers Protocol  Failed 02/21/2022 08:02 AM   Protocol Details  Order for Serevent, Striverdi, or Foradil and pt has steroid inhaler     Pt has upcoming appt scheduled 4/15 w PCP    LISA NavarroN RN  [facility]

## 2022-02-24 ENCOUNTER — TELEPHONE (OUTPATIENT)
Dept: FAMILY MEDICINE | Facility: CLINIC | Age: 77
End: 2022-02-24
Payer: MEDICARE

## 2022-02-24 NOTE — TELEPHONE ENCOUNTER
Reason for Call:  Home Health Care    Shannon with [facility] called regarding (reason for call): orders    Orders are needed for this patient. To delay start of care 3-4-2022    PT:     OT:     Skilled Nursing:     Pt Provider: Dr Pereyra    Phone Number Homecare Nurse can be reached at: [phone]    Can we leave a detailed message on this number? YES    Phone number patient can be reached at:     Best Time:       Call taken on 2/24/2022 at 9:22 AM by Ayde Wilson

## 2022-02-24 NOTE — TELEPHONE ENCOUNTER
Dr. Pereyra-Please advise if okay to delay start of home care services until 3/4/22?    Thank you!  LISA RamirezN, RN  [facility]

## 2022-02-24 NOTE — TELEPHONE ENCOUNTER
Patient returned call.  Home care was recommended and ordered for her by the provider at the TCU.  Noted in TCU discharge summary dated 2/17/22: Home Care:  Occupational Therapy, Physical Therapy, Registered Nurse, Home Health Aide and From:  [facility]    Patient had a hip fracture 1/23/22 and was admitted to the hospital for three days. She was then in a TCU for 20 days. Needs PT/OT.    Lynn Mayers RN  [facility]

## 2022-02-24 NOTE — TELEPHONE ENCOUNTER
Left message for Shannon from home care. Requested that OT start earlier if possible. Gave ok to delay start of home care.    Lynn Mayers RN  [facility]

## 2022-02-24 NOTE — TELEPHONE ENCOUNTER
Would you mind contacting patient and asking if she needs home care services now? I am not sure why home nursing was ordered for her. Does she need help with medications or other needs?    Thank you.    Shanna Pereyra MD  [facility]  [phone]

## 2022-02-24 NOTE — TELEPHONE ENCOUNTER
I attempted to call home care and no answer or VM. Are they able to start home OT any sooner than 3/4? Otherwise OK to delay start of care on home services.     Shanna Pereyra MD  [facility]  [phone]

## 2022-03-04 ENCOUNTER — TELEPHONE (OUTPATIENT)
Dept: FAMILY MEDICINE | Facility: CLINIC | Age: 77
End: 2022-03-04
Payer: MEDICARE

## 2022-03-04 NOTE — TELEPHONE ENCOUNTER
Reason for Call:  Home Health Care    Eloina with [facility] called regarding (reason for call): orders    Orders are needed for this patient. Home Health Aide   Bathing, personal care 1x a week for 5 weeks    PT: eval safety mobility, ADLS    OT: eval safety mobility, ADLS    Skilled Nursinx a week for 2 weeks  1x every other week 6,  3 as needed  assessment of right hip incision  med's management    Pt Provider: Dr. Pereyra    Phone Number Homecare Nurse can be reached at: [phone]    Can we leave a detailed message on this number? YES    Phone number patient can be reached at:     Best Time:     Call taken on 3/4/2022 at 2:59 PM by Ayde Wilson

## 2022-03-04 NOTE — TELEPHONE ENCOUNTER
Verbal orders given via confidential voicemail of Eloina with [facility]    LISA NavarroN RN  [facility]

## 2022-03-07 ENCOUNTER — PATIENT OUTREACH (OUTPATIENT)
Dept: CARE COORDINATION | Facility: CLINIC | Age: 77
End: 2022-03-07
Payer: MEDICARE

## 2022-03-07 NOTE — PROGRESS NOTES
Clinic Care Coordination Contact    Follow Up Progress Note      Assessment: Writer spoke with patient for follow-up.  Patient was able to utilize [facility] for her future orthopedic appointment scheduled 3/10/22.  Patient states she had no success with the other services listed.  Writer sent patient Senior Linkage Line for any potential additional transportation companies.  Patient verbalized understanding.  Patient states her daughter has been bringing her groceries/food at this time.  Patient reports her pain has been intermittent and resolves with Tylenol.  Patient has a future PCP appointment scheduled 4/15/22.  Patient declines a sooner appointment.      Care Gaps:    Health Maintenance Due   Topic Date Due     PHQ-2  01/01/2022       Care Gaps Last addressed on 2/21/22    Goals addressed this encounter:   Goals Addressed                    This Visit's Progress       1. Transportation (pt-stated)   10%      Goal Statement: I need transportation until I can drive again.  Date Goal set: 2/21/2022   Barriers: right hip surgery/pain  Strengths: motivated, care coordination  Date to Achieve By: 5/21/22  Patient expressed understanding of goal: yes  Action steps to achieve this goal:  1. I will review transportation resources sent by Care Coordinator.  2. I will call Care Coordinator with any questions.           2. Healthy Eating (pt-stated)   10%      Goal Statement: I need help getting groceries until I can drive.  Date Goal set: 2/21/2022   Barriers: hip surgery/pain  Strengths: motivated, care coordination  Date to Achieve By: 5/21/22  Patient expressed understanding of goal: yes  Action steps to achieve this goal:  1. I will review resources sent by Care Coordination.  2. I will call Care Coordination with any questions.           3. Financial Wellbeing (pt-stated)   10%      Goal Statement: I need resources for purchasing my groceries.  Date Goal set: 2/21/2022   Barriers: financial  Strengths:  motivated, Care Coordination  Date to Achieve By: 5/21/22  Patient expressed understanding of goal: yes  Action steps to achieve this goal:  1. I will review resources sent by Care Coordination.  2. I will call Care Coordination with any questions.              Intervention/Education provided during outreach: Writer sent patient Senior Linkage Line resource and reviewed plan of care     Outreach Frequency: 2 weeks    Plan:   1. Patient will attend orthopedic follow-up appointment.  2. Patient will review Senior Linkage Line resource.  3. RN Clinical Product Navigator will follow up in approximately 2 weeks.    Melissa Behl BSN, RN, PHN, [facility]  RN Clinical Product Navigator  [phone]

## 2022-03-11 ENCOUNTER — TELEPHONE (OUTPATIENT)
Dept: FAMILY MEDICINE | Facility: CLINIC | Age: 77
End: 2022-03-11
Payer: MEDICARE

## 2022-03-11 DIAGNOSIS — Z53.9 DIAGNOSIS NOT YET DEFINED: Primary | ICD-10-CM

## 2022-03-11 PROCEDURE — G0180 MD CERTIFICATION HHA PATIENT: HCPCS | Performed by: STUDENT IN AN ORGANIZED HEALTH CARE EDUCATION/TRAINING PROGRAM

## 2022-03-11 NOTE — TELEPHONE ENCOUNTER
Forms completed and placed in MA basket on 3/11/22.    Shanna Pereyra MD  [facility]  [phone]

## 2022-03-11 NOTE — TELEPHONE ENCOUNTER
Reason for Call:  Form, our goal is to have forms completed with 72 hours, however, some forms may require a visit or additional information.    Type of letter, form or note:  Home Health Certification    Who is the form from?: Home care    Where did the form come from: form was faxed in    What clinic location was the form placed at?: [facility]    Where the form was placed: placed in pod B providers signature folder Box/Folder    What number is listed as a contact on the form?: [phone]       Additional comments:     Call taken on 3/11/2022 at 12:43 PM by Ayde Wilson

## 2022-03-21 ENCOUNTER — PATIENT OUTREACH (OUTPATIENT)
Dept: CARE COORDINATION | Facility: CLINIC | Age: 77
End: 2022-03-21
Payer: MEDICARE

## 2022-03-21 NOTE — PROGRESS NOTES
"Clinic Care Coordination Contact    Follow Up Progress Note      Assessment: Writer spoke with patient for follow-up.  Patient has not looked at resources sent to her by writer since last outreach ([facility]).  Patient states her daughter continues to bring her groceries/food.    Patient reports her hip pain is down to a \"dull ache.\"  Patient reports new neck pain due to not being able to lay on her right side at night from her hip fracture.  Patient is working with her home physical therapist on exercises, stretches, is using I[employer].  Patient plans on reaching out to her PCP for follow-up regarding this.      Care Gaps:    Health Maintenance Due   Topic Date Due     PHQ-2 (once per calendar year)  01/01/2022       will address at future 4/15/22 PCP visit    Goals addressed this encounter:   Goals Addressed                    This Visit's Progress       1. Transportation (pt-stated)   20%      Goal Statement: I need transportation until I can drive again.  Date Goal set: 2/21/2022   Barriers: right hip surgery/pain  Strengths: motivated, care coordination  Date to Achieve By: 5/21/22  Patient expressed understanding of goal: yes  Action steps to achieve this goal:  1. I will review transportation resources sent by Care Coordinator.  2. I will call Care Coordinator with any questions.           2. Healthy Eating (pt-stated)   20%      Goal Statement: I need help getting groceries until I can drive.  Date Goal set: 2/21/2022   Barriers: hip surgery/pain  Strengths: motivated, care coordination  Date to Achieve By: 5/21/22  Patient expressed understanding of goal: yes  Action steps to achieve this goal:  1. I will review resources sent by Care Coordination.  2. I will call Care Coordination with any questions.           3. Financial Wellbeing (pt-stated)   20%      Goal Statement: I need resources for purchasing my groceries.  Date Goal set: 2/21/2022   Barriers: financial  Strengths: motivated, Care " Coordination  Date to Achieve By: 5/21/22  Patient expressed understanding of goal: yes  Action steps to achieve this goal:  1. I will review resources sent by Care Coordination.  2. I will call Care Coordination with any questions.              Intervention/Education provided during outreach: Reviewed plan of care, resource sent at last outreach     Outreach Frequency: monthly    Plan:   1. Patient will review/outreach to Senior Linkage Line for additional transportation resources.  2. Patient will follow-up with PCP as scheduled.  3. RN Clinical Product Navigator will follow up in 1 month.    Melissa Behl BSN, RN, PHN, [facility]  RN Clinical Product Navigator  [phone]

## 2022-04-12 ENCOUNTER — TELEPHONE (OUTPATIENT)
Dept: FAMILY MEDICINE | Facility: CLINIC | Age: 77
End: 2022-04-12
Payer: MEDICARE

## 2022-04-12 NOTE — TELEPHONE ENCOUNTER
Pt has upcoming appt this month with PCP    OK for orders below given to homecare    Bebe Anderson RN, BSN  [facility]

## 2022-04-12 NOTE — TELEPHONE ENCOUNTER
Reason for Call: Other call back    Detailed comments: [facility] is requesting OT - late entry eval and treat. Please follow up. Thanks!    Phone Number Patient can be reached at: [phone]    Best Time: Any    Can we leave a detailed message on this number? YES    Call taken on 4/12/2022 at 10:55 AM by Eleni Serrato

## 2022-04-14 ENCOUNTER — MEDICAL CORRESPONDENCE (OUTPATIENT)
Dept: HEALTH INFORMATION MANAGEMENT | Facility: CLINIC | Age: 77
End: 2022-04-14
Payer: MEDICARE

## 2022-04-15 ENCOUNTER — OFFICE VISIT (OUTPATIENT)
Dept: FAMILY MEDICINE | Facility: CLINIC | Age: 77
End: 2022-04-15
Payer: MEDICARE

## 2022-04-15 VITALS
TEMPERATURE: 98.4 F | HEART RATE: 74 BPM | WEIGHT: 164 LBS | SYSTOLIC BLOOD PRESSURE: 100 MMHG | OXYGEN SATURATION: 96 % | DIASTOLIC BLOOD PRESSURE: 68 MMHG | BODY MASS INDEX: 28 KG/M2 | HEIGHT: 64 IN | RESPIRATION RATE: 16 BRPM

## 2022-04-15 DIAGNOSIS — D62 ACUTE BLOOD LOSS AS CAUSE OF POSTOPERATIVE ANEMIA: ICD-10-CM

## 2022-04-15 DIAGNOSIS — E83.42 HYPOMAGNESEMIA: ICD-10-CM

## 2022-04-15 DIAGNOSIS — G89.29 CHRONIC NECK PAIN: ICD-10-CM

## 2022-04-15 DIAGNOSIS — M79.661 PAIN IN RIGHT SHIN: ICD-10-CM

## 2022-04-15 DIAGNOSIS — M62.838 SPASM OF MUSCLE: Primary | ICD-10-CM

## 2022-04-15 DIAGNOSIS — M54.2 CHRONIC NECK PAIN: ICD-10-CM

## 2022-04-15 PROCEDURE — 99214 OFFICE O/P EST MOD 30 MIN: CPT | Performed by: STUDENT IN AN ORGANIZED HEALTH CARE EDUCATION/TRAINING PROGRAM

## 2022-04-15 RX ORDER — CYCLOBENZAPRINE HCL 5 MG
5 TABLET ORAL 3 TIMES DAILY PRN
Qty: 60 TABLET | Refills: 1 | Status: SHIPPED | OUTPATIENT
Start: 2022-04-15 | End: 2023-02-26

## 2022-04-15 NOTE — PROGRESS NOTES
Assessment & Plan     Spasm of muscle  Chronic neck pain  Chronic neck pain that she had been managing with Flexeril prior to her hospitalization for fall with hip fracture in January. Her symptoms have not changed in severity or nature but she has been out of Flexeril so has not been able to manage as well as prior. H/o neck MRI in 2018 for radicular symptoms (no longer present) that showed spondylosis. No red flag symptoms or concerning exam findings. We will have her restart Flexeril. Discussed that if she continues to have persistent pain or worsening, we should have her evaluated by spine specialty.   - cyclobenzaprine (FLEXERIL) 5 MG tablet  Dispense: 60 tablet; Refill: 1    Pain in right shin  I am not concerned for any serious process such as infection, DVT, fracture. No history of injury. Her exam is unremarkable. Symptoms have been intermittent, brief and improving with time. I feel this is secondary to her using the leg more during therapies since her hip fracture and we discussed watchful waiting. Return if worsening.    Acute blood loss as cause of postoperative anemia  She is asymptomatic. She has not had a hemoglobin since February. We will check to ensure stability.   - Hemoglobin with Reflex to Iron Studies    Hypomagnesemia  Noted low magnesium when checked during her hospitalization. She is not supplementing. Recheck.   - Magnesium      Shanna Pereyra MD  [facility]    Tanna   Pat is a 77 year old who presents for the following health issues     History of Present Illness       Reason for visit:  General Follow-up visit after hip replacement  Symptom onset:  More than a month  Symptoms include:  Neck soreness/stiffness, general aches & pains  Symptom intensity:  Moderate  Symptom progression:  Staying the same  Had these symptoms before:  Yes  Has tried/received treatment for these symptoms:  Yes  Previous treatment was successful:  Yes  Prior treatment  "description:  Prescription for inflammation & relax muscles  What makes it worse:  Not being able to move my neck/head very hard  What makes it better:  Heat & massage    She eats 2-3 servings of fruits and vegetables daily.She consumes 2 sweetened beverage(s) daily.She exercises with enough effort to increase her heart rate 9 or less minutes per day.  She exercises with enough effort to increase her heart rate 3 or less days per week.   She is taking medications regularly.       Here for routine follow up on hip fracture and hip replacement. She was discharged home from the TCU in mid February.     Concerns today:  -Muscle tension in her neck and shoulder; this issue has been present for the past several years intermittently. She had been taking Flexeril to manage before her fall.She is now managing with Tylenol but that hasn't helped. She is not currently taking Flexeril due to running out. Pain goes from the back of her neck into her shoulder. She does not have radiation into the left arm or hand. Previously she would only use flexeril as needed and not often. No new incontinence, saddle anesthesia, fevers, extremity weakness or numbness.    -Pain in the front of her right shin: Onset a few weeks ago. Brief episodes of discomfort where pain radiates from her knee down into the ankle. This is infrequent and improving with time. No swelling or calf pain or redness. No injury to the leg, knee or ankle.   She is living at home on her own. Receives help from her daughter for daily chores. She cannot drive yet.     She is receiving home physical therapy. Last visit on Monday. She is now seeing them every couple of weeks. OT is also coming to see her. She is working on getting into the bath tub. She last saw orthopedics on March 10th at [facility]. Plan is to follow up in 6 months . Has been doing hip exercises at home.     Objective    /68   Pulse 74   Temp 98.4  F (36.9  C)   Resp 16   Ht 1.626 m (5' 4\")   Wt 74.4 " kg (164 lb)   SpO2 96%   BMI 28.15 kg/m    Body mass index is 28.15 kg/m .  Physical Exam   GENERAL: healthy, alert and no distress  EYES: Eyes grossly normal to inspection, PERRL and conjunctivae and sclerae normal  NECK: no adenopathy, no asymmetry, masses, or scars, no midline spine tenderness; tense left suprascapular muscles; reports improvement with massage; neck ROM is restricted in all planes due to discomfort  RESP: lungs clear to auscultation - no rales, rhonchi or wheezes  CV: regular rate and rhythm, normal S1 S2, no S3 or S4, no murmur, click or rub, no peripheral edema   MS: no gross musculoskeletal defects noted, no edema; no Baker's cyst right knee; knee is unremarkable; no joint effusion or redness; no calf swelling; leg is normal in appearance  SKIN: no suspicious lesions or rashes    Imaging:    MRI cervical spine 10/2018:                                                                   Impression:   Multilevel spondylosis, most pronounced at C5-6 as detailed above.

## 2022-04-15 NOTE — PATIENT INSTRUCTIONS
You may use Flexeril as needed for neck pain and tension. If that is not helping, please let me know so I can refer you to a spine specialist. Other reasons to be seen right away would include any new tingling and numbness in either arm, worsening pain, or arm weakness.     Monitor the shin pain. If persistent after 2-3 weeks, please let me know. I think this will improve with time and physical activity.     Please return for a physical exam.

## 2022-04-21 ENCOUNTER — PATIENT OUTREACH (OUTPATIENT)
Dept: CARE COORDINATION | Facility: CLINIC | Age: 77
End: 2022-04-21
Payer: MEDICARE

## 2022-04-21 ASSESSMENT — ACTIVITIES OF DAILY LIVING (ADL): DEPENDENT_IADLS:: CLEANING;LAUNDRY;SHOPPING;TRANSPORTATION

## 2022-04-21 NOTE — LETTER
[facility]  Patient Centered Plan of Care  About Me:        Patient Name:  Raisa Archer    YOB: 1945  Age:         77 year old   [place] MRN:    [id number] Telephone Information:  Home Phone [phone]   Mobile [phone]       Address:  [address] [place] [address] Email address:  [email or web address]      Emergency Contact(s)    Name Relationship Lgl Grd Work Phone Home Phone Mobile Phone   CHERYL GANDARA Daughter   [phone]            Primary language:  English     needed? No   [place] Language [employer]:  [phone] op. 1  Other communication barriers:None    Preferred Method of Communication:  Mail  Current living arrangement: I live alone    Mobility Status/ Medical Equipment: Independent w/Device        Health Maintenance  Health Maintenance Reviewed: Due/Overdue ***      My Access Plan  Medical Emergency 911   Primary Clinic Line [facility] - [phone]   24 Hour Appointment Line [phone] or  [phone]) (toll-free)   24 Hour Nurse Line [phone] (toll-free)   Preferred Urgent Care [facility], [phone]     [facility]  [phone]     Preferred Pharmacy [facility][id number] [place] [id number] [facility] [address] AT [address]     Behavioral Health Crisis Line The National Suicide Prevention Lifeline at 1-273.794.1375 or 911             My Care Team Members  Patient Care Team       Relationship Specialty Notifications Start End    Shanna Pereyra MD PCP - General Family Medicine  7/5/21     Phone: [phone] Fax: [phone] [address] [facility] [address]    Yaritza Basilio MD MD Urology  11/17/18     Phone: [phone] Fax: [phone]         [address] [place] [address]    Jonas  Chantelle, RN Registered Nurse Urology  11/17/18     Phone: [phone] Pager: [phone]        Bassem Wolf [facility] Pharmacist Pharmacist  7/5/21      [address] [facility] [address]    Shanna Pereyra MD Assigned PCP   8/1/21     Phone: [phone] Fax: [phone] [address] [facility] [address]    Behl, Melissa K, RN Other (see comments) Primary Care - CC Admissions 1/26/22     RN Clinical Product Navigator    Phone: [phone] Fax: [phone]        (Fgs), Danielle [facility]    1/26/22     Phone: [phone] Fax: [phone] [address] [facility] [address]            My Care Plans  Self Management and Treatment Plan  Goals and (Comments)   Goals        General     1. Pain Management (pt-stated)      Notes - Note edited  4/21/2022  1:56 PM by Behl, Melissa K, RN     Goal Statement: I want my pain to be consistently manageable.  Date Goal set: 4/21/2022   Barriers: limited mobility due to hip surgery   Strengths: motivated  Date to Achieve By: 7/21/2022  Patient expressed understanding of goal: yes  Action steps to achieve this goal:  1. I will take my medications as directed.  2. I will maintain my hip precautions.  3. I will perform my home therapy exercises as directed.  4. I will contact my provider for new, worsening or persistent symptoms.          2. Functional (pt-stated)      Notes - Note created  4/21/2022  1:57 PM by Behl, Melissa K, RN     Goal Statement: I want to be independent in my activities of daily living.  Date Goal set: 4/21/2022   Barriers: pain, physical deconditioning, hip precautions  Strengths: motivated, home care, care coordination  Date to Achieve By: 7/21/2022  Patient expressed understanding of goal: yes  Action steps to achieve this goal:  1. I will participate in home care services.  2. I will perform my home care exercises as directed.  3. I will follow-up with my provider as  directed.                Action Plans on File:                       Advance Care Plans/Directives Type:   Advanced Directive - On File      My Medical and Care Information  Problem List   Patient Active Problem List   Diagnosis     Tobacco use disorder     Malignant neoplasm of female breast (H)     Osteoporosis     Advanced directives, counseling/discussion     Chronic rhinitis     Hyperlipidemia LDL goal <160     Other hammer toe (acquired)     Meningioma (H)     History of colonic polyps     Centrilobular emphysema (H)     Mixed incontinence     Pelvic floor dysfunction     Myalgia of pelvic floor     Nicotine dependence     Cholelithiasis     History of total right hip replacement     Pain     Anticoagulated     Acute otitis media     Primary insomnia     Drug-induced constipation     Hypokalemia     Hypomagnesemia      Current Medications and Allergies:  See printed Medication Report.    Care Coordination Start Date: No linked episodes   Frequency of Care Coordination: monthly     Form Last Updated: 04/21/2022

## 2022-04-21 NOTE — PROGRESS NOTES
Clinic Care Coordination Contact    Follow Up Progress Note      Assessment: RN Clinical Product Navigator spoke with patient for follow-up.  Patient had follow-up with PCP 4/15/22 for chronic neck pain, pain in right shin.  Patient states her pain continues to be a limiting factor.  Patient reports Flexeril is helping her neck pain, which was up to 10/10, go down to 3/10 at times.  Right hip was up to 6/10 with increase in activity.  Right hip is at times down to 2/10.  Patient continues with home OT and PT.  Patient has been working on getting in and out of her bathtub and getting in and out of her car.  Patient states it continues to be difficult for her.  Patient states her main goals are to be independent in ADLs and to have pain be manageable at all times.  Patient states she has been able to have groceries delivered by ordering online and her daughter has been able to have them delivered.  Patient has been able to secure transportation from family to her appointments when needed.  These goals have been met.  Patient next PCP due October 2022, orthopedics last visit 3/10/22 and next due September 2022.    Care Gaps:    There are no preventive care reminders to display for this patient.    Currently there are no Care Gaps.    Goals addressed this encounter:    Goals Addressed                    This Visit's Progress       1. Pain Management (pt-stated)         Goal Statement: I want my pain to be consistently manageable.  Date Goal set: 4/21/2022   Barriers: limited mobility due to hip surgery   Strengths: motivated  Date to Achieve By: 7/21/2022  Patient expressed understanding of goal: yes  Action steps to achieve this goal:  1. I will take my medications as directed.  2. I will maintain my hip precautions.  3. I will perform my home therapy exercises as directed.  4. I will contact my provider for new, worsening or persistent symptoms.            COMPLETED: 1. Transportation (pt-stated)         Goal  Statement: I need transportation until I can drive again.  Date Goal set: 2/21/2022   Barriers: right hip surgery/pain  Strengths: motivated, care coordination  Date to Achieve By: 5/21/22  Patient expressed understanding of goal: yes  Action steps to achieve this goal:  1. I will review transportation resources sent by Care Coordinator.  2. I will call Care Coordinator with any questions.             2. Functional (pt-stated)         Goal Statement: I want to be independent in my activities of daily living.  Date Goal set: 4/21/2022   Barriers: pain, physical deconditioning, hip precautions  Strengths: motivated, home care, care coordination  Date to Achieve By: 7/21/2022  Patient expressed understanding of goal: yes  Action steps to achieve this goal:  1. I will participate in home care services.  2. I will perform my home care exercises as directed.  3. I will follow-up with my provider as directed.            COMPLETED: 2. Healthy Eating (pt-stated)         Goal Statement: I need help getting groceries until I can drive.  Date Goal set: 2/21/2022   Barriers: hip surgery/pain  Strengths: motivated, care coordination  Date to Achieve By: 5/21/22  Patient expressed understanding of goal: yes  Action steps to achieve this goal:  1. I will review resources sent by Care Coordination.  2. I will call Care Coordination with any questions.             COMPLETED: 3. Financial Wellbeing (pt-stated)         Goal Statement: I need resources for purchasing my groceries.  Date Goal set: 2/21/2022   Barriers: financial  Strengths: motivated, Care Coordination  Date to Achieve By: 5/21/22  Patient expressed understanding of goal: yes  Action steps to achieve this goal:  1. I will review resources sent by Care Coordination.  2. I will call Care Coordination with any questions.                Intervention/Education provided during outreach: Reviewed plan of care and recent PCP instructions.     Outreach Frequency: monthly    Plan:    1. Continue to participate in home care therapies; perform home care exercises.  2. Patient will take medications as directed when needed.  3. Patient will follow-up with PCP as directed.  4. RN Clinical Product Navigator will follow up in 1 month.    Melissa Behl BSN, RN, PHN, [facility]  RN Clinical Product Navigator  [phone]

## 2022-04-21 NOTE — LETTER
[facility] CARE COORDINATION  [address] [facility] [address]   April 21, 2022        Raias Archer  [address] [place] [id number]          Dear Dave Mckeon is an updated Patient Centered Plan of Care for your continued enrollment in Care Coordination. Please let us know if you have additional questions, concerns, or goals that we can assist with.    Sincerely,    Melissa Behl BSN, RN, PHN, CCM  RN Clinical Product Navigator  [phone]              [facility]  Patient Centered Plan of Care  About Me:        Patient Name:  Raisa Archer    YOB: 1945  Age:         77 year old   [place] MRN:    [id number] Telephone Information:  Home Phone [phone]   Mobile [phone]       Address:  [address] [place] [address] Email address:  [email or web address]      Emergency Contact(s)    Name Relationship Lgl Grd Work Phone Home Phone Mobile Phone   1CHERYL LORA Daughter   [phone]            Primary language:  English     needed? No   [place] Language Services:  [phone] op. 1  Other communication barriers:None    Preferred Method of Communication:  Mail  Current living arrangement: I live alone    Mobility Status/ Medical Equipment: Independent w/Device        Health Maintenance  Health Maintenance Reviewed: Up to date      My Access Plan  Medical Emergency 911   Primary Clinic Line [phone]    24 Hour Appointment Line [phone] or  [phone]) (toll-free)   24 Hour Nurse Line [phone] (toll-free)   Preferred Urgent Care [facility], [phone]     Preferred Hospital [facility]  [phone]     [id number] - [facility] [address] AT [address]     Behavioral Health Crisis Line The National Suicide Prevention  [facility] at 1-383.219.7868 or 911             My Care Team Members  Patient Care Team       Relationship Specialty Notifications Start End    Shanna Pereyra MD PCP - General Family Medicine  7/5/21     Phone: [phone] Fax: [phone]         [address] [place] [facility] [address]    Yaritza Basilio MD MD Urology  11/17/18     Phone: [phone] Fax: [phone]         [address] [place] [address]    Chantelle Mcdaniel, RN Registered Nurse Urology  11/17/18     Phone: [phone] Pager: [phone]        Bassem Wolf [facility] Pharmacist Pharmacist  7/5/21      [address] [facility] [address]    Shanna Pereyra MD Assigned PCP   8/1/21     Phone: [phone] Fax: [phone]         [address] [place] [facility] [address]    Behl, Melissa K, RN Other (see comments) Primary Care - CC Admissions 1/26/22     RN Clinical Product Navigator    Phone: [phone] Fax: [phone]        (Fgs), [facility]le [facility]    1/26/22     Phone: [phone] Fax: [phone] [address] [facility] [address]            My Care Plans  Self Management and Treatment Plan  Goals and (Comments)   Goals        General     1. Pain Management (pt-stated)      Notes - Note edited  4/21/2022  1:56 PM by Behl, Melissa K, RN     Goal Statement: I want my pain to be consistently manageable.  Date Goal set: 4/21/2022   Barriers: limited mobility due to hip surgery   Strengths: motivated  Date to Achieve By: 7/21/2022  Patient expressed understanding of goal: yes  Action steps to achieve this goal:  1. I will take my medications as directed.  2. I will maintain my hip precautions.  3. I will perform my home therapy exercises as directed.  4. I will contact my provider for new, worsening or persistent symptoms.          2. Functional (pt-stated)      Notes - Note created  4/21/2022  1:57 PM by Behl,  Olimpia WEI RN     Goal Statement: I want to be independent in my activities of daily living.  Date Goal set: 4/21/2022   Barriers: pain, physical deconditioning, hip precautions  Strengths: motivated, home care, care coordination  Date to Achieve By: 7/21/2022  Patient expressed understanding of goal: yes  Action steps to achieve this goal:  1. I will participate in home care services.  2. I will perform my home care exercises as directed.  3. I will follow-up with my provider as directed.                Action Plans on File:                       Advance Care Plans/Directives Type:   Advanced Directive - On File      My Medical and Care Information  Problem List   Patient Active Problem List   Diagnosis     Tobacco use disorder     Malignant neoplasm of female breast (H)     Osteoporosis     Advanced directives, counseling/discussion     Chronic rhinitis     Hyperlipidemia LDL goal <160     Other hammer toe (acquired)     Meningioma (H)     History of colonic polyps     Centrilobular emphysema (H)     Mixed incontinence     Pelvic floor dysfunction     Myalgia of pelvic floor     Nicotine dependence     Cholelithiasis     History of total right hip replacement     Pain     Anticoagulated     Acute otitis media     Primary insomnia     Drug-induced constipation     Hypokalemia     Hypomagnesemia      Current Medications and Allergies:  See printed Medication Report.    Care Coordination Start Date: No linked episodes   Frequency of Care Coordination: monthly     Form Last Updated: 04/21/2022

## 2022-05-04 ENCOUNTER — MYC MEDICAL ADVICE (OUTPATIENT)
Dept: FAMILY MEDICINE | Facility: CLINIC | Age: 77
End: 2022-05-04
Payer: MEDICARE

## 2022-05-05 NOTE — TELEPHONE ENCOUNTER
TC team,    Can you help her with handicap parking form?    Bebe Anderson RN, BSN  [facility]

## 2022-05-09 NOTE — TELEPHONE ENCOUNTER
Dr. Pereyra-Can colorectal cancer screening reminder be postponed until 2023?  Writer reviewed report from 2019 and it states repeat in 5 years.    Team Coordinators-Please review patient's response regarding Handicap Parking Application.    Thank you!  LISA RamirezN, RN  [facility]

## 2022-05-10 NOTE — TELEPHONE ENCOUNTER
Writer responded via [employer].    LISA RamirezN, RN  [facility]

## 2022-05-23 ENCOUNTER — PATIENT OUTREACH (OUTPATIENT)
Dept: CARE COORDINATION | Facility: CLINIC | Age: 77
End: 2022-05-23
Payer: MEDICARE

## 2022-05-23 NOTE — PROGRESS NOTES
Clinic Care Coordination Contact    Follow Up Progress Note      Assessment: Home therapies have discharged the last week of April 2022.  Has been performing home care therapy exercises on own.    Patient able to get up to the store with daughter driving and was able to get into and out of tub yesterday for the first time.    Pain in right hip is intermittent, most days it is a mild ache.  Not taking pain medication on a regular basis, only as needed when pain is moderate; 3 days a week, towards the end of the day if really active.  Patient taking acetaminophen for pain relief and does get relief.  Pain is 2-3/10 and at times has no pain.  Still using walker at times.   Patient is filling out an application for [employer] hoping to get help with her roof and railings on her stairs.    Patient states home care SW gave her information on [employer] food program, patient has contact information.    Care Gaps:    Health Maintenance Due   Topic Date Due     COVID-19 Vaccine (3 - Booster for Bruce series) 03/26/2022       Postponed to patient states she will work on this when she is ready     Goals addressed this encounter:    Goals Addressed                    This Visit's Progress       COMPLETED: 1. Pain Management (pt-stated)         Goal Statement: I want my pain to be consistently manageable.  Date Goal set: 4/21/2022   Barriers: limited mobility due to hip surgery   Strengths: motivated  Date to Achieve By: 7/21/2022  Patient expressed understanding of goal: yes  Action steps to achieve this goal:  1. I will take my medications as directed.  2. I will maintain my hip precautions.  3. I will perform my home therapy exercises as directed.  4. I will contact my provider for new, worsening or persistent symptoms.            COMPLETED: 2. Functional (pt-stated)         Goal Statement: I want to be independent in my activities of daily living.  Date Goal set: 4/21/2022   Barriers: pain, physical  deconditioning, hip precautions  Strengths: motivated, home care, care coordination  Date to Achieve By: 7/21/2022  Patient expressed understanding of goal: yes  Action steps to achieve this goal:  1. I will participate in home care services.  2. I will perform my home care exercises as directed.  3. I will follow-up with my provider as directed.               Intervention/Education provided during outreach: Reviewed health maintenance; change to maintenance status.              Plan:   1. Patient changed to maintenance status due to goals met.  2. RN Clinical Product Navigator will follow up in 1-2 months.    Melissa Behl BSN, RN, PHN, CCM  RN Clinical Product Navigator  [phone]

## 2022-05-25 NOTE — TELEPHONE ENCOUNTER
I actually need more information to fill this form out. Can you please add her as a phone visit to my schedule? You can double book me.    Shanna Pereyra MD  [facility]  [phone]

## 2022-06-03 ENCOUNTER — VIRTUAL VISIT (OUTPATIENT)
Dept: FAMILY MEDICINE | Facility: CLINIC | Age: 77
End: 2022-06-03
Payer: MEDICARE

## 2022-06-03 DIAGNOSIS — Z96.641 HISTORY OF TOTAL RIGHT HIP REPLACEMENT: Primary | ICD-10-CM

## 2022-06-03 DIAGNOSIS — R76.12 POSITIVE QUANTIFERON-TB GOLD TEST: ICD-10-CM

## 2022-06-03 PROCEDURE — 99213 OFFICE O/P EST LOW 20 MIN: CPT | Mod: 95 | Performed by: STUDENT IN AN ORGANIZED HEALTH CARE EDUCATION/TRAINING PROGRAM

## 2022-06-03 NOTE — TELEPHONE ENCOUNTER
Copy sent to abstract  Copy kept in clinic  Original placed in outgoing mail bin  MyC message sent to patient informing her of completion

## 2022-06-03 NOTE — PROGRESS NOTES
Linda is a 77 year old who is being evaluated via a billable telephone visit.      What phone number would you like to be contacted at? [phone]  How would you like to obtain your AVS? MyChart    Assessment & Plan     History of total right hip replacement  Needs forms filled out for disability parking permit. This was completed and faxed today. She will follow up with surgeon in fall.     Positive QuantiFERON-TB Gold test  Noted positive test per chart review, which was not mentioned in her discharge paperwork. I'm suspicious for a false positive. I have asked her to come into lab for redraw.  - Quantiferon TB Gold Plus      Shanna Pereyra MD  [facility]    Subjective   Linda is a 77 year old who presents for the following health issues    History of Present Illness       Reason for visit:  Discuss FormsShe consumes 2 sweetened beverage(s) daily. She exercises with enough effort to increase her heart rate 3 or less days per week.   She is taking medications regularly.     Seen by phone to discuss handicap forms.    She is not driving. Now doing home PT exercises. She was discharged from PT due to insurance coverage. She is now ambulating on her own with a cane. Has not attempted stairs yet. Plans to start driving when she feels ready. Lower leg strength and sensation is normal. Working on building up her strength in the leg. Her daughter is driving her to appointments.    She is wondering about the positive quantiferon gold test that was done in January. Denies cough, shortness of breath, fever, night sweats, weight loss, known TB exposures.        Objective           Vitals:  No vitals were obtained today due to virtual visit.    Physical Exam   healthy, alert and no distress  PSYCH: Alert and oriented times 3; coherent speech, normal   rate and volume, able to articulate logical thoughts, able   to abstract reason, no tangential thoughts, no hallucinations   or delusions  Her affect  is normal and pleasant  RESP: No cough, no audible wheezing, able to talk in full sentences  Remainder of exam unable to be completed due to telephone visits        Phone call duration: 10 minutes

## 2022-06-24 ENCOUNTER — PATIENT OUTREACH (OUTPATIENT)
Dept: CARE COORDINATION | Facility: CLINIC | Age: 77
End: 2022-06-24

## 2022-06-24 ASSESSMENT — ACTIVITIES OF DAILY LIVING (ADL): DEPENDENT_IADLS:: CLEANING;LAUNDRY;SHOPPING;TRANSPORTATION

## 2022-06-24 NOTE — PROGRESS NOTES
Clinic Care Coordination Contact    Assessment: Care Coordinator contacted patient for 2 month follow up.  Patient has continued to follow the plan of care and assessment is negative for any new needs or concerns.    Enrollment status: Graduated.      Plan: No further outreaches at this time.  Patient will continue to follow the plan of care.  If new needs arise a new Care Coordination referral may be placed.  FYI to PCP    Melissa Behl BSN, RN, PHN, [facility]  RN Clinical Product Navigator  [phone]

## 2022-06-24 NOTE — LETTER
[facility]ORDIN[facility]  [address] [facility] [address]   June 24, 2022    Raisa Archer  [address] [place] [id number]    Dear Raisa,  Your Care Team congratulates you on your journey to maintain wellness. This document will help guide you on your journey to maintain a healthy lifestyle.  You can use this to help you overcome any barriers you may encounter.  If you should have any questions or concerns, you can contact the members of your Care Team or contact your Primary Care Clinic for assistance.     Health Maintenance  Health Maintenance Reviewed: Due/Overdue; address at next PCP visit  Health Maintenance Due   Topic Date Due     COVID-19 Vaccine (3 - Booster for Bruce series) 03/26/2022     LUNG CANCER SCREENING  08/24/2022        My Access Plan  Medical Emergency 911   Primary Clinic Line [facility] - [phone]   24 Hour Appointment Line [phone] or  [phone]) (toll-free)   24 Hour Nurse Line [phone] (toll-free)   Preferred Urgent Care [facility], [phone]   [facility]  [phone]   Preferred Pharmacy [id number] [place] [id number] Behavioral Health Crisis Line The National Suicide Prevention Lifeline at 1-558.596.4506 or 281     My Care Team Members  Patient Care Team       Relationship Specialty Notifications Start End    Shanna Pereyra MD PCP - General Family Medicine  7/5/21     Phone: [phone] Fax: [phone]         [address] [facility] [address]    Yaritza Basilio MD MD Urology  11/17/18     Phone: [phone] Fax: [phone]         [address] [place] [address]    Chantelle Mcdaniel, RN Registered Nurse Urology  11/17/18      Phone: [phone] Pager: [phone]        Bassem Wolf [facility] Pharmacist Pharmacist  7/5/21      [address] [facility] [address]    Shanna Pereyra MD Assigned PCP   8/1/21     Phone: [phone] Fax: [phone]         [address] [facility] [address]    Behl, Melissa K, RN Other (see comments) Primary Care - CC Admissions 1/26/22     RN Clinical Product Navigator    Phone: [phone] Fax: [phone]        (Fgs), Danielle [facility]    1/26/22     Phone: [phone] Fax: [phone] [address] [facility] [address]    Bassem Wolf RPH Assigned MTM Pharmacist   5/28/22      [address] [facility] [address]               Goals        COMPLETED: 1. Pain Management (pt-stated)       Goal Statement: I want my pain to be consistently manageable.  Date Goal set: 4/21/2022   Barriers: limited mobility due to hip surgery   Strengths: motivated  Date to Achieve By: 7/21/2022  Patient expressed understanding of goal: yes  Action steps to achieve this goal:  1. I will take my medications as directed.  2. I will maintain my hip precautions.  3. I will perform my home therapy exercises as directed.  4. I will contact my provider for new, worsening or persistent symptoms.            COMPLETED: 1. Transportation (pt-stated)       Goal Statement: I need transportation until I can drive again.  Date Goal set: 2/21/2022   Barriers: right hip surgery/pain  Strengths: motivated, care coordination  Date to Achieve By: 5/21/22  Patient expressed understanding of goal: yes  Action steps to achieve this goal:  1. I will review transportation resources sent by Care Coordinator.  2. I will call Care Coordinator with any questions.             COMPLETED: 2. Functional (pt-stated)       Goal Statement: I want to be independent in my activities of daily living.  Date Goal set: 4/21/2022   Barriers: pain, physical deconditioning, hip  precautions  Strengths: motivated, home care, care coordination  Date to Achieve By: 7/21/2022  Patient expressed understanding of goal: yes  Action steps to achieve this goal:  1. I will participate in home care services.  2. I will perform my home care exercises as directed.  3. I will follow-up with my provider as directed.            COMPLETED: 2. Healthy Eating (pt-stated)       Goal Statement: I need help getting groceries until I can drive.  Date Goal set: 2/21/2022   Barriers: hip surgery/pain  Strengths: motivated, care coordination  Date to Achieve By: 5/21/22  Patient expressed understanding of goal: yes  Action steps to achieve this goal:  1. I will review resources sent by Care Coordination.  2. I will call Care Coordination with any questions.             COMPLETED: 3. Financial Wellbeing (pt-stated)       Goal Statement: I need resources for purchasing my groceries.  Date Goal set: 2/21/2022   Barriers: financial  Strengths: motivated, Care Coordination  Date to Achieve By: 5/21/22  Patient expressed understanding of goal: yes  Action steps to achieve this goal:  1. I will review resources sent by Care Coordination.  2. I will call Care Coordination with any questions.                 Advance Care Plans/Directives Type:   Type Advanced Care Plans/Directives: Advanced Directive - On File  Thank you for providing us with your Advance Directive. We will keep this on file and recommend that it be updated every 2 years, if your health situation changes, if there is a death of one of your health care agents, and/or if there are changes in your marital status.    It has been your Clinic Care Team's pleasure to work with you on your goals.    Regards,  Your Clinic Care Team

## 2022-07-11 ENCOUNTER — MYC MEDICAL ADVICE (OUTPATIENT)
Dept: PHARMACY | Facility: CLINIC | Age: 77
End: 2022-07-11

## 2022-07-11 NOTE — TELEPHONE ENCOUNTER
I would like to be able to come to see you but as I am still recovering from hip replacement surgery I cannot drive at this time.  If I can find someone who can drive me over there I could make an appointment but at this time there is no one    mt will offer virtual visit.    Bassem Wolf [facility].  Medication Therapy Management Provider  [phone]

## 2022-07-12 NOTE — TELEPHONE ENCOUNTER
7-12-22    That would be great.  My phone # is [phone].  Today has not been great as my lower back is hurting and so is my left hip area.  The right is doing okay.  Just couldn't get comfortable last night.  I keep telling the left not to break on me!!        mt will give her telephone appt 7-25-22 at 1pm.    Bassem Wolf Rph.  Medication Therapy Management Provider  [phone]

## 2022-07-24 NOTE — PROGRESS NOTES
Medication Therapy Management (MTM) Encounter    ASSESSMENT:                            Medication Adherence/Access: No issues identified    COPD: Stable  Patient would benefit from the following medication changes: Start Albuterol and use as needed when shortness of breath , cough. See plan for details.     PLAN:                              1. FYi--I think you should have a rescue inhaler called Albuterol on hand and take it with you whenever you leave the house --use it for cough, shortness of breath as needed.  Please use your Stiolto (maintenance inhaler) --1 puff twice daily as you are.     2. Have labs rechecked the fall  for Magnesium,Hemoglobin, Quantiferon . (Labs orders are in the system).       Follow-up: Return in about 24 weeks (around 1/9/2023), or 1 Pm via telephone, for Medication Therapy Management Visit, COPD.          SUBJECTIVE/OBJECTIVE:                          Raisa Archer is a 77 year old female called for an follow-up (7-5-21) visit. She was referred to me from Sushma Bledsoe; Dr. Pereyra will be her new PCP.   .      Reason for visit:     1 year med review.       Allergies/ADRs: Reviewed in chart: wants amoxicillin diarrhea Se removed not valid anymore.   Tobacco: She reports that she quit smoking about 8 years ago. Her smoking use included cigarettes. She started smoking about 55 years ago. She has a 45.00 pack-year smoking history. She has never used smokeless tobacco.  Alcohol: not currently using  Caffeine: 0-1 cups/day of coffee, 0-1 cups/day of tea, diet sodas as needed.   Activity: since pandemic --no gym , sorta walks daily.   Past Medical History: Reviewed in chart      Medication Adherence/Access: no issues reported    COPD: Current medications: Stioto    Patient is not experiencing side effects.   Patient reports the following symptoms:  Cough once in awhile   Patient does have an COPD Action Plan on file.   Has spirometry been completed: Yes    Inhaler/device technique reviewed: Aerosol inhaler      stiolto--1 puff bid. Breathing is ok but feel she needs - albuterol for rescue.           ----------------      I spent 30 minutes with this patient today. All changes were made via collaborative practice agreement with Shanna Pereyra  . A copy of the visit note was provided to the patient's primary care provider.    The patient was sent via [email or web address] a summary of these recommendations.     Bassem Wolf [facility].  Medication Therapy Management Provider  [phone]         Medication Therapy Recommendations  Centrilobular emphysema (H)    Current Medication: albuterol (PROAIR HFA/PROVENTIL HFA/VENTOLIN HFA) 108 (90 Base) MCG/ACT inhaler   Rationale: Synergistic therapy - Needs additional medication therapy - Indication   Recommendation: Start Medication   Status: Accepted per CPA

## 2022-07-25 ENCOUNTER — VIRTUAL VISIT (OUTPATIENT)
Dept: PHARMACY | Facility: CLINIC | Age: 77
End: 2022-07-25

## 2022-07-25 DIAGNOSIS — J43.2 CENTRILOBULAR EMPHYSEMA (H): Primary | ICD-10-CM

## 2022-07-25 PROCEDURE — 99607 MTMS BY PHARM ADDL 15 MIN: CPT | Performed by: PHARMACIST

## 2022-07-25 PROCEDURE — 99605 MTMS BY PHARM NP 15 MIN: CPT | Performed by: PHARMACIST

## 2022-07-25 RX ORDER — ALBUTEROL SULFATE 90 UG/1
2 AEROSOL, METERED RESPIRATORY (INHALATION) EVERY 6 HOURS
Qty: 18 G | Refills: 2 | Status: SHIPPED | OUTPATIENT
Start: 2022-07-25 | End: 2023-06-23

## 2022-07-25 NOTE — LETTER
July 25, 2022  Raisa Archer  [address] [place] [id number]    Dear Ms. Archer, [facility]        Thank you for talking with me on Jul 25, 2022 about your health and medications. As a follow-up to our conversation, I have included two documents:      1. Your Recommended To-Do List has steps you should take to get the best results from your medications.  2. Your Medication List will help you keep track of your medications and how to take them.    If you want to talk about these documents, please call Bassem Wolf RPH at phone: [phone], Monday-Friday 8-4:30pm.    I look forward to working with you and your doctors to make sure your medications work well for you.    Sincerely,    Bassem Wolf RPH  [facility] Pharmacist, [facility]

## 2022-07-25 NOTE — LETTER
"Recommended To-Do List      Prepared on: 7/25/2022     You can get the best results from your medications by completing the items on this \"To-Do List.\"      Bring your To-Do List when you go to your doctor. And, share it with your family or caregivers.    My To-Do List:  What we talked about: What I should do:   A new medication that may be of benefit to you    Start taking Albuterol rescue inhaler if you have a cough or shortness of breath ; continue using your Stiolto inhaler twice daily everyday of the year as your maintenance inhaler.           What we talked about: What I should do:                       "

## 2022-07-25 NOTE — LETTER
_  Medication List        Prepared on: 7/25/2022     Bring your Medication List when you go to the doctor, hospital, or   emergency room. And, share it with your family or caregivers.     Note any changes to how you take your medications.  Cross out medications when you no longer use them.    Medication How I take it Why I use it Prescriber   acetaminophen (TYLENOL) 500 MG tablet Take 2 tablets (1,000 mg) by mouth every 6 hours as needed for mild pain Hip fracture, right, closed, initial encounter (H) Tangela Swenson MD   albuterol (PROAIR HFA/PROVENTIL HFA/VENTOLIN HFA) 108 (90 Base) MCG/ACT inhaler Inhale 2 puffs into the lungs every 6 hours Centrilobular emphysema (H) Shanna Pereyra MD   Calcium Carbonate-Vitamin D (CALCIUM + D PO) Take 1 tablet by mouth daily Total 1200/800mg daily Supplement  Patient Reported   cyclobenzaprine (FLEXERIL) 5 MG tablet Take 1 tablet (5 mg) by mouth 3 times daily as needed for muscle spasms Spasm of Muscle Shanna Pereyra MD   diphenhydrAMINE-acetaminophen (TYLENOL PM)  MG tablet Take 1-2 tablets by mouth nightly as needed for sleep insomnia Patient Reported   fluticasone (FLONASE) 50 MCG/ACT nasal spray Spray 1-2 sprays into both nostrils daily Seasonal Allergic Rhinitis Lyla Hawkins MD   Multiple Vitamins-Minerals (ZAHIDA MULTIVITAMIN FOR WOMEN) TABS Take 2 Gum by mouth daily Supplement  Patient Reported   Omega-3 Fatty Acids (FISH OIL) 1200 MG CAPS Take 1 capsule by mouth daily  Supplement  Patient Reported   tiotropium-olodaterol (STIOLTO RESPIMAT) 2.5-2.5 MCG/ACT AERS Inhale 2 puffs into the lungs daily Centrilobular emphysema (H) Shanna Pereyra MD   Vitamin D, Cholecalciferol, 25 MCG (1000 UT) TABS Take 1 tablet by mouth daily Supplement  Patient Reported         Add new medications, over-the-counter drugs, herbals, vitamins, or  minerals in the blank rows below.    Medication How I take it Why I use it Prescriber                          Allergies:       aspirin; chantix [varenicline tartrate]; glycerin; ipratropium; sympathomimetics; varenicline; wool fiber        Side effects I have had:               Other Information:              My notes and questions:

## 2022-07-25 NOTE — Clinical Note
Shanna--krystian-- pat doing well --just needed a rescue albuterol on hand when leaving the house . I sent in rx.  Aliyahx .-Bassem

## 2022-07-25 NOTE — PATIENT INSTRUCTIONS
"Recommendations from today's MTM visit:                                                         1. FYi--I think you should have a rescue inhaler called Albuterol on hand and take it with you whenever you leave the house --use it for cough, shortness of breath as needed.  Please use your Stiolto (maintenance inhaler) --1 puff twice daily as you are.     2. Have labs rechecked the fall  for Magnesium,Hemoglobin, Quantiferon . (Labs orders are in the system).       Follow-up: Return in about 24 weeks (around 1/9/2023), or 1 Pm via telephone, for Medication Therapy Management Visit, COPD.    It was great speaking with you today.  I value your experience and would be very thankful for your time in providing feedback in our clinic survey. In the next few days, you may receive an email or text message from [employer] with a link to a survey related to your  clinical pharmacist.\"     To schedule another MTM appointment, please call the clinic directly or you may call the MTM scheduling line at [phone] or toll-free at [phone].     My Clinical Pharmacist's contact information:                                                      Please feel free to contact me with any questions or concerns you have.      Bassem Wolf [facility].  Medication Therapy Management Provider  [phone]    "

## 2022-08-17 ENCOUNTER — MYC MEDICAL ADVICE (OUTPATIENT)
Dept: FAMILY MEDICINE | Facility: CLINIC | Age: 77
End: 2022-08-17

## 2022-08-17 DIAGNOSIS — C50.919 MALIGNANT NEOPLASM OF FEMALE BREAST (H): Primary | ICD-10-CM

## 2022-08-17 DIAGNOSIS — Z12.31 VISIT FOR SCREENING MAMMOGRAM: ICD-10-CM

## 2022-08-17 DIAGNOSIS — Z12.31 ENCOUNTER FOR SCREENING MAMMOGRAM FOR MALIGNANT NEOPLASM OF BREAST: ICD-10-CM

## 2022-08-17 DIAGNOSIS — Z87.891 PERSONAL HISTORY OF TOBACCO USE: ICD-10-CM

## 2022-08-17 DIAGNOSIS — Z85.3 PERSONAL HISTORY OF MALIGNANT NEOPLASM OF BREAST: ICD-10-CM

## 2022-08-23 NOTE — TELEPHONE ENCOUNTER
Pended mammo and CT lung cancer screening.     This is the mammo order that popped up from care gaps, but seems different than ones she has gotten in the past, please review    CLAUDIA Navarro RN  [facility]

## 2022-08-25 DIAGNOSIS — J43.2 CENTRILOBULAR EMPHYSEMA (H): ICD-10-CM

## 2022-08-26 DIAGNOSIS — J43.2 CENTRILOBULAR EMPHYSEMA (H): ICD-10-CM

## 2022-08-29 NOTE — TELEPHONE ENCOUNTER
Routing refill request to provider for review/approval because:  Long-Acting Beta Agonist Inhalers Protocol  Failed 08/25/2022 08:22 AM   Protocol Details  Order for Serevent, Striverdi, or Foradil and pt has steroid inhaler     Last Written Prescription Date:  5/20/22  Last Fill Quantity: 4g,  # refills: 2   Last office visit: 6/3/22 Virtual with prescribing provider:  Hafsa   Future Office Visit:   Next 5 appointments (look out 90 days)    Oct 05, 2022  2:00 PM  (Arrive by 1:40 PM)  Annual Wellness Visit with Shanna Pereyra MD  [facility] ([facility] ) [address] [place] [address] [phone]         CLAUDIA Navarro RN  [facility]

## 2022-08-30 RX ORDER — TIOTROPIUM BROMIDE AND OLODATEROL 3.124; 2.736 UG/1; UG/1
2 SPRAY, METERED RESPIRATORY (INHALATION) DAILY
Qty: 4 G | Refills: 2 | OUTPATIENT
Start: 2022-08-30

## 2022-08-30 RX ORDER — TIOTROPIUM BROMIDE AND OLODATEROL 3.124; 2.736 UG/1; UG/1
2 SPRAY, METERED RESPIRATORY (INHALATION) DAILY
Qty: 4 G | Refills: 2 | Status: SHIPPED | OUTPATIENT
Start: 2022-08-30 | End: 2023-06-23

## 2022-08-30 NOTE — TELEPHONE ENCOUNTER
Duplicate.   Provider sent in refill on 8/30/2022 to pharmacy.     LISA ZeeN RN  [facility]

## 2022-09-08 ENCOUNTER — TRANSFERRED RECORDS (OUTPATIENT)
Dept: HEALTH INFORMATION MANAGEMENT | Facility: CLINIC | Age: 77
End: 2022-09-08

## 2022-09-09 ENCOUNTER — LAB (OUTPATIENT)
Dept: LAB | Facility: CLINIC | Age: 77
End: 2022-09-09
Payer: MEDICARE

## 2022-09-09 DIAGNOSIS — D62 ACUTE BLOOD LOSS AS CAUSE OF POSTOPERATIVE ANEMIA: ICD-10-CM

## 2022-09-09 DIAGNOSIS — E83.42 HYPOMAGNESEMIA: ICD-10-CM

## 2022-09-09 DIAGNOSIS — R76.12 POSITIVE QUANTIFERON-TB GOLD TEST: ICD-10-CM

## 2022-09-09 LAB
HGB BLD-MCNC: 13.1 G/DL (ref 11.7–15.7)
HOLD SPECIMEN: NORMAL

## 2022-09-09 PROCEDURE — 86481 TB AG RESPONSE T-CELL SUSP: CPT

## 2022-09-09 PROCEDURE — 36415 COLL VENOUS BLD VENIPUNCTURE: CPT

## 2022-09-09 PROCEDURE — 83735 ASSAY OF MAGNESIUM: CPT

## 2022-09-09 PROCEDURE — 85018 HEMOGLOBIN: CPT

## 2022-09-11 LAB — MAGNESIUM SERPL-MCNC: 1.8 MG/DL (ref 1.6–2.3)

## 2022-09-12 ENCOUNTER — MYC MEDICAL ADVICE (OUTPATIENT)
Dept: FAMILY MEDICINE | Facility: CLINIC | Age: 77
End: 2022-09-12
Payer: MEDICARE

## 2022-09-12 DIAGNOSIS — R76.12 POSITIVE QUANTIFERON-TB GOLD TEST: Primary | ICD-10-CM

## 2022-09-12 LAB
GAMMA INTERFERON BACKGROUND BLD IA-ACNC: 0.02 IU/ML
M TB IFN-G BLD-IMP: NEGATIVE
M TB IFN-G CD4+ BCKGRND COR BLD-ACNC: 0.51 IU/ML
MITOGEN IGNF BCKGRD COR BLD-ACNC: 0.01 IU/ML
MITOGEN IGNF BCKGRD COR BLD-ACNC: 0.01 IU/ML
QUANTIFERON MITOGEN: 0.53 IU/ML
QUANTIFERON NIL TUBE: 0.02 IU/ML
QUANTIFERON TB1 TUBE: 0.03 IU/ML
QUANTIFERON TB2 TUBE: 0.03

## 2022-09-12 PROCEDURE — 99207 E-CONSULT TO INFECTIOUS DISEASE (ADULT OUTPT PROVIDER TO SPECIALIST WRITTEN QUESTION & RESPONSE): CPT | Performed by: STUDENT IN AN ORGANIZED HEALTH CARE EDUCATION/TRAINING PROGRAM

## 2022-09-13 ENCOUNTER — E-CONSULT (OUTPATIENT)
Dept: INFECTIOUS DISEASES | Facility: CLINIC | Age: 77
End: 2022-09-13
Payer: MEDICARE

## 2022-09-13 PROCEDURE — 99451 NTRPROF PH1/NTRNET/EHR 5/>: CPT | Performed by: INTERNAL MEDICINE

## 2022-09-14 NOTE — PROGRESS NOTES
"9/13/2022     E-Consult has been accepted.    Interprofessional consultation requested by:  Shanna Pereyra MD      Clinical Question/Purpose: MY CLINICAL QUESTION IS: Patient had a positive quantiferon gold screen when she was admitted to TCU after a hip fracture earlier this year. She is asymptomatic. We just rechecked the blood work and her repeat test is negative. Can there be a false positive on the quant gold? I haven't encountered this situation and it seems unusual that the TCU got a positive screen and there was no follow up. She is scheduled for CT lung cancer screen on 9/15/22.    Patient assessment and information reviewed:  Both Quant Gold assays, chest CT reports, limited chart notes.    Recommendations: This is a very apt E-Consult question.  Quantiferon Gold assays can be falsely positive and need to be interpreted in the context of the patient's TB exposure risks and thus the test's \"prior probability\".  Although the Quantiferon Gold is not really an at all reliable quantitative test, the likelihood of a false is perhaps somewhat higher when the \"quantitative\" result is quite low, as was the case for Ms. Archer's first 1/27/22 result, when the antigen-minus-nil results were only 0.12 and 0.58.  Thus, her repeat result on 9/9/22 that is negative is not real surprising and is likely the true result, even though her response to the mitogen control on the 9/9/22 assay was just barely sufficient to keep from invalidating the test (making it \"inderteminate\").    In general, the testing needs to be taken in context of her past test history and risks.  Has she ever had other positive Quant Golds or PPD skin tests?  Does she have any risks for potential TB exposures in her life:  Namely, childhood (or subsequent) exposure to a family member / other close contact with active pulmonary TB, residence or travel in a country / countries / locales with endemic TB (for example, [place], [place], a number of "  countries, etc), history of incarceration / extensive time spent with individuals immigrated from endemic TB countries / long periods in a nursing home where pulmonary disease was evident?  If she has none of those risks, then she has a low prior test probability and the 9/9/22 negative Quant Gold assay result is truly reliable.    In her case, it is also reassuring (although obviously not usually available in most individuals with Quantiferon Gold testing) that she has had serial annual smoker's CT screens since 2016 which have been negative except for the stable RUL scarring consistently attributed to her prior radiation therapy.  With that additional radiographic data, in the absence of any cough or other active TB symptoms, she definitively, certainly does not have active TB.    If the 9/9/22 test is interpreted as the reliable answer (as above), then she also does not have latent tuberculosis infection and so does not need to be treated even for that.  She should probably avoid having Quantiferon Gold / PPD assays done in the future in the absence of a new strong clinical symptomatic indication (such a new major persistent pulmonary or constitutional symptoms) or a new marked lesion on future chest radiology.     Hope this helps -- let me know if there are additional questions.      The recommendations provided in this E-Consult are based on a review of clinical data pertinent to the clinical question presented, without a review of the patient's complete medical record or, the benefit of a comprehensive in-person or virtual patient evaluation. This consultation should not replace the clinical judgement and evaluation of the provider ordering this E-Consult. Any new clinical issues, or changes in patient status since the filing of this E-Consult will need to be taken into account when assessing these recommendations. Please contact me if you have further questions.    My total time spent reviewing clinical  information and formulating assessment was 15 minutes.    {Report sent automatically to requesting provider once signed.    Mikie Beasley MD

## 2022-09-15 ENCOUNTER — ANCILLARY PROCEDURE (OUTPATIENT)
Dept: CT IMAGING | Facility: CLINIC | Age: 77
End: 2022-09-15
Attending: STUDENT IN AN ORGANIZED HEALTH CARE EDUCATION/TRAINING PROGRAM
Payer: MEDICARE

## 2022-09-15 ENCOUNTER — TELEPHONE (OUTPATIENT)
Dept: FAMILY MEDICINE | Facility: CLINIC | Age: 77
End: 2022-09-15

## 2022-09-15 DIAGNOSIS — Z87.891 PERSONAL HISTORY OF TOBACCO USE: ICD-10-CM

## 2022-09-15 PROCEDURE — 71271 CT THORAX LUNG CANCER SCR C-: CPT | Mod: GC | Performed by: RADIOLOGY

## 2022-09-15 NOTE — TELEPHONE ENCOUNTER
Called Pat and reviewed ID e consult guidance with her . She has not had exposures/risk factors for TB and I feel her first tested was falsely positive. She does not need any further screening.    Shanna Pereyra MD  [facility]  [phone]

## 2022-09-15 NOTE — TELEPHONE ENCOUNTER
This is a known finding and does not require further evaluation at this time.    Shanna Pereyra MD  [facility]  [phone]

## 2022-09-15 NOTE — TELEPHONE ENCOUNTER
Dr Pereyra,    Imaging calling with incidental finding today on CT scan    2. Significant Incidental Finding(s):  Category S: Yes.  a.  Right upper lobe consolidative scarring and right mastectomy.     Please close if no further action needed or advise RN    Thanks    Bebe Anderson, RN, BSN  [facility]

## 2022-09-29 ASSESSMENT — ACTIVITIES OF DAILY LIVING (ADL)
CURRENT_FUNCTION: HOUSEWORK REQUIRES ASSISTANCE
CURRENT_FUNCTION: SHOPPING REQUIRES ASSISTANCE

## 2022-09-29 ASSESSMENT — ENCOUNTER SYMPTOMS
PALPITATIONS: 0
HEARTBURN: 0
JOINT SWELLING: 0
BREAST MASS: 0
EYE PAIN: 0
HEMATURIA: 0
DIZZINESS: 0
NAUSEA: 0
SORE THROAT: 0
FEVER: 0
COUGH: 0
SHORTNESS OF BREATH: 1
HEMATOCHEZIA: 0
HEADACHES: 0
CHILLS: 0
WEAKNESS: 0
MYALGIAS: 0
ABDOMINAL PAIN: 0
NERVOUS/ANXIOUS: 0
PARESTHESIAS: 0
DIARRHEA: 0
ARTHRALGIAS: 1
DYSURIA: 0
FREQUENCY: 1
CONSTIPATION: 0

## 2022-10-05 ENCOUNTER — OFFICE VISIT (OUTPATIENT)
Dept: FAMILY MEDICINE | Facility: CLINIC | Age: 77
End: 2022-10-05
Payer: MEDICARE

## 2022-10-05 VITALS
RESPIRATION RATE: 16 BRPM | SYSTOLIC BLOOD PRESSURE: 128 MMHG | OXYGEN SATURATION: 96 % | TEMPERATURE: 97 F | DIASTOLIC BLOOD PRESSURE: 78 MMHG | WEIGHT: 163 LBS | BODY MASS INDEX: 27.98 KG/M2 | HEART RATE: 89 BPM

## 2022-10-05 DIAGNOSIS — Z13.220 SCREENING FOR LIPID DISORDERS: ICD-10-CM

## 2022-10-05 DIAGNOSIS — Z00.00 ENCOUNTER FOR MEDICARE ANNUAL WELLNESS EXAM: Primary | ICD-10-CM

## 2022-10-05 DIAGNOSIS — L30.4 INTERTRIGO: ICD-10-CM

## 2022-10-05 PROCEDURE — G0008 ADMIN INFLUENZA VIRUS VAC: HCPCS | Performed by: STUDENT IN AN ORGANIZED HEALTH CARE EDUCATION/TRAINING PROGRAM

## 2022-10-05 PROCEDURE — 90662 IIV NO PRSV INCREASED AG IM: CPT | Performed by: STUDENT IN AN ORGANIZED HEALTH CARE EDUCATION/TRAINING PROGRAM

## 2022-10-05 PROCEDURE — 99213 OFFICE O/P EST LOW 20 MIN: CPT | Mod: 25 | Performed by: STUDENT IN AN ORGANIZED HEALTH CARE EDUCATION/TRAINING PROGRAM

## 2022-10-05 PROCEDURE — G0439 PPPS, SUBSEQ VISIT: HCPCS | Performed by: STUDENT IN AN ORGANIZED HEALTH CARE EDUCATION/TRAINING PROGRAM

## 2022-10-05 RX ORDER — CLOTRIMAZOLE 1 %
CREAM (GRAM) TOPICAL 2 TIMES DAILY
Qty: 30 G | Refills: 0 | Status: SHIPPED | OUTPATIENT
Start: 2022-10-05 | End: 2023-06-23

## 2022-10-05 ASSESSMENT — ENCOUNTER SYMPTOMS
ARTHRALGIAS: 1
ABDOMINAL PAIN: 0
HEMATOCHEZIA: 0
SHORTNESS OF BREATH: 1
FREQUENCY: 1
HEADACHES: 0
EYE PAIN: 0
PALPITATIONS: 0
JOINT SWELLING: 0
PARESTHESIAS: 0
COUGH: 0
HEMATURIA: 0
NAUSEA: 0
BREAST MASS: 0
DYSURIA: 0
FEVER: 0
DIZZINESS: 0
CONSTIPATION: 0
MYALGIAS: 0
CHILLS: 0
WEAKNESS: 0
HEARTBURN: 0
DIARRHEA: 0
NERVOUS/ANXIOUS: 0
SORE THROAT: 0

## 2022-10-05 ASSESSMENT — ACTIVITIES OF DAILY LIVING (ADL)
CURRENT_FUNCTION: SHOPPING REQUIRES ASSISTANCE
CURRENT_FUNCTION: HOUSEWORK REQUIRES ASSISTANCE

## 2022-10-05 NOTE — PATIENT INSTRUCTIONS
Patient Education   Personalized Prevention Plan  You are due for the preventive services outlined below.  Your care team is available to assist you in scheduling these services.  If you have already completed any of these items, please share that information with your care team to update in your medical record.  Health Maintenance Due   Topic Date Due     ANNUAL REVIEW OF HM ORDERS  08/03/2022     Flu Vaccine (1) 09/01/2022     Preventive Health Recommendations    See your health care provider every year to    Review health changes.     Discuss preventive care.      Review your medicines if your doctor has prescribed any.    You no longer need a yearly Pap test unless you've had an abnormal Pap test in the past 10 years. If you have vaginal symptoms, such as bleeding or discharge, be sure to talk with your provider about a Pap test.    Every 1 to 2 years, have a mammogram.  If you are over 69, talk with your health care provider about whether or not you want to continue having screening mammograms.    Every 10 years, have a colonoscopy. Or, have a yearly FIT test (stool test). These exams will check for colon cancer.     Have a cholesterol test every 5 years, or more often if your doctor advises it.     Have a diabetes test (fasting glucose) every three years. If you are at risk for diabetes, you should have this test more often.     At age 65, have a bone density scan (DEXA) to check for osteoporosis (brittle bone disease).    Shots:    Get a flu shot each year.    Get a tetanus shot every 10 years.    Talk to your doctor about your pneumonia vaccines. There are now two you should receive - Pneumovax (PPSV 23) and Prevnar (PCV 13).    Talk to your pharmacist about the shingles vaccine.    Talk to your doctor about the hepatitis B vaccine.    Nutrition:     Eat at least 5 servings of fruits and vegetables each day.    Eat whole-grain bread, whole-wheat pasta and brown rice instead of white grains and rice.    Get  adequate Calcium and Vitamin D.     Lifestyle    Exercise at least 150 minutes a week (30 minutes a day, 5 days a week). This will help you control your weight and prevent disease.    Limit alcohol to one drink per day.    No smoking.     Wear sunscreen to prevent skin cancer.     See your dentist twice a year for an exam and cleaning.    See your eye doctor every 1 to 2 years to screen for conditions such as glaucoma, macular degeneration and cataracts.    Personalized Prevention Plan  You are due for the preventive services outlined below.  Your care team is available to assist you in scheduling these services.  If you have already completed any of these items, please share that information with your care team to update in your medical record.  Health Maintenance   Topic Date Due     ANNUAL REVIEW OF HM ORDERS  08/03/2022     INFLUENZA VACCINE (1) 09/01/2022     COVID-19 Vaccine (4 - Booster for Bruce series) 10/21/2022     MAMMO SCREENING  09/15/2023     LUNG CANCER SCREENING  09/15/2023     MEDICARE ANNUAL WELLNESS VISIT  10/05/2023     FALL RISK ASSESSMENT  10/05/2023     COLORECTAL CANCER SCREENING  10/04/2024     DEXA  07/26/2025     LIPID  08/16/2026     DTAP/TDAP/TD IMMUNIZATION (3 - Td or Tdap) 04/10/2027     ADVANCE CARE PLANNING  10/05/2027     SPIROMETRY  Completed     HEPATITIS C SCREENING  Completed     COPD ACTION PLAN  Completed     PHQ-2 (once per calendar year)  Completed     Pneumococcal Vaccine: 65+ Years  Completed     ZOSTER IMMUNIZATION  Completed     IPV IMMUNIZATION  Aged Out     MENINGITIS IMMUNIZATION  Aged Out     HEPATITIS B IMMUNIZATION  Aged Out       Understanding USDA MyPlate  The USDA has guidelines to help you make healthy food choices. These are called MyPlate. MyPlate shows the food groups that make up healthy meals using the image of a place setting. Before you eat, think about the healthiest choices for what to put on your plate or in your cup or bowl. To learn more about  building a healthy plate, visit www.choosemyplate.gov.    The food groups    Fruits. Any fruit or 100% fruit juice counts as part of the Fruit Group. Fruits may be fresh, canned, frozen, or dried, and may be whole, cut-up, or pureed. Make 1/2 of your plate fruits and vegetables.    Vegetables. Any vegetable or 100% vegetable juice counts as a member of the Vegetable Group. Vegetables may be fresh, frozen, canned, or dried. They can be served raw or cooked and may be whole, cut-up, or mashed. Make 1/2 of your plate fruits and vegetables.    Grains. All foods made from grains are part of the Grains Group. These include wheat, rice, oats, cornmeal, and barley. Grains are often used to make foods such as bread, pasta, oatmeal, cereal, tortillas, and grits. Grains should be no more than 1/4 of your plate. At least half of your grains should be whole grains.    Protein. This group includes meat, poultry, seafood, beans and peas, eggs, processed soy products (such as tofu), nuts (including nut butters), and seeds. Make protein choices no more than 1/4 of your plate. Meat and poultry choices should be lean or low fat.    Dairy. The Dairy Group includes all fluid milk products and foods made from milk that contain calcium, such as yogurt and cheese. (Foods that have little calcium, such as cream, butter, and cream cheese, are not part of this group.) Most dairy choices should be low-fat or fat-free.    Oils. Oils aren't a food group, but they do contain essential nutrients. However it's important to watch your intake of oils. These are fats that are liquid at room temperature. They include canola, corn, olive, soybean, vegetable, and sunflower oil. Foods that are mainly oil include mayonnaise, certain salad dressings, and soft margarines. You likely already get your daily oil allowance from the foods you eat.  Things to limit  Eating healthy also means limiting these things in your diet:       Salt (sodium). Many processed  foods have a lot of sodium. To keep sodium intake down, eat fresh vegetables, meats, poultry, and seafood when possible. Purchase low-sodium, reduced-sodium, or no-salt-added food products at the store. And don't add salt to your meals at home. Instead, season them with herbs and spices such as dill, oregano, cumin, and paprika. Or try adding flavor with lemon or lime zest and juice.    Saturated fat. Saturated fats are most often found in animal products such as beef, pork, and chicken. They are often solid at room temperature, such as butter. To reduce your saturated fat intake, choose leaner cuts of meat and poultry. And try healthier cooking methods such as grilling, broiling, roasting, or baking. For a simple lower-fat swap, use plain nonfat yogurt instead of mayonnaise when making potato salad or macaroni salad.    Added sugars. These are sugars added to foods. They are in foods such as ice cream, candy, soda, fruit drinks, sports drinks, energy drinks, cookies, pastries, jams, and syrups. Cut down on added sugars by sharing sweet treats with a family member or friend. You can also choose fruit for dessert, and drink water or other unsweetened beverages.     [employer] last reviewed this educational content on 6/1/2020 2000-2021 [employer]. All rights reserved. This information is not intended as a substitute for professional medical care. Always follow your healthcare professional's instructions.        Activities of Daily Living    Your Health Risk Assessment indicates you have difficulties with activities of daily living such as housework, bathing, preparing meals, taking medication, etc. Please make a follow up appointment for us to address this issue in more detail.    Signs of Hearing Loss      Hearing much better with one ear can be a sign of hearing loss.   Hearing loss is a problem shared by many people. In fact, it is one of the most common health problems, particularly as people age.  Most people age 65 and older have some hearing loss. By age 80, almost everyone does. Hearing loss often occurs slowly over the years. So you may not realize your hearing has gotten worse.  Have your hearing checked  Call your healthcare provider if you:    Have to strain to hear normal conversation    Have to watch other people s faces very carefully to follow what they re saying    Need to ask people to repeat what they ve said    Often misunderstand what people are saying    Turn the volume of the television or radio up so high that others complain    Feel that people are mumbling when they re talking to you    Find that the effort to hear leaves you feeling tired and irritated    Notice, when using the phone, that you hear better with one ear than the other  [employer] last reviewed this educational content on 1/1/2020 2000-2021 [employer]. All rights reserved. This information is not intended as a substitute for professional medical care. Always follow your healthcare professional's instructions.          Urinary Incontinence, Female (Adult)   Urinary incontinence means loss of bladder control. This problem affects many women, especially as they get older. If you have incontinence, you may be embarrassed to ask for help. But know that this problem can be treated.   Types of Incontinence  There are different types of incontinence. Two of the main types are described here. You can have more than one type.     Stress incontinence. With this type, urine leaks when pressure (stress) is put on the bladder. This may happen when you cough, sneeze, or laugh. Stress incontinence most often occurs because the pelvic floor muscles that support the bladder and urethra are weak. This can happen after pregnancy and vaginal childbirth or a hysterectomy. It can also be due to excess body weight or hormone changes.    Urge incontinence (also called overactive bladder). With this type, a sudden urge to urinate is  felt often. This may happen even though there may not be much urine in the bladder. The need to urinate often during the night is common. Urge incontinence most often occurs because of bladder spasms. This may be due to bladder irritation or infection. Damage to bladder nerves or pelvic muscles, constipation, and certain medicines can also lead to urge incontinence.  Treatment depends on the cause. Further evaluation is needed to find the type you have. This will likely include an exam and certain tests. Based on the results, you and your healthcare provider can then plan treatment. Until a diagnosis is made, the home care tips below can help ease symptoms.   Home care    Do pelvic floor muscle exercises, if they are prescribed. The pelvic floor muscles help support the bladder and urethra. Many women find that their symptoms improve when doing special exercises that strengthen these muscles. To do the exercises, contract the muscles you would use to stop your stream of urine. But do this when you re not urinating. Hold for 10 seconds, then relax. Repeat 10 to 20 times in a row, at least 3 times a day. Your healthcare provider may give you other instructions for how to do the exercises and how often.    Keep a bladder diary. This helps track how often and how much you urinate over a set period of time. Bring this diary with you to your next visit with the provider. The information can help your provider learn more about your bladder problem.    Lose weight, if advised to by your provider. Extra weight puts pressure on the bladder. Your provider can help you create a weight-loss plan that s right for you. This may include exercising more and making certain diet changes.    Don't have foods and drinks that may irritate the bladder. These can include alcohol and caffeinated drinks.    Quit smoking. Smoking and other tobacco use can lead to a long-term (chronic) cough that strains the pelvic floor muscles. Smoking may  also damage the bladder and urethra. Talk with your provider about treatments or methods you can use to quit smoking.    If drinking large amounts of fluid makes you have symptoms, you may be advised to limit your fluid intake. You may also be advised to drink most of your fluids during the day and to limit fluids at night.    If you re worried about urine leakage or accidents, you may wear absorbent pads to catch urine. Change the pads often. This helps reduce discomfort. It may also reduce the risk of skin or bladder infections.    Follow-up care  Follow up with your healthcare provider, or as directed. It may take some to find the right treatment for your problem. But healthy lifestyle changes can be made right away. These include such things as exercising on a regular basis, eating a healthy diet, losing weight (if needed), and quitting smoking. Your treatment plan may include special therapies or medicines. Certain procedures or surgery may also be options. Talk about any questions you have with your provider.   When to seek medical advice  Call the healthcare provider right away if any of these occur:    Fever of 100.4 F (38 C) or higher, or as directed by your provider    Bladder pain or fullness    Belly swelling    Nausea or vomiting    Back pain    Weakness, dizziness, or fainting  [employer] last reviewed this educational content on 1/1/2020 2000-2021 [employer]. All rights reserved. This information is not intended as a substitute for professional medical care. Always follow your healthcare professional's instructions.          Treating Incontinence in Women: Nonsurgical Methods     The best treatment for you will depend on the type of incontinence you have. Your symptoms, age, and any underlying problems that are found also affect your treatment. Some types of incontinence may over time require surgery. But nonsurgical treatments may be effective in many cases. Nonsurgical treatments  include lifestyle changes, muscle-strengthening exercises, and medicines.   Nonsurgical treatments  Treatment for stress urinary incontinence includes:     Bladder training    Lifestyle changes such as weight loss and increased activity if incontinence is due to being overweight    Medicines, if bladder training has not helped    Pelvic floor muscle exercises  Lifestyle changes    Losing weight. Excess weight puts extra pressure on the pelvic floor muscles. Exercising and eating right can help you lose weight. This helps other treatments work better.    Making certain diet changes. Some foods may make you need to urinate more, so it may be good not to have them. These include caffeinated drinks and alcohol. Ask your healthcare provider if these or other diet changes might be helpful.    Quitting smoking. Smoking can lead to a long-term (chronic) cough that strains pelvic floor muscles. Smoking may also damage the bladder and urethra.    Pelvic floor muscle exercises  There are exercises you can do to help strengthen your pelvic floor muscles. The pelvic floor muscles act as a sling to help hold the bladder and urethra in place. These muscles also help keep the urethra closed. Weak pelvic floor muscles may allow urine to leak. To strengthen the pelvic floor muscles, do the exercises daily. In a few months, the muscles will be stronger and tighter. This can help prevent urine leakage.   [employer] last reviewed this educational content on 9/1/2019 2000-2021 [employer]. All rights reserved. This information is not intended as a substitute for professional medical care. Always follow your healthcare professional's instructions.          Treating Incontinence in Women with Medicine    Urinary incontinence is the leaking of urine from the bladder. In some cases, medicine can reduce or stop the leaking. It's mainly given for urge incontinence. Your healthcare provider will talk with you about your options.  Make sure to ask what side effects to expect.   Below are some types of medicines that may help with urge incontinence.   Types of medicine    Anticholinergics or beta-3 adrenergics.  These may increase how much urine the bladder can hold. They may also help relax bladder muscles.    Estrogen. This may help improve muscle tone in the urethra and bladder.    Antibiotics. These are used to treat urinary tract infections.    Botulinum toxin. Injection of botulinum toxin into the bladder muscle is an option when other medicines are not effective.    Tips for taking medicine    Take your medicine on time and as your healthcare provider tell you to.    Tell your healthcare provider if you have any side effects, your dosage may be adjusted if needed.    Be patient. It may take time to find the right dose for you.    Keep a list of the medicines you take. Show it to your healthcare provider and pharmacist before you buy over-the-counter medicines.    [employer] last reviewed this educational content on 9/1/2019 2000-2021 [employer]. All rights reserved. This information is not intended as a substitute for professional medical care. Always follow your healthcare professional's instructions.          Kegel Exercises  Kegel exercises are done to help strengthen the muscles in your pelvic floor. You don t need special clothing or equipment. They re easy to learn and simple to do. And if you do them right, no one can tell you re doing them, so they can be done almost anywhere. Your healthcare provider, nurse, or physical therapist can answer any questions you have and help you get started.   A weak pelvic floor  The pelvic floor muscles may weaken due to aging, pregnancy and vaginal childbirth, injury, surgery, chronic cough, or lack of exercise. If the pelvic floor is weak, your bladder and other pelvic organs may sag out of place. The urethra may also open too easily and allow urine to leak out. Kegel exercises  can help you strengthen your pelvic floor muscles. Then they can better support the pelvic organs and control urine flow.     How Kegel exercises are done  Try each of the Kegel exercises described below. When you re doing them, try not to move your leg, buttock, or stomach muscles:     Contract as if you were stopping your urine stream. But do it when you re not urinating.    Tighten your rectum as if trying not to pass gas. Contract your anus, but don t move your buttocks.    You may place a finger or 2 in the vagina and squeeze your finger with your vagina to learn which muscles to tighten.  Try to hold each Kegel for a slow count to 5. You probably won t be able to hold them for that long at first. But keep practicing. It will get easier as your pelvic floor gets stronger. Eventually, special weights that you place in your vagina may be recommended to help make your Kegels even more effective. Talk to your healthcare provider if you have trouble doing Kegel exercises.   Helpful tips  Here are some tips to follow:    Do your Kegels as often as you can. The more you do them, the faster you ll feel the results.    Pick an activity you do often as a reminder. For instance, do your Kegels every time you sit down.    Tighten your pelvic floor before you sneeze, get up from a chair, cough, laugh, or lift. This can help prevent urine, gas, or stool leakage.  [employer] last reviewed this educational content on 8/1/2020 2000-2021 [employer]. All rights reserved. This information is not intended as a substitute for professional medical care. Always follow your healthcare professional's instructions.

## 2022-10-05 NOTE — NURSING NOTE
Prior to immunization administration, verified patients identity using patient s name and date of birth. Please see Immunization Activity for additional information.     Screening Questionnaire for Adult Immunization    Are you sick today?   No   Do you have allergies to medications, food, a vaccine component or latex?   No   Have you ever had a serious reaction after receiving a vaccination?   No   Do you have a long-term health problem with heart, lung, kidney, or metabolic disease (e.g., diabetes), asthma, a blood disorder, no spleen, complement component deficiency, a cochlear implant, or a spinal fluid leak?  Are you on long-term aspirin therapy?   No   Do you have cancer, leukemia, HIV/AIDS, or any other immune system problem?   No   Do you have a parent, brother, or sister with an immune system problem?   No   In the past 3 months, have you taken medications that affect  your immune system, such as prednisone, other steroids, or anticancer drugs; drugs for the treatment of rheumatoid arthritis, Crohn s disease, or psoriasis; or have you had radiation treatments?   No   Have you had a seizure, or a brain or other nervous system problem?   No   During the past year, have you received a transfusion of blood or blood    products, or been given immune (gamma) globulin or antiviral drug?   No   For women: Are you pregnant or is there a chance you could become       pregnant during the next month?   No   Have you received any vaccinations in the past 4 weeks?   No     Immunization questionnaire answers were all negative.        Per orders of Dr. colin, injection of fluzone hd given by Diane Alas. Patient instructed to remain in clinic for 15 minutes afterwards, and to report any adverse reaction to me immediately.       Diane Alas on 10/5/2022 at 2:36 PM     Screening performed by Diane Alas on 10/5/2022 at 2:36 PM.

## 2022-10-05 NOTE — PROGRESS NOTES
"SUBJECTIVE:   Pat is a 77 year old who presents for Preventive Visit.    Patient has been advised of split billing requirements and indicates understanding: Yes  Are you in the first 12 months of your Medicare coverage?  No    Healthy Habits:     In general, how would you rate your overall health?  Good    Frequency of exercise:  4-5 days/week    Duration of exercise:  15-30 minutes    Do you usually eat at least 4 servings of fruit and vegetables a day, include whole grains    & fiber and avoid regularly eating high fat or \"junk\" foods?  No    Taking medications regularly:  Yes    Ability to successfully perform activities of daily living:  Shopping requires assistance and housework requires assistance    Home Safety:  Lack of grab bars in the bathroom    Hearing Impairment:  Difficulty understanding soft or whispered speech    In the past 6 months, have you been bothered by leaking of urine? Yes    In general, how would you rate your overall mental or emotional health?  Excellent      PHQ-2 Total Score: 0    Additional concerns today:  No    Do you feel safe in your environment? Yes    Have you ever done Advance Care Planning? (For example, a Health Directive, POLST, or a discussion with a medical provider or your loved ones about your wishes): Yes, advance care planning is on file.      Fall risk  Fallen 2 or more times in the past year?: No  Any fall with injury in the past year?: Yes  click delete button to remove this line now  Cognitive Screening   1) Repeat 3 items (Leader, Season, Table)    2) Clock draw: NORMAL  3) 3 item recall: Recalls 2 objects   Results: NORMAL clock, 1-2 items recalled: COGNITIVE IMPAIRMENT LESS LIKELY    Mini-CogTM Copyright AN [employer]. Licensed by the author for use in [facility]; reprinted with permission (renae@.[facility]). All rights reserved.      Do you have sleep apnea, excessive snoring or daytime drowsiness?: no    Reviewed and updated as needed this visit by clinical " staff   Tobacco  Allergies  Meds   Med Hx  Surg Hx  Fam Hx  Soc Hx          Reviewed and updated as needed this visit by Provider                   Social History     Tobacco Use     Smoking status: Former Smoker     Packs/day: 1.50     Years: 30.00     Pack years: 45.00     Types: Cigarettes     Start date: 1967     Quit date: 5/10/2014     Years since quittin.4     Smokeless tobacco: Never Used     Tobacco comment: Quit several times over the years including when pregnant   Substance Use Topics     Alcohol use: No     Alcohol/week: 0.0 standard drinks     If you drink alcohol do you typically have >3 drinks per day or >7 drinks per week? Not applicable    No flowsheet data found.      PROBLEMS TO ADD ON...    History of intertrigo of her abdominal pannus. She was prescribed a topical antifungal /steroid and nystatin powder in  and has used it intermittently since then. She has since run out. She is now having recurrent symptoms of redness and stinging in the area.       Current providers sharing in care for this patient include:   Patient Care Team:  Shanna Pereyra MD as PCP - General (Family Medicine)  Yaritza Basilio MD as MD (Urology)  Chantelle Mcdaniel, RN as Registered Nurse (Urology)  Bassem Wolf RPH as Pharmacist (Pharmacist)  Shanna Pereyra MD as Assigned PCP  (Fgs)Danielle Paull, RPH as Assigned [facility] Pharmacist    The following health maintenance items are reviewed in Epic and correct as of today:  Health Maintenance   Topic Date Due     INFLUENZA VACCINE (1) 2022     COVID-19 Vaccine (4 - Booster for Bruce series) 10/21/2022     MAMMO SCREENING  09/15/2023     LUNG CANCER SCREENING  09/15/2023     MEDICARE ANNUAL WELLNESS VISIT  10/05/2023     ANNUAL REVIEW OF HM ORDERS  10/05/2023     FALL RISK ASSESSMENT  10/05/2023     COLORECTAL CANCER SCREENING  10/04/2024     DEXA  2025     LIPID  2026     DTAP/TDAP/TD  "IMMUNIZATION (3 - Td or Tdap) 04/10/2027     ADVANCE CARE PLANNING  10/05/2027     SPIROMETRY  Completed     HEPATITIS C SCREENING  Completed     COPD ACTION PLAN  Completed     PHQ-2 (once per calendar year)  Completed     Pneumococcal Vaccine: 65+ Years  Completed     ZOSTER IMMUNIZATION  Completed     IPV IMMUNIZATION  Aged Out     MENINGITIS IMMUNIZATION  Aged Out     HEPATITIS B IMMUNIZATION  Aged Out     Pertinent mammograms are reviewed under the imaging tab.    Review of Systems   Constitutional: Negative for chills and fever.   HENT: Negative for congestion, ear pain, hearing loss and sore throat.    Eyes: Negative for pain and visual disturbance.   Respiratory: Positive for shortness of breath. Negative for cough.    Cardiovascular: Negative for chest pain, palpitations and peripheral edema.   Gastrointestinal: Negative for abdominal pain, constipation, diarrhea, heartburn, hematochezia and nausea.   Breasts:  Negative for tenderness, breast mass and discharge.   Genitourinary: Positive for frequency. Negative for dysuria, genital sores, hematuria, pelvic pain, urgency, vaginal bleeding and vaginal discharge.   Musculoskeletal: Positive for arthralgias. Negative for joint swelling and myalgias.   Skin: Negative for rash.   Neurological: Negative for dizziness, weakness, headaches and paresthesias.   Psychiatric/Behavioral: Negative for mood changes. The patient is not nervous/anxious.      Dyspnea - patient has known emphysema and uses Stiolto daily and occasional use of albuterol. She feels that symptoms are stable. She is UTD on annual chest CT.       OBJECTIVE:   /78 (BP Location: Right arm, Patient Position: Chair, Cuff Size: Adult Regular)   Pulse 89   Temp 97  F (36.1  C) (Temporal)   Resp 16   Wt 73.9 kg (163 lb)   LMP  (LMP Unknown)   SpO2 96%   BMI 27.98 kg/m   Estimated body mass index is 27.98 kg/m  as calculated from the following:    Height as of 4/15/22: 1.626 m (5' 4\").    " "Weight as of this encounter: 73.9 kg (163 lb).  Physical Exam  GENERAL: healthy, alert and no distress  EYES: Eyes grossly normal to inspection, PERRL and conjunctivae and sclerae normal  HENT: ear canals and TM's normal, nose and mouth without ulcers or lesions  NECK: no adenopathy, no asymmetry, masses, or scars and thyroid normal to palpation  RESP: lungs clear to auscultation - no rales, rhonchi or wheezes  CV: regular rate and rhythm, normal S1 S2, no S3 or S4, no murmur, click or rub, no peripheral edema and peripheral pulses strong  ABDOMEN: soft, nontender, no hepatosplenomegaly, no masses and bowel sounds normal  MS: no gross musculoskeletal defects noted, no edema  SKIN: patchy erythema and satellite lesions on left pannus  NEURO: Normal strength and tone, mentation intact and speech normal  PSYCH: mentation appears normal, affect normal/bright    Diagnostic Test Results:  Labs reviewed in Epic    ASSESSMENT / PLAN:   Raisa was seen today for physical.    Diagnoses and all orders for this visit:    Encounter for Medicare annual wellness exam  -     **Glucose FUTURE 2mo; Future    Screening for lipid disorders  -     Lipid panel reflex to direct LDL Fasting; Future    Intertrigo  Trial of topical lotrimin. If symptoms fail to improve, then would try antifungal/steroid combo and nystatin powder as she responded well to this in the past.   -     clotrimazole (LOTRIMIN) 1 % external cream; Apply topically 2 times daily    Other orders  -     REVIEW OF HEALTH MAINTENANCE PROTOCOL ORDERS  -     INFLUENZA, QUAD, HD, PF, 65+ (FLUZONE HD)        COUNSELING:  Reviewed preventive health counseling, as reflected in patient instructions    Estimated body mass index is 27.98 kg/m  as calculated from the following:    Height as of 4/15/22: 1.626 m (5' 4\").    Weight as of this encounter: 73.9 kg (163 lb).    She reports that she quit smoking about 8 years ago. Her smoking use included cigarettes. She started smoking " about 55 years ago. She has a 45.00 pack-year smoking history. She has never used smokeless tobacco.      Appropriate preventive services were discussed with this patient, including applicable screening as appropriate for cardiovascular disease, diabetes, osteopenia/osteoporosis, and glaucoma.  As appropriate for age/gender, discussed screening for colorectal cancer, prostate cancer, breast cancer, and cervical cancer. Checklist reviewing preventive services available has been given to the patient.    Reviewed patients plan of care and provided an AVS. The Basic Care Plan (routine screening as documented in Health Maintenance) for Raisa meets the Care Plan requirement. This Care Plan has been established and reviewed with the Patient.    Counseling Resources:  ATP IV Guidelines  Pooled Cohorts Equation Calculator  Breast Cancer Risk Calculator  Breast Cancer: Medication to Reduce Risk  FRAX Risk Assessment  ICSI Preventive Guidelines  Dietary Guidelines for Americans, 2010  USDA's MyPlate  ASA Prophylaxis  Lung CA Screening    Shanna ePreyra MD  [employer]    Identified Health Risks:    The patient was counseled and encouraged to consider modifying their diet and eating habits. She was provided with information on recommended healthy diet options.  The patient reports that she has difficulty with activities of daily living. These are secondary to her h/o of right hip replacement, and symptoms have been improving.   The patient was provided with written information regarding signs of hearing loss.  Information on urinary incontinence and treatment options given to patient.

## 2022-12-22 ENCOUNTER — APPOINTMENT (OUTPATIENT)
Dept: GENERAL RADIOLOGY | Facility: CLINIC | Age: 77
End: 2022-12-22
Attending: EMERGENCY MEDICINE
Payer: MEDICARE

## 2022-12-22 ENCOUNTER — HOSPITAL ENCOUNTER (EMERGENCY)
Facility: CLINIC | Age: 77
Discharge: HOME | End: 2022-12-22
Attending: EMERGENCY MEDICINE | Admitting: EMERGENCY MEDICINE
Payer: MEDICARE

## 2022-12-22 VITALS
RESPIRATION RATE: 16 BRPM | OXYGEN SATURATION: 99 % | DIASTOLIC BLOOD PRESSURE: 73 MMHG | TEMPERATURE: 98 F | HEIGHT: 64 IN | HEART RATE: 92 BPM | WEIGHT: 172 LBS | BODY MASS INDEX: 29.37 KG/M2 | SYSTOLIC BLOOD PRESSURE: 140 MMHG

## 2022-12-22 DIAGNOSIS — S52.531A CLOSED COLLES' FRACTURE OF RIGHT RADIUS, INITIAL ENCOUNTER: ICD-10-CM

## 2022-12-22 PROCEDURE — 99285 EMERGENCY DEPT VISIT HI MDM: CPT | Mod: 25

## 2022-12-22 PROCEDURE — 250N000011 HC RX IP 250 OP 636: Performed by: EMERGENCY MEDICINE

## 2022-12-22 PROCEDURE — 96374 THER/PROPH/DIAG INJ IV PUSH: CPT

## 2022-12-22 PROCEDURE — 73110 X-RAY EXAM OF WRIST: CPT | Mod: RT

## 2022-12-22 PROCEDURE — 25600 CLTX DST RDL FX/EPHYS SEP WO: CPT | Mod: RT

## 2022-12-22 PROCEDURE — 999N000065 XR WRIST RIGHT G/E 3 VIEWS: Mod: RT

## 2022-12-22 RX ORDER — OXYCODONE HYDROCHLORIDE 5 MG/1
5 TABLET ORAL EVERY 6 HOURS PRN
Qty: 10 TABLET | Refills: 0 | Status: SHIPPED | OUTPATIENT
Start: 2022-12-22 | End: 2022-12-25

## 2022-12-22 RX ORDER — MORPHINE SULFATE 2 MG/ML
2 INJECTION, SOLUTION INTRAMUSCULAR; INTRAVENOUS ONCE
Status: COMPLETED | OUTPATIENT
Start: 2022-12-22 | End: 2022-12-22

## 2022-12-22 RX ADMIN — MORPHINE SULFATE 2 MG: 2 INJECTION, SOLUTION INTRAMUSCULAR; INTRAVENOUS at 12:51

## 2022-12-22 ASSESSMENT — ENCOUNTER SYMPTOMS: ARTHRALGIAS: 1

## 2022-12-22 ASSESSMENT — ACTIVITIES OF DAILY LIVING (ADL)
ADLS_ACUITY_SCORE: 35
ADLS_ACUITY_SCORE: 35

## 2022-12-22 NOTE — ED PROVIDER NOTES
"  History   Chief Complaint:  Fall and Wrist Pain       HPI   Raisa Archer is a 77 year old right-handed female with history of hypertension, hyperlipidemia, and breast cancer who presents with right wrist pain after falling on ice while shoveling today. EMS placed a soft splint. There is a noted deformity to the area. No other injuries.    Review of Systems   Musculoskeletal: Positive for arthralgias.   All other systems reviewed and are negative.      Allergies:  Aspirin  Chantix  Glycerin  Ipratropium  Sympathomimetics  Varenicline    Medications:  Albuterol inhaler  Cyclobenzaprine  Cholecalciferol     Past Medical History:     Breast cancer  Hypertension  Centrilobular emphysema  Hyperlipidemia  Meningioma  Osteoporosis  Tobacco use disorder  Colonic polyps  Pelvic floor dysfunction  Primary insomnia  Hypokalemia  Hypomagnesemia     Past Surgical History:    Hip arthroplasty   Cholecystectomy   Hysterectomy  Phacoemulsification clear cornea with lens implant  Breast surgery  Left foot reconstruction     Family History:    Brother- Hodgkin's lymphoma, diabetes mellitus, asthma  Sister- breast cancer, diabetes mellitus     Social History:  The patient presents to the ED via EMS  PCP: Shanna Pereyra     Physical Exam     Patient Vitals for the past 24 hrs:   BP Temp Temp src Pulse Resp SpO2 Height Weight   12/22/22 1232 128/80 98  F (36.7  C) Oral 95 16 98 % 1.626 m (5' 4\") 78 kg (172 lb)       Physical Exam  Nursing note and vitals reviewed.  General: Oriented to person, place, and time. Appears well-developed and well-nourished.   Head: No signs of trauma.   Mouth/Throat: Oropharynx is clear and moist.   Eyes: Conjunctivae are normal. Pupils are equal, round, and reactive to light.   Neck: Normal range of motion. No nuchal rigidity.   Cardiovascular: Normal rate and regular rhythm.    Respiratory: Effort normal and breath sounds normal. No respiratory distress.   Abdominal: Soft. There is no tenderness. " There is no guarding.   Musculoskeletal: Normal range of motion. no edema. Deformity of right wrist with tenderness to palpation.  Neurological: The patient is alert and oriented to person, place, and time.  PERRLA, EOMI, visual fields intact, strength in upper/lower extremities normal and symmetrical.   Sensation normal. Speech normal  GCS eye subscore is 4. GCS verbal subscore is 5. GCS motor subscore is 6.   Skin: Skin is warm and dry. No rash noted.   Psychiatric: normal mood and affect. behavior is normal.     Emergency Department Course     Imaging:  XR Wrist Right G/E 3 Views   Final Result   IMPRESSION: Interval closed reduction and splint placement. Improved   position and alignment of the distal radius and ulna fractures. No   other change. Fine detail obscured by splint material.      KOKI RODRIGUEZ MD            SYSTEM ID:  [id number]      XR Wrist Right G/E 3 Views   Final Result   IMPRESSION:   1. Comminuted fracture of the distal metadiaphysis and metaphysis of   the radius with up to 1 cm of dorsal displacement of fracture   fragments and moderate dorsal tilt of the distal radial articular   surface.   2. Comminuted fracture in the distal diaphysis and metadiaphysis of   the ulna. There is up to 10 mm of dorsal and radial displacement of   fracture fragments and there is a moderate degree of radial and dorsal   tilt to the distal ulna.   3. Soft tissue swelling about the wrist.   4. Otherwise negative.      KOKI RODRIGUEZ MD            SYSTEM ID:  [id number]        Report per radiology      Procedures     Hematoma Block    LOCATION:  Right distal radius and right distal ulna    CONSENT: Risks and benefits were discussed with the patient and consent for the procedure was obtained     ANESTHESIA: Hematoma block using Bupivacaine    PROCEDURE NOTE: The site of injection was cleaned and prepped with betadine. Above anesthetic was injected into the fracture site. Flash back of blood was seen after the  injection. The patient tolerated the procedure well with good relief of discomfort and there were no complications.       Dislocation Reduction   Procedure: Dislocation Reduction  Consent: Verbal from Patient  Risks Discussed: Pain, need for repeat attempts, fracture, neurovascular injury, unsuccessful attempts and need to go to OR  Universal Protocol: Universal protocol was followed and time out conducted just prior to starting procedure, confirming patient identity, site/side, procedure, patient position, and availability of correct equipment and implants.    Indication: Displaced radial and ulnar fractures   Location: Right Wrist  Anesthesia/Sedation: Hematoma block using Bupivacaine  Procedure Detail: I manipulated the joint including Traction-counter traction    Immobilized with finger traps  Post procedure assessment:  Gross deformity resolved , Neurovascular intact  and ROM improved   Patient Status: The patient tolerated the procedure well: Yes. There were no complications.      Splint Placement     Procedure: Splint Placement     Indication: Fracture    Consent: Verbal     Location: Right Wrist    Preparation: Wounds were cleansed and dressed with a non-adherent bandage     Procedure detail:   Splint was applied by Tech/Nurse with my assistance  Splint type: Sugar-tong   Splint materilal: Plaster  After placement I checked and adjusted the fit as needed to ensure proper positioning/fit   Sensation and circulation are intact after splint placement     Patient Status: The patient tolerated the procedure well: Yes. There were no complications.      Emergency Department Course:     Reviewed:  I reviewed nursing notes, vitals and past medical history    Assessments:  1233 I obtained history and examined the patient as noted above.   1247 I rechecked the patient and explained imaging findings.   1259 Hematoma block performed.    Interventions:  Medications   morphine (PF) injection 2 mg (2 mg Intravenous Given  12/22/22 1251)     Disposition:  The patient was discharged to home.     Impression & Plan     Medical Decision Making:  Raisa Archer is a 77 year old female who presents for evaluation of wrist pain after fall. CMS is intact distally in the extremity.  Xrays reveal a fracture that is reduced successfully in ED with subsequent splint placement, see above procedure notes  There is no indication for ortho consultation from the ED. Rather, close follow-up is indicated in the next days.  Splint and fracture precautions for home.  The patients head to toe trauma exam is otherwise normal at this time and no further trauma workup is needed as I believe there is no signs of serious head, neck, chest, spinal, extremity or abdominal injuries warranting this.      Diagnosis:    ICD-10-CM    1. Closed Colles' fracture of right radius, initial encounter  S52.531A           Discharge Medications:  Discharge Medication List as of 12/22/2022  3:02 PM      START taking these medications    Details   oxyCODONE (ROXICODONE) 5 MG tablet Take 1 tablet (5 mg) by mouth every 6 hours as needed for pain, Disp-10 tablet, R-0, Local Print             Scribe Disclosure:  I, Karla Kaur, am serving as a scribe at 12:19 PM on 12/22/2022 to document services personally performed by Scar Yoo MD based on my observations and the provider's statements to me.            Scar Yoo MD  12/22/22 1055

## 2022-12-22 NOTE — ED TRIAGE NOTES
Slipped and fell on ice after shoveling snow, landed with right wrist first, since having right wrist pain with deformity noted. Soft splint on per EMS.      Triage Assessment     Row Name 12/22/22 1233       Triage Assessment (Adult)    Airway WDL WDL       Respiratory WDL    Respiratory WDL WDL       Skin Circulation/Temperature WDL    Skin Circulation/Temperature WDL WDL       Cardiac WDL    Cardiac WDL WDL       Peripheral/Neurovascular WDL    Peripheral Neurovascular WDL WDL       Cognitive/Neuro/Behavioral WDL    Cognitive/Neuro/Behavioral WDL WDL

## 2022-12-22 NOTE — ED NOTES
Bed: ED06  Expected date:   Expected time:   Means of arrival:   Comments:  [facility] [place] - 423 - 77 F fall wrist pain

## 2023-01-07 NOTE — PROGRESS NOTES
Medication Therapy Management (MTM) Encounter    ASSESSMENT:                            Medication Adherence/Access: No issues identified    COPD: Stable  Patient would benefit from the following medication changes: Restart Stiolto when able to use it post broken wrist heals  and use as needed albuterol when shortness of breath , cough. See plan for details.     PLAN:                                1.  Good luck with your broken wrist --stay indoors as much as possible, use your oxycodone pain pill when needed.  When your wrist is healed enough please restart your Stiolto -copd inhaler as a twice daily maintenance inhaler , for now use your albuterol inhaler as needed.     2. Find a new primary care provider here at the clinic in 2023.         Follow-up: with new primary care provider as needed.       SUBJECTIVE/OBJECTIVE:                          Raisa Archer is a 77 year old female called for an follow-up (7-25-22) visit. She was referred to me from Sushma Bledsoe; Dr. Pereyra will be her new PCP. 1-9-23 --mtm informed patient will need another new pcp in 2023.   .      Reason for visit:   Fell 12-22-22 --broke rt. Wrist in 2 places. Prn oxycodone --usually one/day now.           Allergies/ADRs: Reviewed in chart: wants amoxicillin diarrhea Se removed not valid anymore.   Tobacco: She reports that she quit smoking about 8 years ago. Her smoking use included cigarettes. She started smoking about 56 years ago. She has a 45.00 pack-year smoking history. She has never used smokeless tobacco.  Alcohol: not currently using  Caffeine: 0-1 cups/day of coffee, 0-1 cups/day of tea, diet sodas as needed.   Activity: since pandemic --no gym , sorta walks daily.   Past Medical History: Reviewed in chart      Medication Adherence/Access: no issues reported    COPD: Current medications: Stiolto -unable to use mechanically as of 12-22-22 per right broken wrist , using albuterol -- 1 puff /day on average in  afternoon.   Patient is not experiencing side effects.   Patient reports the following symptoms:  Depends --if she gets frustrated --get SOB --use albuterol then .   She does daily breathing exercises. They helps.   Patient does have an COPD Action Plan on file.   Has spirometry been completed: Yes   Inhaler/device technique reviewed: Aerosol inhaler        ------------------------------------------------------------------------------------------------      I spent 20 minutes with this patient today. All changes were made via collaborative practice agreement with Shanna Pereyra MD. A copy of the visit note was provided to the patient's provider(s).    A summary of these recommendations was sent via [employer].    Bassem Wolf [facility].  Medication Therapy Management Provider  [phone]      Telemedicine Visit Details  Type of service:  Telephone visit  Start Time: 1:30 PM  End Time: 1:50 PM     Medication Therapy Recommendations  Centrilobular emphysema (H)    Current Medication: STIOLTO RESPIMAT 2.5-2.5 MCG/ACT AERS   Rationale: Patient prefers not to take - Adherence - Adherence   Recommendation: Provide Education - restart 2 x day maintenenace copd inhaler when rt. wrist is healed enough to use the inhaler device.   Status: Accepted per CPA

## 2023-01-09 ENCOUNTER — VIRTUAL VISIT (OUTPATIENT)
Dept: PHARMACY | Facility: CLINIC | Age: 78
End: 2023-01-09
Payer: MEDICARE

## 2023-01-09 DIAGNOSIS — J43.2 CENTRILOBULAR EMPHYSEMA (H): Primary | ICD-10-CM

## 2023-01-09 PROCEDURE — 99207 PR NO CHARGE LOS: CPT | Performed by: PHARMACIST

## 2023-01-09 RX ORDER — OXYCODONE HYDROCHLORIDE 5 MG/1
5 TABLET ORAL EVERY 6 HOURS PRN
COMMUNITY
End: 2023-06-23

## 2023-01-09 NOTE — PATIENT INSTRUCTIONS
"Recommendations from today's MTM visit:                                                         1.  Good luck with your broken wrist --stay indoors as much as possible, use your oxycodone pain pill when needed.  When your wrist is healed enough please restart your Stiolto -copd inhaler as a twice daily maintenance inhaler , for now use your albuterol inhaler as needed.     2. Find a new primary care provider here at the clinic in 2023.         Follow-up: with new primary care provider as needed.     It was great speaking with you today.  I value your experience and would be very thankful for your time in providing feedback in our clinic survey. In the next few days, you may receive an email or text message from [facility] [employer] with a link to a survey related to your  clinical pharmacist.\"     To schedule another MTM appointment, please call the clinic directly or you may call the MTM scheduling line at [phone] or toll-free at [phone].     My Clinical Pharmacist's contact information:                                                      Please feel free to contact me with any questions or concerns you have.      Bassem Wolf [facility].  Medication Therapy Management Provider  [phone]    "

## 2023-02-26 ENCOUNTER — MYC MEDICAL ADVICE (OUTPATIENT)
Dept: PHARMACY | Facility: CLINIC | Age: 78
End: 2023-02-26
Payer: MEDICARE

## 2023-02-26 DIAGNOSIS — M62.838 SPASM OF MUSCLE: ICD-10-CM

## 2023-02-26 RX ORDER — CYCLOBENZAPRINE HCL 5 MG
5 TABLET ORAL 3 TIMES DAILY PRN
Qty: 30 TABLET | Refills: 0 | Status: SHIPPED | OUTPATIENT
Start: 2023-02-26 | End: 2023-06-23

## 2023-02-26 NOTE — TELEPHONE ENCOUNTER
Caio Donovan     I was wondering if I could get a refill for the muscle spasms medication:  Cyclone saltine 5mg tablet.       I used the last one the other day.  Having spasms from neck into hand.  The bones have healed but will have to have extensive therapy to get it and the arm back to normal.     Would very much appreciate the refill.  Raisa (Linda) Gianni    mtm notes she needs new pcp--mtm will fill 1/2 rx with no refills -then have new pcp refill      Bassem Wolf luisa.  Medication Therapy Management Provider  [phone]

## 2023-06-23 ENCOUNTER — OFFICE VISIT (OUTPATIENT)
Dept: FAMILY MEDICINE | Facility: CLINIC | Age: 78
End: 2023-06-23
Payer: MEDICARE

## 2023-06-23 VITALS
RESPIRATION RATE: 15 BRPM | WEIGHT: 157 LBS | SYSTOLIC BLOOD PRESSURE: 124 MMHG | OXYGEN SATURATION: 95 % | TEMPERATURE: 99.1 F | HEIGHT: 63 IN | DIASTOLIC BLOOD PRESSURE: 68 MMHG | BODY MASS INDEX: 27.82 KG/M2 | HEART RATE: 90 BPM

## 2023-06-23 DIAGNOSIS — Z12.31 ENCOUNTER FOR SCREENING MAMMOGRAM FOR BREAST CANCER: ICD-10-CM

## 2023-06-23 DIAGNOSIS — Z87.891 PERSONAL HISTORY OF NICOTINE DEPENDENCE: ICD-10-CM

## 2023-06-23 DIAGNOSIS — Z86.0100 HISTORY OF COLONIC POLYPS: ICD-10-CM

## 2023-06-23 DIAGNOSIS — M62.838 SPASM OF MUSCLE: ICD-10-CM

## 2023-06-23 DIAGNOSIS — J43.2 CENTRILOBULAR EMPHYSEMA (H): ICD-10-CM

## 2023-06-23 DIAGNOSIS — E78.5 HYPERLIPIDEMIA LDL GOAL <160: ICD-10-CM

## 2023-06-23 DIAGNOSIS — J30.1 SEASONAL ALLERGIC RHINITIS DUE TO POLLEN: ICD-10-CM

## 2023-06-23 DIAGNOSIS — D32.9 MENINGIOMA (H): ICD-10-CM

## 2023-06-23 DIAGNOSIS — M80.051D AGE-RELATED OSTEOPOROSIS WITH CURRENT PATHOLOGICAL FRACTURE, RIGHT FEMUR, SUBSEQUENT ENCOUNTER FOR FRACTURE WITH ROUTINE HEALING: ICD-10-CM

## 2023-06-23 DIAGNOSIS — Z86.2 HISTORY OF ANEMIA: ICD-10-CM

## 2023-06-23 DIAGNOSIS — Z12.2 SCREENING FOR LUNG CANCER: ICD-10-CM

## 2023-06-23 DIAGNOSIS — K59.03 DRUG-INDUCED CONSTIPATION: ICD-10-CM

## 2023-06-23 DIAGNOSIS — M25.551 HIP PAIN, RIGHT: Primary | ICD-10-CM

## 2023-06-23 DIAGNOSIS — K59.04 CHRONIC IDIOPATHIC CONSTIPATION: ICD-10-CM

## 2023-06-23 DIAGNOSIS — Z13.220 SCREENING CHOLESTEROL LEVEL: ICD-10-CM

## 2023-06-23 PROBLEM — D64.9 ANEMIA, UNSPECIFIED: Status: RESOLVED | Noted: 2022-01-26 | Resolved: 2023-06-23
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
## 2023-06-23 PROBLEM — I10 ESSENTIAL (PRIMARY) HYPERTENSION: Status: ACTIVE | Noted: 2022-01-26

## 2023-06-23 PROBLEM — E83.42 HYPOMAGNESEMIA: Status: RESOLVED | Noted: 2022-02-10 | Resolved: 2023-06-23

## 2023-06-23 PROBLEM — Z90.11 ACQUIRED ABSENCE OF RIGHT BREAST AND NIPPLE: Status: ACTIVE | Noted: 2022-01-26

## 2023-06-23 PROBLEM — Z79.01 ANTICOAGULATED: Status: RESOLVED | Noted: 2022-01-27 | Resolved: 2023-06-23
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
## 2023-06-23 PROBLEM — E87.6 HYPOKALEMIA: Status: RESOLVED | Noted: 2022-02-08 | Resolved: 2023-06-23

## 2023-06-23 PROBLEM — D64.9 ANEMIA, UNSPECIFIED: Status: ACTIVE | Noted: 2022-01-26

## 2023-06-23 PROBLEM — H66.93 OTITIS MEDIA, UNSPECIFIED, BILATERAL: Status: ACTIVE | Noted: 2022-01-26

## 2023-06-23 PROBLEM — Z96.641 PRESENCE OF RIGHT ARTIFICIAL HIP JOINT: Status: ACTIVE | Noted: 2022-01-26

## 2023-06-23 PROBLEM — Z85.3 PERSONAL HISTORY OF MALIGNANT NEOPLASM OF BREAST: Status: ACTIVE | Noted: 2022-01-26

## 2023-06-23 LAB
ERYTHROCYTE [DISTWIDTH] IN BLOOD BY AUTOMATED COUNT: 12.5 % (ref 10–15)
HCT VFR BLD AUTO: 41.6 % (ref 35–47)
HGB BLD-MCNC: 13.4 G/DL (ref 11.7–15.7)
MCH RBC QN AUTO: 28.6 PG (ref 26.5–33)
MCHC RBC AUTO-ENTMCNC: 32.2 G/DL (ref 31.5–36.5)
MCV RBC AUTO: 89 FL (ref 78–100)
PLATELET # BLD AUTO: 208 10E3/UL (ref 150–450)
RBC # BLD AUTO: 4.68 10E6/UL (ref 3.8–5.2)
WBC # BLD AUTO: 5.8 10E3/UL (ref 4–11)

## 2023-06-23 PROCEDURE — 80053 COMPREHEN METABOLIC PANEL: CPT | Performed by: INTERNAL MEDICINE

## 2023-06-23 PROCEDURE — 99215 OFFICE O/P EST HI 40 MIN: CPT | Mod: 25 | Performed by: INTERNAL MEDICINE

## 2023-06-23 PROCEDURE — 36415 COLL VENOUS BLD VENIPUNCTURE: CPT | Performed by: INTERNAL MEDICINE

## 2023-06-23 PROCEDURE — 80061 LIPID PANEL: CPT | Performed by: INTERNAL MEDICINE

## 2023-06-23 PROCEDURE — 0134A COVID-19 BIVALENT 18+ (MODERNA): CPT | Performed by: INTERNAL MEDICINE

## 2023-06-23 PROCEDURE — 91313 COVID-19 BIVALENT 18+ (MODERNA): CPT | Performed by: INTERNAL MEDICINE

## 2023-06-23 PROCEDURE — 85027 COMPLETE CBC AUTOMATED: CPT | Performed by: INTERNAL MEDICINE

## 2023-06-23 RX ORDER — ACETAMINOPHEN 500 MG
1000 TABLET ORAL EVERY 6 HOURS PRN
COMMUNITY
Start: 2023-06-23

## 2023-06-23 RX ORDER — POLYETHYLENE GLYCOL 3350 17 G/17G
1 POWDER, FOR SOLUTION ORAL DAILY PRN
Qty: 850 G | Refills: 3 | Status: SHIPPED | OUTPATIENT
Start: 2023-06-23 | End: 2024-01-03

## 2023-06-23 RX ORDER — MULTIVITAMIN
1 TABLET ORAL DAILY
COMMUNITY
Start: 2023-06-23

## 2023-06-23 RX ORDER — CYCLOBENZAPRINE HCL 5 MG
5 TABLET ORAL 3 TIMES DAILY PRN
Qty: 30 TABLET | Refills: 0 | Status: SHIPPED | OUTPATIENT
Start: 2023-06-23 | End: 2023-10-06

## 2023-06-23 RX ORDER — FLUTICASONE PROPIONATE 50 MCG
1-2 SPRAY, SUSPENSION (ML) NASAL DAILY
Qty: 16 G | Refills: 11 | Status: SHIPPED | OUTPATIENT
Start: 2023-06-23

## 2023-06-23 RX ORDER — ALBUTEROL SULFATE 90 UG/1
2 AEROSOL, METERED RESPIRATORY (INHALATION) EVERY 6 HOURS PRN
Qty: 18 G | Refills: 3 | Status: SHIPPED | OUTPATIENT
Start: 2023-06-23

## 2023-06-23 RX ORDER — TIOTROPIUM BROMIDE AND OLODATEROL 3.124; 2.736 UG/1; UG/1
2 SPRAY, METERED RESPIRATORY (INHALATION) DAILY
Qty: 4 G | Refills: 11 | Status: SHIPPED | OUTPATIENT
Start: 2023-06-23 | End: 2024-02-01

## 2023-06-23 ASSESSMENT — PAIN SCALES - GENERAL: PAINLEVEL: NO PAIN (0)

## 2023-06-23 NOTE — PATIENT INSTRUCTIONS
- I recommend following up with Neurosurgery for a repeat brain MRI for your meningioma (you last saw Tia Moser PA-C in 2020)- their phone number is [phone]    - For constipation, if you don't have a soft easy bowel movement over the course of the day, then I recommend you take Miralax 17 grams (1 capful) per day as needed.    - Continue your current regimen, we will check labs today.  You are due for your mammogram and CT lung cancer screening in September.    - Come back and see me in October 2023.  Schedule that as a medicare wellness visit.

## 2023-06-23 NOTE — PROGRESS NOTES
Assessment & Plan     (M25.551) Hip pain, right  (primary encounter diagnosis)  Comment: Pain localized at right trochanteric bursa.  Seems to be improving with conservative mgmt.  Trial topical analgesics.  Info on trochanteric bursitis given.  Plan:   - If symptoms not improving, will return to consider steroid injection/referral    (J43.2) Centrilobular emphysema (H)  Comment: Symptoms stable, slightly worse with poor air quality.  Refilled Respimat and albuterol prn.  Plan: albuterol (PROAIR HFA/PROVENTIL HFA/VENTOLIN         HFA) 108 (90 Base) MCG/ACT inhaler,         tiotropium-olodaterol (STIOLTO RESPIMAT)         2.5-2.5 MCG/ACT AER    (M62.838) Spasm of muscle  Comment: Intermittent, right leg predominantly, refilled Flexeril.  Plan: cyclobenzaprine (FLEXERIL) 5 MG tablet    (D32.9) Meningioma (H)  Comment: Has followed with Neurosurgery, due for 3 yr follow up.  She will call to schedule appt and MRI follow up.  Plan:     (K59.03) Drug-induced constipation  Comment: Miralax prn.  Plan:   - Discussed mgmt     (E78.5) Hyperlipidemia LDL goal <160  Comment: Not on medications, check today.  Plan: Lipid panel reflex to direct LDL Non-fasting          (Z13.220) Screening cholesterol level  Comment:   Plan: Lipid panel reflex to direct LDL Non-fasting    (M80.051D) Age-related osteoporosis with current pathological fracture, right femur, subsequent encounter for fracture with routine healing  Comment: History of this, not currently on medications.  Plan:   - Noted, will discuss at Medicare wellness visit    (Z86.010) History of colonic polyps  Comment: 2019- colonoscopy, due 2024 (every 5 years due to polyp).  Plan:     (Z86.2) History of anemia  Comment: Check CBC today.  Plan: CBC with platelets         (J30.2) Seasonal allergic rhinitis  Comment: Refilled Flonase today, has been working well.  Plan:     (Z12.31) Encounter for screening mammogram for breast cancer  Comment: Mammogram due in September.  Plan:  "MA Screen Left w/C[facility]          (Z12.2) Screening for lung cancer  Comment: CT lung cancer due September.  Plan: CT Chest Lung Cancer Scrn Low Dose wo          (K59.04) Chronic idiopathic constipation  Comment:   Plan: polyethylene glycol (MIRALAX) 17 GM/Dose powder    (Z87.891) Personal history of nicotine dependence  Comment:   Plan: CT Chest Lung Cancer Scrn Low Dose wo    (J30.1) Seasonal allergic rhinitis due to pollen  Comment:   Plan: fluticasone (FLONASE) 50 MCG/ACT nasal spray      I spent a total of 59 minutes on the day of the visit.   Time spent by me doing chart review, history and exam, documentation and further activities per the note       BMI:   Estimated body mass index is 28.26 kg/m  as calculated from the following:    Height as of this encounter: 1.588 m (5' 2.5\").    Weight as of this encounter: 71.2 kg (157 lb).       Patient Instructions   - I recommend following up with Neurosurgery for a repeat brain MRI for your meningioma (you last saw Tia Moser PA-C in 2020)- their phone number is [phone]    - For constipation, if you don't have a soft easy bowel movement over the course of the day, then I recommend you take Miralax 17 grams (1 capful) per day as needed.    - Continue your current regimen, we will check labs today.  You are due for your mammogram and CT lung cancer screening in September.    - Come back and see me in October 2023.  Schedule that as a medicare wellness visit.      Alicia Tidwell MD  [facility]    Tanna Mckeon is a 78 year old, presenting for the following health issues:  Establish Care (Est Care ) and Musculoskeletal Problem (Check Hip pain - feel a while ago and has hip replacement )        6/23/2023     1:21 PM   Additional Questions   Roomed by Latosha Oliveira     History of Present Illness       COPD:  She presents for follow up of COPD.  Overall, COPD symptoms are stable since last visit. She has same as usual fatigue or " "shortness of breath with exertion and no shortness of breath at rest.She sometimes coughs and does not have change in sputum. No recent fever. She can walk less than a mile without stopping to rest. She can walk 1 flights of stairs without resting.The patient has had an ED, urgent care, or hospital admission because of COPD since the last visit. She states she has had 1 visit(s) to an ED, Urgent Care, or Hospital due to her COPD.    She eats 2-3 servings of fruits and vegetables daily.She consumes 1 sweetened beverage(s) daily.She exercises with enough effort to increase her heart rate 10 to 19 minutes per day.  She exercises with enough effort to increase her heart rate 5 days per week. She is missing 1 dose(s) of medications per week.  She is not taking prescribed medications regularly due to other.     Is interested in getting established.    Needs prescriptions renewed- is out of all of the other medications.  Is only taking a One-A-Day.    There were three days in a row that had to take albuterol frequently.      Has records from hip surgery and wrist from [facility].    Has lost weight- 172 lbs in December 2022 down to 157 lbs today.  But she thinks this is due to meals and eating less.  She gets [employer] meals delivered- I filled out form today.      Review of Systems   Constitutional, HEENT, cardiovascular, pulmonary, gi and gu systems are negative, except as otherwise noted.      Objective    /68 (BP Location: Left arm, Patient Position: Sitting, Cuff Size: Adult Regular)   Pulse 90   Temp 99.1  F (37.3  C) (Temporal)   Resp 15   Ht 1.588 m (5' 2.5\")   Wt 71.2 kg (157 lb)   LMP  (LMP Unknown)   SpO2 95%   BMI 28.26 kg/m    Body mass index is 28.26 kg/m .  Physical Exam   GENERAL: healthy, alert and no distress  EYES: Eyes grossly normal to inspection, PERRL and conjunctivae and sclerae normal  HENT: ear canals and TM's normal, nose and mouth without ulcers or lesions  RESP: lungs clear to " auscultation - no rales, rhonchi or wheezes  CV: regular rate and rhythm, normal S1 S2, no S3 or S4, no murmur, click or rub, no peripheral edema and peripheral pulses strong  ABDOMEN: soft, mildly tender in lower abdomen  MS: pain with palpation of right greater trochanter.  SKIN: no suspicious lesions or rashes  NEURO: Normal strength and tone, mentation intact and speech normal  PSYCH: mentation appears normal, affect normal/bright

## 2023-06-24 LAB
ALBUMIN SERPL BCG-MCNC: 4.4 G/DL (ref 3.5–5.2)
ALP SERPL-CCNC: 119 U/L (ref 35–104)
ALT SERPL W P-5'-P-CCNC: 20 U/L (ref 0–50)
ANION GAP SERPL CALCULATED.3IONS-SCNC: 11 MMOL/L (ref 7–15)
AST SERPL W P-5'-P-CCNC: 30 U/L (ref 0–45)
BILIRUB SERPL-MCNC: 0.5 MG/DL
BUN SERPL-MCNC: 15 MG/DL (ref 8–23)
CALCIUM SERPL-MCNC: 9.7 MG/DL (ref 8.8–10.2)
CHLORIDE SERPL-SCNC: 101 MMOL/L (ref 98–107)
CHOLEST SERPL-MCNC: 203 MG/DL
CREAT SERPL-MCNC: 0.94 MG/DL (ref 0.51–0.95)
DEPRECATED HCO3 PLAS-SCNC: 29 MMOL/L (ref 22–29)
GFR SERPL CREATININE-BSD FRML MDRD: 63 ML/MIN/1.73M2
GLUCOSE SERPL-MCNC: 89 MG/DL (ref 70–99)
HDLC SERPL-MCNC: 73 MG/DL
LDLC SERPL CALC-MCNC: 109 MG/DL
NONHDLC SERPL-MCNC: 130 MG/DL
POTASSIUM SERPL-SCNC: 4.2 MMOL/L (ref 3.4–5.3)
PROT SERPL-MCNC: 6.9 G/DL (ref 6.4–8.3)
SODIUM SERPL-SCNC: 141 MMOL/L (ref 136–145)
TRIGL SERPL-MCNC: 104 MG/DL

## 2023-08-16 ENCOUNTER — TELEPHONE (OUTPATIENT)
Dept: NEUROSURGERY | Facility: CLINIC | Age: 78
End: 2023-08-16
Payer: MEDICARE

## 2023-08-16 ENCOUNTER — PATIENT OUTREACH (OUTPATIENT)
Dept: CARE COORDINATION | Facility: CLINIC | Age: 78
End: 2023-08-16
Payer: MEDICARE

## 2023-08-16 DIAGNOSIS — D32.9 MENINGIOMA (H): Primary | ICD-10-CM

## 2023-08-16 NOTE — TELEPHONE ENCOUNTER
Health Call Center    Phone Message    May a detailed message be left on voicemail: yes     Reason for Call: Other: Patient is calling requesting to schedule her 3 year follow up with MRI. She is unsure if she can have an MRI due to falling and having emergency hip replacement surgery and has a metal plate. Writer is unable to schedule without instructions, please call patient back to schedule.      Action Taken: Message routed to:  [facility]    Travel Screening: Not Applicable

## 2023-08-17 NOTE — TELEPHONE ENCOUNTER
RECORDS RECEIVED FROM: Internal    REASON FOR VISIT: 3 yr follow up Meningioma/MRI prior   Date of Appt: 10/4/23 12:00pm    NOTES (FOR ALL VISITS) STATUS DETAILS   OFFICE NOTE from referring provider Received Pt Self Referred   OFFICE NOTE from other specialist Internal 8/16/23 (Phone Note)    4/15/22 Shanna Pereyra MD @[facility]    8/16/21 Audrey Holder APRN CNP @[facility]    9/11/20 Tia Moser PA-C @[facility]    8/13/20 Sushma Bledsoe MD @[facility]     MEDICATION LIST Internal    IMAGING  (FOR ALL VISITS)     MRI (HEAD, NECK, SPINE) Internal [facility]  9/25/23  MR Brain  9/9/20 MR Brain  10/3/18 MR Cervical Spine

## 2023-09-13 ENCOUNTER — PATIENT OUTREACH (OUTPATIENT)
Dept: CARE COORDINATION | Facility: CLINIC | Age: 78
End: 2023-09-13
Payer: MEDICARE

## 2023-09-18 ENCOUNTER — ANCILLARY PROCEDURE (OUTPATIENT)
Dept: CT IMAGING | Facility: CLINIC | Age: 78
End: 2023-09-18
Attending: INTERNAL MEDICINE
Payer: MEDICARE

## 2023-09-18 ENCOUNTER — ANCILLARY PROCEDURE (OUTPATIENT)
Dept: MAMMOGRAPHY | Facility: CLINIC | Age: 78
End: 2023-09-18
Attending: INTERNAL MEDICINE
Payer: MEDICARE

## 2023-09-18 DIAGNOSIS — Z12.31 ENCOUNTER FOR SCREENING MAMMOGRAM FOR BREAST CANCER: ICD-10-CM

## 2023-09-18 DIAGNOSIS — Z87.891 PERSONAL HISTORY OF NICOTINE DEPENDENCE: ICD-10-CM

## 2023-09-18 DIAGNOSIS — Z12.2 SCREENING FOR LUNG CANCER: ICD-10-CM

## 2023-09-18 PROCEDURE — 77067 SCR MAMMO BI INCL CAD: CPT | Mod: 52 | Performed by: RADIOLOGY

## 2023-09-18 PROCEDURE — 77063 BREAST TOMOSYNTHESIS BI: CPT | Mod: 52 | Performed by: RADIOLOGY

## 2023-09-18 PROCEDURE — 71271 CT THORAX LUNG CANCER SCR C-: CPT | Mod: GC | Performed by: RADIOLOGY

## 2023-09-25 ENCOUNTER — ANCILLARY PROCEDURE (OUTPATIENT)
Dept: MRI IMAGING | Facility: CLINIC | Age: 78
End: 2023-09-25
Attending: PHYSICIAN ASSISTANT
Payer: MEDICARE

## 2023-09-25 DIAGNOSIS — D32.9 MENINGIOMA (H): ICD-10-CM

## 2023-09-25 PROCEDURE — G1010 CDSM STANSON: HCPCS | Performed by: RADIOLOGY

## 2023-09-25 PROCEDURE — 70553 MRI BRAIN STEM W/O & W/DYE: CPT | Mod: MG | Performed by: RADIOLOGY

## 2023-09-25 PROCEDURE — A9585 GADOBUTROL INJECTION: HCPCS | Mod: JZ | Performed by: RADIOLOGY

## 2023-09-25 RX ORDER — GADOBUTROL 604.72 MG/ML
7.5 INJECTION INTRAVENOUS ONCE
Status: COMPLETED | OUTPATIENT
Start: 2023-09-25 | End: 2023-09-25

## 2023-09-25 RX ADMIN — GADOBUTROL 7 ML: 604.72 INJECTION INTRAVENOUS at 12:40

## 2023-09-30 ASSESSMENT — ENCOUNTER SYMPTOMS
MYALGIAS: 1
SORE THROAT: 0
SHORTNESS OF BREATH: 0
WEAKNESS: 0
DYSURIA: 0
PARESTHESIAS: 0
PALPITATIONS: 0
NAUSEA: 0
HEMATURIA: 0
DIZZINESS: 0
FEVER: 0
HEARTBURN: 0
JOINT SWELLING: 0
HEADACHES: 0
DIARRHEA: 0
ARTHRALGIAS: 1
EYE PAIN: 0
CONSTIPATION: 0
HEMATOCHEZIA: 0
ABDOMINAL PAIN: 0
NERVOUS/ANXIOUS: 0
BREAST MASS: 0
COUGH: 0
FREQUENCY: 0
CHILLS: 0

## 2023-09-30 ASSESSMENT — ACTIVITIES OF DAILY LIVING (ADL): CURRENT_FUNCTION: NO ASSISTANCE NEEDED

## 2023-09-30 NOTE — COMMUNITY RESOURCES LIST (ENGLISH)
09/30/2023    [facility] [employer]  N/A  For questions about this resource list or additional care needs, please contact your primary care clinic or care manager.  Phone: [phone]   Email: N/A   Address: [address] [place] [id number]   Hours: N/A        Financial Stability       Rent and mortgage payment assistance  1  [facility] [place] [employer] Distance: 3.72 miles      Phone/Virtual   [address] [address]  Language: English, Tamazight  Hours: Mon - Fri 10:00 AM - 4:00 PM  Fees: Free   Phone: (851) 867-9443 Email: info@[facility].org Website: http://HarQen     2  [facility] Distance: 4.14 miles      In-Person   [address] [place] [address]  Language: English  Hours: Mon - Fri 8:00 AM - 3:00 PM  Fees: Free   Phone: (795) 415-3510 Ext.14 Email: neighborhood@[employer] Website: http://[email or web address]          Important Numbers & Websites       Emergency Services   911  [facility] Services   311  Poison Control   (357) 690-2196  Suicide Prevention Lifeline   (737) 668-7389 (TALK)  Child Abuse Hotline   (531) 959-1738 (4-A-Child)  Sexual Assault Hotline   (811) 386-7151 (HOPE)  National Runaway Safeline   (916) 526-7098 (RUNAWAY)  All-Options Talkline   (476) 195-2424  Substance Abuse Referral   (403) 651-4610 (HELP)

## 2023-10-04 ENCOUNTER — PRE VISIT (OUTPATIENT)
Dept: NEUROSURGERY | Facility: CLINIC | Age: 78
End: 2023-10-04

## 2023-10-04 ENCOUNTER — OFFICE VISIT (OUTPATIENT)
Dept: NEUROSURGERY | Facility: CLINIC | Age: 78
End: 2023-10-04
Payer: MEDICARE

## 2023-10-04 VITALS
BODY MASS INDEX: 28.89 KG/M2 | DIASTOLIC BLOOD PRESSURE: 80 MMHG | HEART RATE: 82 BPM | OXYGEN SATURATION: 88 % | SYSTOLIC BLOOD PRESSURE: 110 MMHG | HEIGHT: 62 IN | WEIGHT: 157 LBS

## 2023-10-04 DIAGNOSIS — D32.9 MENINGIOMA (H): Primary | ICD-10-CM

## 2023-10-04 DIAGNOSIS — M47.812 FACET ARTHROPATHY, CERVICAL: ICD-10-CM

## 2023-10-04 PROCEDURE — 99204 OFFICE O/P NEW MOD 45 MIN: CPT | Performed by: PHYSICIAN ASSISTANT

## 2023-10-04 NOTE — LETTER
10/4/2023       RE: Raisa Archer  [address] [place] [id number]     Dear Colleague,    Thank you for referring your patient, Raisa Archer, to the [facility] [place] [facility]. Please see a copy of my visit note below.      [facility]  Department of Neurosurgery    Name: Raisa Archer  MRN: [id number]  Age: 78 year old  : 1945  10/04/2023      Chief Complaint:   Left frontal meningioma, new patient consult    History of Present Illness:   Raisa Archer is a 78 year old female with a history of breast cancer, tobacco use disorder, and gallstones who is here today for a 3 year follow up. She's been followed in our department since  regarding an incidentally found left frontal meningioma. Our last visit was 2020. She is feeling well and has no new complaints. She does reports ongoing chronic left neck pain. Has tried chiropractic care without relief.     Review of Systems:   Pertinent items are noted in HPI or as in patient entered ROS below, remainder of complete ROS is negative.      Active Medications:     Current Outpatient Medications:     acetaminophen (TYLENOL) 500 MG tablet, Take 2 tablets (1,000 mg) by mouth every 6 hours as needed for mild pain, Disp: , Rfl:     albuterol (PROAIR HFA/PROVENTIL HFA/VENTOLIN HFA) 108 (90 Base) MCG/ACT inhaler, Inhale 2 puffs into the lungs every 6 hours as needed for shortness of breath, wheezing or cough, Disp: 18 g, Rfl: 3    Calcium Carbonate-Vitamin D (CALCIUM + D PO), Take 1 tablet by mouth daily Total 1200/800mg daily, Disp: , Rfl:     cyclobenzaprine (FLEXERIL) 5 MG tablet, Take 1 tablet (5 mg) by mouth 3 times daily as needed for muscle spasms, Disp: 30 tablet, Rfl: 0    diphenhydrAMINE-acetaminophen (TYLENOL PM)  MG tablet, Take 1-2 tablets by mouth nightly as needed for sleep, Disp: , Rfl:     fluticasone (FLONASE) 50  MCG/ACT nasal spray, Spray 1-2 sprays into both nostrils daily, Disp: 16 g, Rfl: 11    multivitamin (ONE-DAILY) tablet, Take 1 tablet by mouth daily, Disp: , Rfl:     polyethylene glycol (MIRALAX) 17 GM/Dose powder, Take 17 g (1 Capful) by mouth daily as needed for constipation, Disp: 850 g, Rfl: 3    tiotropium-olodaterol (STIOLTO RESPIMAT) 2.5-2.5 MCG/ACT AERS, Inhale 2 puffs into the lungs daily, Disp: 4 g, Rfl: 11      Allergies:   Aspirin, Chantix [varenicline tartrate], Glycerin, Ipratropium, Sympathomimetics, Varenicline, and Wool fiber      Past Medical History:  Past Medical History:   Diagnosis Date    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    Breast cancer, right breast (H)     Centrilobular emphysema (H)     Colon polyps     repeat colonoscopy 2019    Left tennis elbow     Rash     currently at masectomy site    Unspecified essential hypertension     situational and resolved     Patient Active Problem List   Diagnosis    Malignant neoplasm of female breast (H)    Osteoporosis    Advanced directives, counseling/discussion    Chronic rhinitis    Hyperlipidemia LDL goal <160    Other hammer toe (acquired)    Meningioma (H)    History of colonic polyps    Centrilobular emphysema (H)    Mixed incontinence    Pelvic floor dysfunction    Myalgia of pelvic floor    Cholelithiasis    History of total right hip replacement    Pain    Acute otitis media    Primary insomnia    Drug-induced constipation    Acquired absence of right breast and nipple    Age-related osteoporosis with current pathological fracture, right femur, subsequent encounter for fracture with routine healing    Other muscle spasm    Otitis media, unspecified, bilateral    Personal history of malignant neoplasm of breast    Presence of right artificial hip joint        Past Surgical History:  Past Surgical History:   Procedure Laterality Date    ABDOMEN SURGERY      ARTHROPLASTY HIP Right 01/24/2022    Procedure: ARTHROPLASTY, HIP, TOTAL;  Surgeon:  Maddy Arango MD;  Location:  OR    BIOPSY      BREAST SURGERY  2007    CATARACT IOL, RT/LT      CHOLECYSTECTOMY      COLONOSCOPY      COLONOSCOPY N/A 10/04/2019    Procedure: COLONOSCOPY, WITH POLYPECTOMY AND BIOPSY;  Surgeon: Lucía John MD;  Location:  GI    ORTHOPEDIC SURGERY      PHACOEMULSIFICATION CLEAR CORNEA WITH STANDARD INTRAOCULAR LENS IMPLANT  05/22/2014    Procedure: PHACOEMULSIFICATION CLEAR CORNEA WITH STANDARD INTRAOCULAR LENS IMPLANT;  Surgeon: Rahul Reid MD;  Location:  EC    PHACOEMULSIFICATION CLEAR CORNEA WITH STANDARD INTRAOCULAR LENS IMPLANT  07/08/2014    Procedure: PHACOEMULSIFICATION CLEAR CORNEA WITH STANDARD INTRAOCULAR LENS IMPLANT;  Surgeon: Rahul Reid MD;  Location:  OR    SURGICAL HISTORY OF -   08/2000    left foot reconstruction    SURGICAL HISTORY OF -   1979    hysterectomy       Family History:   Family History   Problem Relation Age of Onset    Breast Cancer Sister         Going thru 2nd round of breast cancer    Diabetes Sister         overweight    Cancer Brother     Asthma Brother     Other Cancer Brother         Hodkins Lymphoma/Hodkins Lymphoma/Hodkins Lymphoma/Hodkins Lymphoma    Diabetes Brother         overweight    Cerebrovascular Disease Father         Had several small ones    Cancer Other         Stomach cancer- cousin    Breast Cancer Other         Aunts    Diabetes Other         Uncles and cousin/Paternal Uncles/Paternal Uncles/Paternal Uncles/Paternal Uncles    Cancer Brother     Obesity Brother     Asthma Brother     Cancer Other         Family Hx of colon cancer    Breast Cancer Other         Paternal Aunt/Paternal Aunt/Paternal Aunt/Paternal Aunt    Colon Cancer Other         Maternal Great Uncles    Breast Cancer Sister     Breast Cancer Cousin         Paternal/Paternal    Colon Cancer Other         Maternal Uncle/Maternal Great Uncles/Maternal Great Uncles/Maternal Great Uncles    Obesity Sister         On diet/On diet/On  "diet    Obesity Brother     Asthma Niece         passed away of pneumonia    Diabetes Other     Breast Cancer Other         had double masectomys    Colon Cancer Other          Social History:   Social History     Tobacco Use    Smoking status: Former     Packs/day: 1.50     Years: 30.00     Pack years: 45.00     Types: Cigarettes     Start date: 1967     Quit date: 5/10/2014     Years since quittin.4    Smokeless tobacco: Never    Tobacco comments:     Quit several times over the years including when pregnant   Substance Use Topics    Alcohol use: No     Alcohol/week: 0.0 standard drinks of alcohol    Drug use: No        Physical Exam:   /80   Pulse 82   Ht 1.575 m (5' 2\")   Wt 71.2 kg (157 lb)   LMP  (LMP Unknown)   SpO2 (!) 88%   BMI 28.72 kg/m     General: No acute distress.   Eyes: Conjunctivae are normal.  MSK: Moves all extremities.  No obvious deformity.  Neuro: The patient is fully oriented. Speech is normal. Facial nerve function is normal, rated as a House Brackmann 1. Gait is normal.  Psych: Normal mood and affect. Behavior is normal.      Imaging:  We reviewed the MRI done 2023 and, when compared to last imaging there has been interval small growth of roughly 2mm in the past 3 years. No FLAIR changes seen.    Per Radiology, \" 3. Enhancement along the left C1-2 facet joint; this may represent  active inflammatory facet arthropathy.\"     Assessment:  Left frontal meningioma, new patient consult  Left neck pain with abnormal MRI findings    Plan:  We discussed her recent MRI and that there's been small interval growth. Given the location, size, and slow growth we discussed options again and the patient prefers ongoing observation which is still reasonable. We'll plan for follow up in 2 years with repeat imaging prior, and she was encouraged to reach out sooner with new concerns.   PM&R referral placed today for consult regarding her ongoing neck pain and MRI findings of possible " C1-2 facet arthropathy on the left.           Again, thank you for allowing me to participate in the care of your patient.      Sincerely,    Tia Moser PA-C

## 2023-10-04 NOTE — PATIENT INSTRUCTIONS
Follow up in 2 years with brain MRI w/wo contrast prior to appointment.    Please reach out sooner with new questions or concerns.

## 2023-10-04 NOTE — PROGRESS NOTES
[facility]  Department of Neurosurgery    Name: Raisa Archer  MRN: [id number]  Age: 78 year old  : 1945  10/04/2023      Chief Complaint:   Left frontal meningioma, new patient consult    History of Present Illness:   Raisa Archer is a 78 year old female with a history of breast cancer, tobacco use disorder, and gallstones who is here today for a 3 year follow up. She's been followed in our department since  regarding an incidentally found left frontal meningioma. Our last visit was 2020. She is feeling well and has no new complaints. She does reports ongoing chronic left neck pain. Has tried chiropractic care without relief.     Review of Systems:   Pertinent items are noted in HPI or as in patient entered ROS below, remainder of complete ROS is negative.      Active Medications:     Current Outpatient Medications:     acetaminophen (TYLENOL) 500 MG tablet, Take 2 tablets (1,000 mg) by mouth every 6 hours as needed for mild pain, Disp: , Rfl:     albuterol (PROAIR HFA/PROVENTIL HFA/VENTOLIN HFA) 108 (90 Base) MCG/ACT inhaler, Inhale 2 puffs into the lungs every 6 hours as needed for shortness of breath, wheezing or cough, Disp: 18 g, Rfl: 3    Calcium Carbonate-Vitamin D (CALCIUM + D PO), Take 1 tablet by mouth daily Total 1200/800mg daily, Disp: , Rfl:     cyclobenzaprine (FLEXERIL) 5 MG tablet, Take 1 tablet (5 mg) by mouth 3 times daily as needed for muscle spasms, Disp: 30 tablet, Rfl: 0    diphenhydrAMINE-acetaminophen (TYLENOL PM)  MG tablet, Take 1-2 tablets by mouth nightly as needed for sleep, Disp: , Rfl:     fluticasone (FLONASE) 50 MCG/ACT nasal spray, Spray 1-2 sprays into both nostrils daily, Disp: 16 g, Rfl: 11    multivitamin (ONE-DAILY) tablet, Take 1 tablet by mouth daily, Disp: , Rfl:     polyethylene glycol (MIRALAX) 17 GM/Dose powder, Take 17 g (1 Capful) by mouth daily as needed for constipation, Disp: 850 g, Rfl: 3    tiotropium-olodaterol (STIOLTO  RESPIMAT) 2.5-2.5 MCG/ACT AERS, Inhale 2 puffs into the lungs daily, Disp: 4 g, Rfl: 11      Allergies:   Aspirin, Chantix [varenicline tartrate], Glycerin, Ipratropium, Sympathomimetics, Varenicline, and Wool fiber      Past Medical History:  Past Medical History:   Diagnosis Date    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    Breast cancer, right breast (H)     Centrilobular emphysema (H)     Colon polyps     repeat colonoscopy 2019    Left tennis elbow     Rash     currently at masectomy site    Unspecified essential hypertension     situational and resolved     Patient Active Problem List   Diagnosis    Malignant neoplasm of female breast (H)    Osteoporosis    Advanced directives, counseling/discussion    Chronic rhinitis    Hyperlipidemia LDL goal <160    Other hammer toe (acquired)    Meningioma (H)    History of colonic polyps    Centrilobular emphysema (H)    Mixed incontinence    Pelvic floor dysfunction    Myalgia of pelvic floor    Cholelithiasis    History of total right hip replacement    Pain    Acute otitis media    Primary insomnia    Drug-induced constipation    Acquired absence of right breast and nipple    Age-related osteoporosis with current pathological fracture, right femur, subsequent encounter for fracture with routine healing    Other muscle spasm    Otitis media, unspecified, bilateral    Personal history of malignant neoplasm of breast    Presence of right artificial hip joint        Past Surgical History:  Past Surgical History:   Procedure Laterality Date    ABDOMEN SURGERY      ARTHROPLASTY HIP Right 01/24/2022    Procedure: ARTHROPLASTY, HIP, TOTAL;  Surgeon: Maddy Arango MD;  Location:  OR    BIOPSY      BREAST SURGERY  2007    CATARACT IOL, RT/LT      CHOLECYSTECTOMY      COLONOSCOPY      COLONOSCOPY N/A 10/04/2019    Procedure: COLONOSCOPY, WITH POLYPECTOMY AND BIOPSY;  Surgeon: Lucía John MD;  Location:  GI    ORTHOPEDIC SURGERY      PHACOEMULSIFICATION CLEAR CORNEA  WITH STANDARD INTRAOCULAR LENS IMPLANT  05/22/2014    Procedure: PHACOEMULSIFICATION CLEAR CORNEA WITH STANDARD INTRAOCULAR LENS IMPLANT;  Surgeon: Rahul Reid MD;  Location:  EC    PHACOEMULSIFICATION CLEAR CORNEA WITH STANDARD INTRAOCULAR LENS IMPLANT  07/08/2014    Procedure: PHACOEMULSIFICATION CLEAR CORNEA WITH STANDARD INTRAOCULAR LENS IMPLANT;  Surgeon: Rahul Reid MD;  Location:  OR    SURGICAL HISTORY OF -   08/2000    left foot reconstruction    SURGICAL HISTORY OF -   1979    hysterectomy       Family History:   Family History   Problem Relation Age of Onset    Breast Cancer Sister         Going thru 2nd round of breast cancer    Diabetes Sister         overweight    Cancer Brother     Asthma Brother     Other Cancer Brother         Hodkins Lymphoma/Hodkins Lymphoma/Hodkins Lymphoma/Hodkins Lymphoma    Diabetes Brother         overweight    Cerebrovascular Disease Father         Had several small ones    Cancer Other         Stomach cancer- cousin    Breast Cancer Other         Aunts    Diabetes Other         Uncles and cousin/Paternal Uncles/Paternal Uncles/Paternal Uncles/Paternal Uncles    Cancer Brother     Obesity Brother     Asthma Brother     Cancer Other         Family Hx of colon cancer    Breast Cancer Other         Paternal Aunt/Paternal Aunt/Paternal Aunt/Paternal Aunt    Colon Cancer Other         Maternal Great Uncles    Breast Cancer Sister     Breast Cancer Cousin         Paternal/Paternal    Colon Cancer Other         Maternal Uncle/Maternal Great Uncles/Maternal Great Uncles/Maternal Great Uncles    Obesity Sister         On diet/On diet/On diet    Obesity Brother     Asthma Niece         passed away of pneumonia    Diabetes Other     Breast Cancer Other         had double masectomys    Colon Cancer Other          Social History:   Social History     Tobacco Use    Smoking status: Former     Packs/day: 1.50     Years: 30.00     Pack years: 45.00     Types: Cigarettes      "Start date: 1967     Quit date: 5/10/2014     Years since quittin.4    Smokeless tobacco: Never    Tobacco comments:     Quit several times over the years including when pregnant   Substance Use Topics    Alcohol use: No     Alcohol/week: 0.0 standard drinks of alcohol    Drug use: No        Physical Exam:   /80   Pulse 82   Ht 1.575 m (5' 2\")   Wt 71.2 kg (157 lb)   LMP  (LMP Unknown)   SpO2 (!) 88%   BMI 28.72 kg/m     General: No acute distress.   Eyes: Conjunctivae are normal.  MSK: Moves all extremities.  No obvious deformity.  Neuro: The patient is fully oriented. Speech is normal. Facial nerve function is normal, rated as a House Brackmann 1. Gait is normal.  Psych: Normal mood and affect. Behavior is normal.      Imaging:  We reviewed the MRI done 2023 and, when compared to last imaging there has been interval small growth of roughly 2mm in the past 3 years. No FLAIR changes seen.    Per Radiology, \" 3. Enhancement along the left C1-2 facet joint; this may represent  active inflammatory facet arthropathy.\"     Assessment:  Left frontal meningioma, new patient consult  Left neck pain with abnormal MRI findings    Plan:  We discussed her recent MRI and that there's been small interval growth. Given the location, size, and slow growth we discussed options again and the patient prefers ongoing observation which is still reasonable. We'll plan for follow up in 2 years with repeat imaging prior, and she was encouraged to reach out sooner with new concerns.   PM&R referral placed today for consult regarding her ongoing neck pain and MRI findings of possible C1-2 facet arthropathy on the left.       Tia Moser PA-C  Department of Neurosurgery  [facility]  [facility]        "

## 2023-10-04 NOTE — NURSING NOTE
"Chief Complaint   Patient presents with    Consult     Mri results   /80   Pulse 82   Ht 1.575 m (5' 2\")   Wt 71.2 kg (157 lb)   LMP  (LMP Unknown)   SpO2 (!) 88%   BMI 28.72 kg/m    Heidi Holland CMA at 12:18 PM on 10/4/2023.      "

## 2023-10-06 ENCOUNTER — OFFICE VISIT (OUTPATIENT)
Dept: FAMILY MEDICINE | Facility: CLINIC | Age: 78
End: 2023-10-06
Payer: MEDICARE

## 2023-10-06 ENCOUNTER — MYC MEDICAL ADVICE (OUTPATIENT)
Dept: FAMILY MEDICINE | Facility: CLINIC | Age: 78
End: 2023-10-06

## 2023-10-06 VITALS
OXYGEN SATURATION: 98 % | SYSTOLIC BLOOD PRESSURE: 127 MMHG | HEIGHT: 63 IN | BODY MASS INDEX: 27.82 KG/M2 | TEMPERATURE: 96.8 F | RESPIRATION RATE: 20 BRPM | DIASTOLIC BLOOD PRESSURE: 74 MMHG | HEART RATE: 83 BPM | WEIGHT: 157 LBS

## 2023-10-06 DIAGNOSIS — E78.5 HYPERLIPIDEMIA LDL GOAL <100: ICD-10-CM

## 2023-10-06 DIAGNOSIS — Z00.00 ENCOUNTER FOR MEDICARE ANNUAL WELLNESS EXAM: ICD-10-CM

## 2023-10-06 DIAGNOSIS — M81.0 AGE-RELATED OSTEOPOROSIS WITHOUT CURRENT PATHOLOGICAL FRACTURE: ICD-10-CM

## 2023-10-06 DIAGNOSIS — Z13.9 ENCOUNTER FOR SCREENING INVOLVING SOCIAL DETERMINANTS OF HEALTH (SDOH): ICD-10-CM

## 2023-10-06 DIAGNOSIS — M62.838 SPASM OF MUSCLE: ICD-10-CM

## 2023-10-06 PROCEDURE — 99214 OFFICE O/P EST MOD 30 MIN: CPT | Mod: 25 | Performed by: INTERNAL MEDICINE

## 2023-10-06 PROCEDURE — G0439 PPPS, SUBSEQ VISIT: HCPCS | Performed by: INTERNAL MEDICINE

## 2023-10-06 PROCEDURE — G0008 ADMIN INFLUENZA VIRUS VAC: HCPCS | Performed by: INTERNAL MEDICINE

## 2023-10-06 PROCEDURE — 90662 IIV NO PRSV INCREASED AG IM: CPT | Performed by: INTERNAL MEDICINE

## 2023-10-06 RX ORDER — CYCLOBENZAPRINE HCL 5 MG
5 TABLET ORAL 3 TIMES DAILY PRN
Qty: 30 TABLET | Refills: 0 | Status: SHIPPED | OUTPATIENT
Start: 2023-10-06 | End: 2024-01-03

## 2023-10-06 RX ORDER — ROSUVASTATIN CALCIUM 5 MG/1
5 TABLET, COATED ORAL DAILY
Qty: 30 TABLET | Refills: 1 | Status: SHIPPED | OUTPATIENT
Start: 2023-10-06 | End: 2023-11-29

## 2023-10-06 ASSESSMENT — ENCOUNTER SYMPTOMS
CHILLS: 0
WEAKNESS: 0
SHORTNESS OF BREATH: 0
COUGH: 0
PALPITATIONS: 0
MYALGIAS: 1
DIZZINESS: 0
EYE PAIN: 0
HEADACHES: 0
HEMATURIA: 0
DYSURIA: 0
HEMATOCHEZIA: 0
JOINT SWELLING: 0
DIARRHEA: 0
ARTHRALGIAS: 1
FEVER: 0
NERVOUS/ANXIOUS: 0
SORE THROAT: 0
HEARTBURN: 0
NAUSEA: 0
PARESTHESIAS: 0
CONSTIPATION: 0
FREQUENCY: 0
ABDOMINAL PAIN: 0
BREAST MASS: 0

## 2023-10-06 ASSESSMENT — ACTIVITIES OF DAILY LIVING (ADL): CURRENT_FUNCTION: NO ASSISTANCE NEEDED

## 2023-10-06 NOTE — COMMUNITY RESOURCES LIST (ENGLISH)
10/06/2023   [facility] [employer]  N/A  For questions about this resource list or additional care needs, please contact your primary care clinic or care manager.  Phone: [phone]   Email: N/A   Address: [address] [place] [address]   Hours: N/A        Financial Stability       Rent and mortgage payment assistance  1  [facility] [place] [employer] Distance: 3.72 miles      Phone/Virtual   [address] [address]  Language: English, Latvian  Hours: Mon - Fri 10:00 AM - 4:00 PM  Fees: Free   Phone: (845) 932-4280 Email: info@[employer].org Website: http://Anita [email or web address]     2  [facility] Distance: 4.14 miles      In-Person   [address] [place] [address]  Language: English  Hours: Mon - Fri 8:00 AM - 3:00 PM  Fees: Free   Phone: (294) 591-6381 Ext.14 Email: neighborhood@[employer] Website: http://www.[facility].org          Important Numbers & Websites       Emergency Services   911  [facility] Services   311  Poison Control   (673) 729-5582  Suicide Prevention Lifeline   (831) 371-2584 (TALK)  Child Abuse Hotline   (723) 269-3086 (4-A-Child)  Sexual Assault Hotline   (290) 602-4936 (HOPE)  National Runaway Safeline   (598) 997-3240 (RUNAWAY)  All-Options Talkline   (984) 400-3550  Substance Abuse Referral   (102) 269-7846 (HELP)

## 2023-10-06 NOTE — PATIENT INSTRUCTIONS
Currently [facility] is not providing or scheduling any RSV vaccines or monoclonal antibodies.  We are waiting to receive our supply of these items and for more information on insurance benefit coverage.  We plan to provide these options to the following patients in the future, when they are available:    RSV vaccine  Patients 60+ years*  Pregnant patients at 32 to 36 weeks' gestation    *It is recommended that patients on Medicare insurance plans receive their RSV vaccine in the pharmacy.        Patient Education   Personalized Prevention Plan  You are due for the preventive services outlined below.  Your care team is available to assist you in scheduling these services.  If you have already completed any of these items, please share that information with your care team to update in your medical record.  Health Maintenance Due   Topic Date Due    Flu Vaccine (1) 09/01/2023    COVID-19 Vaccine (6 - 2023-24 season) 09/01/2023    ANNUAL REVIEW OF HM ORDERS  10/05/2023     Learning About Dietary Guidelines  What are the Dietary Guidelines for Americans?     Dietary Guidelines for Americans provide tips for eating well and staying healthy. This helps reduce the risk for long-term (chronic) diseases.  These guidelines recommend that you:  Eat and drink the right amount for you. The U.S. government's food guide is called MyPlate. It can help you make your own well-balanced eating plan.  Try to balance your eating with your activity. This helps you stay at a healthy weight.  Drink alcohol in moderation, if at all.  Limit foods high in salt, saturated fat, trans fat, and added sugar.  These guidelines are from the U.S. Department of Agriculture and the U.S. Department of Health and Human Services. They are updated every 5 years.  What is MyPlate?  MyPlate is the U.S. government's food guide. It can help you make your own well-balanced eating plan. A balanced eating plan means that you eat enough, but not too much, and  "that your food gives you the nutrients you need to stay healthy.  MyPlate focuses on eating plenty of whole grains, fruits, and vegetables, and on limiting fat and sugar. It is available online at www.ChooseMyPlate.gov.  How can you get started?  If you're trying to eat healthier, you can slowly change your eating habits over time. You don't have to make big changes all at once. Start by adding one or two healthy foods to your meals each day.  Grains  Choose whole-grain breads and cereals and whole-wheat pasta and whole-grain crackers.  Vegetables  Eat a variety of vegetables every day. They have lots of nutrients and are part of a heart-healthy diet.  Fruits  Eat a variety of fruits every day. Fruits contain lots of nutrients. Choose fresh fruit instead of fruit juice.  Protein foods  Choose fish and lean poultry more often. Eat red meat and fried meats less often. Dried beans, tofu, and nuts are also good sources of protein.  Dairy  Choose low-fat or fat-free products from this food group. If you have problems digesting milk, try eating cheese or yogurt instead.  Fats and oils  Limit fats and oils if you're trying to cut calories. Choose healthy fats when you cook. These include canola oil and olive oil.  Where can you learn more?  Go to https://www.[employer]urt[employer] Engine.net/patiented  Enter D676 in the search box to learn more about \"Learning About Dietary Guidelines.\"  Current as of: March 1, 2023               Content Version: 13.7    4814-7667 [employer].   Care instructions adapted under license by your healthcare professional. If you have questions about a medical condition or this instruction, always ask your healthcare professional. [employer] disclaims any warranty or liability for your use of this information.      Hearing Loss: Care Instructions  Overview     Hearing loss is a sudden or slow decrease in how well you hear. It can range from slight to profound. Permanent hearing loss can " occur with aging. It also can happen when you are exposed long-term to loud noise. Examples include listening to loud music, riding motorcycles, or being around other loud machines.  Hearing loss can affect your work and home life. It can make you feel lonely or depressed. You may feel that you have lost your independence. But hearing aids and other devices can help you hear better and feel connected to others.  Follow-up care is a key part of your treatment and safety. Be sure to make and go to all appointments, and call your doctor if you are having problems. It's also a good idea to know your test results and keep a list of the medicines you take.  How can you care for yourself at home?  Avoid loud noises whenever possible. This helps keep your hearing from getting worse.  Always wear hearing protection around loud noises.  Wear a hearing aid as directed.  A professional can help you pick a hearing aid that will work best for you.  You can also get hearing aids over the counter for mild to moderate hearing loss.  Have hearing tests as your doctor suggests. They can show whether your hearing has changed. Your hearing aid may need to be adjusted.  Use other devices as needed. These may include:  Telephone amplifiers and hearing aids that can connect to a television, stereo, radio, or microphone.  Devices that use lights or vibrations. These alert you to the doorbell, a ringing telephone, or a baby monitor.  Television closed-captioning. This shows the words at the bottom of the screen. Most new TVs can do this.  TTY (text telephone). This lets you type messages back and forth on the telephone instead of talking or listening. These devices are also called TDD. When messages are typed on the keyboard, they are sent over the phone line to a receiving TTY. The message is shown on a monitor.  Use text messaging, social media, and email if it is hard for you to communicate by telephone.  Try to learn a listening  "technique called speechreading. It is not lipreading. You pay attention to people's gestures, expressions, posture, and tone of voice. These clues can help you understand what a person is saying. Face the person you are talking to, and have them face you. Make sure the lighting is good. You need to see the other person's face clearly.  Think about counseling if you need help to adjust to your hearing loss.  When should you call for help?  Watch closely for changes in your health, and be sure to contact your doctor if:    You think your hearing is getting worse.     You have new symptoms, such as dizziness or nausea.   Where can you learn more?  Go to https://www.Sentrinsic.net/patiented  Enter R798 in the search box to learn more about \"Hearing Loss: Care Instructions.\"  Current as of: March 1, 2023               Content Version: 13.7    5711-9915 [employer].   Care instructions adapted under license by your healthcare professional. If you have questions about a medical condition or this instruction, always ask your healthcare professional. [employer] disclaims any warranty or liability for your use of this information.      Bladder Training: Care Instructions  Your Care Instructions     Bladder training is used to treat urge incontinence and stress incontinence. Urge incontinence means that the need to urinate comes on so fast that you can't get to a toilet in time. Stress incontinence means that you leak urine because of pressure on your bladder. For example, it may happen when you laugh, cough, or lift something heavy.  Bladder training can increase how long you can wait before you have to urinate. It can also help your bladder hold more urine. And it can give you better control over the urge to urinate.  It is important to remember that bladder training takes a few weeks to a few months to make a difference. You may not see results right away, but don't give up.  Follow-up care is a " key part of your treatment and safety. Be sure to make and go to all appointments, and call your doctor if you are having problems. It's also a good idea to know your test results and keep a list of the medicines you take.  How can you care for yourself at home?  Work with your doctor to come up with a bladder training program that is right for you. You may use one or more of the following methods.  Delayed urination  In the beginning, try to keep from urinating for 5 minutes after you first feel the need to go.  While you wait, take deep, slow breaths to relax. Kegel exercises can also help you delay the need to go to the bathroom.  After some practice, when you can easily wait 5 minutes to urinate, try to wait 10 minutes before you urinate.  Slowly increase the waiting period until you are able to control when you have to urinate.  Scheduled urination  Empty your bladder when you first wake up in the morning.  Schedule times throughout the day when you will urinate.  Start by going to the bathroom every hour, even if you don't need to go.  Slowly increase the time between trips to the bathroom.  When you have found a schedule that works well for you, keep doing it.  If you wake up during the night and have to urinate, do it. Apply your schedule to waking hours only.  Kegel exercises  These tighten and strengthen pelvic muscles, which can help you control the flow of urine. (If doing these exercises causes pain, stop doing them and talk with your doctor.) To do Kegel exercises:  Squeeze your muscles as if you were trying not to pass gas. Or squeeze your muscles as if you were stopping the flow of urine. Your belly, legs, and buttocks shouldn't move.  Hold the squeeze for 3 seconds, then relax for 5 to 10 seconds.  Start with 3 seconds, then add 1 second each week until you are able to squeeze for 10 seconds.  Repeat the exercise 10 times a session. Do 3 to 8 sessions a day.  When should you call for help?  Watch  "closely for changes in your health, and be sure to contact your doctor if:    Your incontinence is getting worse.     You do not get better as expected.   Where can you learn more?  Go to https://www.Propeller.net/patiented  Enter V684 in the search box to learn more about \"Bladder Training: Care Instructions.\"  Current as of: March 1, 2023               Content Version: 13.7    3267-7971 [employer].   Care instructions adapted under license by your healthcare professional. If you have questions about a medical condition or this instruction, always ask your healthcare professional. [employer] disclaims any warranty or liability for your use of this information.         "

## 2023-10-06 NOTE — COMMUNITY RESOURCES LIST (ENGLISH)
10/06/2023   [facility] [employer]  N/A  For questions about this resource list or additional care needs, please contact your primary care clinic or care manager.  Phone: [phone]   Email: N/A   Address: [address] [place] [address]   Hours: N/A        Financial Stability       Rent and mortgage payment assistance  1  [facility] - [place] [employer]'s R[employer] Support Fund Distance: 3.72 miles      Phone/Virtual   [address] [address]  Language: English, Lao  Hours: Mon - Fri 10:00 AM - 4:00 PM  Fees: Free   Phone: (268) 114-7885 Email: info@[employer].org Website: http://[employer]     2  [facility] Distance: 4.14 miles      In-Person   [address] [place] [address]  Language: English  Hours: Mon - Fri 8:00 AM - 3:00 PM  Fees: Free   Phone: (849) 643-1881 Ext.14 Email: neighborhood@[employer] Website: http://www.[facility].org          Important Numbers & Websites       Emergency Services   911  [facility] Services   311  Poison Control   (308) 931-7758  Suicide Prevention Lifeline   (889) 661-5930 (TALK)  Child Abuse Hotline   (161) 963-6202 (4-A-Child)  Sexual Assault Hotline   (424) 998-2263 (HOPE)  National Runaway Safeline   (607) 634-4721 (RUNAWAY)  All-Options Talkline   (743) 925-4568  Substance Abuse Referral   (704) 978-6166 (HELP)

## 2023-10-06 NOTE — PROGRESS NOTES
"SUBJECTIVE:   Linda is a 78 year old who presents for Preventive Visit.      10/6/2023     1:42 PM   Additional Questions   Roomed by lucila   Accompanied by self       Are you in the first 12 months of your Medicare coverage?  No    Healthy Habits:     In general, how would you rate your overall health?  Good    Frequency of exercise:  2-3 days/week    Duration of exercise:  15-30 minutes    Do you usually eat at least 4 servings of fruit and vegetables a day, include whole grains    & fiber and avoid regularly eating high fat or \"junk\" foods?  No    Taking medications regularly:  Yes    Medication side effects:  None    Ability to successfully perform activities of daily living:  No assistance needed    Home Safety:  No safety concerns identified    Hearing Impairment:  Difficulty understanding soft or whispered speech    In the past 6 months, have you been bothered by leaking of urine? Yes    In general, how would you rate your overall mental or emotional health?  Good    Additional concerns today:  No      Today's PHQ-2 Score:       10/6/2023     1:42 PM   PHQ-2 ( 1999 [employer])   Q1: Little interest or pleasure in doing things 1   Q2: Feeling down, depressed or hopeless 0   PHQ-2 Score 1   Q1: Little interest or pleasure in doing things Several days   Q2: Feeling down, depressed or hopeless Not at all   PHQ-2 Score 1       Saw Neurosurgery 10/4/23- left frontal meningioma, follow up Neurosx 2 years.    Was having neck pain so was referred to PM&R and will do physical therapy for her neck pain.      Had runny stools this morning.      For osteoporosis- doesn't want to start a medicine.  Worries about side effects.  Has been following a bone health program called [employer].      The 10-year ASCVD risk score (Jovan WHITESIDE, et al., 2019) is: 17.8%    Values used to calculate the score:      Age: 78 years      Sex: Female      Is Non- : No      Diabetic: No      Tobacco smoker: No      Systolic " Blood Pressure: 127 mmHg      Is BP treated: No      HDL Cholesterol: 73 mg/dL      Total Cholesterol: 203 mg/dL    Have you ever done Advance Care Planning? (For example, a Health Directive, POLST, or a discussion with a medical provider or your loved ones about your wishes): Yes, advance care planning is on file.       Fall risk  Fallen 2 or more times in the past year?: No  Any fall with injury in the past year?: No    Cognitive Screening   1) Repeat 3 items (Leader, Season, Table)    2) Clock draw: NORMAL  3) 3 item recall: Recalls 3 objects  Results: 3 items recalled: COGNITIVE IMPAIRMENT LESS LIKELY    Mini-CogTM Copyright S [employer]rington. Licensed by the author for use in [facility]; reprinted with permission (soob@[facility]). All rights reserved.      Do you have sleep apnea, excessive snoring or daytime drowsiness? : no    Reviewed and updated as needed this visit by clinical staff   Tobacco  Allergies  Meds              Reviewed and updated as needed this visit by Provider                 Social History     Tobacco Use    Smoking status: Former     Packs/day: 1.50     Years: 30.00     Pack years: 45.00     Types: Cigarettes     Start date: 1967     Quit date: 5/10/2014     Years since quittin.4    Smokeless tobacco: Never    Tobacco comments:     Quit several times over the years including when pregnant   Substance Use Topics    Alcohol use: No     Alcohol/week: 0.0 standard drinks of alcohol             2023    11:30 AM   Alcohol Use   Prescreen: >3 drinks/day or >7 drinks/week? Not Applicable          No data to display              Do you have a current opioid prescription? No  Do you use any other controlled substances or medications that are not prescribed by a provider? None      Current providers sharing in care for this patient include:   Patient Care Team:  Alicia Tidwell DO as PCP - General (Internal Medicine)  Yaritza Basilio MD as MD (Urology)  Jonas  Chantelle, RN as Registered Nurse (Urology)  Bassem Wolf RPH as Pharmacist (Pharmacist)  ([facility]gs), Danielle On Alexai Tcdesiree - Bassem Grayson RPH as Assigned MTM Pharmacist  Alicia Tidwell DO as Assigned PCP  Tia Moser PA-C as Physician Assistant (Neurological Surgery)  Roxann Huber APRN CNP as Nurse Practitioner (Neurology)    The following health maintenance items are reviewed in Epic and correct as of today:  Health Maintenance   Topic Date Due    COVID-19 Vaccine (6 - 2023-24 season) 09/01/2023    MAMMO SCREENING  09/18/2024    LUNG CANCER SCREENING  09/18/2024    COLORECTAL CANCER SCREENING  10/04/2024    MEDICARE ANNUAL WELLNESS VISIT  10/06/2024    ANNUAL REVIEW OF HM ORDERS  10/06/2024    FALL RISK ASSESSMENT  10/06/2024    DEXA  07/26/2025    DTAP/TDAP/TD IMMUNIZATION (3 - Td or Tdap) 04/10/2027    LIPID  06/23/2028    ADVANCE CARE PLANNING  10/06/2028    SPIROMETRY  Completed    HEPATITIS C SCREENING  Completed    COPD ACTION PLAN  Completed    PHQ-2 (once per calendar year)  Completed    INFLUENZA VACCINE  Completed    Pneumococcal Vaccine: 65+ Years  Completed    ZOSTER IMMUNIZATION  Completed    IPV IMMUNIZATION  Aged Out    HPV IMMUNIZATION  Aged Out    MENINGITIS IMMUNIZATION  Aged Out     Lab work is in process      Pertinent mammograms are reviewed under the imaging tab.    Review of Systems   Constitutional:  Negative for chills and fever.   HENT:  Negative for congestion, ear pain, hearing loss and sore throat.    Eyes:  Negative for pain and visual disturbance.   Respiratory:  Negative for cough and shortness of breath.    Cardiovascular:  Negative for chest pain, palpitations and peripheral edema.   Gastrointestinal:  Negative for abdominal pain, constipation, diarrhea, heartburn, hematochezia and nausea.   Breasts:  Negative for tenderness, breast mass and discharge.   Genitourinary:  Positive for pelvic pain. Negative for dysuria, frequency, genital sores, hematuria,  "urgency, vaginal bleeding and vaginal discharge.   Musculoskeletal:  Positive for arthralgias and myalgias. Negative for joint swelling.   Skin:  Negative for rash.   Neurological:  Negative for dizziness, weakness, headaches and paresthesias.   Psychiatric/Behavioral:  Negative for mood changes. The patient is not nervous/anxious.          OBJECTIVE:   /74 (BP Location: Right arm, Patient Position: Sitting, Cuff Size: Adult Regular)   Pulse 83   Temp 96.8  F (36  C) (Temporal)   Resp 20   Ht 1.605 m (5' 3.19\")   Wt 71.2 kg (157 lb)   LMP  (LMP Unknown)   SpO2 98%   BMI 27.65 kg/m   Estimated body mass index is 27.65 kg/m  as calculated from the following:    Height as of this encounter: 1.605 m (5' 3.19\").    Weight as of this encounter: 71.2 kg (157 lb).  Physical Exam  GENERAL APPEARANCE: healthy, alert and no distress  EYES: Eyes grossly normal to inspection, PERRL and conjunctivae and sclerae normal  HENT: ear canals and TM's normal, nose and mouth without ulcers or lesions, oropharynx clear and oral mucous membranes moist  NECK: no adenopathy, no asymmetry, masses, or scars and thyroid normal to palpation  RESP: lungs clear to auscultation - no rales, rhonchi or wheezes  CV: regular rate and rhythm, normal S1 S2, no S3 or S4, no murmur, click or rub, no peripheral edema and peripheral pulses strong  ABDOMEN: soft, nontender, no hepatosplenomegaly, no masses and bowel sounds normal  MS: no musculoskeletal defects are noted and gait is age appropriate without ataxia  SKIN: no suspicious lesions or rashes  NEURO: Normal strength and tone, sensory exam grossly normal, mentation intact and speech normal  PSYCH: mentation appears normal and affect normal/bright    Diagnostic Test Results:  Labs reviewed in Epic    ASSESSMENT / PLAN:   Raisa was seen today for physical.    Diagnoses and all orders for this visit:    Encounter for Medicare annual wellness exam  Mammo: 9/18/23  Colon: 10/4/19, repeat 5 " years  Immunizations: Due for flu, COVID- did flu today and she will get COVID and RSV vaccines at pharmacy  Discussed healthy lifestyle and aging recommendations including regular exercise, adequate and regular sleep, 5+ fruits and veggies daily.  Statin-   DEXA- osteoporosis 2010- declines treatment  Lung cancer screening- due again 2024    Encounter for screening involving social determinants of health (SDoH)  Comments:  Having financial difficulty related to plumbing problems at home, cannot afford dentist.  Orders:  -     Primary Care - Care Coordination Referral; Future    Spasm of muscle  Comments:  Intermittent, right leg predominantly, refilled Flexeril.  Orders:  -     Physical Therapy Referral; Future  -     cyclobenzaprine (FLEXERIL) 5 MG tablet; Take 1 tablet (5 mg) by mouth 3 times daily as needed for muscle spasms    Hyperlipidemia LDL goal <100  Comments:  ASCVD risk 17.8%.  Would like to try statin to see if side effects to try to reduce risk of heart attack/stroke.  Orders:  -     rosuvastatin (CRESTOR) 5 MG tablet; Take 1 tablet (5 mg) by mouth daily    Age-related osteoporosis without current pathological fracture  Comments:  Diagnosed by DEXA 2010- declines bisphosphonate, Prolia, Endocrine referral.  Follows [employer] regimen.    Other orders  -     INFLUENZA VACCINE 65+ (FLUZONE HD)  -     Cancel: COVID-19 12+ (2023-24) (PFIZER)  -     REVIEW OF HEALTH MAINTENANCE PROTOCOL ORDERS  -     PRIMARY CARE FOLLOW-UP SCHEDULING; Future        Patient has been advised of split billing requirements and indicates understanding: Yes      COUNSELING:  Reviewed preventive health counseling, as reflected in patient instructions        She reports that she quit smoking about 9 years ago. Her smoking use included cigarettes. She started smoking about 56 years ago. She has a 45.00 pack-year smoking history. She has never used smokeless tobacco.      Appropriate preventive services were discussed with this  patient, including applicable screening as appropriate for fall prevention, nutrition, physical activity, Tobacco-use cessation, weight loss and cognition.  Checklist reviewing preventive services available has been given to the patient.    Reviewed patients plan of care and provided an AVS. The Basic Care Plan (routine screening as documented in Health Maintenance) for Raisa meets the Care Plan requirement. This Care Plan has been established and reviewed with the Patient.          Alicia Tidwell [facility]    Identified Health Risks:  I have reviewed Opioid Use Disorder and Substance Use Disorder risk factors and made any needed referrals. The patient was counseled and encouraged to consider modifying their diet and eating habits. She was provided with information on recommended healthy diet options.  The patient was provided with written information regarding signs of hearing loss.  Information on urinary incontinence and treatment options given to patient.

## 2023-10-09 ENCOUNTER — PATIENT OUTREACH (OUTPATIENT)
Dept: CARE COORDINATION | Facility: CLINIC | Age: 78
End: 2023-10-09
Payer: MEDICARE

## 2023-10-09 NOTE — LETTER
[facility] CARE COORDINATION  [address]  [facility] [address]    October 10, 2023    Raisa Archer  [address] [place] [id number]      Dear Raisa,        I am a  clinic care coordinator who works with Alicia Tidwell,  with the [facility]. I wanted to introduce myself and provide you with my contact information for you to be able to call me with any questions or concerns. Below is a description of clinic care coordination and how I can further assist you.       The clinic care coordination team is made up of a registered nurse, , financial resource worker and community health worker who understand the health care system. The goal of clinic care coordination is to help you manage your health and improve access to the health care system. Our team works alongside your provider to assist you in determining your health and social needs. We can help you obtain health care and community resources, providing you with necessary information and education. We can work with you through any barriers and develop a care plan that helps coordinate and strengthen the communication between you and your care team.  Our services are voluntary and are offered without charge to you personally.    Please feel free to contact me with any questions or concerns regarding care coordination and what we can offer.      We are focused on providing you with the highest-quality healthcare experience possible.    Sincerely,     Kala May, CHW, B.A. [facility] Care Coordination  [facility]:   [facility]  [phone]

## 2023-10-09 NOTE — PROGRESS NOTES
Clinic Care Coordination Contact  [facility]/Voicemail       Clinical Data: Care Coordinator Outreach  Outreach attempted x 1.  Left message on patient's voicemail with call back information and requested return call.  Plan: Care Coordinator will try to reach patient again in 1-2 business days.    Kala May CHW, B.A. [facility] Care Coordination  [facility]:   [facility]  [phone]

## 2023-10-10 NOTE — PROGRESS NOTES
Clinic Care Coordination Contact  [facility]/Voicemail       Clinical Data: Care Coordinator Outreach  Outreach attempted x 2.  Left message on patient's voicemail with call back information and requested return call.  Plan: Care Coordinator will send care coordination introduction letter with care coordinator contact information and explanation of care coordination services via [employer]. Care Coordinator will do no further outreaches at this time.    Kala May, CHW, B.A. [facility] Care Coordination  [facility]:   [facility]  [phone]

## 2023-10-23 NOTE — TELEPHONE ENCOUNTER
Team- could you please check in with patient and see if she wants an appointment with me to evaluate her ear symptoms?  Ok to use same day/double book if necessary.    Thank you!     Alicia Tidwell,   Internal Medicine - Pediatrics Physician  [facility]

## 2023-10-24 NOTE — TELEPHONE ENCOUNTER
Writer responded via [employer].  LISA RamirezN, RN-BC  [facility]

## 2023-10-24 NOTE — TELEPHONE ENCOUNTER
"Dr. Tidwell-Please review patient's response.  Would Flonase be an option?    \"She had told me that there was fluid behind the ear drum when I saw her for my checkup.  I would like to know if there is anything I can use to get rid of it. \"    Thank you!  LISA RamirezN, RN-BC  [facility]    "

## 2023-10-24 NOTE — TELEPHONE ENCOUNTER
Yes- can absolutely try Flonase with this technique (and if no improvement after 2 weeks I'd be happy to refer to ENT):    Technique on Flonase:    How to spray your nasal steroid spray:    Tilt your head forward. Spray the nasal spray up your nose and tilt the spray towards your ears. Spray 2 sprays per nostril. Inhale gently. Dab excess nasal spray with tissue. Remain upright for at least 1 hour. Use nasal spray once daily.    You should never taste the nasal spray dripping down your throat. If you do, rinse out your mouth well and try tilting your head more forward the next time you use your spray.    Some people find it easier to spray 1 spray per nostril twice daily.    Recommend spraying your nasal spray straight into your nose (nose to toes), angle out towards your ears to avoid hitting the septum, breathe in while you spray.    Flonase is a steroid nasal spray that works as an anti-inflammatory to open up the nasal passages and decrease the amount of drainage from your nose and sinuses.  It is only effective when used consistently.  It may take 2-3 weeks of consistent use to reach it optimal effect.      Thank you!     Alicia Tidwell, DO  Internal Medicine - Pediatrics Physician  [facility]

## 2023-11-10 ENCOUNTER — MYC MEDICAL ADVICE (OUTPATIENT)
Dept: FAMILY MEDICINE | Facility: CLINIC | Age: 78
End: 2023-11-10
Payer: MEDICARE

## 2023-11-15 NOTE — PROGRESS NOTES
PHYSICAL THERAPY EVALUATION  Type of Visit: Evaluation    See electronic medical record for Abuse and Falls Screening details.    Subjective       Presenting condition or subjective complaint: I have stiffness in the neck and shoulder area.  Even trying to do yoga exercises is hard.  Turning the neck is hard especially turning left.  Date of onset: 11/16/03    Relevant medical history: Arthritis; COPD; Neck injury   Dates & types of surgery: Foot surgery for hammer toe,  hysterectomy, hip replacement, surgery for broken hand, gall bladder removal.  Dates are varied going back to the 1980s.    Prior diagnostic imaging/testing results: X-ray     Prior therapy history for the same diagnosis, illness or injury: Yes Over the past 40 years I have had therapy such as chiropractic adjustments, working with a  at my local gym, upper back exercises    Living Environment  Social support: Alone   Type of home: House   Stairs to enter the home: Yes 5 Is there a railing: Yes   Ramp: No   Stairs inside the home: Yes 10 Is there a railing: Yes   Help at home: Self Cares (home health aide/personal care attendant, family, etc)  Equipment owned: Four-point cane; Walker; Bath bench; Dressing equipment     Employment: Not Applicable    Hobbies/Interests: Crocheting, reading, watching TV, playing with my kitties, visiting with my family    Patient goals for therapy: Be limber in my neck and upper back area so that I can turn my head especially when driviing    Raisa GRACIE Archer is a 78 year old female with a cervical condition. Mechanism of injury: Chronic bilateral neck and upper back pain for 20 years. Where: (home, work, MVA, community, recreation/sport, unknown, other): NA. Location of symptoms: bilateral upper trap and levator scapula - worse on left. Pain level on number scale: 7/10. Quality of pain: achy. Associated symptoms: stiffness, intermittent numbness and tingling. Pain frequency (constant/intermittent): constant.  Symptoms are exacerbated by: driving, sitting, reading. Symptoms are relieved by: tylenol. Progression of symptoms since onset (same/better/worse): same. Special tests (x-ray, MRI, CT scan, EMG, bone scan): None. Previous treatment: None recently. Improvement with previous treatment: NA. General health as reported by patient is good. Pertinent medical history includes:  see Epic. Medical allergies includes: see Epic. Surgical history includes: see Epic. Current medications include: see Epic. Occupation: retired. Patient is (working in normal job without restrictions/working in normal job with restrictions/working in an alternate job/not working due to present treatment problem): NA. Primary job tasks: NA. Barriers at home/work: None reported by patient. Red flags: None reported by patient.     Objective   Posture     Sitting (good/fair/poor):  poor  Standing (good/fair/poor):  poor  Protruded head (yes/no):  no  Wry neck (right/left/nil):  nil  Relevant wry neck (yes/no):  no  Correction of posture (better/worse/no effect): better    Neurological    Myotomes L R   C4 (shoulder elevation) 5/5 5/5   C5 (shoulder abduction) 5/5 5/5   C6 (elbow flexion) 5/5 5/5   C7 (elbow extension) 5/5 5/5   C8 (thumb extension) 5/5 5/5   T1 (finger add/abd) 5/5 5/5     Sensory Deficit, Reflexes, Dural Signs: cervical dermatomes WNL    Cervical Movement Loss Justin Mod Min Nil Pain   Protrusion    x    Flexion   x     Retraction  x   +   Extension x    +   Rotation Left  x   +   Rotation Right  x   +   Lateral Flexion Right  x   +   Lateral Flexion Left  x   +     Assessment & Plan   CLINICAL IMPRESSIONS  Medical Diagnosis: neck pain    Treatment Diagnosis: neck pain   Impression/Assessment: Patient is a 78 year old female with cervical complaints.  The following significant findings have been identified: Pain, Decreased ROM/flexibility, Decreased joint mobility, Decreased strength, Impaired muscle performance, Decreased activity  tolerance, and Impaired posture. These impairments interfere with their ability to perform self care tasks, recreational activities, household chores, and driving  as compared to previous level of function.     Clinical Decision Making (Complexity):  Clinical Presentation: Stable/Uncomplicated  Clinical Presentation Rationale: based on medical and personal factors listed in PT evaluation  Clinical Decision Making (Complexity): Low complexity    PLAN OF CARE  Treatment Interventions:  Interventions: Manual Therapy, Neuromuscular Re-education, Therapeutic Activity, Therapeutic Exercise, Self-Care/Home Management    Long Term Goals     PT Goal 1  Goal Description: Patient will be able to drive with 0/10 pain.  Rationale: to maximize safety and independence with transportation  Target Date: 02/08/24      Frequency of Treatment: 1x per week  Duration of Treatment: for 4 weeks tapering down to 2x per month for 8 weeks    Recommended Referrals to Other Professionals:  None  Education Assessment:   Learner/Method: Patient;No Barriers to Learning    Risks and benefits of evaluation/treatment have been explained.   Patient/Family/caregiver agrees with Plan of Care.     Evaluation Time:     PT Eval, Low Complexity Minutes (14437): 15  Signing Clinician: Lazaro Patel, PT      [facility]                                                                                   OUTPATIENT PHYSICAL THERAPY      PLAN OF TREATMENT FOR OUTPATIENT REHABILITATION   Patient's Last Name, First Name, Raisa Howe YOB: 1945   Provider's Name   [facility]   Medical Record No.  [id number]     Onset Date: 11/16/03  Start of Care Date: 11/16/23     Medical Diagnosis:  neck pain      PT Treatment Diagnosis:  neck pain Plan of Treatment  Frequency/Duration: 1x per week/ for 4 weeks tapering down to 2x per month for 8 weeks    Certification date from 11/16/23 to  02/08/24         See note for plan of treatment details and functional goals     Lazaro Patel, PT                         I CERTIFY THE NEED FOR THESE SERVICES FURNISHED UNDER        THIS PLAN OF TREATMENT AND WHILE UNDER MY CARE     (Physician attestation of this document indicates review and certification of the therapy plan).              Referring Provider:  Alicia Tidwell    Initial Assessment  See Epic Evaluation- Start of Care Date: 11/16/23

## 2023-11-16 ENCOUNTER — THERAPY VISIT (OUTPATIENT)
Dept: PHYSICAL THERAPY | Facility: CLINIC | Age: 78
End: 2023-11-16
Attending: INTERNAL MEDICINE
Payer: MEDICARE

## 2023-11-16 DIAGNOSIS — M54.2 NECK PAIN: Primary | ICD-10-CM

## 2023-11-16 DIAGNOSIS — M62.838 SPASM OF MUSCLE: ICD-10-CM

## 2023-11-16 PROCEDURE — 97530 THERAPEUTIC ACTIVITIES: CPT | Mod: GP | Performed by: PHYSICAL THERAPIST

## 2023-11-16 PROCEDURE — 97161 PT EVAL LOW COMPLEX 20 MIN: CPT | Mod: GP | Performed by: PHYSICAL THERAPIST

## 2023-11-16 PROCEDURE — 97110 THERAPEUTIC EXERCISES: CPT | Mod: 59 | Performed by: PHYSICAL THERAPIST

## 2023-11-29 ENCOUNTER — MYC REFILL (OUTPATIENT)
Dept: FAMILY MEDICINE | Facility: CLINIC | Age: 78
End: 2023-11-29
Payer: MEDICARE

## 2023-11-29 DIAGNOSIS — E78.5 HYPERLIPIDEMIA LDL GOAL <100: ICD-10-CM

## 2023-11-29 RX ORDER — ROSUVASTATIN CALCIUM 5 MG/1
5 TABLET, COATED ORAL DAILY
Qty: 90 TABLET | Refills: 1 | Status: SHIPPED | OUTPATIENT
Start: 2023-11-29 | End: 2024-01-03

## 2024-01-03 ENCOUNTER — OFFICE VISIT (OUTPATIENT)
Dept: FAMILY MEDICINE | Facility: CLINIC | Age: 79
End: 2024-01-03
Payer: MEDICARE

## 2024-01-03 VITALS
SYSTOLIC BLOOD PRESSURE: 118 MMHG | HEART RATE: 96 BPM | RESPIRATION RATE: 19 BRPM | HEIGHT: 63 IN | TEMPERATURE: 97.2 F | WEIGHT: 158.4 LBS | DIASTOLIC BLOOD PRESSURE: 79 MMHG | OXYGEN SATURATION: 96 % | BODY MASS INDEX: 28.07 KG/M2

## 2024-01-03 DIAGNOSIS — D32.9 MENINGIOMA (H): ICD-10-CM

## 2024-01-03 DIAGNOSIS — M62.838 SPASM OF MUSCLE: ICD-10-CM

## 2024-01-03 DIAGNOSIS — E78.5 HYPERLIPIDEMIA LDL GOAL <100: ICD-10-CM

## 2024-01-03 DIAGNOSIS — H93.12 TINNITUS, LEFT: ICD-10-CM

## 2024-01-03 DIAGNOSIS — J43.2 CENTRILOBULAR EMPHYSEMA (H): ICD-10-CM

## 2024-01-03 LAB
ALBUMIN SERPL BCG-MCNC: 4.5 G/DL (ref 3.5–5.2)
ALP SERPL-CCNC: 108 U/L (ref 40–150)
ALT SERPL W P-5'-P-CCNC: 19 U/L (ref 0–50)
ANION GAP SERPL CALCULATED.3IONS-SCNC: 10 MMOL/L (ref 7–15)
AST SERPL W P-5'-P-CCNC: 27 U/L (ref 0–45)
BILIRUB SERPL-MCNC: 0.5 MG/DL
BUN SERPL-MCNC: 16.6 MG/DL (ref 8–23)
CALCIUM SERPL-MCNC: 10.2 MG/DL (ref 8.8–10.2)
CHLORIDE SERPL-SCNC: 101 MMOL/L (ref 98–107)
CHOLEST SERPL-MCNC: 174 MG/DL
CREAT SERPL-MCNC: 1.05 MG/DL (ref 0.51–0.95)
DEPRECATED HCO3 PLAS-SCNC: 30 MMOL/L (ref 22–29)
EGFRCR SERPLBLD CKD-EPI 2021: 55 ML/MIN/1.73M2
FASTING STATUS PATIENT QL REPORTED: NO
GLUCOSE SERPL-MCNC: 89 MG/DL (ref 70–99)
HDLC SERPL-MCNC: 72 MG/DL
LDLC SERPL CALC-MCNC: 71 MG/DL
NONHDLC SERPL-MCNC: 102 MG/DL
POTASSIUM SERPL-SCNC: 4 MMOL/L (ref 3.4–5.3)
PROT SERPL-MCNC: 6.8 G/DL (ref 6.4–8.3)
SODIUM SERPL-SCNC: 141 MMOL/L (ref 135–145)
TRIGL SERPL-MCNC: 157 MG/DL

## 2024-01-03 PROCEDURE — 36415 COLL VENOUS BLD VENIPUNCTURE: CPT | Performed by: INTERNAL MEDICINE

## 2024-01-03 PROCEDURE — 80053 COMPREHEN METABOLIC PANEL: CPT | Performed by: INTERNAL MEDICINE

## 2024-01-03 PROCEDURE — 99214 OFFICE O/P EST MOD 30 MIN: CPT | Performed by: INTERNAL MEDICINE

## 2024-01-03 PROCEDURE — 80061 LIPID PANEL: CPT | Performed by: INTERNAL MEDICINE

## 2024-01-03 RX ORDER — ROSUVASTATIN CALCIUM 5 MG/1
5 TABLET, COATED ORAL DAILY
Qty: 90 TABLET | Refills: 3 | Status: SHIPPED | OUTPATIENT
Start: 2024-01-03 | End: 2024-10-07

## 2024-01-03 RX ORDER — CYCLOBENZAPRINE HCL 5 MG
5 TABLET ORAL 3 TIMES DAILY PRN
Qty: 30 TABLET | Refills: 0 | Status: SHIPPED | OUTPATIENT
Start: 2024-01-03 | End: 2024-08-26

## 2024-01-03 RX ORDER — RESPIRATORY SYNCYTIAL VIRUS VACCINE 120MCG/0.5
0.5 KIT INTRAMUSCULAR ONCE
Qty: 1 EACH | Refills: 0 | Status: CANCELLED | OUTPATIENT
Start: 2024-01-03 | End: 2024-01-03

## 2024-01-03 ASSESSMENT — PAIN SCALES - GENERAL: PAINLEVEL: MODERATE PAIN (4)

## 2024-01-03 NOTE — COMMUNITY RESOURCES LIST (ENGLISH)
01/03/2024   [facility]  N/A  For questions about this resource list or additional care needs, please contact your primary care clinic or care manager.  Phone: [phone]   Email: N/A   Address: [address] [place] [address]   Hours: N/A        Financial Stability       Rent and mortgage payment assistance  1  [facility] [place] [employer] Distance: 3.72 miles      Phone/Virtual   [address] [address]  Language: English, Nepalese  Hours: Mon - Fri 10:00 AM - 4:00 PM  Fees: Free   Phone: (575) 683-8758 Email: info@[facility].org Website: http://[employer]     2  [facility] Distance: 4.14 miles      In-Person   [address] [place] [address]  Language: English  Hours: Mon - Fri 8:00 AM - 3:00 PM  Fees: Free   Phone: (208) 144-7636 Ext.14 Email: neighborhood@[facility].[employer] Website: http://www.[email or web address].[employer]     Utility payment assistance  3  [facility] Distance: 4.14 miles      In-Person   [address] [place] [address]  Language: English  Hours: Mon - Fri 8:00 AM - 3:00 PM  Fees: Free   Phone: (474) 800-8586 Ext.14 Email: [facility].[employer] Website: http://www.[employer]     4  [facility] - Utility payment assistance Distance: 4.34 miles      In-Person, Phone/Virtual   [address] [place] [address]  Language: English  Hours: Mon - Thu 9:00 AM - 3:00 PM  Fees: Free   Phone: (156) 279-2754 Email: [employer]@[facility].org Website: http://www.[employer].org/care-ministries/          Important Numbers & Websites       Emergency Services   911  City Services   311  Poison Control   (126) 931-7873  Suicide Prevention Lifeline   (717) 622-2611 (TALK)  Child Abuse Hotline   (347) 665-3670 (4-A-Child)  Sexual Assault Hotline   (785) 117-9552  (HOPE)  National Runaway Safeline   (668) 128-6190 (RUNAWAY)  All-Options Talkline   (852) 160-1511  Substance Abuse Referral   (874) 669-1978 (HELP)

## 2024-01-03 NOTE — PROGRESS NOTES
"  Assessment & Plan     (E78.5) Hyperlipidemia LDL goal <100  Comment: ASCVD risk 17.8%.  Would like to try statin to see if side effects to try to reduce risk of heart attack/stroke.  Plan: Lipid panel reflex to direct LDL Non-fasting,         Comprehensive metabolic panel (BMP + Alb, Alk         Phos, ALT, AST, Total. Bili, TP), rosuvastatin         (CRESTOR) 5 MG tablet          (M62.838) Spasm of muscle  Comment: Intermittent, left leg predominantly, refilled Flexeril.  Plan: cyclobenzaprine (FLEXERIL) 5 MG tablet          (H93.12) Tinnitus, left  Comment: Persisting since October, has had some in right ear as well.  Not painful nor with overt hearing loss.  Effusion on left resolved.  ENT referral placed.  I don't think it's caused by the left frontal meningioma based on location, although I guess possible since it's the same side and also has associated left frontal sinus pressure and previously I think had Eustachian tube dysfunction although Flonase was not very effective.  Plan: Adult ENT  Referral          (J43.2) Centrilobular emphysema (H)  Comment:   Plan:   - Continue current regimen, well controlled.    (D32.9) Meningioma (H)  Comment: Noted, follows with Neurosurgery- last saw 10/4/23- due to repeat follow up in 2025.  Plan: Noted       BMI:   Estimated body mass index is 28.06 kg/m  as calculated from the following:    Height as of this encounter: 1.6 m (5' 3\").    Weight as of this encounter: 71.8 kg (158 lb 6.4 oz).       There are no Patient Instructions on file for this visit.    Alicia Tidwell DO  [facility]    Tanna Mckeon is a 78 year old, presenting for the following health issues:  Lipids (Left ear)      1/3/2024     1:16 PM   Additional Questions   Roomed by Iveth CLAUDIO       History of Present Illness       Hyperlipidemia:  She presents for follow up of hyperlipidemia.   She is taking medication to lower cholesterol. She is not having myalgia " "or other side effects to statin medications.    Reason for visit:  Follow up    She eats 4 or more servings of fruits and vegetables daily.She consumes 2 sweetened beverage(s) daily.She exercises with enough effort to increase her heart rate 10 to 19 minutes per day.  She exercises with enough effort to increase her heart rate 4 days per week. She is missing 1 dose(s) of medications per week.  She is not taking prescribed medications regularly due to remembering to take and other.     Still having left ear symptoms and left sinus.  Had tried to do Flonase- was using it twice daily.  Has been using Mucinex which seems to help.  Hearing buzzing in the left ear that is driving her nuts.    Holding off on physical therapy for her left neck.  Is going to try heating pad.  Takes Flexeril- once daily only if really, really needs it.  Does seem to help.    Hyperlipidemia Follow-Up    Are you regularly taking any medication or supplement to lower your cholesterol?   Yes-    Are you having muscle aches or other side effects that you think could be caused by your cholesterol lowering medication?  No      Review of Systems   Constitutional, HEENT, cardiovascular, pulmonary, gi and gu systems are negative, except as otherwise noted.      Objective    /79 (BP Location: Right arm, Patient Position: Sitting, Cuff Size: Adult Regular)   Pulse 96   Temp 97.2  F (36.2  C) (Temporal)   Resp 19   Ht 1.6 m (5' 3\")   Wt 71.8 kg (158 lb 6.4 oz)   LMP  (LMP Unknown)   SpO2 96%   BMI 28.06 kg/m    Body mass index is 28.06 kg/m .  Physical Exam   GENERAL: healthy, alert and no distress  HEENT: TM appear normally bilaterally, no cerumen impaction.  NECK: no adenopathy, no asymmetry, masses, or scars and thyroid normal to palpation  RESP: lungs clear to auscultation - no rales, rhonchi or wheezes  ABDOMEN: soft, nontender, no hepatosplenomegaly, no masses and bowel sounds normal  MS: no gross musculoskeletal defects noted, no " edema

## 2024-01-12 NOTE — TELEPHONE ENCOUNTER
"FUTURE VISIT INFORMATION      FUTURE VISIT INFORMATION:  Date:   6/19/24  Time: 1:30 PM  Location: [facility]  REFERRAL INFORMATION:  Referring provider:  Alicia Tidwell DO.  Referring providers clinic: [facility]   Reason for visit/diagnosis:  Tinnitus, left. Ref by Alicia Tidwell DO. CSC verified. Records in [facility]     RECORDS REQUESTED FROM      Clinic name Comments Records Status Imaging Status   [facility] 1/3/24 referral/ OV with Alicia Tidwell DO. [facility] MR brain 9/25/23 Epic PACS           \"Please notify/message CSS if patient completed outside imaging prior to scheduled appointment and/or any outside records that might have been missed at pre visit -Thank you\"  "

## 2024-03-18 ENCOUNTER — TELEPHONE (OUTPATIENT)
Dept: OTOLARYNGOLOGY | Facility: CLINIC | Age: 79
End: 2024-03-18
Payer: MEDICARE

## 2024-03-27 ENCOUNTER — PRE VISIT (OUTPATIENT)
Dept: OTOLARYNGOLOGY | Facility: CLINIC | Age: 79
End: 2024-03-27

## 2024-04-10 ENCOUNTER — MYC REFILL (OUTPATIENT)
Dept: FAMILY MEDICINE | Facility: CLINIC | Age: 79
End: 2024-04-10
Payer: MEDICARE

## 2024-04-10 DIAGNOSIS — E78.5 HYPERLIPIDEMIA LDL GOAL <100: ICD-10-CM

## 2024-04-11 RX ORDER — ROSUVASTATIN CALCIUM 5 MG/1
5 TABLET, COATED ORAL DAILY
Qty: 90 TABLET | Refills: 3 | OUTPATIENT
Start: 2024-04-11

## 2024-07-22 DIAGNOSIS — J43.2 CENTRILOBULAR EMPHYSEMA (H): ICD-10-CM

## 2024-07-22 RX ORDER — GLYCOPYRROLATE AND FORMOTEROL FUMARATE 9; 4.8 UG/1; UG/1
2 AEROSOL, METERED RESPIRATORY (INHALATION) 2 TIMES DAILY
Qty: 10.7 G | Refills: 5 | Status: SHIPPED | OUTPATIENT
Start: 2024-07-22

## 2024-08-19 ENCOUNTER — PATIENT OUTREACH (OUTPATIENT)
Dept: CARE COORDINATION | Facility: CLINIC | Age: 79
End: 2024-08-19
Payer: MEDICARE

## 2024-08-19 ENCOUNTER — MYC MEDICAL ADVICE (OUTPATIENT)
Dept: FAMILY MEDICINE | Facility: CLINIC | Age: 79
End: 2024-08-19
Payer: MEDICARE

## 2024-08-19 DIAGNOSIS — Z12.2 ENCOUNTER FOR SCREENING FOR MALIGNANT NEOPLASM OF LUNG: ICD-10-CM

## 2024-08-19 DIAGNOSIS — Z12.11 ENCOUNTER FOR SCREENING FOR MALIGNANT NEOPLASM OF COLON: ICD-10-CM

## 2024-08-19 DIAGNOSIS — Z12.31 ENCOUNTER FOR SCREENING MAMMOGRAM FOR BREAST CANCER: Primary | ICD-10-CM

## 2024-08-19 DIAGNOSIS — Z87.891 PERSONAL HISTORY OF NICOTINE DEPENDENCE: ICD-10-CM

## 2024-08-21 NOTE — TELEPHONE ENCOUNTER
Dr. Tidwell-Please review and sign if agree. Screening mammogram, CT lung cancer screen and screening colonoscopy due/soon due per review of health maintenance.    Writer responded via [employer].    LISA RamirezN, RN-[facility]

## 2024-08-22 ENCOUNTER — NURSE TRIAGE (OUTPATIENT)
Dept: FAMILY MEDICINE | Facility: CLINIC | Age: 79
End: 2024-08-22
Payer: MEDICARE

## 2024-08-22 NOTE — TELEPHONE ENCOUNTER
"Per message sent on 8/21/24 within 8/19/24 [facility] encounter:    \"Thanks for letting me know since I have gotten letters regarding setting up appointments.     I don't know if I should get an appointment with her to check my head (which I banged hard getting out of my niece's vehicle)   I have had some headaches since then\"        Called patient's home number: Left message to call back and ask to speak with an available triage nurse.    Called patient's mobile number: Left message to call back and ask to speak with an available triage nurse.    LISA RamirezN, RN-[facility]    "

## 2024-08-22 NOTE — TELEPHONE ENCOUNTER
Ok to use a same day appt with any provider for sooner appt.  She should be seen sooner than 9/16/24 for head and neck pain,    Jesus Castellano PA-C

## 2024-08-22 NOTE — TELEPHONE ENCOUNTER
Patient requested next week. Ok to wait to be seen on 8/26/24?    Patient to call and ask to speak to triage nurse if develops any new/worsening symptoms. Patient verbalized understanding and agrees with plan.    RN -- pt does not need call back unless change in plan    Susie Rodriguez RN  [facility]

## 2024-08-22 NOTE — TELEPHONE ENCOUNTER
Noted.    No further action needed from RN team.    LISA RamirezN, RN-BC  [facility]

## 2024-08-22 NOTE — TELEPHONE ENCOUNTER
Separate triage encounter created to address head injury.    CLAUDIA Ramirez, RN-[facility]

## 2024-08-22 NOTE — TELEPHONE ENCOUNTER
"Dr. Tidwell --  I used the first \"triage\" appointment for this patient which is 9/16. Patient hoping to be seen sooner. Do you double book at all. Was it ok to use the triage.     Thank you.   LISA HammondN RN  [facility]      S-(situation): patient hit head in May. Patient seemed to have some slight confusion of chronology related to injury, PT, etc. Patient still experiencing neck pain and stiffness related to head injury. Has headaches twice weekly.     B-(background): hit head in May when getting out of a car. Top left side of head.     A-(assessment): patient to be assessed.     R-(recommendations): to be seen sooner than later due to ongoing pain in neck/head.       1. MECHANISM: \"How did the injury happen?\" For falls, ask: \"What height did you fall from?\" and \"What surface did you fall against?\"       Hit head while getting out of niece's car.   2. ONSET: \"When did the injury happen?\" (Minutes or hours ago)       May, 2024.   3. NEUROLOGIC SYMPTOMS: \"Was there any loss of consciousness?\" \"Are there any other neurological symptoms?\"       Neck cracking a lot since then.   4. MENTAL STATUS: \"Does the person know who they are, who you are, and where they are?\"       Nothing.   5. LOCATION: \"What part of the head was hit?\"       Top left side of head.   6. SCALP APPEARANCE: \"What does the scalp look like? Is it bleeding now?\" If Yes, ask: \"Is it difficult to stop?\"       No bleeding or laceration. No swelling that patient noticed.   7. SIZE: For cuts, bruises, or swelling, ask: \"How large is it?\" (e.g., inches or centimeters)       No.   8. PAIN: \"Is there any pain?\" If Yes, ask: \"How bad is it?\"  (e.g., Scale 1-10; or mild, moderate, severe)      Severe pain. Patient is now having pain in her neck and difficult turning neck to the left.   9. TETANUS: For any breaks in the skin, ask: \"When was the last tetanus booster?\"      NA  10. BLOOD THINNERS: \"Do you take any blood " "thinners?\" (e.g., aspirin, clopidogrel / Plavix, coumadin, heparin). Notes: Other strong blood thinners include: Arixtra (fondaparinux), Eliquis (apixaban), Pradaxa (dabigatran), and Xarelto (rivaroxaban).        No.   11. OTHER SYMPTOMS: \"Do you have any other symptoms?\" (e.g., neck pain, vomiting)        Neck pain and stiffness. Neck pain is constant. Difficult turning neck to left. If moves head the wrong way can trigger neck pain.   Patient still has headache once in a while. Headache can be a 4/10 and on left side of head and radiates down to the neck. 2x/week has headache.       Reason for Disposition   Patient wants to be seen    Additional Information   Negative: ACUTE NEUROLOGIC SYMPTOM and symptom present now   Negative: Knocked out (unconscious) > 1 minute   Negative: Seizure (convulsion) occurred  (Exception: Prior history of seizures and now alert and without Acute Neurologic Symptoms.)   Negative: Neck pain after dangerous injury (e.g., MVA, diving, trampoline, contact sports, fall > 10 feet or 3 meters)  (Exception: Neck pain began > 1 hour after injury.)   Negative: Major bleeding (actively dripping or spurting) that can't be stopped   Negative: Penetrating head injury (e.g., knife, gunshot wound, metal object)   Negative: Sounds like a life-threatening emergency to the triager   Negative: Diagnosed with a concussion within last 14 days   Negative: Can't remember what happened (amnesia)   Negative: Vomiting once or more   Negative: Watery or blood-tinged fluid dripping from the nose or ears   Negative: ACUTE NEUROLOGIC SYMPTOM and now fine   Negative: Knocked out (unconscious) < 1 minute and now fine   Negative: SEVERE headache   Negative: Dangerous injury (e.g., MVA, diving, trampoline, contact sports, fall > 10 feet or 3 meters) or severe blow from hard object (e.g., golf club or baseball bat)   Negative: Large swelling or bruise (> 2 inches or 5 cm)   Negative: Skin is split open or gaping (length " > 1/2 inch or 12 mm)   Negative: Bleeding won't stop after 10 minutes of direct pressure (using correct technique)   Negative: One or two 'black eyes' (bruising, purple color of eyelids), and onset within 24 hours of head injury   Negative: Taking Coumadin (warfarin) or other strong blood thinner, or known bleeding disorder (e.g., thrombocytopenia)   Negative: Age over 65 years with an area of head swelling or bruise   Negative: Sounds like a serious injury to the triager   Negative: HIV positive or severe immunodeficiency (severely weak immune system) and DIRTY cut or scrape   Negative: No prior tetanus shots (or is not fully vaccinated) and any wound (e.g., cut or scrape)   Negative: Patient is confused or is an unreliable provider of information (e.g., dementia, severe intellectual disability, alcohol intoxication)    Protocols used: Head Injury-A-OH    LISA HammondN RN  [facility]

## 2024-08-26 ENCOUNTER — OFFICE VISIT (OUTPATIENT)
Dept: FAMILY MEDICINE | Facility: CLINIC | Age: 79
End: 2024-08-26
Payer: MEDICARE

## 2024-08-26 ENCOUNTER — ANCILLARY PROCEDURE (OUTPATIENT)
Dept: GENERAL RADIOLOGY | Facility: CLINIC | Age: 79
End: 2024-08-26
Attending: FAMILY MEDICINE
Payer: MEDICARE

## 2024-08-26 VITALS
DIASTOLIC BLOOD PRESSURE: 72 MMHG | WEIGHT: 155.3 LBS | SYSTOLIC BLOOD PRESSURE: 130 MMHG | HEIGHT: 63 IN | RESPIRATION RATE: 15 BRPM | BODY MASS INDEX: 27.52 KG/M2 | OXYGEN SATURATION: 95 % | HEART RATE: 87 BPM | TEMPERATURE: 97.5 F

## 2024-08-26 DIAGNOSIS — M54.2 NECK PAIN: Primary | ICD-10-CM

## 2024-08-26 DIAGNOSIS — M62.838 SPASM OF MUSCLE: ICD-10-CM

## 2024-08-26 PROCEDURE — 72040 X-RAY EXAM NECK SPINE 2-3 VW: CPT | Mod: TC | Performed by: PREVENTIVE MEDICINE

## 2024-08-26 PROCEDURE — 99214 OFFICE O/P EST MOD 30 MIN: CPT | Performed by: FAMILY MEDICINE

## 2024-08-26 RX ORDER — CYCLOBENZAPRINE HCL 5 MG
5 TABLET ORAL 3 TIMES DAILY PRN
Qty: 30 TABLET | Refills: 0 | Status: SHIPPED | OUTPATIENT
Start: 2024-08-26

## 2024-08-26 ASSESSMENT — PAIN SCALES - GENERAL: PAINLEVEL: MODERATE PAIN (5)

## 2024-08-26 NOTE — PROGRESS NOTES
"  Assessment & Plan     Neck pain  See below    Spasm of muscle  Persistent neck muscle spasms.  We did not significant with physical therapy but we discussed physical therapy would be very helpful.  Will do Flexeril refills but due to persistent nature we will try to rule out underlying osteoarthritis and will refer her to sports medicine.  She agreed  - cyclobenzaprine (FLEXERIL) 5 MG tablet; Take 1 tablet (5 mg) by mouth 3 times daily as needed for muscle spasms.  - XR Cervical Spine 2/3 Views; Future  - Orthopedic  Referral; Future          BMI  Estimated body mass index is 27.51 kg/m  as calculated from the following:    Height as of this encounter: 1.6 m (5' 3\").    Weight as of this encounter: 70.4 kg (155 lb 4.8 oz).              Tanna Kline is a 79 year old, presenting for the following health issues:  RECHECK (Head injury )      2024    12:35 PM   Additional Questions   Roomed by Apryl tinoco     History of Present Illness       Headaches:   Since the patient's last clinic visit, headaches are: no change  The patient is getting headaches:  It varies but mostly a couple times a week  She is able to do normal daily activities when she has a migraine.  The patient is taking the following rescue/relief medications:  Tylenol   Patient states \"I get some relief\" from the rescue/relief medications.   The patient is taking the following medications to prevent migraines:  No medications to prevent migraines  In the past 4 weeks, the patient has gone to an Urgent Care or Emergency Room 0 times times due to headaches.   She is taking medications regularly.    Brother  in March and memorial service in April.   Was getting out of the car and hit her head - through door jam.  No loss of consciousness.   Headache did not start right after that. It was come and go.   When doing exercise - felt pain from head to let side of neck.  Neck ROM not loosening. Tried neck brace from [employer], did not " "help.   Yesterday had to take flexeril. Using volteran gel and it helps.   Tried ice and head. Also neck massager.   Left jaw 2020 - dentist did root canal and some pain since then.   Been to PT and felt it was not helpful.   History of hip replacement surgery and uses cane.         Objective    /72 (BP Location: Right arm, Patient Position: Sitting, Cuff Size: Adult Regular)   Pulse 87   Temp 97.5  F (36.4  C) (Temporal)   Resp 15   Ht 1.6 m (5' 3\")   Wt 70.4 kg (155 lb 4.8 oz)   LMP  (LMP Unknown)   SpO2 95%   BMI 27.51 kg/m    Body mass index is 27.51 kg/m .  Physical Exam   Neck range of motion restricted.  Left trapezius tenderness.  Both upper arms normal strength and reflexes.            Signed Electronically by: Jose Maria Zavaleta MD, MD    "

## 2024-09-12 ENCOUNTER — OFFICE VISIT (OUTPATIENT)
Dept: PHYSICAL MEDICINE AND REHAB | Facility: CLINIC | Age: 79
End: 2024-09-12
Attending: FAMILY MEDICINE
Payer: MEDICARE

## 2024-09-12 VITALS
RESPIRATION RATE: 16 BRPM | SYSTOLIC BLOOD PRESSURE: 117 MMHG | OXYGEN SATURATION: 95 % | HEART RATE: 87 BPM | DIASTOLIC BLOOD PRESSURE: 76 MMHG

## 2024-09-12 DIAGNOSIS — G89.29 OTHER CHRONIC PAIN: ICD-10-CM

## 2024-09-12 DIAGNOSIS — M62.838 SPASM OF MUSCLE: ICD-10-CM

## 2024-09-12 DIAGNOSIS — M47.812 CERVICAL SPONDYLOSIS WITHOUT MYELOPATHY: Primary | ICD-10-CM

## 2024-09-12 DIAGNOSIS — D32.9 MENINGIOMA (H): ICD-10-CM

## 2024-09-12 DIAGNOSIS — M54.2 NECK PAIN: ICD-10-CM

## 2024-09-12 PROCEDURE — 99205 OFFICE O/P NEW HI 60 MIN: CPT | Performed by: PHYSICAL MEDICINE & REHABILITATION

## 2024-09-12 NOTE — PROGRESS NOTES
CC: I am seeing this patient at the request of her primary care physician Dr. Zavaleta for evaluation treatment of chronic neck pain with muscle spasms.    Patient is a very pleasant 79-year-old woman who has been dealing with chronic neck pain worse on the left than on the right for many years.  She recalls a fall in 2022 but none since.  Her brother aged 81 recently passed away.  She was traveling with her nephew and ended up bumping her head in his car.  Subsequently she traveled with her niece and did the same thing again.  She feels that is bothering her as well as making her neck pain and spasms worse.  She is reporting some pops and cracks when she moves her neck.  She does not particularly report any radicular symptoms.  She has a history of meningioma and has had MRI of the brain in 2023.  This did show some C1-C2 enhancement as well as enhancement of the temporomandibular joint left greater than the right.  She subsequently had x-ray of her cervical spine which again shows degenerative changes in her discs and arthritis in her facets.  She has received chiropractic and finds that very helpful.  She also gets physical therapy but did not feel it was that helpful and the physical therapist that go back to chiropractic.  She is here for evaluation to see if we can offer her anything to make a difference.    She takes Tylenol and finds that helpful.  Her pain is generally 5-6 on average 4 at best and can be 7 or 8 when she turns her neck.  She avoids starting the neck by using a soft cervical collar.  She has also been using a narrow based quad cane.  She tries to be careful except for the injuries noted previously.    She does have history of bladder incontinence with stress and monitors it carefully.  No bowel incontinence noted.  She does not particularly complaining of any lower back lower extremity issues though she has received chiropractic in the past for them.    MR BRAIN W/O & W CONTRAST 9/25/2023 1:09  PM     Provided History: meningioma, follow up; Meningioma (H).  ICD-10: Meningioma (H)     Comparison: MRI 9/9/2020, 7/13/2007.     Technique: Multiplanar T1-weighted, axial FLAIR, and susceptibility  images were obtained without intravenous contrast. Following  intravenous gadolinium-based contrast administration, axial  T2-weighted, diffusion, and T1-weighted images (in multiple planes)  were obtained.     Contrast: 7 mL Gadavist     Findings:  Slightly increased size of T2/FLAIR isointense, homogeneously  enhancing, dural based extra-axial mass overlying the left frontal  convexity now measuring 2.4 x 1.9 x 2.1 cm, previously 2.2 x 1.7 x 2.1  cm on 9/9/2020. This is slowly growing dating back to 7/13/2007 which  then measured 1.9 x 1.1 x 1.5 cm.     No evidence of intracranial hemorrhage. The ventricles are  proportionate to the cerebral sulci. Normal major vascular  intracranial flow-voids. Few foci of T2 hyperintense signal scattered  throughout the periventricular and subcortical white matter which is  nonspecific but likely represent chronic small vessel ischemic disease  in patient this age. Susceptibility weighted imaging reveals no  intracranial hemorrhage. Diffusion-weighted images represent no  abnormal reduced diffusion. Basilar cisterns are patent. Presumed  arachnoid granulation within right transverse sinus.     Enhancement along the left C1-2 facet joint. Bilateral  temporomandibular joint arthropathy; left greater than right. The  visualized portions of paranasal sinuses, and mastoid air cells are  relatively clear. The orbits are grossly unremarkable. Pseudophakia  bilaterally                                                                      Impression:     1. Meningioma overlying the left frontal convexity is slightly  increased in size compared to MRI 2 9/9/2020.  2. Mild Leukoaraiosis.  3. Enhancement along the left C1-2 facet joint; this may represent  active inflammatory facet  arthropathy.     I have personally reviewed the examination and initial interpretation  and I agree with the findings.     PALOMO BYRNE MD      EXAM: XR CERVICAL SPINE 2/3 VIEWS 8/26/2024 1:18 PM     HISTORY: Spasm of muscle      COMPARISON: None.                                                                      IMPRESSION: Preserved vertebral body heights. Relative straightening  of the vertebral body alignment. Loss of disc height, opposing  endplate degenerative change at C5-C6 and C6-C7. Otherwise preserved  intervertebral disc spaces for patient age. Multilevel uncovertebral  and facet osteoarthrosis bilaterally. No prevertebral edema. Nonfocal  extraspinal structures.     PERLA HAILE,         Past Medical History:   Diagnosis Date    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    Breast cancer, right breast (H)     Centrilobular emphysema (H)     Colon polyps     repeat colonoscopy 2019    Left tennis elbow     Rash     currently at masectomy site    Unspecified essential hypertension     situational and resolved     Past Surgical History:   Procedure Laterality Date    ABDOMEN SURGERY      ARTHROPLASTY HIP Right 01/24/2022    Procedure: ARTHROPLASTY, HIP, TOTAL;  Surgeon: Maddy Arango MD;  Location:  OR    BIOPSY      BREAST SURGERY  2007    CATARACT IOL, RT/LT      CHOLECYSTECTOMY      COLONOSCOPY      COLONOSCOPY N/A 10/04/2019    Procedure: COLONOSCOPY, WITH POLYPECTOMY AND BIOPSY;  Surgeon: Lucía John MD;  Location:  GI    ORTHOPEDIC SURGERY      PHACOEMULSIFICATION CLEAR CORNEA WITH STANDARD INTRAOCULAR LENS IMPLANT  05/22/2014    Procedure: PHACOEMULSIFICATION CLEAR CORNEA WITH STANDARD INTRAOCULAR LENS IMPLANT;  Surgeon: Rahul Reid MD;  Location: [facility]    PHACOEMULSIFICATION CLEAR CORNEA WITH STANDARD INTRAOCULAR LENS IMPLANT  07/08/2014    Procedure: PHACOEMULSIFICATION CLEAR CORNEA WITH STANDARD INTRAOCULAR LENS IMPLANT;  Surgeon: Rahul Reid MD;  Location:  OR     SURGICAL HISTORY OF -   08/2000    left foot reconstruction    SURGICAL HISTORY OF -   1979    hysterectomy     Family History       Problem (# of Occurrences) Relation (Name,Age of Onset)    Asthma (3) Brother (Reese), Brother, Niece (Lakshmi): passed away of pneumonia    Cancer (4) Brother (Reese), Other (n/a): Stomach cancer- cousin, Brother, Other: Family Hx of colon cancer    Diabetes (4) Sister (Alejandra): overweight, Brother (Reese): overweight, Other (n/a): Uncles and cousin/Paternal Uncles/Paternal Uncles/Paternal Uncles/Paternal Uncles, Other (Paternal Uncles)    Cerebrovascular Disease (1) Father (Dick): Had several small ones    Obesity (3) Brother, Sister: On diet/On diet/On diet, Brother    Breast Cancer (6) Sister (Alejandra): Going thru 2nd round of breast cancer, Other (n/a): Aunts, Other: Paternal Aunt/Paternal Aunt/Paternal Aunt/Paternal Aunt, Sister, Cousin (Brandi): Paternal/Paternal, Other (Paternal Aunts): had double masectomys    Other Cancer (1) Brother (Reese): Hodkins Lymphoma/Hodkins Lymphoma/Hodkins Lymphoma/Hodkins Lymphoma    Colon Cancer (3) Other: Maternal Great Uncles, Other: Maternal Uncle/Maternal Great Uncles/Maternal Great Uncles/Maternal Great Uncles, Other (Maternal Great Uncles)           Social History     Socioeconomic History    Marital status:      Spouse name: Not on file    Number of children: Not on file    Years of education: Not on file    Highest education level: Not on file   Occupational History    Not on file   Tobacco Use    Smoking status: Former     Current packs/day: 0.00     Average packs/day: 1.5 packs/day for 47.4 years (71.0 ttl pk-yrs)     Types: Cigarettes     Start date: 1/2/1967     Quit date: 5/10/2014     Years since quitting: 10.3    Smokeless tobacco: Never    Tobacco comments:     Quit several times over the years including when pregnant   Substance and Sexual Activity    Alcohol use: No     Alcohol/week: 0.0 standard drinks of alcohol    Drug use:  No    Sexual activity: Never   Other Topics Concern    Parent/sibling w/ CABG, MI or angioplasty before 65F 55M? No   Social History Narrative    Dairy/d 1-2 servings/d.     Caffeine 0-1 servings/d    Exercise 3 x week    Sunscreen used - Yes    Seatbelts used - Yes    Working smoke/CO detectors in the home - Yes    Guns stored in the home - No    Self Breast Exams - Yes    Self Testicular Exam - NA    Eye Exam up to date - Yes    Dental Exam up to date - Yes     Pap Smear up to date - hysterectomy    Mammogram up to date - Yes     PSA up to date - NA    Dexa Scan up to date - No    Flex Sig / Colonoscopy up to date - yes    Immunizations up to date - Yes 2007 Tetanus    Abuse: Current or Past(Physical, Sexual or Emotional)- No    Do you feel safe in your environment - Yes    2008 last updated 7/10                         Social Determinants of Health     Financial Resource Strain: High Risk (12/27/2023)    Financial Resource Strain     Within the past 12 months, have you or your family members you live with been unable to get utilities (heat, electricity) when it was really needed?: Yes   Food Insecurity: Low Risk  (12/27/2023)    Food Insecurity     Within the past 12 months, did you worry that your food would run out before you got money to buy more?: No     Within the past 12 months, did the food you bought just not last and you didn t have money to get more?: No   Transportation Needs: Low Risk  (12/27/2023)    Transportation Needs     Within the past 12 months, has lack of transportation kept you from medical appointments, getting your medicines, non-medical meetings or appointments, work, or from getting things that you need?: No   Physical Activity: Not on file   Stress: Not on file   Social Connections: Not on file   Interpersonal Safety: Low Risk  (8/26/2024)    Interpersonal Safety     Do you feel physically and emotionally safe where you currently live?: Yes     Within the past 12 months, have you been  hit, slapped, kicked or otherwise physically hurt by someone?: No     Within the past 12 months, have you been humiliated or emotionally abused in other ways by your partner or ex-partner?: No   Housing Stability: High Risk (12/27/2023)    Housing Stability     Do you have housing? : Yes     Are you worried about losing your housing?: Yes       Current Outpatient Medications   Medication Sig Dispense Refill    acetaminophen (TYLENOL) 500 MG tablet Take 2 tablets (1,000 mg) by mouth every 6 hours as needed for mild pain      albuterol (PROAIR HFA/PROVENTIL HFA/VENTOLIN HFA) 108 (90 Base) MCG/ACT inhaler Inhale 2 puffs into the lungs every 6 hours as needed for shortness of breath, wheezing or cough 18 g 3    Calcium Carbonate-Vitamin D (CALCIUM + D PO) Take 1 tablet by mouth daily Total 1200/800mg daily      cyclobenzaprine (FLEXERIL) 5 MG tablet Take 1 tablet (5 mg) by mouth 3 times daily as needed for muscle spasms. 30 tablet 0    diphenhydrAMINE-acetaminophen (TYLENOL PM)  MG tablet Take 1-2 tablets by mouth nightly as needed for sleep      fluticasone (FLONASE) 50 MCG/ACT nasal spray Spray 1-2 sprays into both nostrils daily 16 g 11    Glycopyrrolate-Formoterol (BEVESPI AEROSPHERE) 9-4.8 MCG/ACT oral inhaler Inhale 2 puffs into the lungs 2 times daily 10.7 g 5    multivitamin (ONE-DAILY) tablet Take 1 tablet by mouth daily      rosuvastatin (CRESTOR) 5 MG tablet Take 1 tablet (5 mg) by mouth daily 90 tablet 3       /76 (BP Location: Right arm, Patient Position: Sitting, Cuff Size: Adult Large)   Pulse 87   Resp 16   LMP  (LMP Unknown)   SpO2 95%      On examination, patient is alert and cooperative and appears to be in no acute distress.  Vital signs are stable.  She is afebrile.  She has a collar on which she takes off for the exam.  She is able to get up and walk around without difficulty.  She is able to walk without assistive device.    She is able to move her upper extremities functionally.   She is able to move her lower extremities functionally.  She responds to touch, temperature.  No sensory deficits were noted.  Her strength is generally full.    Reflexes are decreased.  Plantars were equivocal.  Her left big toe was up all the time.  She has a meningioma on the left side.    Musculoskeletal examination revealed no tenderness over the occipital nerves.  She has soft relaxed muscles.  She is tender over the cervical facets left side between C3 and C6 and right side in the C3-C4 and C4-C5.  Remainder the areas were nontender.  No focal areas of tenderness were elicited in the lower back region.    Impression: At this point this 79-year-old woman with degenerative changes in the cervical spine with facet arthropathy has been dealing with neck pain left worse than the right with some neck spasms.  It is affecting at least 3 levels on left and 2 levels on the right as noted.  She has been getting chiropractic and has not gone since January 2024.  She would like to give that a try once more.  We talked about treatment options including medial branch blocks and radiofrequency ablations for the facet joints with a view to provide long-term relief.  I think it is reasonable for her to continue her conservative care and connect with us if she decides she is not getting adequate relief and then we can proceed with the plans to do medial branch blocks and work towards radiofrequency ablations.    This was reviewed at length with the patient.  She can continue her current medications including muscle relaxants and Tylenol and collar as she is.    60 minutes visit, greater than 50% was for counseling on above-mentioned issues.    Cristian Ashby MD

## 2024-09-12 NOTE — LETTER
9/12/2024       RE: Raisa Archer  [address] [place] [id number]     Dear Colleague,    Thank you for referring your patient, Raisa Archer, to the [facility] [place] at [facility]. Please see a copy of my visit note below.    CC: I am seeing this patient at the request of her primary care physician Dr. Zavaleta for evaluation treatment of chronic neck pain with muscle spasms.    Patient is a very pleasant 79-year-old woman who has been dealing with chronic neck pain worse on the left than on the right for many years.  She recalls a fall in 2022 but none since.  Her brother aged 81 recently passed away.  She was traveling with her nephew and ended up bumping her head in his car.  Subsequently she traveled with her niece and did the same thing again.  She feels that is bothering her as well as making her neck pain and spasms worse.  She is reporting some pops and cracks when she moves her neck.  She does not particularly report any radicular symptoms.  She has a history of meningioma and has had MRI of the brain in 2023.  This did show some C1-C2 enhancement as well as enhancement of the temporomandibular joint left greater than the right.  She subsequently had x-ray of her cervical spine which again shows degenerative changes in her discs and arthritis in her facets.  She has received chiropractic and finds that very helpful.  She also gets physical therapy but did not feel it was that helpful and the physical therapist that go back to chiropractic.  She is here for evaluation to see if we can offer her anything to make a difference.    She takes Tylenol and finds that helpful.  Her pain is generally 5-6 on average 4 at best and can be 7 or 8 when she turns her neck.  She avoids starting the neck by using a soft cervical collar.  She has also been using a narrow based quad cane.  She tries to  be careful except for the injuries noted previously.    She does have history of bladder incontinence with stress and monitors it carefully.  No bowel incontinence noted.  She does not particularly complaining of any lower back lower extremity issues though she has received chiropractic in the past for them.    MR BRAIN W/O & W CONTRAST 9/25/2023 1:09 PM     Provided History: meningioma, follow up; Meningioma (H).  ICD-10: Meningioma (H)     Comparison: MRI 9/9/2020, 7/13/2007.     Technique: Multiplanar T1-weighted, axial FLAIR, and susceptibility  images were obtained without intravenous contrast. Following  intravenous gadolinium-based contrast administration, axial  T2-weighted, diffusion, and T1-weighted images (in multiple planes)  were obtained.     Contrast: 7 mL Gadavist     Findings:  Slightly increased size of T2/FLAIR isointense, homogeneously  enhancing, dural based extra-axial mass overlying the left frontal  convexity now measuring 2.4 x 1.9 x 2.1 cm, previously 2.2 x 1.7 x 2.1  cm on 9/9/2020. This is slowly growing dating back to 7/13/2007 which  then measured 1.9 x 1.1 x 1.5 cm.     No evidence of intracranial hemorrhage. The ventricles are  proportionate to the cerebral sulci. Normal major vascular  intracranial flow-voids. Few foci of T2 hyperintense signal scattered  throughout the periventricular and subcortical white matter which is  nonspecific but likely represent chronic small vessel ischemic disease  in patient this age. Susceptibility weighted imaging reveals no  intracranial hemorrhage. Diffusion-weighted images represent no  abnormal reduced diffusion. Basilar cisterns are patent. Presumed  arachnoid granulation within right transverse sinus.     Enhancement along the left C1-2 facet joint. Bilateral  temporomandibular joint arthropathy; left greater than right. The  visualized portions of paranasal sinuses, and mastoid air cells are  relatively clear. The orbits are grossly  unremarkable. Pseudophakia  bilaterally                                                                      Impression:     1. Meningioma overlying the left frontal convexity is slightly  increased in size compared to MRI 2 9/9/2020.  2. Mild Leukoaraiosis.  3. Enhancement along the left C1-2 facet joint; this may represent  active inflammatory facet arthropathy.     I have personally reviewed the examination and initial interpretation  and I agree with the findings.     PALOMO BYRNE MD      EXAM: XR CERVICAL SPINE 2/3 VIEWS 8/26/2024 1:18 PM     HISTORY: Spasm of muscle      COMPARISON: None.                                                                      IMPRESSION: Preserved vertebral body heights. Relative straightening  of the vertebral body alignment. Loss of disc height, opposing  endplate degenerative change at C5-C6 and C6-C7. Otherwise preserved  intervertebral disc spaces for patient age. Multilevel uncovertebral  and facet osteoarthrosis bilaterally. No prevertebral edema. Nonfocal  extraspinal structures.     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     Breast cancer, right breast (H)      Centrilobular emphysema (H)      Colon polyps     repeat colonoscopy 2019     Left tennis elbow      Rash     currently at masectomy site     Unspecified essential hypertension     situational and resolved     Past Surgical History:   Procedure Laterality Date     ABDOMEN SURGERY       ARTHROPLASTY HIP Right 01/24/2022    Procedure: ARTHROPLASTY, HIP, TOTAL;  Surgeon: Maddy Arango MD;  Location:  OR     BIOPSY       BREAST SURGERY  2007     CATARACT IOL, RT/LT       CHOLECYSTECTOMY       COLONOSCOPY       COLONOSCOPY N/A 10/04/2019    Procedure: COLONOSCOPY, WITH POLYPECTOMY AND BIOPSY;  Surgeon: Lucía John MD;  Location:  GI     ORTHOPEDIC SURGERY       PHACOEMULSIFICATION CLEAR CORNEA WITH STANDARD INTRAOCULAR LENS IMPLANT   05/22/2014    Procedure: PHACOEMULSIFICATION CLEAR CORNEA WITH STANDARD INTRAOCULAR LENS IMPLANT;  Surgeon: Rahul Reid MD;  Location:  EC     PHACOEMULSIFICATION CLEAR CORNEA WITH STANDARD INTRAOCULAR LENS IMPLANT  07/08/2014    Procedure: PHACOEMULSIFICATION CLEAR CORNEA WITH STANDARD INTRAOCULAR LENS IMPLANT;  Surgeon: Rahul Reid MD;  Location:  OR     SURGICAL HISTORY OF -   08/2000    left foot reconstruction     SURGICAL HISTORY OF -   1979    hysterectomy     Family History       Problem (# of Occurrences) Relation (Name,Age of Onset)    Asthma (3) Brother (Reese), Brother, Niece (Lakshmi): passed away of pneumonia    Cancer (4) Brother (Reese), Other (n/a): Stomach cancer- cousin, Brother, Other: Family Hx of colon cancer    Diabetes (4) Sister (Alejandra): overweight, Brother (eRese): overweight, Other (n/a): Uncles and cousin/Paternal Uncles/Paternal Uncles/Paternal Uncles/Paternal Uncles, Other (Paternal Uncles)    Cerebrovascular Disease (1) Father (Dick): Had several small ones    Obesity (3) Brother, Sister: On diet/On diet/On diet, Brother    Breast Cancer (6) Sister (Alejandra): Going thru 2nd round of breast cancer, Other (n/a): Aunts, Other: Paternal Aunt/Paternal Aunt/Paternal Aunt/Paternal Aunt, Sister, Cousin (Brandi): Paternal/Paternal, Other (Paternal Aunts): had double masectomys    Other Cancer (1) Brother (Reese): Hodkins Lymphoma/Hodkins Lymphoma/Hodkins Lymphoma/Hodkins Lymphoma    Colon Cancer (3) Other: Maternal Great Uncles, Other: Maternal Uncle/Maternal Great Uncles/Maternal Great Uncles/Maternal Great Uncles, Other (Maternal Great Uncles)           Social History     Socioeconomic History     Marital status:      Spouse name: Not on file     Number of children: Not on file     Years of education: Not on file     Highest education level: Not on file   Occupational History     Not on file   Tobacco Use     Smoking status: Former     Current packs/day: 0.00     Average  packs/day: 1.5 packs/day for 47.4 years (71.0 ttl pk-yrs)     Types: Cigarettes     Start date: 1/2/1967     Quit date: 5/10/2014     Years since quitting: 10.3     Smokeless tobacco: Never     Tobacco comments:     Quit several times over the years including when pregnant   Substance and Sexual Activity     Alcohol use: No     Alcohol/week: 0.0 standard drinks of alcohol     Drug use: No     Sexual activity: Never   Other Topics Concern     Parent/sibling w/ CABG, MI or angioplasty before 65F 55M? No   Social History Narrative    Dairy/d 1-2 servings/d.     Caffeine 0-1 servings/d    Exercise 3 x week    Sunscreen used - Yes    Seatbelts used - Yes    Working smoke/CO detectors in the home - Yes    Guns stored in the home - No    Self Breast Exams - Yes    Self Testicular Exam - NA    Eye Exam up to date - Yes    Dental Exam up to date - Yes     Pap Smear up to date - hysterectomy    Mammogram up to date - Yes     PSA up to date - NA    Dexa Scan up to date - No    Flex Sig / Colonoscopy up to date - yes    Immunizations up to date - Yes 2007 Tetanus    Abuse: Current or Past(Physical, Sexual or Emotional)- No    Do you feel safe in your environment - Yes    2008 last updated 7/10                         Social Determinants of Health     Financial Resource Strain: High Risk (12/27/2023)    Financial Resource Strain      Within the past 12 months, have you or your family members you live with been unable to get utilities (heat, electricity) when it was really needed?: Yes   Food Insecurity: Low Risk  (12/27/2023)    Food Insecurity      Within the past 12 months, did you worry that your food would run out before you got money to buy more?: No      Within the past 12 months, did the food you bought just not last and you didn t have money to get more?: No   Transportation Needs: Low Risk  (12/27/2023)    Transportation Needs      Within the past 12 months, has lack of transportation kept you from medical appointments,  getting your medicines, non-medical meetings or appointments, work, or from getting things that you need?: No   Physical Activity: Not on file   Stress: Not on file   Social Connections: Not on file   Interpersonal Safety: Low Risk  (8/26/2024)    Interpersonal Safety      Do you feel physically and emotionally safe where you currently live?: Yes      Within the past 12 months, have you been hit, slapped, kicked or otherwise physically hurt by someone?: No      Within the past 12 months, have you been humiliated or emotionally abused in other ways by your partner or ex-partner?: No   Housing Stability: High Risk (12/27/2023)    Housing Stability      Do you have housing? : Yes      Are you worried about losing your housing?: Yes       Current Outpatient Medications   Medication Sig Dispense Refill     acetaminophen (TYLENOL) 500 MG tablet Take 2 tablets (1,000 mg) by mouth every 6 hours as needed for mild pain       albuterol (PROAIR HFA/PROVENTIL HFA/VENTOLIN HFA) 108 (90 Base) MCG/ACT inhaler Inhale 2 puffs into the lungs every 6 hours as needed for shortness of breath, wheezing or cough 18 g 3     Calcium Carbonate-Vitamin D (CALCIUM + D PO) Take 1 tablet by mouth daily Total 1200/800mg daily       cyclobenzaprine (FLEXERIL) 5 MG tablet Take 1 tablet (5 mg) by mouth 3 times daily as needed for muscle spasms. 30 tablet 0     diphenhydrAMINE-acetaminophen (TYLENOL PM)  MG tablet Take 1-2 tablets by mouth nightly as needed for sleep       fluticasone (FLONASE) 50 MCG/ACT nasal spray Spray 1-2 sprays into both nostrils daily 16 g 11     Glycopyrrolate-Formoterol (BEVESPI AEROSPHERE) 9-4.8 MCG/ACT oral inhaler Inhale 2 puffs into the lungs 2 times daily 10.7 g 5     multivitamin (ONE-DAILY) tablet Take 1 tablet by mouth daily       rosuvastatin (CRESTOR) 5 MG tablet Take 1 tablet (5 mg) by mouth daily 90 tablet 3       /76 (BP Location: Right arm, Patient Position: Sitting, Cuff Size: Adult Large)   Pulse  87   Resp 16   LMP  (LMP Unknown)   SpO2 95%      On examination, patient is alert and cooperative and appears to be in no acute distress.  Vital signs are stable.  She is afebrile.  She has a collar on which she takes off for the exam.  She is able to get up and walk around without difficulty.  She is able to walk without assistive device.    She is able to move her upper extremities functionally.  She is able to move her lower extremities functionally.  She responds to touch, temperature.  No sensory deficits were noted.  Her strength is generally full.    Reflexes are decreased.  Plantars were equivocal.  Her left big toe was up all the time.  She has a meningioma on the left side.    Musculoskeletal examination revealed no tenderness over the occipital nerves.  She has soft relaxed muscles.  She is tender over the cervical facets left side between C3 and C6 and right side in the C3-C4 and C4-C5.  Remainder the areas were nontender.  No focal areas of tenderness were elicited in the lower back region.    Impression: At this point this 79-year-old woman with degenerative changes in the cervical spine with facet arthropathy has been dealing with neck pain left worse than the right with some neck spasms.  It is affecting at least 3 levels on left and 2 levels on the right as noted.  She has been getting chiropractic and has not gone since January 2024.  She would like to give that a try once more.  We talked about treatment options including medial branch blocks and radiofrequency ablations for the facet joints with a view to provide long-term relief.  I think it is reasonable for her to continue her conservative care and connect with us if she decides she is not getting adequate relief and then we can proceed with the plans to do medial branch blocks and work towards radiofrequency ablations.    This was reviewed at length with the patient.  She can continue her current medications including muscle relaxants and  Tylenol and collar as she is.    60 minutes visit, greater than 50% was for counseling on above-mentioned issues.    Cristian Ashby MD       Again, thank you for allowing me to participate in the care of your patient.      Sincerely,    Cristian Ashby MD

## 2024-09-12 NOTE — NURSING NOTE
Chief Complaint   Patient presents with    New Patient     Here for a follow up, confirmed with patient     Sweta Ellis

## 2024-09-19 ENCOUNTER — ANCILLARY PROCEDURE (OUTPATIENT)
Dept: CT IMAGING | Facility: CLINIC | Age: 79
End: 2024-09-19
Attending: INTERNAL MEDICINE
Payer: MEDICARE

## 2024-09-19 ENCOUNTER — ANCILLARY PROCEDURE (OUTPATIENT)
Dept: MAMMOGRAPHY | Facility: CLINIC | Age: 79
End: 2024-09-19
Attending: INTERNAL MEDICINE
Payer: MEDICARE

## 2024-09-19 DIAGNOSIS — Z12.2 ENCOUNTER FOR SCREENING FOR MALIGNANT NEOPLASM OF LUNG: ICD-10-CM

## 2024-09-19 DIAGNOSIS — Z12.31 ENCOUNTER FOR SCREENING MAMMOGRAM FOR BREAST CANCER: ICD-10-CM

## 2024-09-19 DIAGNOSIS — Z87.891 PERSONAL HISTORY OF NICOTINE DEPENDENCE: ICD-10-CM

## 2024-09-19 PROCEDURE — 71271 CT THORAX LUNG CANCER SCR C-: CPT | Performed by: RADIOLOGY

## 2024-09-19 PROCEDURE — 77063 BREAST TOMOSYNTHESIS BI: CPT | Mod: 52 | Performed by: RADIOLOGY

## 2024-09-19 PROCEDURE — 77067 SCR MAMMO BI INCL CAD: CPT | Mod: 52 | Performed by: RADIOLOGY

## 2024-10-02 SDOH — HEALTH STABILITY: PHYSICAL HEALTH: ON AVERAGE, HOW MANY DAYS PER WEEK DO YOU ENGAGE IN MODERATE TO STRENUOUS EXERCISE (LIKE A BRISK WALK)?: 2 DAYS

## 2024-10-02 SDOH — HEALTH STABILITY: PHYSICAL HEALTH: ON AVERAGE, HOW MANY MINUTES DO YOU ENGAGE IN EXERCISE AT THIS LEVEL?: 20 MIN

## 2024-10-02 ASSESSMENT — SOCIAL DETERMINANTS OF HEALTH (SDOH): HOW OFTEN DO YOU GET TOGETHER WITH FRIENDS OR RELATIVES?: PATIENT DECLINED

## 2024-10-07 ENCOUNTER — OFFICE VISIT (OUTPATIENT)
Dept: FAMILY MEDICINE | Facility: CLINIC | Age: 79
End: 2024-10-07
Attending: INTERNAL MEDICINE
Payer: MEDICARE

## 2024-10-07 VITALS
BODY MASS INDEX: 28.16 KG/M2 | RESPIRATION RATE: 16 BRPM | HEART RATE: 84 BPM | SYSTOLIC BLOOD PRESSURE: 111 MMHG | WEIGHT: 153 LBS | HEIGHT: 62 IN | OXYGEN SATURATION: 95 % | TEMPERATURE: 97 F | DIASTOLIC BLOOD PRESSURE: 67 MMHG

## 2024-10-07 DIAGNOSIS — L29.9 ITCHING: ICD-10-CM

## 2024-10-07 DIAGNOSIS — Z00.00 ENCOUNTER FOR MEDICARE ANNUAL WELLNESS EXAM: Primary | ICD-10-CM

## 2024-10-07 DIAGNOSIS — Z29.11 NEED FOR VACCINATION AGAINST RESPIRATORY SYNCYTIAL VIRUS: ICD-10-CM

## 2024-10-07 DIAGNOSIS — E78.5 HYPERLIPIDEMIA LDL GOAL <100: ICD-10-CM

## 2024-10-07 LAB
ALBUMIN SERPL BCG-MCNC: 4.4 G/DL (ref 3.5–5.2)
ALP SERPL-CCNC: 106 U/L (ref 40–150)
ALT SERPL W P-5'-P-CCNC: 14 U/L (ref 0–50)
ANION GAP SERPL CALCULATED.3IONS-SCNC: 9 MMOL/L (ref 7–15)
AST SERPL W P-5'-P-CCNC: 29 U/L (ref 0–45)
BASOPHILS # BLD AUTO: 0.1 10E3/UL (ref 0–0.2)
BASOPHILS NFR BLD AUTO: 1 %
BILIRUB SERPL-MCNC: 0.5 MG/DL
BUN SERPL-MCNC: 15.8 MG/DL (ref 8–23)
CALCIUM SERPL-MCNC: 9.5 MG/DL (ref 8.8–10.4)
CHLORIDE SERPL-SCNC: 101 MMOL/L (ref 98–107)
CHOLEST SERPL-MCNC: 156 MG/DL
CREAT SERPL-MCNC: 0.9 MG/DL (ref 0.51–0.95)
EGFRCR SERPLBLD CKD-EPI 2021: 66 ML/MIN/1.73M2
EOSINOPHIL # BLD AUTO: 0.1 10E3/UL (ref 0–0.7)
EOSINOPHIL NFR BLD AUTO: 2 %
ERYTHROCYTE [DISTWIDTH] IN BLOOD BY AUTOMATED COUNT: 12.3 % (ref 10–15)
FASTING STATUS PATIENT QL REPORTED: NO
FASTING STATUS PATIENT QL REPORTED: NO
GLUCOSE SERPL-MCNC: 92 MG/DL (ref 70–99)
HCO3 SERPL-SCNC: 27 MMOL/L (ref 22–29)
HCT VFR BLD AUTO: 43.6 % (ref 35–47)
HDLC SERPL-MCNC: 71 MG/DL
HGB BLD-MCNC: 13.6 G/DL (ref 11.7–15.7)
IMM GRANULOCYTES # BLD: 0 10E3/UL
IMM GRANULOCYTES NFR BLD: 0 %
LDLC SERPL CALC-MCNC: 63 MG/DL
LYMPHOCYTES # BLD AUTO: 0.9 10E3/UL (ref 0.8–5.3)
LYMPHOCYTES NFR BLD AUTO: 20 %
MCH RBC QN AUTO: 28.2 PG (ref 26.5–33)
MCHC RBC AUTO-ENTMCNC: 31.2 G/DL (ref 31.5–36.5)
MCV RBC AUTO: 91 FL (ref 78–100)
MONOCYTES # BLD AUTO: 0.3 10E3/UL (ref 0–1.3)
MONOCYTES NFR BLD AUTO: 6 %
NEUTROPHILS # BLD AUTO: 3.4 10E3/UL (ref 1.6–8.3)
NEUTROPHILS NFR BLD AUTO: 72 %
NONHDLC SERPL-MCNC: 85 MG/DL
PLATELET # BLD AUTO: 204 10E3/UL (ref 150–450)
POTASSIUM SERPL-SCNC: 4.2 MMOL/L (ref 3.4–5.3)
PROT SERPL-MCNC: 7.1 G/DL (ref 6.4–8.3)
RBC # BLD AUTO: 4.82 10E6/UL (ref 3.8–5.2)
SODIUM SERPL-SCNC: 137 MMOL/L (ref 135–145)
TRIGL SERPL-MCNC: 110 MG/DL
WBC # BLD AUTO: 4.7 10E3/UL (ref 4–11)

## 2024-10-07 PROCEDURE — 85025 COMPLETE CBC W/AUTO DIFF WBC: CPT | Performed by: INTERNAL MEDICINE

## 2024-10-07 PROCEDURE — G0439 PPPS, SUBSEQ VISIT: HCPCS | Performed by: INTERNAL MEDICINE

## 2024-10-07 PROCEDURE — 99214 OFFICE O/P EST MOD 30 MIN: CPT | Mod: 25 | Performed by: INTERNAL MEDICINE

## 2024-10-07 PROCEDURE — 90662 IIV NO PRSV INCREASED AG IM: CPT | Performed by: INTERNAL MEDICINE

## 2024-10-07 PROCEDURE — 80053 COMPREHEN METABOLIC PANEL: CPT | Performed by: INTERNAL MEDICINE

## 2024-10-07 PROCEDURE — G0008 ADMIN INFLUENZA VIRUS VAC: HCPCS | Performed by: INTERNAL MEDICINE

## 2024-10-07 PROCEDURE — 80061 LIPID PANEL: CPT | Performed by: INTERNAL MEDICINE

## 2024-10-07 PROCEDURE — 36415 COLL VENOUS BLD VENIPUNCTURE: CPT | Performed by: INTERNAL MEDICINE

## 2024-10-07 PROCEDURE — 90480 ADMN SARSCOV2 VAC 1/ONLY CMP: CPT | Performed by: INTERNAL MEDICINE

## 2024-10-07 PROCEDURE — 91320 SARSCV2 VAC 30MCG TRS-SUC IM: CPT | Performed by: INTERNAL MEDICINE

## 2024-10-07 RX ORDER — TRIAMCINOLONE ACETONIDE 1 MG/G
OINTMENT TOPICAL 2 TIMES DAILY PRN
Qty: 80 G | Refills: 0 | Status: SHIPPED | OUTPATIENT
Start: 2024-10-07

## 2024-10-07 RX ORDER — ROSUVASTATIN CALCIUM 5 MG/1
5 TABLET, COATED ORAL DAILY
Qty: 90 TABLET | Refills: 3 | Status: SHIPPED | OUTPATIENT
Start: 2024-10-07

## 2024-10-07 ASSESSMENT — PAIN SCALES - GENERAL: PAINLEVEL: NO PAIN (0)

## 2024-10-07 NOTE — PATIENT INSTRUCTIONS
Ask your pharmacist if the Anoro Ellipta to see if that one would be cheaper- then I'd be happy to prescribe that one instead    Use the triamcinolone ointment when you get itchy spots.    Patient Education   Preventive Care Advice   This is general advice given by our system to help you stay healthy. However, your care team may have specific advice just for you. Please talk to your care team about your preventive care needs.  Nutrition  Eat 5 or more servings of fruits and vegetables each day.  Try wheat bread, brown rice and whole grain pasta (instead of white bread, rice, and pasta).  Get enough calcium and vitamin D. Check the label on foods and aim for 100% of the RDA (recommended daily allowance).  Lifestyle  Exercise at least 150 minutes each week  (30 minutes a day, 5 days a week).  Do muscle strengthening activities 2 days a week. These help control your weight and prevent disease.  No smoking.  Wear sunscreen to prevent skin cancer.  Have a dental exam and cleaning every 6 months.  Yearly exams  See your health care team every year to talk about:  Any changes in your health.  Any medicines your care team has prescribed.  Preventive care, family planning, and ways to prevent chronic diseases.  Shots (vaccines)   HPV shots (up to age 26), if you've never had them before.  Hepatitis B shots (up to age 59), if you've never had them before.  COVID-19 shot: Get this shot when it's due.  Flu shot: Get a flu shot every year.  Tetanus shot: Get a tetanus shot every 10 years.  Pneumococcal, hepatitis A, and RSV shots: Ask your care team if you need these based on your risk.  Shingles shot (for age 50 and up)  General health tests  Diabetes screening:  Starting at age 35, Get screened for diabetes at least every 3 years.  If you are younger than age 35, ask your care team if you should be screened for diabetes.  Cholesterol test: At age 39, start having a cholesterol test every 5 years, or more often if  advised.  Bone density scan (DEXA): At age 50, ask your care team if you should have this scan for osteoporosis (brittle bones).  Hepatitis C: Get tested at least once in your life.  STIs (sexually transmitted infections)  Before age 24: Ask your care team if you should be screened for STIs.  After age 24: Get screened for STIs if you're at risk. You are at risk for STIs (including HIV) if:  You are sexually active with more than one person.  You don't use condoms every time.  You or a partner was diagnosed with a sexually transmitted infection.  If you are at risk for HIV, ask about PrEP medicine to prevent HIV.  Get tested for HIV at least once in your life, whether you are at risk for HIV or not.  Cancer screening tests  Cervical cancer screening: If you have a cervix, begin getting regular cervical cancer screening tests starting at age 21.  Breast cancer scan (mammogram): If you've ever had breasts, begin having regular mammograms starting at age 40. This is a scan to check for breast cancer.  Colon cancer screening: It is important to start screening for colon cancer at age 45.  Have a colonoscopy test every 10 years (or more often if you're at risk) Or, ask your provider about stool tests like a FIT test every year or Cologuard test every 3 years.  To learn more about your testing options, visit:   .  For help making a decision, visit:   https://bit.ly/gr51039.  Prostate cancer screening test: If you have a prostate, ask your care team if a prostate cancer screening test (PSA) at age 55 is right for you.  Lung cancer screening: If you are a current or former smoker ages 50 to 80, ask your care team if ongoing lung cancer screenings are right for you.  For informational purposes only. Not to replace the advice of your health care provider. Copyright   2023 [employer]. All rights reserved. Clinically reviewed by the [facility]. [employer] 825235 - REV 01/24.  Learning  About Activities of Daily Living  What are activities of daily living?     Activities of daily living (ADLs) are the basic self-care tasks you do every day. These include eating, bathing, dressing, and moving around.  As you age, and if you have health problems, you may find that it's harder to do some of these tasks. If so, your doctor can suggest ideas that may help.  To measure what kind of help you may need, your doctor will ask how well you are able to do ADLs. Let your doctor know if there are any tasks that you are having trouble doing. This is an important first step to getting help. And when you have the help you need, you can stay as independent as possible.  How will a doctor assess your ADLs?  Asking about ADLs is part of a routine health checkup your doctor will likely do as you age. Your health check might be done in a doctor's office, in your home, or at a hospital. The goal is to find out if you are having any problems that could make it hard to care for yourself or that make it unsafe for you to be on your own.  To measure your ADLs, your doctor will ask how hard it is for you to do routine tasks. Your doctor may also want to know if you have changed the way you do a task because of a health problem. Your doctor may watch how you:  Walk back and forth.  Keep your balance while you stand or walk.  Move from sitting to standing or from a bed to a chair.  Button or unbutton a shirt or sweater.  Remove and put on your shoes.  It's common to feel a little worried or anxious if you find you can't do all the things you used to be able to do. Talking with your doctor about ADLs is a way to make sure you're as safe as possible and able to care for yourself as well as you can. You may want to bring a caregiver, friend, or family member to your checkup. They can help you talk to your doctor.  Follow-up care is a key part of your treatment and safety. Be sure to make and go to all appointments, and call your  doctor if you are having problems. It's also a good idea to know your test results and keep a list of the medicines you take.  Current as of: October 24, 2023  Content Version: 14.2 2024 [facility] [employer].   Care instructions adapted under license by your healthcare professional. If you have questions about a medical condition or this instruction, always ask your healthcare professional. [employer] disclaims any warranty or liability for your use of this information.    Preventing Falls: Care Instructions  Injuries and health problems such as trouble walking or poor eyesight can increase your risk of falling. So can some medicines. But there are things you can do to help prevent falls. You can exercise to get stronger. You can also arrange your home to make it safer.    Talk to your doctor about the medicines you take. Ask if any of them increase the risk of falls and whether they can be changed or stopped.   Try to exercise regularly. It can help improve your strength and balance. This can help lower your risk of falling.         Practice fall safety and prevention.   Wear low-heeled shoes that fit well and give your feet good support. Talk to your doctor if you have foot problems that make this hard.  Carry a cellphone or wear a medical alert device that you can use to call for help.  Use stepladders instead of chairs to reach high objects. Don't climb if you're at risk for falls. Ask for help, if needed.  Wear the correct eyeglasses, if you need them.        Make your home safer.   Remove rugs, cords, clutter, and furniture from walkways.  Keep your house well lit. Use night-lights in hallways and bathrooms.  Install and use sturdy handrails on stairways.  Wear nonskid footwear, even inside. Don't walk barefoot or in socks without shoes.        Be safe outside.   Use handrails, curb cuts, and ramps whenever possible.  Keep your hands free by using a shoulder bag or backpack.  Try to walk in  "well-lit areas. Watch out for uneven ground, changes in pavement, and debris.  Be careful in the winter. Walk on the grass or gravel when sidewalks are slippery. Use de-icer on steps and walkways. Add non-slip devices to shoes.    Put grab bars and nonskid mats in your shower or tub and near the toilet. Try to use a shower chair or bath bench when bathing.   Get into a tub or shower by putting in your weaker leg first. Get out with your strong side first. Have a phone or medical alert device in the bathroom with you.   Where can you learn more?  Go to https://www.[employer].net/patiented  Enter G117 in the search box to learn more about \"Preventing Falls: Care Instructions.\"  Current as of: July 17, 2023  Content Version: 14.2 2024 [facility] [employer].   Care instructions adapted under license by your healthcare professional. If you have questions about a medical condition or this instruction, always ask your healthcare professional. [employer] disclaims any warranty or liability for your use of this information.    Bladder Training: Care Instructions  Your Care Instructions     Bladder training is used to treat urge incontinence and stress incontinence. Urge incontinence means that the need to urinate comes on so fast that you can't get to a toilet in time. Stress incontinence means that you leak urine because of pressure on your bladder. For example, it may happen when you laugh, cough, or lift something heavy.  Bladder training can increase how long you can wait before you have to urinate. It can also help your bladder hold more urine. And it can give you better control over the urge to urinate.  It is important to remember that bladder training takes a few weeks to a few months to make a difference. You may not see results right away, but don't give up.  Follow-up care is a key part of your treatment and safety. Be sure to make and go to all appointments, and call your doctor if you are " having problems. It's also a good idea to know your test results and keep a list of the medicines you take.  How can you care for yourself at home?  Work with your doctor to come up with a bladder training program that is right for you. You may use one or more of the following methods.  Delayed urination  In the beginning, try to keep from urinating for 5 minutes after you first feel the need to go.  While you wait, take deep, slow breaths to relax. Kegel exercises can also help you delay the need to go to the bathroom.  After some practice, when you can easily wait 5 minutes to urinate, try to wait 10 minutes before you urinate.  Slowly increase the waiting period until you are able to control when you have to urinate.  Scheduled urination  Empty your bladder when you first wake up in the morning.  Schedule times throughout the day when you will urinate.  Start by going to the bathroom every hour, even if you don't need to go.  Slowly increase the time between trips to the bathroom.  When you have found a schedule that works well for you, keep doing it.  If you wake up during the night and have to urinate, do it. Apply your schedule to waking hours only.  Kegel exercises  These tighten and strengthen pelvic muscles, which can help you control the flow of urine. (If doing these exercises causes pain, stop doing them and talk with your doctor.) To do Kegel exercises:  Squeeze your muscles as if you were trying not to pass gas. Or squeeze your muscles as if you were stopping the flow of urine. Your belly, legs, and buttocks shouldn't move.  Hold the squeeze for 3 seconds, then relax for 5 to 10 seconds.  Start with 3 seconds, then add 1 second each week until you are able to squeeze for 10 seconds.  Repeat the exercise 10 times a session. Do 3 to 8 sessions a day.  When should you call for help?  Watch closely for changes in your health, and be sure to contact your doctor if:    Your incontinence is getting worse.      "You do not get better as expected.   Where can you learn more?  Go to https://www.[employer].net/patiented  Enter V684 in the search box to learn more about \"Bladder Training: Care Instructions.\"  Current as of: November 15, 2023  Content Version: 14.2 2024 [facility] [employer] [facility].   Care instructions adapted under license by your healthcare professional. If you have questions about a medical condition or this instruction, always ask your healthcare professional. [employer] disclaims any warranty or liability for your use of this information.       "

## 2024-10-07 NOTE — PROGRESS NOTES
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 tone, mentation intact and speech normal  PSYCH: mentation appears normal, affect normal/bright        10/7/2024   Mini Cog   Clock Draw Score 0 Abnormal   3 Item Recall 3 objects recalled   Mini Cog Total Score 3                 Signed Electronically by: Alicia Tidwell DO    "

## 2024-10-16 NOTE — PATIENT INSTRUCTIONS
"    How to Schedule OMT :  Please make an appointment for \"OMT\" with the .  Please inform them you are scheduling for OMT with Dr. Ceron for ____ (ex. Back pain, neck pain, ect ).    What Is OMT (Osteopathic Manipulative Treatment)?  As part of their education, DOs (doctor of osteopathic medicine) receive special training in the musculoskeletal system (your nerves, bones and muscles).  OMT involves using the hands to diagnose, treat and prevent illness or injury.  Using OMT, your osteopathic physician will move your muscles and joints using techniques including stretching, gentle pressure and resistance.  OMT can help people of all ages and backgrounds. In addition to muscle or joint pain, it can be used to treat a variety of conditions such as asthma, sinus pain, migraines and carpal tunnel syndrome.  For more information, please visit [email or web address]    How does osteopathic manipulative therapy work?  If you're receiving OMT, you should expect a doctor to palpate your body with their hands. This means they may press your joints or muscles with their palms or fingers. They might also pull or rotate your limbs, trunk, or head.  Depending on your reason for receiving OMT, you might be asked to apply force as well, such as lifting your arms, while the doctor applies counterpressure.  You could remain in a particular position for 1 or 2 minutes. Sometimes, the doctor will move your body slowly and continuously, and other times they'll move your body quickly.  OMT might occasionally be uncomfortable, but it should never be painful. If you experience any pain during OMT, let your doctor know immediately.    Osteopathic manipulative therapy vs. chiropractic therapy  OMT and chiropractic therapy can look the same superficially, but they have some important differences.  Chiropractic therapy generally places a lot of emphasis on your spine, while OMT includes your entire body. Similarly, the goal of " chiropractic therapy is often to alleviate pain in a specific part of your body, while OMT is part of a holistic approach to medicine that stresses that all facets of your body are interconnected, including your mental and emotional states.  Chiropractors must be licensed, but they aren't medical doctors. OMT is performed by a medical doctor.

## 2024-10-16 NOTE — PROGRESS NOTES
"HPI      Raisa is a 79 year old who presents today for Osteopathic Evaluation.   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    Allergies   Allergen Reactions    Aspirin Nausea and Vomiting     sick    Chantix [Varenicline Tartrate] Other (See Comments)     Sees things    Glycerin Itching     LIQUID,   Reactions: itchy and redness      Ipratropium Other (See Comments)     atrovent inhaler caused sore, tight throat , chest hurt, breathless.     Sympathomimetics Other (See Comments)     Patient doesn't know why this is listed as an allergy    Varenicline GI Disturbance, Other (See Comments) and Nausea and Vomiting    Wool Fiber Itching     Redness      Review of Systems       ROS      10 point ROS neg other than the symptoms noted above here or in the HPI.    Physical Exam     Vitals:    10/17/24 1339   BP: 110/67   BP Location: Left arm   Patient Position: Sitting   Cuff Size: Adult Regular   Pulse: 84   Resp: 18   Temp: 97.2  F (36.2  C)   TempSrc: Temporal   SpO2: 96%   Weight: 69.6 kg (153 lb 8 oz)   Height: 1.585 m (5' 2.4\")     XR c-spine 8/26/24  IMPRESSION: Preserved vertebral body heights. Relative straightening  of the vertebral body alignment. Loss of disc height, opposing  endplate degenerative change at C5-C6 and C6-C7. Otherwise preserved  intervertebral disc spaces for patient age. Multilevel uncovertebral  and facet osteoarthrosis bilaterally. No prevertebral edema. " Nonfocal  extraspinal structures.    Osteopathic Structural Exam and additional MSK exam found below in OMT Procedure Note.    Assessment and Plan     Raisa was seen today for omt.    Diagnoses and all orders for this visit:    Neck pain  -     OSTEOPATHIC MANIP,3-4 BODY REGN    Somatic dysfunction of head region  -     OSTEOPATHIC MANIP,3-4 BODY REGN    Somatic dysfunction of cervical region  -     OSTEOPATHIC MANIP,3-4 BODY REGN    Somatic dysfunction of upper extremity  -     OSTEOPATHIC MANIP,3-4 BODY REGN      Given that this has been interfering with the patient's quality of life and ADLs, after discussion, informed consent, and medical assessment for safety, we have together decided to address this concern with Osteopathic Manipulative Treatment.    Please see OMT Procedure Note below for the specifics of treatment.         OMT PROCEDURE NOTE    Body Region: Head  Somatic Dysfunction: minimal OA tightness b/l  Treatment: suboccipital release, soft tissue  Outcome: Improved    Body Region: C-spine / Neck  Somatic Dysfunction: very restricted rotation and SB b/l  Treatment: muscle energy, gentle stretching  Outcome: Stable    Body Region: Upper extremity  Somatic Dysfunction: tight trapezius L>r  Treatment: soft tissue, pressure point release, muscle energy  Outcome: Improved    Very restricted ROM of neck with SB and rotation, making OMT challenging. Will try at least 3 sessions every 2-4 weeks to see if we can improve ROM. She tried PT but had an issue with the physical therapist. Stretches given to do at home. Continue heat, massage, and topical voltaren.    The patient actively participated in OMT and was able to communicate both positive and negative feedback throughout. OMT completed without incident. Patient tolerated treatment well. Patient reported that ROM, function, and/or pain level were improved. Advised that pain is occasionally worse during the first 24 hours after treatment and that drinking  more water and taking Tylenol or Ibuprofen often help. Patient to return in 2-4 week/s or as needed for repeat osteopathic assessment.     Luciano Ceron, DO    I spent a total of 30 minutes on the day of the visit.   Time spent by me doing chart review, history and exam, documentation and further activities per the note      Answers submitted by the patient for this visit:  General Questionnaire (Submitted on 10/12/2024)  Chief Complaint: Chronic problems general questions HPI Form  How many days per week do you miss taking your medication?: 7  What makes it hard for you to take your medication every day?: other  General Concern (Submitted on 10/12/2024)  Chief Complaint: Chronic problems general questions HPI Form  What is the reason for your visit today?: Referral from primary care doctor  When did your symptoms begin?: More than a month  What are your symptoms?: headache, pain radiating from top of head (left side) down to right shoulder  How would you describe these symptoms?: Moderate  Are your symptoms:: Staying the same  Have you had these symptoms before?: Yes  Have you tried or received treatment for these symptoms before?: Yes  Did that treatment work? : No  Is there anything that makes you feel worse?: Inflammation in neck and left shoulder  Is there anything that makes you feel better?: Tylenol

## 2024-10-17 ENCOUNTER — OFFICE VISIT (OUTPATIENT)
Dept: FAMILY MEDICINE | Facility: CLINIC | Age: 79
End: 2024-10-17
Payer: MEDICARE

## 2024-10-17 VITALS
RESPIRATION RATE: 18 BRPM | HEIGHT: 62 IN | TEMPERATURE: 97.2 F | BODY MASS INDEX: 28.25 KG/M2 | WEIGHT: 153.5 LBS | SYSTOLIC BLOOD PRESSURE: 110 MMHG | DIASTOLIC BLOOD PRESSURE: 67 MMHG | OXYGEN SATURATION: 96 % | HEART RATE: 84 BPM

## 2024-10-17 DIAGNOSIS — M99.07 SOMATIC DYSFUNCTION OF UPPER EXTREMITY: ICD-10-CM

## 2024-10-17 DIAGNOSIS — M99.00 SOMATIC DYSFUNCTION OF HEAD REGION: ICD-10-CM

## 2024-10-17 DIAGNOSIS — M54.2 NECK PAIN: Primary | ICD-10-CM

## 2024-10-17 DIAGNOSIS — M99.01 SOMATIC DYSFUNCTION OF CERVICAL REGION: ICD-10-CM

## 2024-10-17 PROBLEM — H66.93 OTITIS MEDIA, UNSPECIFIED, BILATERAL: Status: RESOLVED | Noted: 2022-01-26 | Resolved: 2024-10-17

## 2024-10-17 PROBLEM — H66.90 ACUTE OTITIS MEDIA: Status: RESOLVED | Noted: 2022-01-27 | Resolved: 2024-10-17

## 2024-10-17 PROCEDURE — 99214 OFFICE O/P EST MOD 30 MIN: CPT | Mod: 25 | Performed by: STUDENT IN AN ORGANIZED HEALTH CARE EDUCATION/TRAINING PROGRAM

## 2024-10-17 PROCEDURE — 98926 OSTEOPATH MANJ 3-4 REGIONS: CPT | Performed by: STUDENT IN AN ORGANIZED HEALTH CARE EDUCATION/TRAINING PROGRAM

## 2024-10-17 ASSESSMENT — PAIN SCALES - GENERAL: PAINLEVEL: MODERATE PAIN (5)

## 2024-11-06 ENCOUNTER — MYC MEDICAL ADVICE (OUTPATIENT)
Dept: FAMILY MEDICINE | Facility: CLINIC | Age: 79
End: 2024-11-06
Payer: MEDICARE

## 2024-11-12 ENCOUNTER — TRANSFERRED RECORDS (OUTPATIENT)
Dept: HEALTH INFORMATION MANAGEMENT | Facility: CLINIC | Age: 79
End: 2024-11-12
Payer: MEDICARE

## 2024-11-15 ENCOUNTER — OFFICE VISIT (OUTPATIENT)
Dept: FAMILY MEDICINE | Facility: CLINIC | Age: 79
End: 2024-11-15
Payer: COMMERCIAL

## 2024-11-15 DIAGNOSIS — M99.01 SOMATIC DYSFUNCTION OF CERVICAL REGION: ICD-10-CM

## 2024-11-15 DIAGNOSIS — M54.2 NECK PAIN: Primary | ICD-10-CM

## 2024-11-15 DIAGNOSIS — M99.00 SOMATIC DYSFUNCTION OF HEAD REGION: ICD-10-CM

## 2024-11-15 DIAGNOSIS — M99.07 SOMATIC DYSFUNCTION OF UPPER EXTREMITY: ICD-10-CM

## 2024-11-15 NOTE — PROGRESS NOTES
HPI      Raisa is a 79 year old who presents today for Osteopathic Evaluation.   No chief complaint on file.    Chronic neck pain  Saw PM&R on 9/12/24-they discussed shots or ablations  Has meningioma on left side  Degenerative changes in C-spine, facet arthopathy    Using voltaren gel on the neck/upper back  Sometimes radiates to top of head and shoulder blades/upper blades  Pain off/on for 6 months  Worse since may 2024-after hitting head on car door  Sees chiropractor weekly or monthly  Has never done PT for the neck  Tylenol  Some tingling in arms    Today  Got neck massager-helping    All active medical problems, med list, PMHx, and social Hx updated and reviewed.    Allergies   Allergen Reactions    Aspirin Nausea and Vomiting     sick    Chantix [Varenicline Tartrate] Other (See Comments)     Sees things    Glycerin Itching     LIQUID,   Reactions: itchy and redness      Ipratropium Other (See Comments)     atrovent inhaler caused sore, tight throat , chest hurt, breathless.     Sympathomimetics Other (See Comments)     Patient doesn't know why this is listed as an allergy    Varenicline GI Disturbance, Other (See Comments) and Nausea and Vomiting    Wool Fiber Itching     Redness      Review of Systems       ROS      10 point ROS neg other than the symptoms noted above here or in the HPI.    Physical Exam     There were no vitals filed for this visit.    XR c-spine 8/26/24  IMPRESSION: Preserved vertebral body heights. Relative straightening  of the vertebral body alignment. Loss of disc height, opposing  endplate degenerative change at C5-C6 and C6-C7. Otherwise preserved  intervertebral disc spaces for patient age. Multilevel uncovertebral  and facet osteoarthrosis bilaterally. No prevertebral edema. Nonfocal  extraspinal structures.    Osteopathic Structural Exam and additional MSK exam found below in OMT Procedure Note.    Assessment and Plan     Diagnoses and all orders for this visit:    Neck  pain  -     OSTEOPATHIC MANIP,3-4 BODY REGN    Somatic dysfunction of head region  -     OSTEOPATHIC MANIP,3-4 BODY REGN    Somatic dysfunction of cervical region  -     OSTEOPATHIC MANIP,3-4 BODY REGN    Somatic dysfunction of upper extremity  -     OSTEOPATHIC MANIP,3-4 BODY REGN        Given that this has been interfering with the patient's quality of life and ADLs, after discussion, informed consent, and medical assessment for safety, we have together decided to address this concern with Osteopathic Manipulative Treatment.    Please see OMT Procedure Note below for the specifics of treatment.         OMT PROCEDURE NOTE    Body Region: Head  Somatic Dysfunction: minimal OA tightness b/l  Treatment: suboccipital release, soft tissue  Outcome: Improved    Body Region: C-spine / Neck  Somatic Dysfunction: very restricted rotation and SB b/l  Treatment: muscle energy, gentle stretching, soft tissue  Outcome: Stable    Body Region: Upper extremity  Somatic Dysfunction: tight trapezius R>L  Treatment: soft tissue, pressure point release, muscle energy  Outcome: Improved      Very restricted ROM of neck with SB and rotation, making OMT challenging. She tried PT but had an issue with the physical therapist. Stretches given to do at home. Continue neck massager, heat, and topical voltaren.    The patient actively participated in OMT and was able to communicate both positive and negative feedback throughout. OMT completed without incident. Patient tolerated treatment well. Patient reported that ROM, function, and/or pain level were improved. Advised that pain is occasionally worse during the first 24 hours after treatment and that drinking more water and taking Tylenol or Ibuprofen often help. Patient to return in 2-4 week/s or as needed for repeat osteopathic assessment.     Luciano Ceron,     I spent a total of 20 minutes on the day of the visit.   Time spent by me doing chart review, history and exam,  documentation and further activities per the note        Answers submitted by the patient for this visit:  General Questionnaire (Submitted on 11/10/2024)  Chief Complaint: Chronic problems general questions HPI Form  What is the reason for your visit today? : Therapy  How many servings of fruits and vegetables do you eat daily?: 4 or more  On average, how many sweetened beverages do you drink each day (Examples: soda, juice, sweet tea, etc.  Do NOT count diet or artificially sweetened beverages)?: 2  How many minutes a day do you exercise enough to make your heart beat faster?: 10 to 19  How many days a week do you exercise enough to make your heart beat faster?: 3 or less  How many days per week do you miss taking your medication?: 0  Questionnaire about: Chronic problems general questions HPI Form (Submitted on 11/10/2024)  Chief Complaint: Chronic problems general questions HPI Form

## 2024-12-03 ENCOUNTER — MYC REFILL (OUTPATIENT)
Dept: FAMILY MEDICINE | Facility: CLINIC | Age: 79
End: 2024-12-03
Payer: MEDICARE

## 2024-12-03 DIAGNOSIS — M62.838 SPASM OF MUSCLE: ICD-10-CM

## 2024-12-04 RX ORDER — CYCLOBENZAPRINE HCL 5 MG
5 TABLET ORAL 3 TIMES DAILY PRN
Qty: 30 TABLET | Refills: 0 | Status: SHIPPED | OUTPATIENT
Start: 2024-12-04

## 2025-01-23 ENCOUNTER — TRANSFERRED RECORDS (OUTPATIENT)
Dept: HEALTH INFORMATION MANAGEMENT | Facility: CLINIC | Age: 80
End: 2025-01-23
Payer: MEDICARE

## 2025-02-03 ENCOUNTER — OFFICE VISIT (OUTPATIENT)
Dept: FAMILY MEDICINE | Facility: CLINIC | Age: 80
End: 2025-02-03
Payer: COMMERCIAL

## 2025-02-03 ENCOUNTER — ANCILLARY PROCEDURE (OUTPATIENT)
Dept: GENERAL RADIOLOGY | Facility: CLINIC | Age: 80
End: 2025-02-03
Attending: INTERNAL MEDICINE
Payer: COMMERCIAL

## 2025-02-03 VITALS
SYSTOLIC BLOOD PRESSURE: 126 MMHG | DIASTOLIC BLOOD PRESSURE: 78 MMHG | WEIGHT: 153 LBS | OXYGEN SATURATION: 95 % | TEMPERATURE: 97 F | HEART RATE: 85 BPM | RESPIRATION RATE: 20 BRPM | BODY MASS INDEX: 27.63 KG/M2

## 2025-02-03 DIAGNOSIS — K63.5 CECAL POLYP: ICD-10-CM

## 2025-02-03 DIAGNOSIS — J43.2 CENTRILOBULAR EMPHYSEMA (H): ICD-10-CM

## 2025-02-03 DIAGNOSIS — Z01.818 PREOP GENERAL PHYSICAL EXAM: Primary | ICD-10-CM

## 2025-02-03 DIAGNOSIS — E78.5 HYPERLIPIDEMIA LDL GOAL <100: ICD-10-CM

## 2025-02-03 DIAGNOSIS — D32.9 MENINGIOMA (H): ICD-10-CM

## 2025-02-03 DIAGNOSIS — F51.01 PRIMARY INSOMNIA: ICD-10-CM

## 2025-02-03 DIAGNOSIS — Z01.818 PREOP GENERAL PHYSICAL EXAM: ICD-10-CM

## 2025-02-03 DIAGNOSIS — M54.2 NECK PAIN: ICD-10-CM

## 2025-02-03 PROBLEM — R52 PAIN: Status: RESOLVED | Noted: 2022-01-27 | Resolved: 2025-02-03

## 2025-02-03 PROBLEM — Z96.641 PRESENCE OF RIGHT ARTIFICIAL HIP JOINT: Status: RESOLVED | Noted: 2022-01-26 | Resolved: 2025-02-03

## 2025-02-03 PROBLEM — Z85.3 PERSONAL HISTORY OF MALIGNANT NEOPLASM OF BREAST: Status: RESOLVED | Noted: 2022-01-26 | Resolved: 2025-02-03

## 2025-02-03 LAB
ANION GAP SERPL CALCULATED.3IONS-SCNC: 13 MMOL/L (ref 7–15)
BUN SERPL-MCNC: 14.8 MG/DL (ref 8–23)
CALCIUM SERPL-MCNC: 10.1 MG/DL (ref 8.8–10.4)
CHLORIDE SERPL-SCNC: 102 MMOL/L (ref 98–107)
CREAT SERPL-MCNC: 0.95 MG/DL (ref 0.51–0.95)
EGFRCR SERPLBLD CKD-EPI 2021: 61 ML/MIN/1.73M2
GLUCOSE SERPL-MCNC: 81 MG/DL (ref 70–99)
HCO3 SERPL-SCNC: 27 MMOL/L (ref 22–29)
HGB BLD-MCNC: 13.6 G/DL (ref 11.7–15.7)
POTASSIUM SERPL-SCNC: 3.9 MMOL/L (ref 3.4–5.3)
SODIUM SERPL-SCNC: 142 MMOL/L (ref 135–145)

## 2025-02-03 PROCEDURE — 85018 HEMOGLOBIN: CPT | Performed by: INTERNAL MEDICINE

## 2025-02-03 PROCEDURE — 80048 BASIC METABOLIC PNL TOTAL CA: CPT | Performed by: INTERNAL MEDICINE

## 2025-02-03 PROCEDURE — 36415 COLL VENOUS BLD VENIPUNCTURE: CPT | Performed by: INTERNAL MEDICINE

## 2025-02-03 PROCEDURE — 93000 ELECTROCARDIOGRAM COMPLETE: CPT | Performed by: INTERNAL MEDICINE

## 2025-02-03 PROCEDURE — 99214 OFFICE O/P EST MOD 30 MIN: CPT | Performed by: INTERNAL MEDICINE

## 2025-02-03 PROCEDURE — 71046 X-RAY EXAM CHEST 2 VIEWS: CPT | Mod: TC | Performed by: RADIOLOGY

## 2025-02-03 ASSESSMENT — PAIN SCALES - GENERAL: PAINLEVEL_OUTOF10: MILD PAIN (3)

## 2025-02-03 NOTE — PROGRESS NOTES
Preoperative Evaluation  [facility]  [address]  [address]  [place] [address]  Phone: [phone]  Fax: [phone]  Primary Provider: Alicia Tidwell DO  Pre-op Performing Provider: Alicia Tidwell DO  Feb 3, 2025             1/29/2025   Surgical Information   What procedure is being done? Abdominal Surgery to remove polyp and appendix   Facility or Hospital where procedure/surgery will be performed: [facility]   Who is doing the procedure / surgery? Lucía Wallace MD   Date of surgery / procedure: 02/12/2025   Time of surgery / procedure: 7:30 a.m.   Where do you plan to recover after surgery? at home alone     Fax number for surgical facility: [phone] and [phone] (phone number [phone])    Assessment & Plan     The proposed surgical procedure is considered INTERMEDIATE risk.    (Z01.818) Preop general physical exam  (primary encounter diagnosis)  (K63.5) Cecal polyp  Comment: Recommending removal with laparoscopy with partial cecectomy with appendectomy for colon adenoma.  Plan: XR Chest 2 Views, EKG 12-lead complete w/read -        Clinics, Basic metabolic panel  (Ca, Cl, CO2,         Creat, Gluc, K, Na, BUN), Hemoglobin    (J43.2) Centrilobular emphysema (H)  Comment: Not having any dyspnea currently- uses albuterol twice monthly at most- using Bevespi daily as maintenance therapy.  CXR requested by surgeon- ordered.  Plan: XR Chest 2 Views    (D32.9) Meningioma (H)  Comment: Noted, follows with Neurosurgery- last saw 10/4/23- due to repeat follow up in 2025.    (M54.2) Neck pain  Comment: When really severe- uses Flexeril sparingly prn.  Wears soft neck brace due to neck pain.    (F51.01) Primary insomnia  Comment: Uses Tylenol PM prn for sleep sometimes- discussed reducing this because can incr risk of falls.     (E78.5) Hyperlipidemia, LDL goal <100  Comment: Cont rosuvastatin 5 mg daily; no side effects.           -  No identified additional risk factors other than previously addressed    Preoperative Medication Instructions  Antiplatelet or Anticoagulation Medication Instructions   - Patient is on no antiplatelet or anticoagulation medications.    Additional Medication Instructions  Take all scheduled medications on the day of surgery   - Herbal medications and vitamins: DO NOT TAKE 14 days prior to surgery.   - LABA, inhaled corticosteroid, long-acting anticholinergics: Continue without modification.   - rescue Inhaler: Continue PRN. Bring to hospital on the day of surgery.    Recommendation  Approval given to proceed with proposed procedure, without further diagnostic evaluation.    Tanna Kline is a 79 year old, presenting for the following:  Pre-Op Exam          2/3/2025     1:09 PM   Additional Questions   Roomed by Elsy   Accompanied by alone         2/3/2025     1:09 PM   Patient Reported Additional Medications   Patient reports taking the following new medications none     HPI related to upcoming procedure:     Having cecectomy due to polyp presence at appendix.  Does note mild pain RLQ.  No fevers.  Had some runny nose this morning.          1/29/2025   Pre-Op Questionnaire   Have you ever had a heart attack or stroke? No   Have you ever had surgery on your heart or blood vessels, such as a stent placement, a coronary artery bypass, or surgery on an artery in your head, neck, heart, or legs? No   Do you have chest pain with activity? No   Do you have a history of heart failure? No   Do you currently have a cold, bronchitis or symptoms of other infection? No   Do you have a cough, shortness of breath, or wheezing? No   Do you or anyone in your family have previous history of blood clots? No   Do you or does anyone in your family have a serious bleeding problem such as prolonged bleeding following surgeries or cuts? No   Have you ever had problems with anemia or been told to take iron pills? (!) YES - not  currently   Have you had any abnormal blood loss such as black, tarry or bloody stools, or abnormal vaginal bleeding? No   Have you ever had a blood transfusion? No   Are you willing to have a blood transfusion if it is medically needed before, during, or after your surgery? Yes   Have you or any of your relatives ever had problems with anesthesia? (!) YES - after hysterectomy had prolonged time to wake up after anesthesia   Do you have sleep apnea, excessive snoring or daytime drowsiness? No   Do you have any artifical heart valves or other implanted medical devices like a pacemaker, defibrillator, or continuous glucose monitor? No   Do you have artificial joints? (!) YES- right hip arthroplasty   Are you allergic to latex? No     Health Care Directive  Patient has a Health Care Directive on file      Preoperative Review of    reviewed - no record of controlled substances prescribed.      Status of Chronic Conditions:  COPD - Patient has a longstanding history of moderate-severe COPD . Patient has been doing well overall noting NO SYMPTOMS and continues on medication regimen consisting of Bevespi and as needed albuterol without adverse reactions or side effects.    HYPERLIPIDEMIA - Patient has a long history of significant Hyperlipidemia requiring medication for treatment with recent good control. Patient reports no problems or side effects with the medication.     Patient Active Problem List    Diagnosis Date Noted    Tinnitus, left 01/03/2024     Priority: Medium     Persisting since October, has had some in right ear as well.  Not painful nor with overt hearing loss.  Effusion on left resolved.  ENT referral placed.      Neck pain 11/16/2023     Priority: Medium    History of total right hip replacement 01/27/2022     Priority: Medium    Primary insomnia 01/27/2022     Priority: Medium    Drug-induced constipation 01/27/2022     Priority: Medium    Acquired absence of right breast and nipple 01/26/2022      Priority: Medium    Age-related osteoporosis with current pathological fracture, right femur, subsequent encounter for fracture with routine healing 01/26/2022     Priority: Medium    Spasm of muscle 01/26/2022     Priority: Medium    Mixed incontinence 01/02/2019     Priority: Medium    Pelvic floor dysfunction 01/02/2019     Priority: Medium    Myalgia of pelvic floor 01/02/2019     Priority: Medium    Centrilobular emphysema (H)      Priority: Medium    History of colonic polyps 05/31/2016     Priority: Medium     Repeat colonoscopy due 2019.      Meningioma (H) 09/24/2014     Priority: Medium    Other hammer toe (acquired) 05/19/2014     Priority: Medium    Hyperlipidemia LDL goal <100 04/04/2012     Priority: Medium    Chronic rhinitis 12/14/2011     Priority: Medium    Osteoporosis 08/18/2010     Priority: Medium    Malignant neoplasm of female breast (H) 01/03/2007     Priority: Medium     Problem list name updated by automated process. Provider to review        Past Medical History:   Diagnosis Date    Breast cancer, right breast (H)     Centrilobular emphysema (H)     Colon polyps     repeat colonoscopy 2019     Past Surgical History:   Procedure Laterality Date    ABDOMEN SURGERY      ARTHROPLASTY HIP Right 01/24/2022    Procedure: ARTHROPLASTY, HIP, TOTAL;  Surgeon: Maddy Arango MD;  Location:  OR    BIOPSY      BREAST SURGERY  2007    CATARACT IOL, RT/LT      CHOLECYSTECTOMY      COLONOSCOPY      COLONOSCOPY N/A 10/04/2019    Procedure: COLONOSCOPY, WITH POLYPECTOMY AND BIOPSY;  Surgeon: Lucía John MD;  Location:  GI    ORTHOPEDIC SURGERY      PHACOEMULSIFICATION CLEAR CORNEA WITH STANDARD INTRAOCULAR LENS IMPLANT  05/22/2014    Procedure: PHACOEMULSIFICATION CLEAR CORNEA WITH STANDARD INTRAOCULAR LENS IMPLANT;  Surgeon: Rahul Reid MD;  Location:  EC    PHACOEMULSIFICATION CLEAR CORNEA WITH STANDARD INTRAOCULAR LENS IMPLANT  07/08/2014    Procedure: PHACOEMULSIFICATION CLEAR  CORNEA WITH STANDARD INTRAOCULAR LENS IMPLANT;  Surgeon: Rahul Reid MD;  Location:  OR    SURGICAL HISTORY OF -   08/2000    left foot reconstruction    SURGICAL HISTORY OF -   1979    hysterectomy     Current Outpatient Medications   Medication Sig Dispense Refill    acetaminophen (TYLENOL) 500 MG tablet Take 2 tablets (1,000 mg) by mouth every 6 hours as needed for mild pain      albuterol (PROAIR HFA/PROVENTIL HFA/VENTOLIN HFA) 108 (90 Base) MCG/ACT inhaler Inhale 2 puffs into the lungs every 6 hours as needed for shortness of breath, wheezing or cough 18 g 3    Calcium Carbonate-Vitamin D (CALCIUM + D PO) Take 1 tablet by mouth daily Total 1200/800mg daily      cyclobenzaprine (FLEXERIL) 5 MG tablet Take 1 tablet (5 mg) by mouth 3 times daily as needed for muscle spasms. 30 tablet 0    diphenhydrAMINE-acetaminophen (TYLENOL PM)  MG tablet Take 1-2 tablets by mouth nightly as needed for sleep      fluticasone (FLONASE) 50 MCG/ACT nasal spray Spray 1-2 sprays into both nostrils daily 16 g 11    Glycopyrrolate-Formoterol (BEVESPI AEROSPHERE) 9-4.8 MCG/ACT oral inhaler Inhale 2 puffs into the lungs 2 times daily 10.7 g 5    multivitamin (ONE-DAILY) tablet Take 1 tablet by mouth daily      rosuvastatin (CRESTOR) 5 MG tablet Take 1 tablet (5 mg) by mouth daily. 90 tablet 3    triamcinolone (KENALOG) 0.1 % external ointment Apply topically 2 times daily as needed for irritation (itching). 80 g 0       Allergies   Allergen Reactions    Aspirin Nausea and Vomiting     sick    Chantix [Varenicline Tartrate] Other (See Comments)     Sees things    Glycerin Itching     LIQUID,   Reactions: itchy and redness      Ipratropium Other (See Comments)     atrovent inhaler caused sore, tight throat , chest hurt, breathless.     Sympathomimetics Other (See Comments)     Patient doesn't know why this is listed as an allergy    Varenicline GI Disturbance, Other (See Comments) and Nausea and Vomiting    Wool Fiber Itching      Redness         Social History     Tobacco Use    Smoking status: Former     Current packs/day: 0.00     Average packs/day: 1.5 packs/day for 47.4 years (71.0 ttl pk-yrs)     Types: Cigarettes     Start date: 1967     Quit date: 5/10/2014     Years since quitting: 10.7    Smokeless tobacco: Never    Tobacco comments:     Quit several times over the years including when pregnant   Substance Use Topics    Alcohol use: Never     Family History   Problem Relation Age of Onset    Cerebrovascular Disease Father         Had several small ones    Breast Cancer Sister         Had double mastectomy.  Cancer spread to other organs and brain because she could not get treatment because of Covid-19 pandemic.  She passed away in 2020.    Diabetes Sister         Overweight    Breast Cancer Sister     Obesity Sister         On diet/On diet/On diet    Cancer Brother     Asthma Brother         Uses inhalers    Other Cancer Brother         Non-Hodgins Lymphoma    Diabetes Brother         Overweight    Cancer Brother     Obesity Brother     Asthma Brother     Obesity Brother     Asthma Niece         passed away of pneumonia    Breast Cancer Cousin         Both breasts    Cancer Other         Stomach cancer- cousin    Breast Cancer Other         Aunts    Diabetes Other         Uncles and cousin/Paternal Uncles/Paternal Uncles/Paternal Uncles/Paternal Uncles    Cancer Other         Family Hx of colon cancer    Breast Cancer Other         Paternal Aunt/Paternal Aunt/Paternal Aunt/Paternal Aunt    Colon Cancer Other         Maternal Great Uncles    Colon Cancer Other         Maternal Uncle/Maternal Great Uncles/Maternal Great Uncles/Maternal Great Uncles    Diabetes Other          of complications    Breast Cancer Other         had double mastectomy    Colon Cancer Other         Colon cancer    Anesthesia Reaction No family hx of      History   Drug Use No               Objective    /78 (BP Location: Right arm,  "Patient Position: Sitting, Cuff Size: Adult Regular)   Pulse 85   Temp 97  F (36.1  C) (Temporal)   Resp 20   Wt 69.4 kg (153 lb)   LMP  (LMP Unknown)   SpO2 95%   BMI 27.63 kg/m     Estimated body mass index is 27.63 kg/m  as calculated from the following:    Height as of 10/17/24: 1.585 m (5' 2.4\").    Weight as of this encounter: 69.4 kg (153 lb).  Physical Exam  GENERAL: alert and no distress  EYES: Eyes grossly normal to inspection, PERRL and conjunctivae and sclerae normal  HENT: ear canals and TM's normal, nose and mouth without ulcers or lesions  NECK: no adenopathy, no asymmetry, masses, or scars  RESP: lungs clear to auscultation - no rales, rhonchi or wheezes  CV: regular rate and rhythm, normal S1 S2, no S3 or S4, no murmur, click or rub, no peripheral edema  MS: no gross musculoskeletal defects noted, no edema  SKIN: no suspicious lesions or rashes  NEURO: Normal strength and tone, mentation intact and speech normal  PSYCH: mentation appears normal, affect normal/bright    Recent Labs   Lab Test 10/07/24  1433   HGB 13.6         POTASSIUM 4.2   CR 0.90        Diagnostics  Labs pending at this time.  Results will be reviewed when available.   EKG required for requirement by surgeon and not completed in the last 90 days.     Revised Cardiac Risk Index (RCRI)  The patient has the following serious cardiovascular risks for perioperative complications:   - No serious cardiac risks = 0 points     RCRI Interpretation: 0 points: Class I (very low risk - 0.4% complication rate)         Signed Electronically by: Alicia Tidwell DO  A copy of this evaluation report is provided to the requesting physician.         "

## 2025-02-03 NOTE — RESULT ENCOUNTER NOTE
Dear Raisa,    Your EKG shows normal sinus rhythm and no other concerning findings.    Kind regards,    Alicia Tidwell, DO  Internal Medicine - Pediatrics Physician  [facility]

## 2025-02-03 NOTE — PATIENT INSTRUCTIONS
I recommend following up with neurosurgery (Tia Moser PA-C  Department of Neurosurgery) [facility]: Neurosurgery Clinic - [place] [phone]     Wear stiff soled shoes or slippers for your 2nd toe pain on the right foot    Foot Care Resources    There are several providers of routine foot care in the [facility]. Services may include nail trimming, callus removal, feet soaking, and checking for signs of foot problems.  This resource information is provided for your convenience. It is not a guarantee of payment. Check with the foot care service provider and/or your health insurance's customer service staff about whether you have coverage for routine foot care.      [facility]  [address], [address], [place], [place] [address] [phone]    [facility]  Basic foot care in your home or at community centers. Serves [facility].  [phone]    [facility]  Routine foot care by a licensed nurse. Services provided at multiple locations.  [phone]    [facility]  [address], [address] [address]  [phone]    [facility]  (Open Monday-Thursday)   [address] [facility] [place], [place] [id number] [phone]    [facility]  Locations in [facility], [place] and [place].  [phone]    [facility]  Specializes in diabetic foot care.  [phone]    [facility]  [address], [address], [place] [address] [phone]      Senior and Community Centers    Some senior and community centers offer routine foot care services. Here is a list of senior centers in [facility]. Call locations near you to find about their foot care services and to schedule an appointment.    [facility] [phone] [address] [facility]   [place] [phone] [address] [facility]   ([facility]) [phone] [address] [facility]  [place] [phone] [address] [phone] [address]   [place] [phone] [address] [facility] [phone] [address] [facility]  [phone] [address] [place]     [facility]   ([facility]) [phone] [address]     [facility] [phone] [address]   [place] [phone] [address] [facility]  ([facility]) [phone] [address]   [place]  [phone] [address]   [place]  ([facility]) [phone] [address] [facility] (other than [place])    [facility] ([facility]) [phone] [address]   [place] [phone] [address]   [place] [phone] [address]   [place] ([place]) [phone] [address]   [place]  ([facility]) [phone] [address] [facility]  ([facility]) [phone] [address]   [place]  ([facility]) [phone] [address]   [facility] [phone] [address]   [place]  ([facility]) [phone] [address]   [place]  ([facility]) [phone] [address]   [place]  ([facility]) [phone] [address]       [facility]  [phone] [address] [facility] [phone] [address]   [place] [phone] [address]   [place] ([place]) [phone] [address] ([place]) [address]     [facility] (other than [place])    [place] [phone] [address] [facility] [phone] [address]   [place] [phone] [address]     [place] [phone] [address] [facility] [phone] [address] [facility]  [phone] [address] [facility] [phone] [address]  [phone] [address]   [place]  ([facility]) [phone] [address] [facility]       Adult Day Service Centers  **Please note you must be a member of these centers to get foot care through them.    [facility] [phone] 2 locations in [place]   [facility] [phone] [facility] [phone] [place]   [place]  Club Day Program [phone] [place]   [facility] [phone] [facility] [phone] [facility] [phone] [facility] [phone] [facility] Adult Day Services [phone] [facility] [phone] [place]       Living at Home/Block Nurse Program     These programs serve seniors in various neighborhoods with services needed to support their ability to live at home. Eligibility for services varies. Some of these services are offered in-home. Please ask when scheduling.    [place]    [facility] Seniors  Services include foot care clinics  [phone]    [facility]   Nurse Is In  program includes foot care. In home services available   [phone] or email at [email or web address]    [facility] Seniors  Services include care of feet and nails in your home  [phone] or www.seseniorsmpls.org      [facility] Living at Home/Block Nurse Program  Foot care clinic at [facility]  [phone]      General Resource Lines to Find Services     [place] Board on Aging Senior Linkage Line - [phone]   Statewide Resources for Seniors - www.[place]Help.info   Resources for [place] Caregivers - www.care[place]iver[place].org      How to Take Your Medication Before Surgery  Preoperative Medication Instructions   Antiplatelet or Anticoagulation Medication  Instructions   - Patient is on no antiplatelet or anticoagulation medications.    Additional Medication Instructions  Take all scheduled medications on the day of surgery   - Herbal medications and vitamins: DO NOT TAKE 14 days prior to surgery.   - LABA, inhaled corticosteroid, long-acting anticholinergics: Continue without modification.   - rescue Inhaler: Continue PRN. Bring to hospital on the day of surgery.       Patient Education   Preparing for Your Surgery  For Adults  Getting started  In most cases, a nurse will call to review your health history and instructions. They will give you an arrival time based on your scheduled surgery time. Please be ready to share:  Your doctor's clinic name and phone number  Your medical, surgical, and anesthesia history  A list of allergies and sensitivities  A list of medicines, including herbal treatments and over-the-counter drugs  Whether the patient has a legal guardian (ask how to send us the papers in advance)  Note: You may not receive a call if you were seen at our PAC (Preoperative Assessment Center).  Please tell us if you're pregnant--or if there's any chance you might be pregnant. Some surgeries may injure a fetus (unborn baby), so they require a pregnancy test. Surgeries that are safe for a fetus don't always need a test, and you can choose whether to have one.   Preparing for surgery  Within 10 to 30 days of surgery: Have a pre-op exam (sometimes called an H&P, or History and Physical). This can be done at a clinic or pre-operative center.  If you're having a , you may not need this exam. Talk to your care team.  At your pre-op exam, talk to your care team about all medicines you take. (This includes CBD oil and any drugs, such as THC, marijuana, and other forms of cannabis.) If you need to stop any medicine before surgery, ask when to start taking it again.  This is for your safety. Many medicines and drugs can make you bleed too much during surgery.  Some change how well surgery (anesthesia) drugs work.  Call your insurance company to let them know you're having surgery. (If you don't have insurance, call [phone].)  Call your clinic if there's any change in your health. This includes a scrape or scratch near the surgery site, or any signs of a cold (sore throat, runny nose, cough, rash, fever).  Eating and drinking guidelines  For your safety: Unless your surgeon tells you otherwise, follow the guidelines below.  Eat and drink as normal until 8 hours before you arrive for surgery. After that, no food or milk. You can spit out gum when you arrive.  Drink clear liquids until 2 hours before you arrive. These are liquids you can see through, like water, Gatorade, and Propel Water. They also include plain black coffee and tea (no cream or milk).  No alcohol for 24 hours before you arrive. The night before surgery, stop any drinks that contain THC.  If your care team tells you to take medicine on the morning of surgery, it's okay to take it with a sip of water. No other medicines or drugs are allowed (including CBD oil)--follow your care team's instructions.  If you have questions the day of surgery, call your hospital or surgery center.   Preventing infection  Shower or bathe the night before and the morning of surgery. Follow the instructions your clinic gave you. (If no instructions, use regular soap.)  Don't shave or clip hair near your surgery site. We'll remove the hair if needed.  Don't smoke or vape the morning of surgery. No chewing tobacco for 6 hours before you arrive. A nicotine patch is okay. You may spit out nicotine gum when you arrive.  For some surgeries, the surgeon will tell you to fully quit smoking and nicotine.  We will make every effort to keep you safe from infection. We will:  Clean our hands often with soap and water (or an alcohol-based hand rub).  Clean the skin at your surgery site with a special soap that kills germs.  Give you a  special gown to keep you warm. (Cold raises the risk of infection.)  Wear hair covers, masks, gowns, and gloves during surgery.  Give antibiotic medicine, if prescribed. Not all surgeries need this medicine.  What to bring on the day of surgery  Photo ID and insurance card  Copy of your health care directive, if you have one  Glasses and hearing aids (bring cases)  You can't wear contacts during surgery  Inhaler and eye drops, if you use them (tell us about these when you arrive)  CPAP machine or breathing device, if you use them  A few personal items, if spending the night  If you have . . .  A pacemaker, ICD (cardiac defibrillator), or other implant: Bring the ID card.  An implanted stimulator: Bring the remote control.  A legal guardian: Bring a copy of the certified (court-stamped) guardianship papers.  Please remove any jewelry, including body piercings. Leave jewelry and other valuables at home.  If you're going home the day of surgery  You must have a responsible adult drive you home. They should stay with you overnight as well.  If you don't have someone to stay with you, and you aren't safe to go home alone, we may keep you overnight. Insurance often won't pay for this.  After surgery  If it's hard to control your pain or you need more pain medicine, please call your surgeon's office.  Questions?   If you have any questions for your care team, list them here:   ____________________________________________________________________________________________________________________________________________________________________________________________________________________________________________________________  For informational purposes only. Not to replace the advice of your health care provider. Copyright   2003, 2019 [facility]. All rights reserved. Clinically reviewed by Lyle Villa MD. [employer] [id number] - REV 08/24.

## 2025-02-11 ENCOUNTER — ANESTHESIA EVENT (OUTPATIENT)
Dept: SURGERY | Facility: CLINIC | Age: 80
End: 2025-02-11
Payer: MEDICARE

## 2025-02-12 ENCOUNTER — ANESTHESIA (OUTPATIENT)
Dept: SURGERY | Facility: CLINIC | Age: 80
End: 2025-02-12
Payer: MEDICARE

## 2025-02-12 ENCOUNTER — HOSPITAL ENCOUNTER (OUTPATIENT)
Facility: CLINIC | Age: 80
Discharge: HOME | End: 2025-02-12
Attending: COLON & RECTAL SURGERY | Admitting: COLON & RECTAL SURGERY
Payer: MEDICARE

## 2025-02-12 VITALS
RESPIRATION RATE: 16 BRPM | HEIGHT: 63 IN | BODY MASS INDEX: 27.11 KG/M2 | TEMPERATURE: 98.2 F | OXYGEN SATURATION: 94 % | HEART RATE: 80 BPM | SYSTOLIC BLOOD PRESSURE: 128 MMHG | DIASTOLIC BLOOD PRESSURE: 54 MMHG | WEIGHT: 153 LBS

## 2025-02-12 DIAGNOSIS — D12.0 ADENOMATOUS POLYP OF CECUM: Primary | ICD-10-CM

## 2025-02-12 LAB
ABO + RH BLD: NORMAL
ANION GAP SERPL CALCULATED.3IONS-SCNC: 9 MMOL/L (ref 7–15)
BLD GP AB SCN SERPL QL: NEGATIVE
BUN SERPL-MCNC: 13.2 MG/DL (ref 8–23)
CALCIUM SERPL-MCNC: 9.3 MG/DL (ref 8.8–10.4)
CHLORIDE SERPL-SCNC: 103 MMOL/L (ref 98–107)
CREAT SERPL-MCNC: 0.85 MG/DL (ref 0.51–0.95)
EGFRCR SERPLBLD CKD-EPI 2021: 70 ML/MIN/1.73M2
ERYTHROCYTE [DISTWIDTH] IN BLOOD BY AUTOMATED COUNT: 12.3 % (ref 10–15)
GLUCOSE SERPL-MCNC: 96 MG/DL (ref 70–99)
HCO3 SERPL-SCNC: 29 MMOL/L (ref 22–29)
HCT VFR BLD AUTO: 37.8 % (ref 35–47)
HGB BLD-MCNC: 12.3 G/DL (ref 11.7–15.7)
MCH RBC QN AUTO: 28.4 PG (ref 26.5–33)
MCHC RBC AUTO-ENTMCNC: 32.5 G/DL (ref 31.5–36.5)
MCV RBC AUTO: 87 FL (ref 78–100)
PLATELET # BLD AUTO: 151 10E3/UL (ref 150–450)
POTASSIUM SERPL-SCNC: 3.9 MMOL/L (ref 3.4–5.3)
RBC # BLD AUTO: 4.33 10E6/UL (ref 3.8–5.2)
SODIUM SERPL-SCNC: 141 MMOL/L (ref 135–145)
SPECIMEN EXP DATE BLD: NORMAL
WBC # BLD AUTO: 4.1 10E3/UL (ref 4–11)

## 2025-02-12 PROCEDURE — 250N000011 HC RX IP 250 OP 636

## 2025-02-12 PROCEDURE — 80048 BASIC METABOLIC PNL TOTAL CA: CPT | Performed by: COLON & RECTAL SURGERY

## 2025-02-12 PROCEDURE — 272N000001 HC OR GENERAL SUPPLY STERILE: Performed by: COLON & RECTAL SURGERY

## 2025-02-12 PROCEDURE — 36415 COLL VENOUS BLD VENIPUNCTURE: CPT | Performed by: COLON & RECTAL SURGERY

## 2025-02-12 PROCEDURE — 85041 AUTOMATED RBC COUNT: CPT | Performed by: COLON & RECTAL SURGERY

## 2025-02-12 PROCEDURE — 86901 BLOOD TYPING SEROLOGIC RH(D): CPT | Performed by: COLON & RECTAL SURGERY

## 2025-02-12 PROCEDURE — 88307 TISSUE EXAM BY PATHOLOGIST: CPT | Mod: TC | Performed by: COLON & RECTAL SURGERY

## 2025-02-12 PROCEDURE — 250N000013 HC RX MED GY IP 250 OP 250 PS 637: Performed by: COLON & RECTAL SURGERY

## 2025-02-12 PROCEDURE — 258N000003 HC RX IP 258 OP 636: Performed by: ANESTHESIOLOGY

## 2025-02-12 PROCEDURE — 370N000017 HC ANESTHESIA TECHNICAL FEE, PER MIN: Performed by: COLON & RECTAL SURGERY

## 2025-02-12 PROCEDURE — 250N000009 HC RX 250

## 2025-02-12 PROCEDURE — 360N000077 HC SURGERY LEVEL 4, PER MIN: Performed by: COLON & RECTAL SURGERY

## 2025-02-12 PROCEDURE — 999N000141 HC STATISTIC PRE-PROCEDURE NURSING ASSESSMENT: Performed by: COLON & RECTAL SURGERY

## 2025-02-12 PROCEDURE — 250N000009 HC RX 250: Performed by: COLON & RECTAL SURGERY

## 2025-02-12 PROCEDURE — 710N000012 HC RECOVERY PHASE 2, PER MINUTE: Performed by: COLON & RECTAL SURGERY

## 2025-02-12 PROCEDURE — 250N000011 HC RX IP 250 OP 636: Performed by: COLON & RECTAL SURGERY

## 2025-02-12 PROCEDURE — 250N000011 HC RX IP 250 OP 636: Mod: JZ | Performed by: COLON & RECTAL SURGERY

## 2025-02-12 PROCEDURE — 250N000025 HC SEVOFLURANE, PER MIN: Performed by: COLON & RECTAL SURGERY

## 2025-02-12 PROCEDURE — 258N000003 HC RX IP 258 OP 636

## 2025-02-12 PROCEDURE — 710N000009 HC RECOVERY PHASE 1, LEVEL 1, PER MIN: Performed by: COLON & RECTAL SURGERY

## 2025-02-12 RX ORDER — MORPHINE SULFATE 0.5 MG/ML
INJECTION, SOLUTION EPIDURAL; INTRATHECAL; INTRAVENOUS PRN
Status: DISCONTINUED | OUTPATIENT
Start: 2025-02-12 | End: 2025-02-12

## 2025-02-12 RX ORDER — CEFAZOLIN SODIUM/WATER 2 G/20 ML
2 SYRINGE (ML) INTRAVENOUS SEE ADMIN INSTRUCTIONS
Status: DISCONTINUED | OUTPATIENT
Start: 2025-02-12 | End: 2025-02-12 | Stop reason: HOSPADM

## 2025-02-12 RX ORDER — PROPOFOL 10 MG/ML
INJECTION, EMULSION INTRAVENOUS PRN
Status: DISCONTINUED | OUTPATIENT
Start: 2025-02-12 | End: 2025-02-12

## 2025-02-12 RX ORDER — OXYCODONE HYDROCHLORIDE 5 MG/1
5-10 TABLET ORAL EVERY 4 HOURS PRN
Qty: 6 TABLET | Refills: 0 | Status: SHIPPED | OUTPATIENT
Start: 2025-02-12

## 2025-02-12 RX ORDER — HYDROMORPHONE HCL IN WATER/PF 6 MG/30 ML
0.2 PATIENT CONTROLLED ANALGESIA SYRINGE INTRAVENOUS EVERY 5 MIN PRN
Status: DISCONTINUED | OUTPATIENT
Start: 2025-02-12 | End: 2025-02-12 | Stop reason: HOSPADM

## 2025-02-12 RX ORDER — SODIUM CHLORIDE, SODIUM LACTATE, POTASSIUM CHLORIDE, CALCIUM CHLORIDE 600; 310; 30; 20 MG/100ML; MG/100ML; MG/100ML; MG/100ML
INJECTION, SOLUTION INTRAVENOUS CONTINUOUS
Status: DISCONTINUED | OUTPATIENT
Start: 2025-02-12 | End: 2025-02-12 | Stop reason: HOSPADM

## 2025-02-12 RX ORDER — ONDANSETRON 2 MG/ML
4 INJECTION INTRAMUSCULAR; INTRAVENOUS ONCE
Status: COMPLETED | OUTPATIENT
Start: 2025-02-12 | End: 2025-02-12

## 2025-02-12 RX ORDER — METRONIDAZOLE 500 MG/100ML
500 INJECTION, SOLUTION INTRAVENOUS
Status: COMPLETED | OUTPATIENT
Start: 2025-02-12 | End: 2025-02-12

## 2025-02-12 RX ORDER — DEXAMETHASONE SODIUM PHOSPHATE 4 MG/ML
INJECTION, SOLUTION INTRA-ARTICULAR; INTRALESIONAL; INTRAMUSCULAR; INTRAVENOUS; SOFT TISSUE PRN
Status: DISCONTINUED | OUTPATIENT
Start: 2025-02-12 | End: 2025-02-12

## 2025-02-12 RX ORDER — FENTANYL CITRATE 50 UG/ML
INJECTION, SOLUTION INTRAMUSCULAR; INTRAVENOUS PRN
Status: DISCONTINUED | OUTPATIENT
Start: 2025-02-12 | End: 2025-02-12

## 2025-02-12 RX ORDER — ONDANSETRON 2 MG/ML
INJECTION INTRAMUSCULAR; INTRAVENOUS PRN
Status: DISCONTINUED | OUTPATIENT
Start: 2025-02-12 | End: 2025-02-12

## 2025-02-12 RX ORDER — HYDROMORPHONE HCL IN WATER/PF 6 MG/30 ML
0.4 PATIENT CONTROLLED ANALGESIA SYRINGE INTRAVENOUS EVERY 5 MIN PRN
Status: DISCONTINUED | OUTPATIENT
Start: 2025-02-12 | End: 2025-02-12 | Stop reason: HOSPADM

## 2025-02-12 RX ORDER — LIDOCAINE HYDROCHLORIDE 20 MG/ML
INJECTION, SOLUTION INFILTRATION; PERINEURAL PRN
Status: DISCONTINUED | OUTPATIENT
Start: 2025-02-12 | End: 2025-02-12

## 2025-02-12 RX ORDER — MAGNESIUM HYDROXIDE 1200 MG/15ML
LIQUID ORAL PRN
Status: DISCONTINUED | OUTPATIENT
Start: 2025-02-12 | End: 2025-02-12 | Stop reason: HOSPADM

## 2025-02-12 RX ORDER — ACETAMINOPHEN 325 MG/1
975 TABLET ORAL ONCE
Status: COMPLETED | OUTPATIENT
Start: 2025-02-12 | End: 2025-02-12

## 2025-02-12 RX ORDER — BUPIVACAINE HYDROCHLORIDE 5 MG/ML
INJECTION, SOLUTION EPIDURAL; INTRACAUDAL
Status: DISCONTINUED
Start: 2025-02-12 | End: 2025-02-12 | Stop reason: HOSPADM

## 2025-02-12 RX ORDER — ONDANSETRON 4 MG/1
4 TABLET, ORALLY DISINTEGRATING ORAL EVERY 30 MIN PRN
Status: DISCONTINUED | OUTPATIENT
Start: 2025-02-12 | End: 2025-02-12 | Stop reason: HOSPADM

## 2025-02-12 RX ORDER — ONDANSETRON 2 MG/ML
4 INJECTION INTRAMUSCULAR; INTRAVENOUS EVERY 30 MIN PRN
Status: DISCONTINUED | OUTPATIENT
Start: 2025-02-12 | End: 2025-02-12 | Stop reason: HOSPADM

## 2025-02-12 RX ORDER — FENTANYL CITRATE 0.05 MG/ML
25 INJECTION, SOLUTION INTRAMUSCULAR; INTRAVENOUS EVERY 5 MIN PRN
Status: DISCONTINUED | OUTPATIENT
Start: 2025-02-12 | End: 2025-02-12 | Stop reason: HOSPADM

## 2025-02-12 RX ORDER — NALOXONE HYDROCHLORIDE 0.4 MG/ML
0.1 INJECTION, SOLUTION INTRAMUSCULAR; INTRAVENOUS; SUBCUTANEOUS
Status: DISCONTINUED | OUTPATIENT
Start: 2025-02-12 | End: 2025-02-12 | Stop reason: HOSPADM

## 2025-02-12 RX ORDER — FENTANYL CITRATE 0.05 MG/ML
50 INJECTION, SOLUTION INTRAMUSCULAR; INTRAVENOUS EVERY 5 MIN PRN
Status: DISCONTINUED | OUTPATIENT
Start: 2025-02-12 | End: 2025-02-12 | Stop reason: HOSPADM

## 2025-02-12 RX ORDER — CEFAZOLIN SODIUM/WATER 2 G/20 ML
2 SYRINGE (ML) INTRAVENOUS
Status: COMPLETED | OUTPATIENT
Start: 2025-02-12 | End: 2025-02-12

## 2025-02-12 RX ORDER — OXYCODONE HYDROCHLORIDE 5 MG/1
5 TABLET ORAL
Status: COMPLETED | OUTPATIENT
Start: 2025-02-12 | End: 2025-02-12

## 2025-02-12 RX ORDER — DEXAMETHASONE SODIUM PHOSPHATE 4 MG/ML
4 INJECTION, SOLUTION INTRA-ARTICULAR; INTRALESIONAL; INTRAMUSCULAR; INTRAVENOUS; SOFT TISSUE
Status: DISCONTINUED | OUTPATIENT
Start: 2025-02-12 | End: 2025-02-12 | Stop reason: HOSPADM

## 2025-02-12 RX ADMIN — PHENYLEPHRINE HYDROCHLORIDE 100 MCG: 10 INJECTION INTRAVENOUS at 07:58

## 2025-02-12 RX ADMIN — ROCURONIUM BROMIDE 10 MG: 50 INJECTION, SOLUTION INTRAVENOUS at 08:03

## 2025-02-12 RX ADMIN — METRONIDAZOLE 500 MG: 500 INJECTION, SOLUTION INTRAVENOUS at 06:49

## 2025-02-12 RX ADMIN — DEXAMETHASONE SODIUM PHOSPHATE 4 MG: 4 INJECTION, SOLUTION INTRA-ARTICULAR; INTRALESIONAL; INTRAMUSCULAR; INTRAVENOUS; SOFT TISSUE at 07:44

## 2025-02-12 RX ADMIN — ROCURONIUM BROMIDE 50 MG: 50 INJECTION, SOLUTION INTRAVENOUS at 07:37

## 2025-02-12 RX ADMIN — MORPHINE SULFATE 0.5 MG: 0.5 INJECTION, SOLUTION EPIDURAL; INTRATHECAL; INTRAVENOUS at 08:21

## 2025-02-12 RX ADMIN — ONDANSETRON 4 MG: 2 INJECTION INTRAMUSCULAR; INTRAVENOUS at 08:32

## 2025-02-12 RX ADMIN — ACETAMINOPHEN 975 MG: 325 TABLET, FILM COATED ORAL at 06:37

## 2025-02-12 RX ADMIN — DEXMEDETOMIDINE HYDROCHLORIDE 8 MCG: 100 INJECTION, SOLUTION INTRAVENOUS at 08:10

## 2025-02-12 RX ADMIN — FENTANYL CITRATE 50 MCG: 50 INJECTION INTRAMUSCULAR; INTRAVENOUS at 08:00

## 2025-02-12 RX ADMIN — OXYCODONE HYDROCHLORIDE 5 MG: 5 TABLET ORAL at 09:34

## 2025-02-12 RX ADMIN — PHENYLEPHRINE HYDROCHLORIDE 100 MCG: 10 INJECTION INTRAVENOUS at 07:50

## 2025-02-12 RX ADMIN — Medication 2 G: at 07:30

## 2025-02-12 RX ADMIN — LIDOCAINE HYDROCHLORIDE 50 MG: 20 INJECTION, SOLUTION INFILTRATION; PERINEURAL at 07:36

## 2025-02-12 RX ADMIN — SODIUM CHLORIDE, POTASSIUM CHLORIDE, SODIUM LACTATE AND CALCIUM CHLORIDE: 600; 310; 30; 20 INJECTION, SOLUTION INTRAVENOUS at 06:44

## 2025-02-12 RX ADMIN — ONDANSETRON 4 MG: 2 INJECTION, SOLUTION INTRAMUSCULAR; INTRAVENOUS at 06:45

## 2025-02-12 RX ADMIN — DEXMEDETOMIDINE HYDROCHLORIDE 4 MCG: 100 INJECTION, SOLUTION INTRAVENOUS at 08:37

## 2025-02-12 RX ADMIN — Medication 200 MG: at 08:32

## 2025-02-12 RX ADMIN — FENTANYL CITRATE 50 MCG: 50 INJECTION INTRAMUSCULAR; INTRAVENOUS at 07:36

## 2025-02-12 RX ADMIN — PROPOFOL 200 MG: 10 INJECTION, EMULSION INTRAVENOUS at 07:36

## 2025-02-12 ASSESSMENT — ACTIVITIES OF DAILY LIVING (ADL)
ADLS_ACUITY_SCORE: 32
DIFFICULTY_EATING/SWALLOWING: NO
ADLS_ACUITY_SCORE: 51
DOING_ERRANDS_INDEPENDENTLY_DIFFICULTY: YES
ADLS_ACUITY_SCORE: 32

## 2025-02-12 NOTE — ANESTHESIA CARE TRANSFER NOTE
Patient: Raisa Archer    Procedure: Procedure(s):  Laparoscopic partial Cecectomy  with appendectomy       Diagnosis: Colon adenoma [D12.6]  Diagnosis Additional Information: No value filed.    Anesthesia Type:   General     Note:    Oropharynx: oropharynx clear of all foreign objects and spontaneously breathing  Level of Consciousness: awake  Oxygen Supplementation: face mask  Level of Supplemental Oxygen (L/min / FiO2): 8  Independent Airway: airway patency satisfactory and stable  Dentition: dentition changed  Vital Signs Stable: post-procedure vital signs reviewed and stable  Report to RN Given: handoff report given  Patient transferred to: PACU    Handoff Report: Identifed the Patient, Identified the Reponsible Provider, Reviewed the pertinent medical history, Discussed the surgical course, Reviewed Intra-OP anesthesia mangement and issues during anesthesia, Set expectations for post-procedure period and Allowed opportunity for questions and acknowledgement of understanding    Vitals:  Vitals Value Taken Time   /61 02/12/25 0900   Temp 36.2  C (97.16  F) 02/12/25 0900   Pulse 89 02/12/25 0900   Resp 13 02/12/25 0900   SpO2 93 % 02/12/25 0900   Vitals shown include unfiled device data.    Electronically Signed By: IZABELA Groves CRNA  February 12, 2025  9:02 AM

## 2025-02-12 NOTE — DISCHARGE INSTRUCTIONS
Today you were given 975 mg of Tylenol at 0630. The recommended daily maximum dose is 4000 mg.     Same Day Surgery Discharge Instructions for  Sedation and General Anesthesia     It's not unusual to feel dizzy, light-headed or faint for up to 24 hours after surgery or while taking pain medication.  If you have these symptoms: sit for a few minutes before standing and have someone assist you when you get up to walk or use the bathroom.    You should rest and relax for the next 24 hours. We recommend you make arrangements to have an adult stay with you for at least 24 hours after your discharge.  Avoid hazardous and strenuous activity.    DO NOT DRIVE any vehicle or operate mechanical equipment for 24 hours following the end of your surgery.  Even though you may feel normal, your reactions may be affected by the medication you have received.    Do not drink alcoholic beverages for 24 hours following surgery.     Slowly progress to your regular diet as you feel able. It's not unusual to feel nauseated and/or vomit after receiving anesthesia.  If you develop these symptoms, drink clear liquids (apple juice, ginger ale, broth, 7-up, etc. ) until you feel better.  If your nausea and vomiting persists for 24 hours, please notify your surgeon.      All narcotic pain medications, along with inactivity and anesthesia, can cause constipation. Drinking plenty of liquids and increasing fiber intake will help.    For any questions of a medical nature, call your surgeon.    Do not make important decisions for 24 hours.    If you had general anesthesia, you may have a sore throat for a couple of days related to the breathing tube used during surgery.  You may use Cepacol lozenges to help with this discomfort.  If it worsens or if you develop a fever, contact your surgeon.     If you feel your pain is not well managed with the pain medications prescribed by your surgeon, please contact your surgeon's office to let them know so they  can address your concerns.       *If you have questions or concerns about your procedure,  call Dr. John at [phone]**

## 2025-02-12 NOTE — BRIEF OP NOTE
[facility]    Brief Operative Note    Pre-operative diagnosis: Colon adenoma [D12.6]  Post-operative diagnosis Same as pre-operative diagnosis    Procedure: Laparoscopic partial Cecectomy, N/A - Abdomen  with appendectomy, N/A - Abdomen    Surgeon: Surgeons and Role:     * Lucía John MD - Primary     * Carlin Burnett PA-C - Assisting  Anesthesia: General   Estimated Blood Loss: Minimal    Drains: None  Specimens:   ID Type Source Tests Collected by Time Destination   1 : CECUM WITH APPENDIX Tissue Abdomen SURGICAL PATHOLOGY EXAM Lucía John MD 2/12/2025  8:24 AM      Findings:   5mm polyp at appendiceal orifice. .  Scarring around the appendix consistent with previous appendicitis.  Otherwise normal intra-abdominal anatomy IR postop  Complications: None.  Implants: * No implants in log *        ADDENDUM:    PATIENT DATA  Indicate Y or N:  Home O2 N  Hemodialysis N  Transplant patient N  Cirrhosis N  Steroids in last 30 days N  Immunomodulators in last 30 days N  Anticoagulation at time of surgery N   List medication   Prior abdominal surgery Y  Pelvic irradiation N    Albumin within 30 days if known 4.4  Hgb within 30 days if known 12.3  Cr within 30 days if known 0.85    OR DATA  Emergent N   <24 hours N   <1 week N  Bowel Prep (Y or N) Y  Antibiotics (Y or N) Y  DVT prophylaxis    Heparin N   SCD Y   None N  Drain N  ASA (1,2,3,4) 3  OR time (min) 41  Stents N  Transfuse >/= 2U N  Anastomosis   Stapled Y   Handsewn N  Leak Test    Positive N   Negative N   Not done Y

## 2025-02-12 NOTE — ANESTHESIA PROCEDURE NOTES
Airway       Patient location during procedure: OR       Procedure Start/Stop Times: 2/12/2025 7:38 AM  Staff -        Anesthesiologist:  Cristobal Ford MD       CRNA: Chay Rose APRN CRNA       Performed By: CRNA  Consent for Airway        Urgency: elective  Indications and Patient Condition       Indications for airway management: jerry-procedural       Induction type:intravenous       Mask difficulty assessment: 1 - vent by mask    Final Airway Details       Final airway type: endotracheal airway       Successful airway: ETT - single  Endotracheal Airway Details        ETT size (mm): 7.0       Cuffed: yes       Successful intubation technique: video laryngoscopy       VL Blade Size: Pitts 3       Grade View of Cords: 1       Adjucts: stylet       Position: Right       Measured from: gums/teeth       Secured at (cm): 21       Bite block used: None    Post intubation assessment        Placement verified by: capnometry, equal breath sounds and chest rise        Number of attempts at approach: 1       Ease of procedure: easy       Dentition: Intact and Unchanged    Medication(s) Administered   Medication Administration Time: 2/12/2025 7:38 AM

## 2025-02-12 NOTE — ANESTHESIA POSTPROCEDURE EVALUATION
Patient: Raisa Archer    Procedure: Procedure(s):  Laparoscopic partial Cecectomy  with appendectomy       Anesthesia Type:  General    Note:  Disposition: Outpatient   Postop Pain Control: Uneventful            Sign Out: Well controlled pain   PONV: No   Neuro/Psych: Uneventful            Sign Out: Acceptable/Baseline neuro status   Airway/Respiratory: Uneventful            Sign Out: Acceptable/Baseline resp. status   CV/Hemodynamics: Uneventful            Sign Out: Acceptable CV status   Other NRE: NONE   DID A NON-ROUTINE EVENT OCCUR?            Last vitals:  Vitals Value Taken Time   /74 02/12/25 0930   Temp 36.2  C (97.16  F) 02/12/25 0920   Pulse 75 02/12/25 0939   Resp 17 02/12/25 0939   SpO2 92 % 02/12/25 0939   Vitals shown include unfiled device data.    Electronically Signed By: Cristobal Ford MD  February 12, 2025  5:23 PM

## 2025-02-12 NOTE — OP NOTE
PREOPERATIVE DIAGNOSIS: Colon polyp at appendiceal orifice not amenable to colonoscopic resection      POSTOPERATIVE DIAGNOSIS: same      PROCEDURE: Laparoscopic appendectomy with partial cecectomy.     SURGEON: Lucía John MD    ASSISTANT: CODY Arriola      ANESTHESIA:  General.      INDICATION:  Raisa Archer is a 79 year old woman who was found to have an endoscopically unresectable polyp at the appendiceal orifice on colonoscopy. The risks and benefits of proceeding with a laparoscopic extended appendectomy versus possible ileocecal resection were discussed with the patient and they agreed to proceed.      OPERATIVE FINDINGS: a partial cecectomy across the top of the cecum was performed in order to completely excise the polyp located at the appendiceal orifice.  Upon opening the specimen, a margin of healthy cecum was present surrounding the appendiceal base. The polyp itself felt soft and was approximately 0.5 cm in size. The cecum appeared normal.      PROCEDURE IN DETAIL:  After informed consent was obtained, the patient was brought to the operating room and sequential compression devices were placed on bilateral lower extremities. The patient was placed in the supine position on the operating room table on a Pigazzi pink pad to prevent patient movement during table motion. IV antibiotics were administered and a orourke catheter was placed. General anesthesia was induced without difficulty. The abdomen was sterilely prepped and draped in standard fashion.      A supraumbilical incision was made and the abdomen entered using the standard Emmanuel technique. A #0 Vicryl suture was placed in the fascia in a figure-of-eight fashion for fascial closure at the end of the case. A balloon port was then inserted, the abdomen was insufflated with CO2 gas to a pressure of 15 mmHg, a 10 mm 30 degree laparoscope was inserted and the abdomen was explored. The operative findings are noted above. A laparoscopic  TAP block was then performed using 30 ml of 0.5% marcaine. Two additional 5 mm trocars were inserted into the abdominal cavity in the left lower quadrant and supraumbilical positions.      The patient was placed in Trendelenburg position with the right side up. The appendix located and adhesions between the appendix and the cecal mesentery were taken down using electrocautery. The mesoappendix was taken down and divided from distal to proximal using the LigaSure device.  There was scarring around the appendix consistent with possible previous appendicitis or intra-abdominal infection.  Adhesions were taken down using the LigaSure device.  The mesoappendix was taken down up to the base of the appendix. The base of the appendix at the cecum appeared normal. The mesentery of the cecum at the site of the cecectomy was then taken down and divided using the LigaSure device.      The 10 mm laparoscope was switched out for a 5 mm laparoscope, which was placed in the left lower quadrant port in preparation for the Endo-CHITO. The cecum was then transected below the level of the appendiceal base using an Endo-CHITO 60 mm stapler. This portion of the cecum was completely transected with a single fire of the Endo-CHIOT.  The appendix and the top of the cecum were then placed into an Endocatch bag and brought out through the 12 mm supraumbilical port site.  The specimen was removed from the operative field, opened on the back table with the findings above, and submitted for pathologic analysis.      The umbilical trocar was reinserted into the abdominal cavity and the staple line was inspected for hemostasis, which was adequate. The previously placed #0 Vicryl suture was used to close the fascia at the umbilical port site.  An additional 0 Vicryl suture was placed at the superior portion of the fascial incision to ensure adequate closure of the port site.  The abdomen was then allowed to desufflate, and all ports were removed from the  abdominal cavity under direct visualization.  All wounds were irrigated with saline solution and the skin was closed with 4-0 Monocryl sutures. Skin glue was applied to all incisions.      The patient tolerated the procedure well without complications. At the end of the procedure, all needle, sponge and instrument counts were correct. The patient was extubated in the operating room and brought to the recovery room in stable condition.      EBL: 5 ml  SPECIMEN: Partial cecum and appendix  COMPLICATIONS: none    Lucía John MD

## 2025-02-12 NOTE — ANESTHESIA PREPROCEDURE EVALUATION
Anesthesia Pre-Procedure Evaluation    Patient: Raisa Archer   MRN: [id number] : 1945        Procedure : Procedure(s):  Laparoscopic partial Cecectomy  with appendectomy          Past Medical History:   Diagnosis Date    Arthritis     Breast cancer, right breast (H)     Centrilobular emphysema (H)     Colon polyps     repeat colonoscopy       Past Surgical History:   Procedure Laterality Date    ABDOMEN SURGERY      ARTHROPLASTY HIP Right 2022    Procedure: ARTHROPLASTY, HIP, TOTAL;  Surgeon: Maddy Arango MD;  Location:  OR    BIOPSY      BREAST SURGERY      CATARACT IOL, RT/LT      CHOLECYSTECTOMY      COLONOSCOPY      COLONOSCOPY N/A 10/04/2019    Procedure: COLONOSCOPY, WITH POLYPECTOMY AND BIOPSY;  Surgeon: Lucía John MD;  Location:  GI    ORTHOPEDIC SURGERY      PHACOEMULSIFICATION CLEAR CORNEA WITH STANDARD INTRAOCULAR LENS IMPLANT  2014    Procedure: PHACOEMULSIFICATION CLEAR CORNEA WITH STANDARD INTRAOCULAR LENS IMPLANT;  Surgeon: Rahul Reid MD;  Location:  EC    PHACOEMULSIFICATION CLEAR CORNEA WITH STANDARD INTRAOCULAR LENS IMPLANT  2014    Procedure: PHACOEMULSIFICATION CLEAR CORNEA WITH STANDARD INTRAOCULAR LENS IMPLANT;  Surgeon: Rahul Reid MD;  Location:  OR    SURGICAL HISTORY OF -   2000    left foot reconstruction    SURGICAL HISTORY OF -       hysterectomy      Allergies   Allergen Reactions    Aspirin Nausea and Vomiting     sick    Chantix [Varenicline Tartrate] Other (See Comments)     Sees things    Glycerin Itching     LIQUID,   Reactions: itchy and redness      Ipratropium Other (See Comments)     atrovent inhaler caused sore, tight throat , chest hurt, breathless.     Sympathomimetics Other (See Comments)     Patient doesn't know why this is listed as an allergy    Varenicline GI Disturbance, Other (See Comments) and Nausea and Vomiting    Wool Fiber Itching     Redness       Social History     Tobacco Use     Smoking status: Former     Current packs/day: 0.00     Average packs/day: 1.5 packs/day for 47.4 years (71.0 ttl pk-yrs)     Types: Cigarettes     Start date: 1/2/1967     Quit date: 5/10/2014     Years since quitting: 10.7    Smokeless tobacco: Never    Tobacco comments:     Quit several times over the years including when pregnant   Substance Use Topics    Alcohol use: Never      Wt Readings from Last 1 Encounters:   02/12/25 69.4 kg (153 lb)        Anesthesia Evaluation            ROS/MED HX  ENT/Pulmonary: Comment: Centrilobular emphysema, chronic rhinitis      Neurologic: Comment: Hx of meningioma      Cardiovascular:     (+) Dyslipidemia - -   -  - -                                   (-) CAD   METS/Exercise Tolerance: 3 - Able to walk 1-2 blocks without stopping    Hematologic:       Musculoskeletal:       GI/Hepatic: Comment: Colon adenoma   (-) GERD   Renal/Genitourinary:    (-) renal disease   Endo:    (-) Type II DM and obesity   Psychiatric/Substance Use:       Infectious Disease:       Malignancy:   (+) Malignancy, History of Breast and Other.    Other:            Physical Exam    Airway        Mallampati: II   TM distance: > 3 FB   Neck ROM: full   Mouth opening: > 3 cm    Respiratory Devices and Support         Dental       (+) Modest Abnormalities - crowns, retainers, 1 or 2 missing teeth      Cardiovascular   cardiovascular exam normal          Pulmonary           (+) decreased breath sounds           OUTSIDE LABS:  CBC:   Lab Results   Component Value Date    WBC 4.7 10/07/2024    WBC 5.8 06/23/2023    HGB 13.6 02/03/2025    HGB 13.6 10/07/2024    HCT 43.6 10/07/2024    HCT 41.6 06/23/2023     10/07/2024     06/23/2023     BMP:   Lab Results   Component Value Date     02/03/2025     10/07/2024    POTASSIUM 3.9 02/03/2025    POTASSIUM 4.2 10/07/2024    CHLORIDE 102 02/03/2025    CHLORIDE 101 10/07/2024    CO2 27 02/03/2025    CO2 27 10/07/2024    BUN 14.8 02/03/2025    BUN  "15.8 10/07/2024    CR 0.95 02/03/2025    CR 0.90 10/07/2024    GLC 81 02/03/2025    GLC 92 10/07/2024     COAGS:   Lab Results   Component Value Date    PTT 30 01/23/2022    INR 0.98 01/23/2022     POC:   Lab Results   Component Value Date    BGM 94 12/22/2006     HEPATIC:   Lab Results   Component Value Date    ALBUMIN 4.4 10/07/2024    PROTTOTAL 7.1 10/07/2024    ALT 14 10/07/2024    AST 29 10/07/2024    ALKPHOS 106 10/07/2024    BILITOTAL 0.5 10/07/2024     OTHER:   Lab Results   Component Value Date    A1C 5.2 04/23/2018    JOJO 10.1 02/03/2025    MAG 1.8 09/09/2022    LIPASE 105 05/16/2018    AMYLASE 48 02/06/2009    TSH 1.07 07/15/2009    SED 12 07/05/2013       Anesthesia Plan    ASA Status:  3    NPO Status:  NPO Appropriate    Anesthesia Type: General.     - Airway: ETT   Induction: Propofol.   Maintenance: Balanced.        Consents    Anesthesia Plan(s) and associated risks, benefits, and realistic alternatives discussed. Questions answered and patient/representative(s) expressed understanding.     - Discussed: Risks, Benefits and Alternatives for BOTH SEDATION and the PROCEDURE were discussed     - Discussed with:  Patient            Postoperative Care    Pain management: IV analgesics, Oral pain medications, Multi-modal analgesia.   PONV prophylaxis: Ondansetron (or other 5HT-3), Dexamethasone or Solumedrol, Background Propofol Infusion     Comments:               Cristobal Ford MD    I have reviewed the pertinent notes and labs in the chart from the past 30 days and (re)examined the patient.  Any updates or changes from those notes are reflected in this note.    Clinically Significant Risk Factors Present on Admission                             # Overweight: Estimated body mass index is 27.1 kg/m  as calculated from the following:    Height as of this encounter: 1.6 m (5' 3\").    Weight as of this encounter: 69.4 kg (153 lb).                "

## 2025-02-14 LAB
PATH REPORT.COMMENTS IMP SPEC: NORMAL
PATH REPORT.COMMENTS IMP SPEC: NORMAL
PATH REPORT.FINAL DX SPEC: NORMAL
PATH REPORT.GROSS SPEC: NORMAL
PATH REPORT.MICROSCOPIC SPEC OTHER STN: NORMAL
PATH REPORT.RELEVANT HX SPEC: NORMAL
PHOTO IMAGE: NORMAL

## 2025-02-14 PROCEDURE — 88307 TISSUE EXAM BY PATHOLOGIST: CPT | Mod: 26 | Performed by: PATHOLOGY

## 2025-03-18 ENCOUNTER — TRANSFERRED RECORDS (OUTPATIENT)
Dept: HEALTH INFORMATION MANAGEMENT | Facility: CLINIC | Age: 80
End: 2025-03-18
Payer: MEDICARE

## 2025-04-09 ENCOUNTER — MYC MEDICAL ADVICE (OUTPATIENT)
Dept: FAMILY MEDICINE | Facility: CLINIC | Age: 80
End: 2025-04-09
Payer: MEDICARE

## 2025-04-11 ENCOUNTER — ANCILLARY PROCEDURE (OUTPATIENT)
Dept: GENERAL RADIOLOGY | Facility: CLINIC | Age: 80
End: 2025-04-11
Attending: PHYSICIAN ASSISTANT
Payer: COMMERCIAL

## 2025-04-11 DIAGNOSIS — G89.29 CHRONIC LEFT HIP PAIN: ICD-10-CM

## 2025-04-11 DIAGNOSIS — M25.552 CHRONIC LEFT HIP PAIN: ICD-10-CM

## 2025-04-11 PROCEDURE — 73502 X-RAY EXAM HIP UNI 2-3 VIEWS: CPT | Mod: TC | Performed by: RADIOLOGY

## 2025-04-14 ENCOUNTER — PATIENT OUTREACH (OUTPATIENT)
Dept: CARE COORDINATION | Facility: CLINIC | Age: 80
End: 2025-04-14
Payer: MEDICARE

## 2025-05-12 ENCOUNTER — PATIENT OUTREACH (OUTPATIENT)
Dept: CARE COORDINATION | Facility: CLINIC | Age: 80
End: 2025-05-12

## 2025-05-15 NOTE — TELEPHONE ENCOUNTER
Based on her medication list- she actually does not need to hold any of her medications explicitly, although for her medications, I would not take her morning ones and wait until after her procedure to take them.    Thank you!     Alicia Tidwell,   Internal Medicine - Pediatrics Physician  [facility]    
Dr. Tidwell --    Patient is having a colonoscopy coming up on Tuesday, 11/12.  Needs to know which medications if any she is allowed to take during this time.     Thank you,   LISA HammondN RN  [facility]    
[employer] message sent to patient.  Tsering PEARSON BSN, PHN, RN-[facility]  [phone]    
Writer responded via [employer].    Kaci Elena, BSN RN  [facility]    
Stable.

## 2025-05-16 SDOH — HEALTH STABILITY: PHYSICAL HEALTH: ON AVERAGE, HOW MANY MINUTES DO YOU ENGAGE IN EXERCISE AT THIS LEVEL?: 10 MIN

## 2025-05-16 SDOH — HEALTH STABILITY: PHYSICAL HEALTH: ON AVERAGE, HOW MANY DAYS PER WEEK DO YOU ENGAGE IN MODERATE TO STRENUOUS EXERCISE (LIKE A BRISK WALK)?: 3 DAYS

## 2025-05-19 ENCOUNTER — OFFICE VISIT (OUTPATIENT)
Dept: ORTHOPEDICS | Facility: CLINIC | Age: 80
End: 2025-05-19
Attending: PHYSICIAN ASSISTANT
Payer: MEDICARE

## 2025-05-19 DIAGNOSIS — M25.559 GREATER TROCHANTERIC PAIN SYNDROME: ICD-10-CM

## 2025-05-19 DIAGNOSIS — M76.32 ILIOTIBIAL BAND SYNDROME OF LEFT SIDE: Primary | ICD-10-CM

## 2025-05-19 DIAGNOSIS — G89.29 CHRONIC LEFT HIP PAIN: ICD-10-CM

## 2025-05-19 DIAGNOSIS — M25.552 CHRONIC LEFT HIP PAIN: ICD-10-CM

## 2025-05-19 DIAGNOSIS — M20.42 HAMMER TOE OF LEFT FOOT: ICD-10-CM

## 2025-05-19 PROCEDURE — 99203 OFFICE O/P NEW LOW 30 MIN: CPT | Performed by: STUDENT IN AN ORGANIZED HEALTH CARE EDUCATION/TRAINING PROGRAM

## 2025-05-19 NOTE — PATIENT INSTRUCTIONS
Schedule physical therapy  Follow up if no improvement 4-6 weeks   I put in the referral for podiatrist to talk about carmelo but can see [facility] if that is still your preference

## 2025-05-19 NOTE — LETTER
5/19/2025      Raisa Archer  [address] [facility] [address]      Dear Colleague,    Thank you for referring your patient, Raisa Archer, to the [facility]. Please see a copy of my visit note below.    ASSESSMENT & PLAN    Raisa was seen today for pain.    Diagnoses and all orders for this visit:    Iliotibial band syndrome of left side  -     Physical Therapy  Referral; Future  -     Orthopedic  Referral; Future    Chronic left hip pain  -     Orthopedic  Referral  -     Physical Therapy  Referral; Future  -     Orthopedic  Referral; Future    Hammer toe of left foot  -     Physical Therapy  Referral; Future  -     Orthopedic  Referral; Future    Greater trochanteric pain syndrome  -     Orthopedic  Referral; Future  -     Sports Med Adult Follow-Up Clinic Order (As Needed); Future      Patient's symptoms are consistent with hip OA. We decided to start with physical therapy and see if she gets any benefit. She would also like to see podiatry about hammertoes as she feels it is affecting her gait. She is leaning towards going to [facility] but I told her I put in a referral to see someone here at [facility] as we have podiatry at . She will make her decision.     Patient Instructions   Schedule physical therapy  Follow up if no improvement 4-6 weeks   I put in the referral for podiatrist to talk about hammertoes but can see [facility] if that is still your preference                Josh Reynolds MD  [facility]    -----------------------------------------------------------------------------------------      SUBJECTIVE  Raisa Archer is a/an 80 year old who left hip/leg pain.      Reason for Visit:  Injured/painful/body part: Left hip  What are your symptoms: Sharp, pain radiating from foot to neck  Date of injury/Onset: 2 months  Cause: Reports  insidious onset without acute precipitating event.  History of similar pain: Hammer Toes Left foot  What makes it better: Elevation, IcyHot/Salonpas, Tumeric Inocencia Tea  What makes it worse: Walking, Sitting to standing  What have you done for this problem: MSK Treatments Tried : Home exercises - Chair Elliptical     Previous surgeries: Right hip replaced  Social History/Occupation: Retired      REVIEW OF SYSTEMS:  Positive ROS was noted in the HPI, otherwise negative.       OBJECTIVE:  Gen: no acute distress  Hip exam  Examination limited to left  hip    Inspection:  Gait normal  Hip region appeared grossly normal without deformity  No open wounds or rashes appreciated    TTP:  Iliopsoas  General anterior groin soft tissue/musculature: -  Greater trochanteric bursa: +  Gluteal tendons: +  Piriformis: -  IT band: +  ASIS: -    Range of motion:  Flexion: 90      Strength:  Flexion: 5/5  Adduction: 5/5  Abduction: 5/5    Special Testing:  Logroll:  positive  FADIR:  positive  Stinchfield:  positive  Josep:  positive    SI / LBP:  Lumbar paraspinal musculature not tender  SI joint: not tender  Slump testing: -  Iliac distraction: -  Iliac compression: -  Sacral thrust: -      Sensation throughout the hip was grossly normal  Neurovascularly intact        RADIOLOGY:  Previous imaging reviewed and listed are the impressions: XR Left Hip 4/11/25   IMPRESSION: Mild degenerative arthrosis of the left hip. No acute fracture.           Again, thank you for allowing me to participate in the care of your patient.        Sincerely,        Josh Reynolds MD    Electronically signed

## 2025-05-19 NOTE — PROGRESS NOTES
ASSESSMENT & PLAN    Raisa was seen today for pain.    Diagnoses and all orders for this visit:    Iliotibial band syndrome of left side  -     Physical Therapy  Referral; Future  -     Orthopedic  Referral; Future    Chronic left hip pain  -     Orthopedic  Referral  -     Physical Therapy  Referral; Future  -     Orthopedic  Referral; Future    Hammer toe of left foot  -     Physical Therapy  Referral; Future  -     Orthopedic  Referral; Future    Greater trochanteric pain syndrome  -     Orthopedic  Referral; Future  -     Sports Med Adult Follow-Up Clinic Order (As Needed); Future      Patient's symptoms are consistent with hip OA. We decided to start with physical therapy and see if she gets any benefit. She would also like to see podiatry about hammertoes as she feels it is affecting her gait. She is leaning towards going to [facility] but I told her I put in a referral to see someone here at [facility] as we have podiatry at . She will make her decision.     Patient Instructions   Schedule physical therapy  Follow up if no improvement 4-6 weeks   I put in the referral for podiatrist to talk about hammertoes but can see [facility] if that is still your preference                Josh Reynolds MD  [facility] [place]    -----------------------------------------------------------------------------------------      SUBJECTIVE  Raisaavis Donohueer is a/an 80 year old who left hip/leg pain.      Reason for Visit:  Injured/painful/body part: Left hip  What are your symptoms: Sharp, pain radiating from foot to neck  Date of injury/Onset: 2 months  Cause: Reports insidious onset without acute precipitating event.  History of similar pain: Hammer Toes Left foot  What makes it better: Elevation, IcyHot/Salonpas, Tumeric Ginger Tea  What makes it worse: Walking, Sitting to standing  What have you done for this problem: [email or web address]  Treatments Tried : Home exercises - Chair Elliptical     Previous surgeries: Right hip replaced  Social History/Occupation: Retired      REVIEW OF SYSTEMS:  Positive ROS was noted in the HPI, otherwise negative.       OBJECTIVE:  Gen: no acute distress  Hip exam  Examination limited to left  hip    Inspection:  Gait normal  Hip region appeared grossly normal without deformity  No open wounds or rashes appreciated    TTP:  Iliopsoas  General anterior groin soft tissue/musculature: -  Greater trochanteric bursa: +  Gluteal tendons: +  Piriformis: -  IT band: +  ASIS: -    Range of motion:  Flexion: 90      Strength:  Flexion: 5/5  Adduction: 5/5  Abduction: 5/5    Special Testing:  Logroll:  positive  FADIR:  positive  Stinchfield:  positive  Josep:  positive    SI / LBP:  Lumbar paraspinal musculature not tender  SI joint: not tender  Slump testing: -  Iliac distraction: -  Iliac compression: -  Sacral thrust: -      Sensation throughout the hip was grossly normal  Neurovascularly intact        RADIOLOGY:  Previous imaging reviewed and listed are the impressions: XR Left Hip 4/11/25   IMPRESSION: Mild degenerative arthrosis of the left hip. No acute fracture.

## 2025-05-20 ENCOUNTER — PATIENT OUTREACH (OUTPATIENT)
Dept: CARE COORDINATION | Facility: CLINIC | Age: 80
End: 2025-05-20
Payer: MEDICARE

## 2025-06-04 ENCOUNTER — ANCILLARY PROCEDURE (OUTPATIENT)
Dept: GENERAL RADIOLOGY | Facility: CLINIC | Age: 80
End: 2025-06-04
Attending: PODIATRIST
Payer: MEDICARE

## 2025-06-04 ENCOUNTER — OFFICE VISIT (OUTPATIENT)
Dept: PODIATRY | Facility: CLINIC | Age: 80
End: 2025-06-04
Attending: STUDENT IN AN ORGANIZED HEALTH CARE EDUCATION/TRAINING PROGRAM
Payer: MEDICARE

## 2025-06-04 VITALS — WEIGHT: 154 LBS | BODY MASS INDEX: 27.28 KG/M2

## 2025-06-04 DIAGNOSIS — L85.1 ACQUIRED PLANTAR POROKERATOSIS: ICD-10-CM

## 2025-06-04 DIAGNOSIS — M21.622 TAILOR'S BUNIONETTE, LEFT: Primary | ICD-10-CM

## 2025-06-04 DIAGNOSIS — G89.29 CHRONIC FOOT PAIN, LEFT: ICD-10-CM

## 2025-06-04 DIAGNOSIS — M77.52 CAPSULITIS OF METATARSOPHALANGEAL (MTP) JOINT OF LEFT FOOT: ICD-10-CM

## 2025-06-04 DIAGNOSIS — M20.42 HAMMER TOE OF LEFT FOOT: ICD-10-CM

## 2025-06-04 DIAGNOSIS — M79.672 CHRONIC FOOT PAIN, LEFT: ICD-10-CM

## 2025-06-04 PROBLEM — M25.552 CHRONIC LEFT HIP PAIN: Status: ACTIVE | Noted: 2025-06-04

## 2025-06-04 PROCEDURE — 73630 X-RAY EXAM OF FOOT: CPT | Mod: TC | Performed by: RADIOLOGY

## 2025-06-04 PROCEDURE — 99203 OFFICE O/P NEW LOW 30 MIN: CPT | Mod: 57 | Performed by: PODIATRIST

## 2025-06-04 NOTE — LETTER
6/4/2025      Raisa Archer  [address]  [facility] [address]      Dear Colleague,    Thank you for referring your patient, Raisa Archer, to the [facility]. Please see a copy of my visit note below.    PATIENT HISTORY:  Dr. Reynolds requested I see this patient for their foot issue.      Raisa Archer is a 80 year old female who presents to clinic for chronic pain of her left foot with callus's and foot deformity.  Patient had a prior surgery for hammertoe repair toes 2 3 and 4 left foot along with bunionectomy and transfer calluses to the bottom of her left foot under her second toe joint and little toe joint.  She has pain at all times to her foot with walking standing very sharp.  She is very frustrated because she wants to walk for exercise and cannot due to the pain.  She has tried many shoes and is currently wearing supportive new balance.  She was advised to get an orthotic with a metatarsal pad from a different podiatrist which she was not able to purchase due to the price.  She will try to file down the calluses on her own but it only provides a little pain relief.    Review of Systems:   ROS: 10 point ROS neg other than the symptoms noted above in the HPI.     PAST MEDICAL HISTORY:   Past Medical History:   Diagnosis Date     Arthritis      Breast cancer, right breast (H)      Centrilobular emphysema (H)      Colon polyps     repeat colonoscopy 2019        PAST SURGICAL HISTORY:   Past Surgical History:   Procedure Laterality Date     ABDOMEN SURGERY       ARTHROPLASTY HIP Right 01/24/2022    Procedure: ARTHROPLASTY, HIP, TOTAL;  Surgeon: Maddy Arango MD;  Location:  OR     BIOPSY       BREAST SURGERY  2007     CATARACT IOL, RT/LT       CHOLECYSTECTOMY       COLONOSCOPY       COLONOSCOPY N/A 10/04/2019    Procedure: COLONOSCOPY, WITH POLYPECTOMY AND BIOPSY;  Surgeon: Lucía John MD;  Location:  GI     LAPAROSCOPIC APPENDECTOMY N/A 2/12/2025     Procedure: with appendectomy;  Surgeon: Lucía John MD;  Location:  OR     LAPAROSCOPIC CECECTOMY N/A 2/12/2025    Procedure: Laparoscopic partial Cecectomy;  Surgeon: Lucía John MD;  Location:  OR     ORTHOPEDIC SURGERY       PHACOEMULSIFICATION CLEAR CORNEA WITH STANDARD INTRAOCULAR LENS IMPLANT  05/22/2014    Procedure: PHACOEMULSIFICATION CLEAR CORNEA WITH STANDARD INTRAOCULAR LENS IMPLANT;  Surgeon: Rahul Reid MD;  Location:  EC     PHACOEMULSIFICATION CLEAR CORNEA WITH STANDARD INTRAOCULAR LENS IMPLANT  07/08/2014    Procedure: PHACOEMULSIFICATION CLEAR CORNEA WITH STANDARD INTRAOCULAR LENS IMPLANT;  Surgeon: Rahul Reid MD;  Location:  OR     SURGICAL HISTORY OF -   08/2000    left foot reconstruction     SURGICAL HISTORY OF -   1979    hysterectomy        MEDICATIONS:   Current Outpatient Medications:      acetaminophen (TYLENOL) 500 MG tablet, Take 2 tablets (1,000 mg) by mouth every 6 hours as needed for mild pain, Disp: , Rfl:      albuterol (PROAIR HFA/PROVENTIL HFA/VENTOLIN HFA) 108 (90 Base) MCG/ACT inhaler, Inhale 2 puffs into the lungs every 6 hours as needed for shortness of breath, wheezing or cough, Disp: 18 g, Rfl: 3     Calcium Carbonate-Vitamin D (CALCIUM + D PO), Take 1 tablet by mouth daily Total 1200/800mg daily, Disp: , Rfl:      cetirizine (ZYRTEC) 10 MG tablet, Take 1 tablet (10 mg) by mouth daily., Disp: 30 tablet, Rfl: 0     cyclobenzaprine (FLEXERIL) 5 MG tablet, Take 1 tablet (5 mg) by mouth 3 times daily as needed for muscle spasms., Disp: 30 tablet, Rfl: 0     diphenhydrAMINE-acetaminophen (TYLENOL PM)  MG tablet, Take 1-2 tablets by mouth nightly as needed for sleep, Disp: , Rfl:      fluticasone (FLONASE) 50 MCG/ACT nasal spray, Spray 1-2 sprays into both nostrils daily, Disp: 16 g, Rfl: 11     Glycopyrrolate-Formoterol (BEVESPI AEROSPHERE) 9-4.8 MCG/ACT oral inhaler, Inhale 2 puffs into the lungs 2 times daily, Disp: 10.7 g, Rfl:  5     multivitamin (ONE-DAILY) tablet, Take 1 tablet by mouth daily, Disp: , Rfl:      oxyCODONE (ROXICODONE) 5 MG tablet, Take 1-2 tablets (5-10 mg) by mouth every 4 hours as needed for moderate to severe pain., Disp: 6 tablet, Rfl: 0     rosuvastatin (CRESTOR) 5 MG tablet, Take 1 tablet (5 mg) by mouth daily., Disp: 90 tablet, Rfl: 3     triamcinolone (KENALOG) 0.1 % external cream, Apply topically 2 times daily., Disp: 80 g, Rfl: 1     triamcinolone (KENALOG) 0.1 % external ointment, Apply topically 2 times daily as needed for irritation (itching)., Disp: 80 g, Rfl: 0     ALLERGIES:    Allergies   Allergen Reactions     Aspirin Nausea and Vomiting     sick     Chantix [Varenicline Tartrate] Other (See Comments)     Sees things     Glycerin Itching     LIQUID,   Reactions: itchy and redness       Ipratropium Other (See Comments)     atrovent inhaler caused sore, tight throat , chest hurt, breathless.      Sympathomimetics Other (See Comments)     Patient doesn't know why this is listed as an allergy     Varenicline GI Disturbance, Other (See Comments) and Nausea and Vomiting     Wool Fiber Itching     Redness         SOCIAL HISTORY:   Social History     Socioeconomic History     Marital status:      Spouse name: Not on file     Number of children: Not on file     Years of education: Not on file     Highest education level: Not on file   Occupational History     Not on file   Tobacco Use     Smoking status: Former     Current packs/day: 0.00     Average packs/day: 1.5 packs/day for 47.4 years (71.0 ttl pk-yrs)     Types: Cigarettes     Start date: 1967     Quit date: 5/10/2014     Years since quittin.0     Smokeless tobacco: Never     Tobacco comments:     Quit several times over the years including when pregnant   Vaping Use     Vaping status: Never Used   Substance and Sexual Activity     Alcohol use: Never     Drug use: No     Sexual activity: Never   Other Topics Concern     Parent/sibling w/  CABG, MI or angioplasty before 65F 55M? No   Social History Narrative    Dairy/d 1-2 servings/d.     Caffeine 0-1 servings/d    Exercise 3 x week    Sunscreen used - Yes    Seatbelts used - Yes    Working smoke/CO detectors in the home - Yes    Guns stored in the home - No    Self Breast Exams - Yes    Self Testicular Exam - NA    Eye Exam up to date - Yes    Dental Exam up to date - Yes     Pap Smear up to date - hysterectomy    Mammogram up to date - Yes     PSA up to date - NA    Dexa Scan up to date - No    Flex Sig / Colonoscopy up to date - yes    Immunizations up to date - Yes 2007 Tetanus    Abuse: Current or Past(Physical, Sexual or Emotional)- No    Do you feel safe in your environment - Yes    2008 last updated 7/10                         Social Drivers of Health     Financial Resource Strain: Low Risk  (10/2/2024)    Financial Resource Strain      Within the past 12 months, have you or your family members you live with been unable to get utilities (heat, electricity) when it was really needed?: No   Food Insecurity: Low Risk  (10/2/2024)    Food Insecurity      Within the past 12 months, did you worry that your food would run out before you got money to buy more?: No      Within the past 12 months, did the food you bought just not last and you didn t have money to get more?: No   Transportation Needs: Low Risk  (10/2/2024)    Transportation Needs      Within the past 12 months, has lack of transportation kept you from medical appointments, getting your medicines, non-medical meetings or appointments, work, or from getting things that you need?: No   Physical Activity: Insufficiently Active (5/16/2025)    Exercise Vital Sign      Days of Exercise per Week: 3 days      Minutes of Exercise per Session: 10 min   Stress: Stress Concern Present (10/2/2024)    [employer] [employer] - Occupational Stress Questionnaire      Feeling of Stress : To some extent   Social Connections: Unknown  (10/2/2024)    Social Connection and Isolation Panel [NHANES]      Frequency of Communication with Friends and Family: Not on file      Frequency of Social Gatherings with Friends and Family: Patient declined      Attends Orthodox Services: Not on file      Active Member of Clubs or Organizations: Not on file      Attends Club or Organization Meetings: Not on file      Marital Status: Not on file   Interpersonal Safety: Low Risk  (2/12/2025)    Interpersonal Safety      Do you feel physically and emotionally safe where you currently live?: Yes      Within the past 12 months, have you been hit, slapped, kicked or otherwise physically hurt by someone?: No      Within the past 12 months, have you been humiliated or emotionally abused in other ways by your partner or ex-partner?: No   Housing Stability: High Risk (10/2/2024)    Housing Stability      Do you have housing? : Yes      Are you worried about losing your housing?: Yes        FAMILY HISTORY:   Family History   Problem Relation Age of Onset     Cerebrovascular Disease Father         Had several small ones     Breast Cancer Sister         Had double mastectomy.  Cancer spread to other organs and brain because she could not get treatment because of Covid-19 pandemic.  She passed away in July 2020.     Diabetes Sister         Overweight     Breast Cancer Sister      Obesity Sister         On diet/On diet/On diet     Cancer Brother      Asthma Brother         Uses inhalers     Other Cancer Brother         Non-Hodgins Lymphoma     Diabetes Brother         Overweight     Cancer Brother      Obesity Brother      Asthma Brother      Obesity Brother      Asthma Niece         passed away of pneumonia     Breast Cancer Cousin         Both breasts     Cancer Other         Stomach cancer- cousin     Breast Cancer Other         Aunts     Diabetes Other         Uncles and cousin/Paternal Uncles/Paternal Uncles/Paternal Uncles/Paternal Uncles     Cancer Other         Family  Hx of colon cancer     Breast Cancer Other         Paternal Aunt/Paternal Aunt/Paternal Aunt/Paternal Aunt     Colon Cancer Other         Maternal Great Uncles     Colon Cancer Other         Maternal Uncle/Maternal Great Uncles/Maternal Great Uncles/Maternal Great Uncles     Diabetes Other          of complications     Breast Cancer Other         had double mastectomy     Colon Cancer Other         Colon cancer     Anesthesia Reaction No family hx of         EXAM:Vitals: LMP  (LMP Unknown)   BMI= There is no height or weight on file to calculate BMI.    General appearance: Patient is alert and fully cooperative with history & exam.  No sign of distress is noted during the visit.     Psychiatric: Affect is pleasant & appropriate.  Patient appears motivated to improve health.     Respiratory: Breathing is regular & unlabored while sitting.     HEENT: Hearing is intact to spoken word.  Speech is clear.  No gross evidence of visual impairment that would impact ambulation.    Lower Extremity Focused:    Dermatologic: supple, no openings.  Hyperkeratotic lesions plantar left second metatarsal phalangeal joint and fifth metatarsal phalangeal joint with central core pain with pinpoint palpation no petechiae no openings.     Vascular: DP pulse 2/4 right 2/4 left PT pulse 2/4 right 2/4 left.  No edema, positive varicosities noted.  CFT and skin temperature is normal to both lower extremities.     Neurologic: Lower extremity sensation is intact to light touch.  No evidence of weakness or contracture in the lower extremities.  No evidence of neuropathy.     Musculoskeletal: toes 2-5 left foot dorsiflexed at the metatarsal phaalngeal joints with plantarflexion at the IPJs, non-reducible, left foot pain with palpation to the metatarsal phalangeal joints plantar 2nd and 5th, ankle dorsiflexion knee extended +0 right, +0 left.  Decreased plantar fat pad bilateral forefoot.  Pain with palpation to the second toe rigid  plantarflexion at the inner phalangeal joints left foot      Labs and Imaging: I have personally reviewed the following   X-rays left foot ordered and reviewed.  Noting per my read demineralization, hammertoe deformities 2 through 4, bunionette deformity      ASSESSMENT:   Hammer toe painful left 2nd   Painful bunionette left  Porokeratosis plantar left 2nd and 5th metatarsal phalangeal joints  Pain left foot chronic  Fat pad atrophy bilateral forefoot       Medical Decision Making/Plan:  Reviewed patient[facility] in [facility].  Reviewed and discussed causes of hammertoes with patient.  Explained that this can be caused by an overpowering of muscles or by the way we walk.  Discussed conservative treatments such as orthotics, pads, shoe gear.  Explained that sometimes the flexor tendons can be cut to try and straighten the toe and reduce rubbing. This is normally done in office and patient is weight bearing in postop she for 1-2 weeks.  We also discussed surgical intervention to remove the joint and possibly fuse the toe.  Normally patient has a pin sticking out of the toe for about 6 weeks and can not get the foot wet. Patient would have to be minimal weight bearing in cam boot.      X-rays left foot ordered and reviewed.  Noting per my read demineralization, hammertoe deformities 2 through 4, bunionette deformity    Recommend starting with offloading using metatarsal pad, application in shoes, along with parring of the hyperkeratotic lesions x 2 left foot with a #15 blade.  This was performed as a courtesy today.  Patient continued to have pain to her left foot and would like to proceed with surgery.    Discussed surgery for osteotomy left 5th metatarsal and 2nd metatarsal osteotomy and left 2nd toe arthroplasty  I have reviewed with the patient the surgical procedure.  I have reviewed postoperative course, potential healing time, and use of osseus fixation. I have reviewed potential complications of the surgery  including but not limited too: infection, joint stiffness, delay or non-union of bone, hardware pain or failure, numbness, need for further surgery, mal-position, continued callus formation. Patient fully accepts and understands all explained. All questions answered. No written or verbal guarantees give or implied.     Follow-up after surgery or sooner as needed.     Patient risk factor: low    All questions were answered to patients satisfaction and they will call with further questions or concerns.       Judith Sanchez DPM      Again, thank you for allowing me to participate in the care of your patient.        Sincerely,        Judith Sanchez DPM    Electronically signed

## 2025-06-04 NOTE — PROGRESS NOTES
PATIENT HISTORY:  Dr. Reynolds requested I see this patient for their foot issue.      Raisa Archer is a 80 year old female who presents to clinic for chronic pain of her left foot with callus's and foot deformity.  Patient had a prior surgery for hammertoe repair toes 2 3 and 4 left foot along with bunionectomy and transfer calluses to the bottom of her left foot under her second toe joint and little toe joint.  She has pain at all times to her foot with walking standing very sharp.  She is very frustrated because she wants to walk for exercise and cannot due to the pain.  She has tried many shoes and is currently wearing supportive new balance.  She was advised to get an orthotic with a metatarsal pad from a different podiatrist which she was not able to purchase due to the price.  She will try to file down the calluses on her own but it only provides a little pain relief.    Review of Systems:   ROS: 10 point ROS neg other than the symptoms noted above in the HPI.     PAST MEDICAL HISTORY:   Past Medical History:   Diagnosis Date    Arthritis     Breast cancer, right breast (H)     Centrilobular emphysema (H)     Colon polyps     repeat colonoscopy 2019        PAST SURGICAL HISTORY:   Past Surgical History:   Procedure Laterality Date    ABDOMEN SURGERY      ARTHROPLASTY HIP Right 01/24/2022    Procedure: ARTHROPLASTY, HIP, TOTAL;  Surgeon: Maddy Arango MD;  Location:  OR    BIOPSY      BREAST SURGERY  2007    CATARACT IOL, RT/LT      CHOLECYSTECTOMY      COLONOSCOPY      COLONOSCOPY N/A 10/04/2019    Procedure: COLONOSCOPY, WITH POLYPECTOMY AND BIOPSY;  Surgeon: Lucía John MD;  Location:  GI    LAPAROSCOPIC APPENDECTOMY N/A 2/12/2025    Procedure: with appendectomy;  Surgeon: Lucía John MD;  Location:  OR    LAPAROSCOPIC CECECTOMY N/A 2/12/2025    Procedure: Laparoscopic partial Cecectomy;  Surgeon: Lucía John MD;  Location:  OR    ORTHOPEDIC SURGERY       PHACOEMULSIFICATION CLEAR CORNEA WITH STANDARD INTRAOCULAR LENS IMPLANT  05/22/2014    Procedure: PHACOEMULSIFICATION CLEAR CORNEA WITH STANDARD INTRAOCULAR LENS IMPLANT;  Surgeon: Rahul Reid MD;  Location:  EC    PHACOEMULSIFICATION CLEAR CORNEA WITH STANDARD INTRAOCULAR LENS IMPLANT  07/08/2014    Procedure: PHACOEMULSIFICATION CLEAR CORNEA WITH STANDARD INTRAOCULAR LENS IMPLANT;  Surgeon: Rahul Reid MD;  Location:  OR    SURGICAL HISTORY OF -   08/2000    left foot reconstruction    SURGICAL HISTORY OF -   1979    hysterectomy        MEDICATIONS:   Current Outpatient Medications:     acetaminophen (TYLENOL) 500 MG tablet, Take 2 tablets (1,000 mg) by mouth every 6 hours as needed for mild pain, Disp: , Rfl:     albuterol (PROAIR HFA/PROVENTIL HFA/VENTOLIN HFA) 108 (90 Base) MCG/ACT inhaler, Inhale 2 puffs into the lungs every 6 hours as needed for shortness of breath, wheezing or cough, Disp: 18 g, Rfl: 3    Calcium Carbonate-Vitamin D (CALCIUM + D PO), Take 1 tablet by mouth daily Total 1200/800mg daily, Disp: , Rfl:     cetirizine (ZYRTEC) 10 MG tablet, Take 1 tablet (10 mg) by mouth daily., Disp: 30 tablet, Rfl: 0    cyclobenzaprine (FLEXERIL) 5 MG tablet, Take 1 tablet (5 mg) by mouth 3 times daily as needed for muscle spasms., Disp: 30 tablet, Rfl: 0    diphenhydrAMINE-acetaminophen (TYLENOL PM)  MG tablet, Take 1-2 tablets by mouth nightly as needed for sleep, Disp: , Rfl:     fluticasone (FLONASE) 50 MCG/ACT nasal spray, Spray 1-2 sprays into both nostrils daily, Disp: 16 g, Rfl: 11    Glycopyrrolate-Formoterol (BEVESPI AEROSPHERE) 9-4.8 MCG/ACT oral inhaler, Inhale 2 puffs into the lungs 2 times daily, Disp: 10.7 g, Rfl: 5    multivitamin (ONE-DAILY) tablet, Take 1 tablet by mouth daily, Disp: , Rfl:     oxyCODONE (ROXICODONE) 5 MG tablet, Take 1-2 tablets (5-10 mg) by mouth every 4 hours as needed for moderate to severe pain., Disp: 6 tablet, Rfl: 0    rosuvastatin (CRESTOR) 5 MG  tablet, Take 1 tablet (5 mg) by mouth daily., Disp: 90 tablet, Rfl: 3    triamcinolone (KENALOG) 0.1 % external cream, Apply topically 2 times daily., Disp: 80 g, Rfl: 1    triamcinolone (KENALOG) 0.1 % external ointment, Apply topically 2 times daily as needed for irritation (itching)., Disp: 80 g, Rfl: 0     ALLERGIES:    Allergies   Allergen Reactions    Aspirin Nausea and Vomiting     sick    Chantix [Varenicline Tartrate] Other (See Comments)     Sees things    Glycerin Itching     LIQUID,   Reactions: itchy and redness      Ipratropium Other (See Comments)     atrovent inhaler caused sore, tight throat , chest hurt, breathless.     Sympathomimetics Other (See Comments)     Patient doesn't know why this is listed as an allergy    Varenicline GI Disturbance, Other (See Comments) and Nausea and Vomiting    Wool Fiber Itching     Redness         SOCIAL HISTORY:   Social History     Socioeconomic History    Marital status:      Spouse name: Not on file    Number of children: Not on file    Years of education: Not on file    Highest education level: Not on file   Occupational History    Not on file   Tobacco Use    Smoking status: Former     Current packs/day: 0.00     Average packs/day: 1.5 packs/day for 47.4 years (71.0 ttl pk-yrs)     Types: Cigarettes     Start date: 1967     Quit date: 5/10/2014     Years since quittin.0    Smokeless tobacco: Never    Tobacco comments:     Quit several times over the years including when pregnant   Vaping Use    Vaping status: Never Used   Substance and Sexual Activity    Alcohol use: Never    Drug use: No    Sexual activity: Never   Other Topics Concern    Parent/sibling w/ CABG, MI or angioplasty before 65F 55M? No   Social History Narrative    Dairy/d 1-2 servings/d.     Caffeine 0-1 servings/d    Exercise 3 x week    Sunscreen used - Yes    Seatbelts used - Yes    Working smoke/CO detectors in the home - Yes    Guns stored in the home - No    Self Breast  Exams - Yes    Self Testicular Exam - NA    Eye Exam up to date - Yes    Dental Exam up to date - Yes     Pap Smear up to date - hysterectomy    Mammogram up to date - Yes     PSA up to date - NA    Dexa Scan up to date - No    Flex Sig / Colonoscopy up to date - yes    Immunizations up to date - Yes 2007 Tetanus    Abuse: Current or Past(Physical, Sexual or Emotional)- No    Do you feel safe in your environment - Yes    2008 last updated 7/10                         Social Drivers of Health     Financial Resource Strain: Low Risk  (10/2/2024)    Financial Resource Strain     Within the past 12 months, have you or your family members you live with been unable to get utilities (heat, electricity) when it was really needed?: No   Food Insecurity: Low Risk  (10/2/2024)    Food Insecurity     Within the past 12 months, did you worry that your food would run out before you got money to buy more?: No     Within the past 12 months, did the food you bought just not last and you didn t have money to get more?: No   Transportation Needs: Low Risk  (10/2/2024)    Transportation Needs     Within the past 12 months, has lack of transportation kept you from medical appointments, getting your medicines, non-medical meetings or appointments, work, or from getting things that you need?: No   Physical Activity: Insufficiently Active (5/16/2025)    Exercise Vital Sign     Days of Exercise per Week: 3 days     Minutes of Exercise per Session: 10 min   Stress: Stress Concern Present (10/2/2024)    [employer] [place] [employer] - Occupational Stress Questionnaire     Feeling of Stress : To some extent   Social Connections: Unknown (10/2/2024)    Social Connection and Isolation Panel [NHANES]     Frequency of Communication with Friends and Family: Not on file     Frequency of Social Gatherings with Friends and Family: Patient declined     Attends Sabianist Services: Not on file     Active Member of Clubs or Organizations: Not  on file     Attends Club or Organization Meetings: Not on file     Marital Status: Not on file   Interpersonal Safety: Low Risk  (2025)    Interpersonal Safety     Do you feel physically and emotionally safe where you currently live?: Yes     Within the past 12 months, have you been hit, slapped, kicked or otherwise physically hurt by someone?: No     Within the past 12 months, have you been humiliated or emotionally abused in other ways by your partner or ex-partner?: No   Housing Stability: High Risk (10/2/2024)    Housing Stability     Do you have housing? : Yes     Are you worried about losing your housing?: Yes        FAMILY HISTORY:   Family History   Problem Relation Age of Onset    Cerebrovascular Disease Father         Had several small ones    Breast Cancer Sister         Had double mastectomy.  Cancer spread to other organs and brain because she could not get treatment because of Covid-19 pandemic.  She passed away in 2020.    Diabetes Sister         Overweight    Breast Cancer Sister     Obesity Sister         On diet/On diet/On diet    Cancer Brother     Asthma Brother         Uses inhalers    Other Cancer Brother         Non-Hodgins Lymphoma    Diabetes Brother         Overweight    Cancer Brother     Obesity Brother     Asthma Brother     Obesity Brother     Asthma Niece         passed away of pneumonia    Breast Cancer Cousin         Both breasts    Cancer Other         Stomach cancer- cousin    Breast Cancer Other         Aunts    Diabetes Other         Uncles and cousin/Paternal Uncles/Paternal Uncles/Paternal Uncles/Paternal Uncles    Cancer Other         Family Hx of colon cancer    Breast Cancer Other         Paternal Aunt/Paternal Aunt/Paternal Aunt/Paternal Aunt    Colon Cancer Other         Maternal Great Uncles    Colon Cancer Other         Maternal Uncle/Maternal Great Uncles/Maternal Great Uncles/Maternal Great Uncles    Diabetes Other          of complications    Breast  Cancer Other         had double mastectomy    Colon Cancer Other         Colon cancer    Anesthesia Reaction No family hx of         EXAM:Vitals: LMP  (LMP Unknown)   BMI= There is no height or weight on file to calculate BMI.    General appearance: Patient is alert and fully cooperative with history & exam.  No sign of distress is noted during the visit.     Psychiatric: Affect is pleasant & appropriate.  Patient appears motivated to improve health.     Respiratory: Breathing is regular & unlabored while sitting.     HEENT: Hearing is intact to spoken word.  Speech is clear.  No gross evidence of visual impairment that would impact ambulation.    Lower Extremity Focused:    Dermatologic: supple, no openings.  Hyperkeratotic lesions plantar left second metatarsal phalangeal joint and fifth metatarsal phalangeal joint with central core pain with pinpoint palpation no petechiae no openings.     Vascular: DP pulse 2/4 right 2/4 left PT pulse 2/4 right 2/4 left.  No edema, positive varicosities noted.  CFT and skin temperature is normal to both lower extremities.     Neurologic: Lower extremity sensation is intact to light touch.  No evidence of weakness or contracture in the lower extremities.  No evidence of neuropathy.     Musculoskeletal: toes 2-5 left foot dorsiflexed at the metatarsal phaalngeal joints with plantarflexion at the IPJs, non-reducible, left foot pain with palpation to the metatarsal phalangeal joints plantar 2nd and 5th, ankle dorsiflexion knee extended +0 right, +0 left.  Decreased plantar fat pad bilateral forefoot.  Pain with palpation to the second toe rigid plantarflexion at the inner phalangeal joints left foot      Labs and Imaging: I have personally reviewed the following   X-rays left foot ordered and reviewed.  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  Reviewed and discussed causes of hammertoes with patient.  Explained that this can be caused by an overpowering of muscles or by the way we walk.  Discussed conservative treatments such as orthotics, pads, shoe gear.  Explained that sometimes the flexor tendons can be cut to try and straighten the toe and reduce rubbing. This is normally done in office and patient is weight bearing in postop she for 1-2 weeks.  We also discussed surgical intervention to remove the joint and possibly fuse the toe.  Normally patient has a pin sticking out of the toe for about 6 weeks and can not get the foot wet. Patient would have to be minimal weight bearing in cam boot.      X-rays left foot ordered and reviewed.  Noting per my read demineralization, hammertoe deformities 2 through 4, bunionette deformity    Recommend starting with offloading using metatarsal pad, application in shoes, along with parring of the hyperkeratotic lesions x 2 left foot with a #15 blade.  This was performed as a courtesy today.  Patient continued to have pain to her left foot and would like to proceed with surgery.    Discussed surgery for osteotomy left 5th metatarsal and 2nd metatarsal osteotomy and left 2nd toe arthroplasty  I have reviewed with the patient the surgical procedure.  I have reviewed postoperative course, potential healing time, and use of osseus fixation. I have reviewed potential complications of the surgery including but not limited too: infection, joint stiffness, delay or non-union of bone, hardware pain or failure, numbness, need for further surgery, mal-position, continued callus formation. Patient fully accepts and understands all explained. All questions answered. No written or verbal guarantees give or implied.     Follow-up after surgery or  sooner as needed.     Patient risk factor: low    All questions were answered to patients satisfaction and they will call with further questions or concerns.       Judith Sanchez DPM

## 2025-06-04 NOTE — PATIENT INSTRUCTIONS
Thank you for choosing [facility]!    DR LEROY CLINIC:  [facility]   [address]   [place] [address]   (Mon, Tues)     [facility]  [address], [place] [address]  (Friday)    [facility]  [address]  [place] [address]  (Wednesdays)       TRIAGE LINE: [phone]  APPOINTMENTS: [phone]  RADIOLOGY: [phone]  SET UP SURGERY: [phone]  PHYSICAL THERAPY: [phone]   BILLING QUESTIONS: [phone]  FAX: [phone]       Follow up: As needed      GETTING READY FOR YOUR SURGERY  ONE-THREE WEEKS BEFORE  1. See your Family Doctor or Primary Care Doctor for a History and Physical. If you do not, we may need to change the date of your surgery.  2. Please see pre-surgical medications below to which medications need to be stopped before surgery and when.    TEN OR MORE DAYS BEFORE    1. [place] with the hospital. (For [facility])      By Phone: [phone].      By Internet: [email or web address]. Choose [facility].      If you do not register by phone or online, we will call to help you register.    SAME DAY SURGERY PATIENTS  1. You will need a family member of friend to drive you home. If you do not have one the surgery will be cancelled or rescheduled.  2. You will need a responsible adult to stay with you that night after the surgery.       We will ask this person to listen to some instructions before you leave the hospital.  * If your child is having surgery, and you would like a tour of the hospital, please call: [phone].      DAY BEFORE SURGERY  1. DO NOT EAT OR DRINK ANYTHING AFTER MIDNIGHT THE NIGHT BEFORE YOUR SURGERY!   2. DO NOT DRINK ALCOHOL.  3. Do not take over the counter drugs.  4. Some people need to have blood tests at the hospital. If you need blood tests, we will tell you in advance.  5. Take medications as directed by your doctor.  You may take these with a small amount of water.  6. Do not chew gum, chew tobacco, or suck on hard candy the day of surgery.  7. Bring your insurance cards, a list of your medicines and co-pays you might need. Leave jewelry and other valuables at home.  8. If you received papers at your doctor's office, bring these with you to the surgery.    If you have questions about these instructions, please call: [phone]  Ask to speak with a pre-admitting nurse.    PRESURGICAL MEDICATIONS:  Certain prescription, over-the-counter, and herbal medications interfere with healing after an operation. The main concern relates to medications that increase bleeding at the surgical site. Excess blood under the incision results in poor wound healing, excess pain, increased scarring, and a higher risk of infection.    Some medications slow the healing process of bone. Medications can also interfere with the anesthesia drugs that keep you asleep during the operation. It is important to ensure that these medications are out of your system prior to the operation. The list below details a number of medications that are of concern. Pay special attention to how long you should avoid these medications before your operation. Please note that this list is not complete. You should ask your surgeon or pharmacist if you are uncertain about other medications. Any herbal supplement not listed should be discontinued at least one week prior to surgery.    Aspirin: Hold for one week prior to surgery and restart the day after surgery. This over the counter medication promotes bleeding.    Motrin / Ibuprofen / Aleve / Advil / NSAIDS:  Stop one week prior to surgery. These medications affect bleeding and may cause delay in bone healing. Avoid taking these medications for six weeks after bone surgeries. Other procedures may allow you to restart sooner than 6 weeks after surgery.    Coumadin / Plavix: Your primary care provider will manage Coumadin in  relation to surgery. Coumadin may result in excessive bleeding and may be adjusted before and after surgery.    Enbriel: Stop two weeks prior to surgery and restart two weeks after surgery. This medication can effect soft tissue healing and increases the risk of infection.    Remicade: Stop 8-12 weeks before surgery and restart two weeks after surgery. This medication can affect soft tissue healing and increases the risk of infection.    Humira: Stop 4 weeks before surgery and restart two weeks after surgery. This medication can affect soft tissue healing and increases the risk of infection.    Methotrexate: Stop one dose prior to surgery. This medication will be restarted when the wound appears to be healing well. Please ask your surgeon about restarting this medication when you are being seen in the office for wound checks.    Kava: Stop at least one day prior to surgery and may restart one day after surgery. Kava may increase the sedative effect of anesthetics that are given during the operation. Kava can also increase bleeding at the surgical site.    Ephedra (ma german): Stop at least one day prior to surgery and may restart one day after surgery.  Ephedra may increase the risk of heart attack and stroke. This medication can also increase bleeding at the surgical site.    Geraldine's Wort: Stop at least five days before surgery and may restart one day after surgery. Geraldine's wort may diminish the effects of several drugs that are given during surgery.    Ginseng: Stop at least one week prior to surgery and may restart one day after surgery.  Ginseng lowers blood sugar and may increase bleeding at the surgical site.    Ginkgo: Stop 36 hours before surgery and may restart one day after surgery. Ginkgo may increase bleeding at the surgical site.    Garlic: Stop at least one week prior to surgery and may restart one day after. Garlic may increase bleeding at the surgery site.    Valerian: Do a slow steady decrease  in your daily dose over a period of 2-3 weeks before surgery to decrease the chance of withdrawal symptoms. Valerian may increase the sedative effect of anesthetics given during the operation.    Echinacea: There is no data on stopping echinacea prior to surgery. This medication though can be associated with allergic reactions and suppression of your immune system.    Vitamin E, Omega-3, Flax, Fish Oil, Glucosamine and Chondroitin: Stop 2 weeks prior to surgery and may restart one day after. These herbal medications can increase risk of bleeding at surgical site.     POST OPERATIVE HOME CARE INSTRUCTIONS  Activities: You should rest today. Stay off your feet as much as possible and keep your foot elevated above the level of your heart (about two pillow height). Wear your surgical shoe at all times when up. Limit walking to 5 to 10 minutes per hour over the next few days if your doctor has previously told you that you can put some weight on the foot after surgery, although limit the weight to your heel. If you are supposed to be non-weight bearing, that means NO WEIGHT AT ALL ON THE FOOT. Use an ice pack on the ankle while awake 20 minutes per hour to help decrease pain and swelling.     Discomfort: If a prescription for pain was given, take as directed. If no pain medication was ordered, you may take a non-prescription, non-aspirin pain medication. If the pain is not relieved by pain medication, call the clinic.     Incision and Dressing: Your surgical dressing is a sterile dressing and should be left in place until removed by your physician. Keep the dressing dry by covering it with a plastic bag for showers, taking baths with the surgical foot out of the tub, or sponge bathing. Some bleeding on the dressing should be expected. If however, you notice active or excessive bleeding or a temperature over 100 degrees by mouth, call the clinic. Do not change dressing by yourself.  If the dressing becomes wet or dirty,  please call the clinic as it may need a new sterile dressing applied. You may start getting the foot wet after the stitches are removed.     Do not wear regular shoes with a surgical bandage and/or external pins in your foot. Wear loose fitting clothing that easily will slip over the bandage and/or pins. Do not cover your surgical foot with blankets as they may damage the dressing/pins. Also, remember that dogs are not aware of your surgery. Please keep them away from the bandage/pins.   If your surgeon places external pins in your foot, you must keep the foot dry until the pins are removed at 6-8 weeks after the surgery. Pins should be covered with a dressing for protection. You should examine the pins and your skin often. Check for any spreading redness or yellow drainage from the pin areas. Do not apply ointment around the pins. Do not push a loose pin back into your foot. Please call the clinic if the pin is spinning or moving in and out. If the pins are bumped or loosened they may need to be removed early. This may affect your surgical outcome.   Please call the clinic if you feel there is a problem with your pins and/or surgical bandage.    TIPS FOR SUCCESSFUL HEALING  How you care for the surgical site is critically important to achieve a successful result after surgery. Avoidance of injury, infection, excess swelling, scar tissue and stiffness are highly dependent on the care you provide over the next six weeks. Please do not hesitate to call if you have questions or concerns.   Your foot requires significant rest and elevation. Sitting for long hours with your foot elevated, however, will create its own problems. Expect muscle aches, back pain, cramps, etc. Optimal posture, lumbar support, back exercises, ice and heat may all help with your new aches and pains. Do not apply a heating pad to your foot or leg as this can cause increase swelling and pain. Rather use ice in those areas.   Pain medications cause  drowsiness. You may frequently sleep during the day and then have trouble sleeping at night. Over the counter sleep aids might be more effective than narcotic pain medication to achieve a reasonable night's sleep.    Narcotic pain medications and inactivity lead to constipation. Limiting use of narcotics will help minimize this problem. The pain medications will not completely alleviate your pain. The purpose of pain pills is to take the edge off and help you get through the first few days. You can substitute Extra Strength Tylenol if pain is mild. Please note that narcotic pain pills usually contain acetaminophen (the active ingredient in Tylenol) so be careful to avoid the maximum dose of acetaminophen. You should take measures to avoid constipation by drinking plenty of water, eating lots of fruit and vegetables and taking the recommended dose of Metamucil or a similar fiber supplement. These measures should be continued for as long as you require narcotic pain medications and are inactive.     Showering is a major challenge. Your incision requires about three days to become sealed from water. Your bandage should not get wet and should not be removed. Do not attempt showering for the first three days. A sponge bath is preferred. You may attempt to shower on the fourth day after the operation. Your foot should be covered with a bag, tape and rubber bands. Double bagging is preferred. Standing in the shower with a bag on your foot is quite hazardous. A portable shower stool would be ideal. The bandage will need to be changed in the office if it becomes moistened. A moist bandage will not dry on its own. A moist dressing may lead to infection.   Stiffness will develop after any operation due to scarring. The scar tissue begins to form immediately after the surgery. Inactivity can cause excess stiffness and may lead to blood clots in your legs. Frequent range of motion exercises will help decrease stiffness, blood  clots, scar tissue and adhesions. Please call if you are unsure about these recommendations.   Good luck and best wishes on a prompt recovery. Healing is slow but an important step in your recovery. You are in control of the final result. Please use this time wisely. Please do not hesitate to call if you have questions, concerns or comments.    * If you have any post-operative questions or concerns regarding your procedure, call our triage team at the [facility] at [phone]    POTENTIAL COMPLICATIONS OF FOOT & ANKLE SURGERY  Undergoing a surgical procedure involves a certain amount of risk. Risks of complications vary depending on the complexity of the surgery and how you take care of the surgical area during the healing process. Complications can range from minor infection to death. Some complications are temporary while others will be permanent.  Your surgeon weighs the risk of complications vs the potential benefit of undergoing surgery. You need to consider your tolerance for unexpected problems as you decide whether to undergo surgery.    Foot and Ankle surgery involves cutting skin, bone, ligaments, blood vessels and joints.  These structures heal well but not without consequence. Any break to the skin can lead to infection. A deep infection involves bones or joints which can be devastating. Deep infection can lead to amputation or could spread to other parts of your body. Most infections are minor and easily treated with oral antibiotics. Infections are often times from bacteria already present on your skin. Proper care of the surgical site is an essential component of avoiding infection. Do not get the bandage wet and take proper care of external pins to avoid these problems.     Joint stiffness is inherent to any foot or ankle surgery. Joint surgery is a major component of reconstructive foot and ankle procedures. The ligaments and tissues around the joint are cut, and later  repaired. Scare tissue limits joint mobility. This can be permanent but generally improves over the course of one year.    Surgery involves dissection around nerves. Visible nerves are moved out of the way while very small nerves are cut. Scar tissue develops around nerves and can lead to nerve pain, numbness, or neuromas. Nerve symptoms can be permanent. This can lead to numbness or sometimes hypersensitivity to touch and problems wearing shoes.    Bones do not always heal after surgery. Poor healing after a bone cut or joint fusion can lead to an extended period of casting or repeat surgery. Electronic bone stimulators are sometimes used to stimulate poor healing of bone. Nonunion is when joint fusion does not take.  This can occur as often as 10% of the time. Smoking doubles your risk of poor bone healing to 20%.    Bone grafting is sometimes necessary during the original or subsequent surgery. Bone is sometimes taken from other parts of your body or freeze dried bone from a bone bank from a bone bank or synthetic bone material might be used.    A scar is always present after foot and ankle surgery. The scar will be visible and could be sensitive. Some people develop excessive scarring, which cannot be controlled by the surgeon. Scars can be unsightly and can restrict joint mobility.    Blood clots can develop in the calf after surgery. Foot and ankle surgery is a predisposing factor for blood clots. The blood clot could break and travel to your lung.  This condition can lead to death. Early warning signs could include calf swelling and pain, chest pain or shortness of breath. This is an emergency that requires immediate attention by a medical doctor!    Surgery will not necessarily create a pain-free foot. Even normal feet hurt. Crooked toes, bunions, neuromas, flat feet and arthritis should all be considered permanent conditions.  Ankle pain commonly requires multiple surgeries over a lifetime. Do not assume  that having surgery will permanently fix your condition. You may need permanent alteration in shoes and activities to accommodate your foot and ankle problem.    Careful attention to post-operative recommendations will dramatically reduce your risk of complications. Proper dressing, wound care, elevation and rest will be essential to get the wound healed and minimize scarring. Strict attention to activity restrictions, such as non-weight bearing, or partial weight bearing is essential. Internal fixation devices may not resist the stress of walking. Some select surgeries allow the patient to walk, however this should be very minimal.    Despite these concerns, foot and ankle surgery leads to a high level of patient satisfaction. Your surgeon would not recommend surgery if he/she did not expect your foot to improve. Talk to your surgeon about any of the above issues.

## 2025-06-06 ENCOUNTER — RESULTS FOLLOW-UP (OUTPATIENT)
Dept: PODIATRY | Facility: CLINIC | Age: 80
End: 2025-06-06

## 2025-06-06 ENCOUNTER — MYC MEDICAL ADVICE (OUTPATIENT)
Dept: ORTHOPEDICS | Facility: CLINIC | Age: 80
End: 2025-06-06
Payer: MEDICARE

## 2025-07-09 ENCOUNTER — OFFICE VISIT (OUTPATIENT)
Dept: FAMILY MEDICINE | Facility: CLINIC | Age: 80
End: 2025-07-09
Payer: MEDICARE

## 2025-07-09 VITALS
DIASTOLIC BLOOD PRESSURE: 71 MMHG | BODY MASS INDEX: 28.16 KG/M2 | TEMPERATURE: 97 F | WEIGHT: 153 LBS | HEIGHT: 62 IN | OXYGEN SATURATION: 96 % | SYSTOLIC BLOOD PRESSURE: 125 MMHG | HEART RATE: 95 BPM | RESPIRATION RATE: 18 BRPM

## 2025-07-09 DIAGNOSIS — G89.29 CHRONIC LEFT HIP PAIN: ICD-10-CM

## 2025-07-09 DIAGNOSIS — F51.01 PRIMARY INSOMNIA: ICD-10-CM

## 2025-07-09 DIAGNOSIS — Z01.818 PREOP GENERAL PHYSICAL EXAM: ICD-10-CM

## 2025-07-09 DIAGNOSIS — J43.2 CENTRILOBULAR EMPHYSEMA (H): ICD-10-CM

## 2025-07-09 DIAGNOSIS — E78.5 HYPERLIPIDEMIA LDL GOAL <100: ICD-10-CM

## 2025-07-09 DIAGNOSIS — M54.2 NECK PAIN: ICD-10-CM

## 2025-07-09 DIAGNOSIS — M80.051D AGE-RELATED OSTEOPOROSIS WITH CURRENT PATHOLOGICAL FRACTURE, RIGHT FEMUR, SUBSEQUENT ENCOUNTER FOR FRACTURE WITH ROUTINE HEALING: ICD-10-CM

## 2025-07-09 DIAGNOSIS — M20.42 HAMMER TOE OF LEFT FOOT: ICD-10-CM

## 2025-07-09 DIAGNOSIS — M25.552 CHRONIC LEFT HIP PAIN: ICD-10-CM

## 2025-07-09 DIAGNOSIS — D32.9 MENINGIOMA (H): ICD-10-CM

## 2025-07-09 DIAGNOSIS — Z23 NEED FOR VACCINATION: ICD-10-CM

## 2025-07-09 LAB
ANION GAP SERPL CALCULATED.3IONS-SCNC: 10 MMOL/L (ref 7–15)
BASOPHILS # BLD AUTO: 0 10E3/UL (ref 0–0.2)
BASOPHILS NFR BLD AUTO: 1 %
BUN SERPL-MCNC: 16.7 MG/DL (ref 8–23)
CALCIUM SERPL-MCNC: 9.8 MG/DL (ref 8.8–10.4)
CHLORIDE SERPL-SCNC: 103 MMOL/L (ref 98–107)
CREAT SERPL-MCNC: 0.89 MG/DL (ref 0.51–0.95)
EGFRCR SERPLBLD CKD-EPI 2021: 66 ML/MIN/1.73M2
EOSINOPHIL # BLD AUTO: 0.1 10E3/UL (ref 0–0.7)
EOSINOPHIL NFR BLD AUTO: 2 %
ERYTHROCYTE [DISTWIDTH] IN BLOOD BY AUTOMATED COUNT: 12.5 % (ref 10–15)
GLUCOSE SERPL-MCNC: 96 MG/DL (ref 70–99)
HCO3 SERPL-SCNC: 28 MMOL/L (ref 22–29)
HCT VFR BLD AUTO: 39.8 % (ref 35–47)
HGB BLD-MCNC: 12.4 G/DL (ref 11.7–15.7)
IMM GRANULOCYTES # BLD: 0 10E3/UL
IMM GRANULOCYTES NFR BLD: 0 %
LYMPHOCYTES # BLD AUTO: 0.7 10E3/UL (ref 0.8–5.3)
LYMPHOCYTES NFR BLD AUTO: 14 %
MCH RBC QN AUTO: 28.2 PG (ref 26.5–33)
MCHC RBC AUTO-ENTMCNC: 31.2 G/DL (ref 31.5–36.5)
MCV RBC AUTO: 91 FL (ref 78–100)
MONOCYTES # BLD AUTO: 0.4 10E3/UL (ref 0–1.3)
MONOCYTES NFR BLD AUTO: 8 %
NEUTROPHILS # BLD AUTO: 3.5 10E3/UL (ref 1.6–8.3)
NEUTROPHILS NFR BLD AUTO: 76 %
PLATELET # BLD AUTO: 192 10E3/UL (ref 150–450)
POTASSIUM SERPL-SCNC: 3.6 MMOL/L (ref 3.4–5.3)
RBC # BLD AUTO: 4.4 10E6/UL (ref 3.8–5.2)
SODIUM SERPL-SCNC: 141 MMOL/L (ref 135–145)
WBC # BLD AUTO: 4.7 10E3/UL (ref 4–11)

## 2025-07-09 PROCEDURE — 3078F DIAST BP <80 MM HG: CPT | Performed by: INTERNAL MEDICINE

## 2025-07-09 PROCEDURE — 85025 COMPLETE CBC W/AUTO DIFF WBC: CPT | Performed by: INTERNAL MEDICINE

## 2025-07-09 PROCEDURE — 80048 BASIC METABOLIC PNL TOTAL CA: CPT | Performed by: INTERNAL MEDICINE

## 2025-07-09 PROCEDURE — 3074F SYST BP LT 130 MM HG: CPT | Performed by: INTERNAL MEDICINE

## 2025-07-09 PROCEDURE — 36415 COLL VENOUS BLD VENIPUNCTURE: CPT | Performed by: INTERNAL MEDICINE

## 2025-07-09 PROCEDURE — 90480 ADMN SARSCOV2 VAC 1/ONLY CMP: CPT | Performed by: INTERNAL MEDICINE

## 2025-07-09 PROCEDURE — 91320 SARSCV2 VAC 30MCG TRS-SUC IM: CPT | Performed by: INTERNAL MEDICINE

## 2025-07-09 PROCEDURE — 99214 OFFICE O/P EST MOD 30 MIN: CPT | Mod: 25 | Performed by: INTERNAL MEDICINE

## 2025-07-09 RX ORDER — ROSUVASTATIN CALCIUM 5 MG/1
5 TABLET, COATED ORAL DAILY
Qty: 90 TABLET | Refills: 3 | OUTPATIENT
Start: 2025-07-09

## 2025-07-09 RX ORDER — LORATADINE 10 MG/1
10 TABLET ORAL DAILY
COMMUNITY
Start: 2025-07-09

## 2025-07-09 NOTE — PATIENT INSTRUCTIONS
How to Take Your Medication Before Surgery  Preoperative Medication Instructions   Antiplatelet or Anticoagulation Medication Instructions   - We reviewed the medication list and the patient is not on an antiplatelet or anticoagulation medications.    Additional Medication Instructions  We reviewed the medication list and there are no chronic medications that need to be adjusted for this procedure.   - Herbal medications and vitamins: DO NOT TAKE 14 days prior to surgery.       Patient Education   Preparing for Your Surgery  For Adults  Getting started  In most cases, a nurse will call to review your health history and instructions. They will give you an arrival time based on your scheduled surgery time. Please be ready to share:  Your doctor's clinic name and phone number  Your medical, surgical, and anesthesia history  A list of allergies and sensitivities  A list of medicines, including herbal treatments and over-the-counter drugs  Whether the patient has a legal guardian (ask how to send us the papers in advance)  Note: You may not receive a call if you were seen at our PAC (Preoperative Assessment Center).  Please tell us if you're pregnant--or if there's any chance you might be pregnant. Some surgeries may injure a fetus (unborn baby), so they require a pregnancy test. Surgeries that are safe for a fetus don't always need a test, and you can choose whether to have one.   Preparing for surgery  Within 10 to 30 days of surgery: Have a pre-op exam (sometimes called an H&P, or History and Physical). This can be done at a clinic or pre-operative center.  If you're having a , you may not need this exam. Talk to your care team.  At your pre-op exam, talk to your care team about all medicines you take. (This includes CBD oil and any drugs, such as THC, marijuana, and other forms of cannabis.) If you need to stop any medicine before surgery, ask when to start taking it again.  This is for your safety. Many  medicines and drugs can make you bleed too much during surgery. Some change how well surgery (anesthesia) drugs work.  Call your insurance company to let them know you're having surgery. (If you don't have insurance, call [phone].)  Call your clinic if there's any change in your health. This includes a scrape or scratch near the surgery site, or any signs of a cold (sore throat, runny nose, cough, rash, fever).  Eating and drinking guidelines  For your safety: Unless your surgeon tells you otherwise, follow the guidelines below.  Eat and drink as normal until 8 hours before you arrive for surgery. After that, no food or milk. You can spit out gum when you arrive.  Drink clear liquids until 2 hours before you arrive. These are liquids you can see through, like water, Gatorade, and Propel Water. They also include plain black coffee and tea (no cream or milk).  No alcohol for 24 hours before you arrive. The night before surgery, stop any drinks that contain THC.  If your care team tells you to take medicine on the morning of surgery, it's okay to take it with a sip of water. No other medicines or drugs are allowed (including CBD oil)--follow your care team's instructions.  If you have questions the day of surgery, call your hospital or surgery center.   Preventing infection  Shower or bathe the night before and the morning of surgery. Follow the instructions your clinic gave you. (If no instructions, use regular soap.)  Don't shave or clip hair near your surgery site. We'll remove the hair if needed.  Don't smoke or vape the morning of surgery. No chewing tobacco for 6 hours before you arrive. A nicotine patch is okay. You may spit out nicotine gum when you arrive.  For some surgeries, the surgeon will tell you to fully quit smoking and nicotine.  We will make every effort to keep you safe from infection. We will:  Clean our hands often with soap and water (or an alcohol-based hand rub).  Clean the skin at your  surgery site with a special soap that kills germs.  Give you a special gown to keep you warm. (Cold raises the risk of infection.)  Wear hair covers, masks, gowns, and gloves during surgery.  Give antibiotic medicine, if prescribed. Not all surgeries need this medicine.  What to bring on the day of surgery  Photo ID and insurance card  Copy of your health care directive, if you have one  Glasses and hearing aids (bring cases)  You can't wear contacts during surgery  Inhaler and eye drops, if you use them (tell us about these when you arrive)  CPAP machine or breathing device, if you use them  A few personal items, if spending the night  If you have . . .  A pacemaker, ICD (cardiac defibrillator), or other implant: Bring the ID card.  An implanted stimulator: Bring the remote control.  A legal guardian: Bring a copy of the certified (court-stamped) guardianship papers.  Please remove any jewelry, including body piercings. Leave jewelry and other valuables at home.  If you're going home the day of surgery  You must have a support person drive you home. They should stay with you overnight, and they may need to help with your self-care.  If you don't have a support person, please tells us as soon as possible. We can help.  After surgery  If it's hard to control your pain or you need more pain medicine, please call your surgeon's office.  Questions?   If you have any questions for your care team, list them here:   ____________________________________________________________________________________________________________________________________________________________________________________________________________________________________________________________  For informational purposes only. Not to replace the advice of your health care provider. Copyright   2003, 2019 [facility]. All rights reserved. Clinically reviewed by Lyle Villa MD. [employer] [id number] - REV 02/25.

## 2025-07-09 NOTE — PROGRESS NOTES
Preoperative Evaluation  [facility]  [address]  [address]  [place] [address]  Phone: [phone]  Fax: [phone]  Primary Provider: Alicia Tidwell DO  Pre-op Performing Provider: Alicia Tidwell DO  Jul 9, 2025 7/4/2025   Surgical Information   What procedure is being done? Pre-Op for foot surgery   Facility or Hospital where procedure/surgery will be performed: [facility]   Who is doing the procedure / surgery? Germania Sanchez   Date of surgery / procedure: 7/17/2025   Time of surgery / procedure: Unknown   Where do you plan to recover after surgery? at home alone     Fax number for surgical facility: Note does not need to be faxed, will be available electronically in Epic.    Assessment & Plan     The proposed surgical procedure is considered INTERMEDIATE risk.    (Z01.818) Preop general physical exam  (primary encounter diagnosis)  (M20.42) Hammer toe of left foot  Comment: Ongoing left foot and left hip pain- ok to proceed with surgery.  Plan: Basic metabolic panel  (Ca, Cl, CO2,         Creat, Gluc, K, Na, BUN), Hemoglobin    (J43.2) Centrilobular emphysema (H)  Comment: Not having any dyspnea currently- uses albuterol twice monthly at most- using Bevespi daily as maintenance therapy.  Plan: Continue current regimen, asymptomatic    (D32.9) Meningioma (H)  Comment: Noted, follows with Neurosurgery- last saw 10/4/23- due to repeat follow up in 2025.    (M54.2) Neck pain  Comment: When really severe- uses Flexeril sparingly prn.  Wears soft neck brace occasionally due to neck pain.    (F51.01) Primary insomnia  Comment: Uses Tylenol PM prn for sleep sometimes- discussed reducing this because can incr risk of falls.     (E78.5) Hyperlipidemia, LDL goal <100  Comment: Cont rosuvastatin 5 mg daily; no side effects.     (M80.051D) Age related osteoporosis  Comment: Continue Ca-Vit D, will discuss in further detail at preventive visit-  previously, Diagnosed by [facility]EXA 2010- declines bisphosphonate, Prolia, Endocrine referral. Follows [employer] regimen.         - No identified additional risk factors other than previously addressed    Preoperative Medication Instructions  Antiplatelet or Anticoagulation Medication Instructions   - We reviewed the medication list and the patient is not on an antiplatelet or anticoagulation medications.    Additional Medication Instructions  We reviewed the medication list and there are no chronic medications that need to be adjusted for this procedure.   - Herbal medications and vitamins: DO NOT TAKE 14 days prior to surgery.    Recommendation  Approval given to proceed with proposed procedure, without further diagnostic evaluation.        Tanna Kline is a 80 year old, presenting for the following:  Pre-Op Exam          7/9/2025     9:47 AM   Additional Questions   Roomed by lucila   Accompanied by self     HPI:   Hasn't been sleeping very well.  When took oxycodone after her cecectomy, vomited and got sick.    Breathing has been good- albuterol use twice in the last month.              7/4/2025   Pre-Op Questionnaire   Have you ever had a heart attack or stroke? No   Have you ever had surgery on your heart or blood vessels, such as a stent placement, a coronary artery bypass, or surgery on an artery in your head, neck, heart, or legs? No   Do you have chest pain with activity? No   Do you have a history of heart failure? No   Do you currently have a cold, bronchitis or symptoms of other infection? No   Do you have a cough, shortness of breath, or wheezing? No   Do you or anyone in your family have previous history of blood clots? No   Do you or does anyone in your family have a serious bleeding problem such as prolonged bleeding following surgeries or cuts? No   Have you ever had problems with anemia or been told to take iron pills? (!) YES - but not currently   Have you had any abnormal blood loss such as  black, tarry or bloody stools, or abnormal vaginal bleeding? No   Have you ever had a blood transfusion? No   Are you willing to have a blood transfusion if it is medically needed before, during, or after your surgery? Yes   Have you or any of your relatives ever had problems with anesthesia? (!) YES - but none recently, woke up early during breast surgery and had oversedation after hysterectomy (had other severe illness at the time)   Do you have sleep apnea, excessive snoring or daytime drowsiness? No   Do you have any artifical heart valves or other implanted medical devices like a pacemaker, defibrillator, or continuous glucose monitor? No   Do you have artificial joints? (!) YES- right hip   Are you allergic to latex? No     Advance Care Planning    Document on file is a Health Care Directive or POLST.    Preoperative Review of    reviewed - no record of controlled substances prescribed.        Patient Active Problem List    Diagnosis Date Noted    Chronic left hip pain 06/04/2025     Priority: Medium    Tinnitus, left 01/03/2024     Priority: Medium     Persisting since October, has had some in right ear as well.  Not painful nor with overt hearing loss.  Effusion on left resolved.  ENT referral placed.      Neck pain 11/16/2023     Priority: Medium    History of total right hip replacement 01/27/2022     Priority: Medium    Primary insomnia 01/27/2022     Priority: Medium    Drug-induced constipation 01/27/2022     Priority: Medium    Acquired absence of right breast and nipple 01/26/2022     Priority: Medium    Age-related osteoporosis with current pathological fracture, right femur, subsequent encounter for fracture with routine healing 01/26/2022     Priority: Medium    Spasm of muscle 01/26/2022     Priority: Medium    Mixed incontinence 01/02/2019     Priority: Medium    Pelvic floor dysfunction 01/02/2019     Priority: Medium    Myalgia of pelvic floor 01/02/2019     Priority: Medium     Centrilobular emphysema (H)      Priority: Medium    History of colonic polyps 05/31/2016     Priority: Medium     Repeat colonoscopy due 2019.      Meningioma (H) 09/24/2014     Priority: Medium    Hammer toe of left foot 05/19/2014     Priority: Medium    Hyperlipidemia LDL goal <100 04/04/2012     Priority: Medium    Chronic rhinitis 12/14/2011     Priority: Medium    Malignant neoplasm of female breast (H) 01/03/2007     Priority: Medium     Problem list name updated by automated process. Provider to review        Past Medical History:   Diagnosis Date    Allergic rhinitis 1968    from cold ac    Arthritis     Breast cancer, right breast (H)     Centrilobular emphysema (H)     Colon polyps     repeat colonoscopy 2019    Hypertension     caused by stress    Tinnitus Aug 2023    Comes and goes     Past Surgical History:   Procedure Laterality Date    ABDOMEN SURGERY      ARTHROPLASTY HIP Right 01/24/2022    Procedure: ARTHROPLASTY, HIP, TOTAL;  Surgeon: Maddy Arango MD;  Location:  OR    BIOPSY      BREAST SURGERY  2007    CATARACT IOL, RT/LT      CHOLECYSTECTOMY      COLONOSCOPY      COLONOSCOPY N/A 10/04/2019    Procedure: COLONOSCOPY, WITH POLYPECTOMY AND BIOPSY;  Surgeon: Lucía John MD;  Location:  GI    GYN SURGERY  1981    Hysterectomy & removal of one ovary    LAPAROSCOPIC APPENDECTOMY N/A 02/12/2025    Procedure: with appendectomy;  Surgeon: Lucía John MD;  Location:  OR    LAPAROSCOPIC CECECTOMY N/A 02/12/2025    Procedure: Laparoscopic partial Cecectomy;  Surgeon: Lucía John MD;  Location:  OR    ORTHOPEDIC SURGERY      PHACOEMULSIFICATION CLEAR CORNEA WITH STANDARD INTRAOCULAR LENS IMPLANT  05/22/2014    Procedure: PHACOEMULSIFICATION CLEAR CORNEA WITH STANDARD INTRAOCULAR LENS IMPLANT;  Surgeon: Rahul Reid MD;  [facility]: [facility]    PHACOEMULSIFICATION CLEAR CORNEA WITH STANDARD INTRAOCULAR LENS IMPLANT  07/08/2014    Procedure: PHACOEMULSIFICATION  CLEAR CORNEA WITH STANDARD INTRAOCULAR LENS IMPLANT;  Surgeon: Rahul Reid MD;  Location:  OR    SURGICAL HISTORY OF -   08/2000    left foot reconstruction    SURGICAL HISTORY OF -   1979    hysterectomy     Current Outpatient Medications   Medication Sig Dispense Refill    acetaminophen (TYLENOL) 500 MG tablet Take 2 tablets (1,000 mg) by mouth every 6 hours as needed for mild pain      albuterol (PROAIR HFA/PROVENTIL HFA/VENTOLIN HFA) 108 (90 Base) MCG/ACT inhaler Inhale 2 puffs into the lungs every 6 hours as needed for shortness of breath, wheezing or cough 18 g 3    Calcium Carbonate-Vitamin D (CALCIUM + D PO) Take 1 tablet by mouth daily Total 1200/800mg daily      cyclobenzaprine (FLEXERIL) 5 MG tablet Take 1 tablet (5 mg) by mouth 3 times daily as needed for muscle spasms. 30 tablet 0    diphenhydrAMINE-acetaminophen (TYLENOL PM)  MG tablet Take 1-2 tablets by mouth nightly as needed for sleep      fluticasone (FLONASE) 50 MCG/ACT nasal spray Spray 1-2 sprays into both nostrils daily. 16 g 11    Glycopyrrolate-Formoterol (BEVESPI AEROSPHERE) 9-4.8 MCG/ACT oral inhaler Inhale 2 puffs into the lungs 2 times daily 10.7 g 5    loratadine (CLARITIN) 10 MG tablet Take 1 tablet (10 mg) by mouth daily.      multivitamin (ONE-DAILY) tablet Take 1 tablet by mouth daily      rosuvastatin (CRESTOR) 5 MG tablet Take 1 tablet (5 mg) by mouth daily. 90 tablet 3    triamcinolone (KENALOG) 0.1 % external ointment Apply topically 2 times daily as needed for irritation (itching). 80 g 0       Allergies   Allergen Reactions    Aspirin Nausea and Vomiting     sick    Chantix [Varenicline Tartrate] Other (See Comments)     Sees things    Glycerin Itching     LIQUID,   Reactions: itchy and redness      Ipratropium Other (See Comments)     atrovent inhaler caused sore, tight throat , chest hurt, breathless.     Sympathomimetics Other (See Comments)     Patient doesn't know why this is listed as an allergy     Varenicline GI Disturbance, Other (See Comments) and Nausea and Vomiting    Wool Fiber Itching     Redness         Social History     Tobacco Use    Smoking status: Former     Current packs/day: 0.00     Average packs/day: 1.5 packs/day for 47.4 years (71.0 ttl pk-yrs)     Types: Cigarettes     Start date: 1967     Quit date: 5/10/2014     Years since quittin.1    Smokeless tobacco: Never    Tobacco comments:     Quit several times over the years including when pregnant   Substance Use Topics    Alcohol use: Never     Family History   Problem Relation Age of Onset    Cerebrovascular Disease Father         Had several minor strokes    Breast Cancer Sister         Had double mastectomy.  Cancer spread to other organs and brain because she could not get treatment because of Covid-19 pandemic.  She passed away in 2020.    Diabetes Sister         Overweight    Cancer Sister         Breast cancer that spread    Breast Cancer Sister     Obesity Sister         On diet/On diet/On diet    Cancer Brother         Non-Hodgkins Lymphoma    Asthma Brother         Uses inhalers    Other Cancer Brother         Non-Hodgins Lymphoma    Diabetes Brother         Overweight    Obesity Brother     Heart Disease Brother         Congestive Heart Failure    Cancer Brother     Obesity Brother     Asthma Brother     Obesity Brother     Asthma Niece         passed away of pneumonia    Breast Cancer Cousin         Both breasts    Cancer Other         Stomach cancer- cousin    Breast Cancer Other         Aunts    Diabetes Other         Uncles and cousin/Paternal Uncles/Paternal Uncles/Paternal Uncles/Paternal Uncles    Cancer Other         Stomach, breast cancer    Breast Cancer Other         Paternal Aunt/Paternal Aunt/Paternal Aunt/Paternal Aunt    Colon Cancer Other         Maternal Great Uncles    Colon Cancer Other         Maternal Uncle/Maternal Great Uncles/Maternal Great Uncles/Maternal Great Uncles    Diabetes Other          " of complications    Breast Cancer Other         had double mastectomy    Colon Cancer Other         Colon cancer    Cancer Sister         Breast cancer    Diabetes Sister     Breast Cancer Cousin         Paternal    Other Cancer Brother         Hodgkins Lymphoma    Asthma Niece     Diabetes Sister     Breast Cancer Sister     Obesity Sister         On diet    Asthma Niece         passed away of pneumonia    Anesthesia Reaction No family hx of      History   Drug Use No               Objective    /71   Pulse 95   Temp 97  F (36.1  C) (Temporal)   Resp 18   Ht 1.58 m (5' 2.21\")   Wt 69.4 kg (153 lb)   LMP  (LMP Unknown)   SpO2 96%   BMI 27.80 kg/m     Estimated body mass index is 27.8 kg/m  as calculated from the following:    Height as of this encounter: 1.58 m (5' 2.21\").    Weight as of this encounter: 69.4 kg (153 lb).  Physical Exam  GENERAL: alert and no distress  EYES: Eyes grossly normal to inspection, PERRL and conjunctivae and sclerae normal  HENT: ear canals and TM's normal, nose and mouth without ulcers or lesions  NECK: no adenopathy, no asymmetry, masses, or scars  RESP: lungs clear to auscultation - no rales, rhonchi or wheezes  CV: regular rate and rhythm, normal S1 S2, no S3 or S4, no murmur, click or rub, no peripheral edema  MS: no gross musculoskeletal defects noted, no edema  SKIN: no suspicious lesions or rashes  NEURO: Normal strength and tone, mentation intact and speech normal  PSYCH: mentation appears normal, affect normal/bright    Recent Labs   Lab Test 25  0643 25  1412 10/07/24  1433   HGB 12.3 13.6 13.6     --  204    142 137   POTASSIUM 3.9 3.9 4.2   CR 0.85 0.95 0.90        Diagnostics  Labs pending at this time.  Results will be reviewed when available.   No EKG required, no history of coronary heart disease, significant arrhythmia, peripheral arterial disease or other structural heart disease.    Revised Cardiac Risk Index (RCRI)  The " patient has the following serious cardiovascular risks for perioperative complications:   - No serious cardiac risks = 0 points     RCRI Interpretation: 0 points: Class I (very low risk - 0.4% complication rate)         Signed Electronically by: Alicia Tidwell DO  A copy of this evaluation report is provided to the requesting physician.

## 2025-07-14 ENCOUNTER — RESULTS FOLLOW-UP (OUTPATIENT)
Dept: FAMILY MEDICINE | Facility: CLINIC | Age: 80
End: 2025-07-14
Payer: MEDICARE

## 2025-07-16 ENCOUNTER — ANESTHESIA EVENT (OUTPATIENT)
Dept: SURGERY | Facility: CLINIC | Age: 80
End: 2025-07-16
Payer: MEDICARE

## 2025-07-16 ASSESSMENT — LIFESTYLE VARIABLES: TOBACCO_USE: 1

## 2025-07-16 NOTE — ANESTHESIA PREPROCEDURE EVALUATION
Anesthesia Pre-Procedure Evaluation    Patient: Raisa Archer   MRN: [id number] : 1945          Procedure : Procedure(s):  ARTHROPLASTY, TOE  OSTEOTOMY, FOOT, OPEN         Past Medical History:   Diagnosis Date    Allergic rhinitis     from cold ac    Arthritis     Breast cancer, right breast (H)     Centrilobular emphysema (H)     Colon polyps     repeat colonoscopy     Hypertension     caused by stress    Tinnitus Aug 2023    Comes and goes      Past Surgical History:   Procedure Laterality Date    ABDOMEN SURGERY      ARTHROPLASTY HIP Right 2022    Procedure: ARTHROPLASTY, HIP, TOTAL;  Surgeon: Maddy Arango MD;  Location:  OR    BIOPSY      BREAST SURGERY      CATARACT IOL, RT/LT      CHOLECYSTECTOMY      COLONOSCOPY      COLONOSCOPY N/A 10/04/2019    Procedure: COLONOSCOPY, WITH POLYPECTOMY AND BIOPSY;  Surgeon: Lucía John MD;  Location:  GI    GYN SURGERY      Hysterectomy & removal of one ovary    LAPAROSCOPIC APPENDECTOMY N/A 2025    Procedure: with appendectomy;  Surgeon: Lucía John MD;  Location:  OR    LAPAROSCOPIC CECECTOMY N/A 2025    Procedure: Laparoscopic partial Cecectomy;  Surgeon: Lucía John MD;  Location:  OR    ORTHOPEDIC SURGERY      PHACOEMULSIFICATION CLEAR CORNEA WITH STANDARD INTRAOCULAR LENS IMPLANT  2014    Procedure: PHACOEMULSIFICATION CLEAR CORNEA WITH STANDARD INTRAOCULAR LENS IMPLANT;  Surgeon: Rahul Reid MD;  Location: [facility]    PHACOEMULSIFICATION CLEAR CORNEA WITH STANDARD INTRAOCULAR LENS IMPLANT  2014    Procedure: PHACOEMULSIFICATION CLEAR CORNEA WITH STANDARD INTRAOCULAR LENS IMPLANT;  Surgeon: Rahul Reid MD;  Location:  OR    SURGICAL HISTORY OF -   2000    left foot reconstruction    SURGICAL HISTORY OF -       hysterectomy      Allergies   Allergen Reactions    Aspirin Nausea and Vomiting     sick    Chantix [Varenicline Tartrate] Other (See  Comments)     Sees things    Glycerin Itching     LIQUID,   Reactions: itchy and redness      Ipratropium Other (See Comments)     atrovent inhaler caused sore, tight throat , chest hurt, breathless.     Sympathomimetics Other (See Comments)     Patient doesn't know why this is listed as an allergy    Varenicline GI Disturbance, Other (See Comments) and Nausea and Vomiting    Wool Fiber Itching     Redness       Social History     Tobacco Use    Smoking status: Former     Current packs/day: 0.00     Average packs/day: 1.5 packs/day for 47.4 years (71.0 ttl pk-yrs)     Types: Cigarettes     Start date: 1967     Quit date: 5/10/2014     Years since quittin.1    Smokeless tobacco: Never    Tobacco comments:     Quit several times over the years including when pregnant   Substance Use Topics    Alcohol use: Never      Wt Readings from Last 1 Encounters:   25 69.4 kg (153 lb)        Anesthesia Evaluation   Pt has had prior anesthetic.     No history of anesthetic complications       ROS/MED HX  ENT/Pulmonary:     (+)                tobacco use (60+ yr history), Past use,         COPD (emphysema, uses inhalers),           (-) sleep apnea   Neurologic:       Cardiovascular:     (+) Dyslipidemia - -   -  - -                                 Previous cardiac testing   Echo: Date: 2023 Results:  Impression:     1. Meningioma overlying the left frontal convexity is slightly  increased in size compared to MRI 2 2020.  2. Mild Leukoaraiosis.  3. Enhancement along the left C1-2 facet joint; this may represent  active inflammatory facet arthropathy.    Stress Test:  Date: Results:    ECG Reviewed:  Date: Results:    Cath:  Date: Results:      METS/Exercise Tolerance:     Hematologic:       Musculoskeletal:       GI/Hepatic:    (-) GERD   Renal/Genitourinary:       Endo:       Psychiatric/Substance Use:       Infectious Disease:       Malignancy: Comment: Hx breast surgery  Meningioma being monitored  (+)  Malignancy,     Other:              Physical Exam  Airway  Mallampati: III  TM distance: >3 FB  Neck ROM: full  Mouth opening: >= 4 cm    Cardiovascular - normal exam   Dental Comments: Upper many implants and bridge      Pulmonary - normal exam      Neurological   Other Findings       OUTSIDE LABS:  CBC:   Lab Results   Component Value Date    WBC 4.7 07/09/2025    WBC 4.1 02/12/2025    HGB 12.4 07/09/2025    HGB 12.3 02/12/2025    HCT 39.8 07/09/2025    HCT 37.8 02/12/2025     07/09/2025     02/12/2025     BMP:   Lab Results   Component Value Date     07/09/2025     02/12/2025    POTASSIUM 3.6 07/09/2025    POTASSIUM 3.9 02/12/2025    CHLORIDE 103 07/09/2025    CHLORIDE 103 02/12/2025    CO2 28 07/09/2025    CO2 29 02/12/2025    BUN 16.7 07/09/2025    BUN 13.2 02/12/2025    CR 0.89 07/09/2025    CR 0.85 02/12/2025    GLC 96 07/09/2025    GLC 96 02/12/2025     COAGS:   Lab Results   Component Value Date    PTT 30 01/23/2022    INR 0.98 01/23/2022     POC:   Lab Results   Component Value Date    BGM 94 12/22/2006     HEPATIC:   Lab Results   Component Value Date    ALBUMIN 4.4 10/07/2024    PROTTOTAL 7.1 10/07/2024    ALT 14 10/07/2024    AST 29 10/07/2024    ALKPHOS 106 10/07/2024    BILITOTAL 0.5 10/07/2024     OTHER:   Lab Results   Component Value Date    A1C 5.2 04/23/2018    JOJO 9.8 07/09/2025    MAG 1.8 09/09/2022    LIPASE 105 05/16/2018    AMYLASE 48 02/06/2009    TSH 1.07 07/15/2009    SED 12 07/05/2013       Anesthesia Plan    ASA Status:  3      NPO Status: NPO Appropriate   Anesthesia Type: MAC.   Techniques and Equipment:       - Monitoring Plan: standard ASA monitoring     Consents    Anesthesia Plan(s) and associated risks, benefits, and realistic alternatives discussed. Questions answered and patient/representative(s) expressed understanding.     - Discussed:     - Discussed with:  Patient               Postoperative Care    Pain management: multimodal analgesia.  "    Comments:    Other Comments: Pt requests care with neck positioning and ideally wants to not be aware the whole procedure, but understands she may               Wilmar Tobin MD    I have reviewed the pertinent notes and labs in the chart from the past 30 days and (re)examined the patient.  Any updates or changes from those notes are reflected in this note.    Clinically Significant Risk Factors Present on Admission                             # Overweight: Estimated body mass index is 27.8 kg/m  as calculated from the following:    Height as of 7/9/25: 1.58 m (5' 2.21\").    Weight as of 7/9/25: 69.4 kg (153 lb).                    "

## 2025-07-17 ENCOUNTER — APPOINTMENT (OUTPATIENT)
Dept: GENERAL RADIOLOGY | Facility: CLINIC | Age: 80
End: 2025-07-17
Attending: PODIATRIST
Payer: MEDICARE

## 2025-07-17 ENCOUNTER — HOSPITAL ENCOUNTER (OUTPATIENT)
Facility: CLINIC | Age: 80
Discharge: HOME | End: 2025-07-17
Attending: PODIATRIST | Admitting: PODIATRIST
Payer: MEDICARE

## 2025-07-17 ENCOUNTER — ANESTHESIA (OUTPATIENT)
Dept: SURGERY | Facility: CLINIC | Age: 80
End: 2025-07-17
Payer: MEDICARE

## 2025-07-17 VITALS
TEMPERATURE: 97.2 F | HEART RATE: 82 BPM | BODY MASS INDEX: 26.91 KG/M2 | RESPIRATION RATE: 16 BRPM | HEIGHT: 63 IN | DIASTOLIC BLOOD PRESSURE: 64 MMHG | OXYGEN SATURATION: 97 % | SYSTOLIC BLOOD PRESSURE: 121 MMHG | WEIGHT: 151.9 LBS

## 2025-07-17 DIAGNOSIS — M20.42 HAMMER TOE OF LEFT FOOT: ICD-10-CM

## 2025-07-17 DIAGNOSIS — Z98.890 STATUS POST SURGERY: Primary | ICD-10-CM

## 2025-07-17 PROCEDURE — 250N000011 HC RX IP 250 OP 636: Performed by: NURSE ANESTHETIST, CERTIFIED REGISTERED

## 2025-07-17 PROCEDURE — 360N000084 HC SURGERY LEVEL 4 W/ FLUORO, PER MIN: Performed by: PODIATRIST

## 2025-07-17 PROCEDURE — 250N000009 HC RX 250: Performed by: PODIATRIST

## 2025-07-17 PROCEDURE — 999N000141 HC STATISTIC PRE-PROCEDURE NURSING ASSESSMENT: Performed by: PODIATRIST

## 2025-07-17 PROCEDURE — C1769 GUIDE WIRE: HCPCS | Performed by: PODIATRIST

## 2025-07-17 PROCEDURE — 250N000011 HC RX IP 250 OP 636: Performed by: PODIATRIST

## 2025-07-17 PROCEDURE — 710N000012 HC RECOVERY PHASE 2, PER MINUTE: Performed by: PODIATRIST

## 2025-07-17 PROCEDURE — 710N000009 HC RECOVERY PHASE 1, LEVEL 1, PER MIN: Performed by: PODIATRIST

## 2025-07-17 PROCEDURE — 258N000003 HC RX IP 258 OP 636: Performed by: NURSE ANESTHETIST, CERTIFIED REGISTERED

## 2025-07-17 PROCEDURE — L4361 PNEUMA/VAC WALK BOOT PRE OTS: HCPCS

## 2025-07-17 PROCEDURE — 999N000179 XR SURGERY CARM FLUORO LESS THAN 5 MIN W STILLS

## 2025-07-17 PROCEDURE — 250N000009 HC RX 250: Performed by: NURSE ANESTHETIST, CERTIFIED REGISTERED

## 2025-07-17 PROCEDURE — C1713 ANCHOR/SCREW BN/BN,TIS/BN: HCPCS | Performed by: PODIATRIST

## 2025-07-17 PROCEDURE — 272N000001 HC OR GENERAL SUPPLY STERILE: Performed by: PODIATRIST

## 2025-07-17 PROCEDURE — 250N000011 HC RX IP 250 OP 636: Performed by: ANESTHESIOLOGY

## 2025-07-17 PROCEDURE — 271N000001 HC OR GENERAL SUPPLY NON-STERILE: Performed by: PODIATRIST

## 2025-07-17 PROCEDURE — 370N000017 HC ANESTHESIA TECHNICAL FEE, PER MIN: Performed by: PODIATRIST

## 2025-07-17 DEVICE — GUIDE WIRE DOUBLE ENDED TROCAR: Type: IMPLANTABLE DEVICE | Site: TOE | Status: FUNCTIONAL

## 2025-07-17 DEVICE — IMPLANTABLE DEVICE: Type: IMPLANTABLE DEVICE | Site: TOE | Status: FUNCTIONAL

## 2025-07-17 DEVICE — SCREW COMPRESSION FT 2.5X14MM: Type: IMPLANTABLE DEVICE | Site: TOE | Status: FUNCTIONAL

## 2025-07-17 RX ORDER — NALOXONE HYDROCHLORIDE 0.4 MG/ML
0.1 INJECTION, SOLUTION INTRAMUSCULAR; INTRAVENOUS; SUBCUTANEOUS
Status: DISCONTINUED | OUTPATIENT
Start: 2025-07-17 | End: 2025-07-17 | Stop reason: HOSPADM

## 2025-07-17 RX ORDER — SODIUM CHLORIDE, SODIUM LACTATE, POTASSIUM CHLORIDE, CALCIUM CHLORIDE 600; 310; 30; 20 MG/100ML; MG/100ML; MG/100ML; MG/100ML
INJECTION, SOLUTION INTRAVENOUS CONTINUOUS
Status: DISCONTINUED | OUTPATIENT
Start: 2025-07-17 | End: 2025-07-17 | Stop reason: HOSPADM

## 2025-07-17 RX ORDER — MAGNESIUM HYDROXIDE 1200 MG/15ML
LIQUID ORAL PRN
Status: DISCONTINUED | OUTPATIENT
Start: 2025-07-17 | End: 2025-07-17 | Stop reason: HOSPADM

## 2025-07-17 RX ORDER — FENTANYL CITRATE 50 UG/ML
50 INJECTION, SOLUTION INTRAMUSCULAR; INTRAVENOUS EVERY 5 MIN PRN
Status: DISCONTINUED | OUTPATIENT
Start: 2025-07-17 | End: 2025-07-17 | Stop reason: HOSPADM

## 2025-07-17 RX ORDER — ONDANSETRON 4 MG/1
4 TABLET, ORALLY DISINTEGRATING ORAL EVERY 30 MIN PRN
Status: DISCONTINUED | OUTPATIENT
Start: 2025-07-17 | End: 2025-07-17 | Stop reason: HOSPADM

## 2025-07-17 RX ORDER — ONDANSETRON 2 MG/ML
INJECTION INTRAMUSCULAR; INTRAVENOUS PRN
Status: DISCONTINUED | OUTPATIENT
Start: 2025-07-17 | End: 2025-07-17

## 2025-07-17 RX ORDER — ACETAMINOPHEN 325 MG/1
650 TABLET ORAL
Status: DISCONTINUED | OUTPATIENT
Start: 2025-07-17 | End: 2025-07-17 | Stop reason: HOSPADM

## 2025-07-17 RX ORDER — BUPIVACAINE HYDROCHLORIDE 5 MG/ML
INJECTION, SOLUTION EPIDURAL; INTRACAUDAL; PERINEURAL PRN
Status: DISCONTINUED | OUTPATIENT
Start: 2025-07-17 | End: 2025-07-17 | Stop reason: HOSPADM

## 2025-07-17 RX ORDER — OXYCODONE HYDROCHLORIDE 5 MG/1
5 TABLET ORAL
Status: DISCONTINUED | OUTPATIENT
Start: 2025-07-17 | End: 2025-07-17 | Stop reason: HOSPADM

## 2025-07-17 RX ORDER — HYDROMORPHONE HCL IN WATER/PF 6 MG/30 ML
0.4 PATIENT CONTROLLED ANALGESIA SYRINGE INTRAVENOUS EVERY 5 MIN PRN
Status: DISCONTINUED | OUTPATIENT
Start: 2025-07-17 | End: 2025-07-17 | Stop reason: HOSPADM

## 2025-07-17 RX ORDER — SODIUM CHLORIDE, SODIUM LACTATE, POTASSIUM CHLORIDE, CALCIUM CHLORIDE 600; 310; 30; 20 MG/100ML; MG/100ML; MG/100ML; MG/100ML
INJECTION, SOLUTION INTRAVENOUS CONTINUOUS PRN
Status: DISCONTINUED | OUTPATIENT
Start: 2025-07-17 | End: 2025-07-17

## 2025-07-17 RX ORDER — CEFAZOLIN SODIUM/WATER 2 G/20 ML
2 SYRINGE (ML) INTRAVENOUS SEE ADMIN INSTRUCTIONS
Status: DISCONTINUED | OUTPATIENT
Start: 2025-07-17 | End: 2025-07-17 | Stop reason: HOSPADM

## 2025-07-17 RX ORDER — HYDROMORPHONE HCL IN WATER/PF 6 MG/30 ML
0.2 PATIENT CONTROLLED ANALGESIA SYRINGE INTRAVENOUS EVERY 5 MIN PRN
Status: DISCONTINUED | OUTPATIENT
Start: 2025-07-17 | End: 2025-07-17 | Stop reason: HOSPADM

## 2025-07-17 RX ORDER — ONDANSETRON 2 MG/ML
4 INJECTION INTRAMUSCULAR; INTRAVENOUS EVERY 30 MIN PRN
Status: DISCONTINUED | OUTPATIENT
Start: 2025-07-17 | End: 2025-07-17 | Stop reason: HOSPADM

## 2025-07-17 RX ORDER — PROPOFOL 10 MG/ML
INJECTION, EMULSION INTRAVENOUS CONTINUOUS PRN
Status: DISCONTINUED | OUTPATIENT
Start: 2025-07-17 | End: 2025-07-17

## 2025-07-17 RX ORDER — FENTANYL CITRATE 50 UG/ML
25 INJECTION, SOLUTION INTRAMUSCULAR; INTRAVENOUS EVERY 5 MIN PRN
Status: DISCONTINUED | OUTPATIENT
Start: 2025-07-17 | End: 2025-07-17 | Stop reason: HOSPADM

## 2025-07-17 RX ORDER — DEXAMETHASONE SODIUM PHOSPHATE 4 MG/ML
INJECTION, SOLUTION INTRA-ARTICULAR; INTRALESIONAL; INTRAMUSCULAR; INTRAVENOUS; SOFT TISSUE PRN
Status: DISCONTINUED | OUTPATIENT
Start: 2025-07-17 | End: 2025-07-17

## 2025-07-17 RX ORDER — DEXAMETHASONE SODIUM PHOSPHATE 4 MG/ML
4 INJECTION, SOLUTION INTRA-ARTICULAR; INTRALESIONAL; INTRAMUSCULAR; INTRAVENOUS; SOFT TISSUE
Status: DISCONTINUED | OUTPATIENT
Start: 2025-07-17 | End: 2025-07-17 | Stop reason: HOSPADM

## 2025-07-17 RX ORDER — OXYCODONE HYDROCHLORIDE 5 MG/1
5-10 TABLET ORAL EVERY 4 HOURS PRN
Qty: 12 TABLET | Refills: 0 | Status: SHIPPED | OUTPATIENT
Start: 2025-07-17

## 2025-07-17 RX ORDER — CEFAZOLIN SODIUM/WATER 2 G/20 ML
2 SYRINGE (ML) INTRAVENOUS
Status: COMPLETED | OUTPATIENT
Start: 2025-07-17 | End: 2025-07-17

## 2025-07-17 RX ORDER — FENTANYL CITRATE 50 UG/ML
INJECTION, SOLUTION INTRAMUSCULAR; INTRAVENOUS PRN
Status: DISCONTINUED | OUTPATIENT
Start: 2025-07-17 | End: 2025-07-17

## 2025-07-17 RX ORDER — LIDOCAINE HYDROCHLORIDE 20 MG/ML
INJECTION, SOLUTION INFILTRATION; PERINEURAL PRN
Status: DISCONTINUED | OUTPATIENT
Start: 2025-07-17 | End: 2025-07-17

## 2025-07-17 RX ORDER — ACETAMINOPHEN 325 MG/1
650 TABLET ORAL EVERY 4 HOURS PRN
Qty: 50 TABLET | Refills: 0 | Status: SHIPPED | OUTPATIENT
Start: 2025-07-17

## 2025-07-17 RX ADMIN — FENTANYL CITRATE 25 MCG: 50 INJECTION INTRAMUSCULAR; INTRAVENOUS at 07:30

## 2025-07-17 RX ADMIN — DEXMEDETOMIDINE HYDROCHLORIDE 8 MCG: 100 INJECTION, SOLUTION INTRAVENOUS at 07:58

## 2025-07-17 RX ADMIN — PHENYLEPHRINE HYDROCHLORIDE 100 MCG: 10 INJECTION INTRAVENOUS at 08:47

## 2025-07-17 RX ADMIN — PHENYLEPHRINE HYDROCHLORIDE 100 MCG: 10 INJECTION INTRAVENOUS at 08:59

## 2025-07-17 RX ADMIN — FENTANYL CITRATE 25 MCG: 50 INJECTION INTRAMUSCULAR; INTRAVENOUS at 07:42

## 2025-07-17 RX ADMIN — SODIUM CHLORIDE, SODIUM LACTATE, POTASSIUM CHLORIDE, AND CALCIUM CHLORIDE: .6; .31; .03; .02 INJECTION, SOLUTION INTRAVENOUS at 07:23

## 2025-07-17 RX ADMIN — PROPOFOL 30 MG: 10 INJECTION, EMULSION INTRAVENOUS at 07:35

## 2025-07-17 RX ADMIN — PROPOFOL 30 MG: 10 INJECTION, EMULSION INTRAVENOUS at 07:43

## 2025-07-17 RX ADMIN — PHENYLEPHRINE HYDROCHLORIDE 100 MCG: 10 INJECTION INTRAVENOUS at 09:08

## 2025-07-17 RX ADMIN — LIDOCAINE HYDROCHLORIDE 60 MG: 20 INJECTION, SOLUTION INFILTRATION; PERINEURAL at 07:30

## 2025-07-17 RX ADMIN — FENTANYL CITRATE 25 MCG: 50 INJECTION INTRAMUSCULAR; INTRAVENOUS at 08:09

## 2025-07-17 RX ADMIN — FENTANYL CITRATE 25 MCG: 50 INJECTION, SOLUTION INTRAMUSCULAR; INTRAVENOUS at 10:25

## 2025-07-17 RX ADMIN — DEXAMETHASONE SODIUM PHOSPHATE 4 MG: 4 INJECTION, SOLUTION INTRA-ARTICULAR; INTRALESIONAL; INTRAMUSCULAR; INTRAVENOUS; SOFT TISSUE at 07:47

## 2025-07-17 RX ADMIN — PHENYLEPHRINE HYDROCHLORIDE 100 MCG: 10 INJECTION INTRAVENOUS at 08:41

## 2025-07-17 RX ADMIN — Medication 2 G: at 07:27

## 2025-07-17 RX ADMIN — FENTANYL CITRATE 25 MCG: 50 INJECTION, SOLUTION INTRAMUSCULAR; INTRAVENOUS at 10:29

## 2025-07-17 RX ADMIN — DEXMEDETOMIDINE HYDROCHLORIDE 4 MCG: 100 INJECTION, SOLUTION INTRAVENOUS at 08:03

## 2025-07-17 RX ADMIN — PHENYLEPHRINE HYDROCHLORIDE 100 MCG: 10 INJECTION INTRAVENOUS at 08:50

## 2025-07-17 RX ADMIN — PROPOFOL 75 MCG/KG/MIN: 10 INJECTION, EMULSION INTRAVENOUS at 08:45

## 2025-07-17 RX ADMIN — PHENYLEPHRINE HYDROCHLORIDE 100 MCG: 10 INJECTION INTRAVENOUS at 09:17

## 2025-07-17 RX ADMIN — FENTANYL CITRATE 50 MCG: 50 INJECTION INTRAMUSCULAR; INTRAVENOUS at 07:55

## 2025-07-17 RX ADMIN — PROPOFOL 100 MCG/KG/MIN: 10 INJECTION, EMULSION INTRAVENOUS at 07:30

## 2025-07-17 RX ADMIN — DEXMEDETOMIDINE HYDROCHLORIDE 8 MCG: 100 INJECTION, SOLUTION INTRAVENOUS at 08:01

## 2025-07-17 RX ADMIN — ONDANSETRON 4 MG: 2 INJECTION INTRAMUSCULAR; INTRAVENOUS at 07:33

## 2025-07-17 RX ADMIN — PHENYLEPHRINE HYDROCHLORIDE 100 MCG: 10 INJECTION INTRAVENOUS at 08:35

## 2025-07-17 RX ADMIN — PHENYLEPHRINE HYDROCHLORIDE 100 MCG: 10 INJECTION INTRAVENOUS at 08:26

## 2025-07-17 ASSESSMENT — ACTIVITIES OF DAILY LIVING (ADL)
ADLS_ACUITY_SCORE: 50

## 2025-07-17 ASSESSMENT — COPD QUESTIONNAIRES: COPD: 1

## 2025-07-17 NOTE — OR NURSING
Meets criteria for discharge.  Discharge instructions reviewed with pt and pt's designated responsible party.  Pt label on prescription bag from pharmacy matched to pt's wristband. Pharmacy bag opened with 2 ` prescriptions inside. Medications were reviewed to match pt wristband while pt and significant other agreed with identification. Prescriptions placed back in pharmacy bag resealed with tape and placed in patients purse per pt request.

## 2025-07-17 NOTE — PRE-PROCEDURE
I have reviewed with the patient the surgical procedure for left foot 2nd and 5th metatarsal osteotomy, hammer toe repair toes 2, 3, 4. I have reviewed postoperative course, potential healing time, and use of osseus fixation. I have reviewed potential complications of the surgery including but not limited too: infection, joint stiffness, delay or non-union of bone, hardware pain or failure, numbness, need for further surgery, mal-position. Patient fully accepts and understands all explained. All questions answered. No written or verbal guarantees give or implied.     Judith Sanchez DPM

## 2025-07-17 NOTE — ANESTHESIA POSTPROCEDURE EVALUATION
Patient: Raisa Archer    Procedure: Procedure(s):  Arthroplasty of the 2nd and 5th toe, tenotomy of the 3rd and 4th toe, capsulotomy of 3rd and 4th metatarsal joints  5th, 2nd and 3rd Metatarsal Osteotomy       Anesthesia Type:  MAC    Note:     Postop Pain Control: Uneventful            Sign Out: Well controlled pain   PONV: No   Neuro/Psych: Uneventful            Sign Out: Acceptable/Baseline neuro status   Airway/Respiratory: Uneventful            Sign Out: Acceptable/Baseline resp. status   CV/Hemodynamics: Uneventful            Sign Out: Acceptable CV status; No obvious hypovolemia; No obvious fluid overload   Other NRE: NONE   DID A NON-ROUTINE EVENT OCCUR? No           Last vitals:  Vitals Value Taken Time   BP     Temp     Pulse     Resp     SpO2         Electronically Signed By: Wilmar Tobin MD  July 17, 2025  10:01 AM

## 2025-07-17 NOTE — OP NOTE
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]  Tailor's bunionette, left [M21.622]   Post-operative diagnosis: Same   Procedure: 1) left foot second metatarsal decompression osteotomy   2) left foot second hammertoe arthroplasty   3) left foot flexor tenotomy third toe   4) left foot flexor tenotomy fourth toe   5) left foot fifth metatarsal osteotomy   6) derotational arthroplasty left fifth toe   7) capsulotomy metatarsal phalangeal joint third with tendon lengthening   8) capsulotomy metatarsal phalangeal joint third with tendon lengthening  9) third metatarsal head fracture ORIF   Surgeon: Judith Sanchez DPM, Podiatry/Foot and Ankle Surgery   Assistant(s): None   Anesthesia: Mac   Pre-op injection: 15 ml 1% lidocaine plain  Post-op injection: 12 ml .5% marcaine plain     Hemostasis:                             Ankle tourniquet at 250 mmHg    Estimated Blood Loss:              15cc    Speceimens:                            none    Materials:                                  Arthrex dynanite toe implant, 2.5mm headless cannulated screw x 2, .45 k-wire x 2 with caps      Indications: Chronic pain left foot with extensive deformity, callus formation plantar second metatarsal head and fifth metatarsal head with noted hammertoe deformity bunionette deformity capsulitis of the metatarsal phalangeal joints with failed conservative care of shoes, orthotics, padding, rest      Procecure: Patient was brought outside the OR where the name and allergy bands were rechecked.  Patient was brought into the OR placed on  table in the supine position.  After MAC anesthesia the left lower extremity was injected locally with 12 cc 1% lidocaine plain.  Sterile prepping and draping in the standard fashion was then performed followed by a formal timeout confirming the left lower extremity and the procedure.  Exsanguination of the left foot and attention to the forefoot was then prepped performed with inflation of the ankle tourniquet.    There is noted to be extensive hammertoe deformity with rigid plantarflexion of the second toe, reducible toes 3, 4, 5.  There was a bunionette deformity.  Decreased plantar forefoot fat pad a dorsal incision was sharply made in the following areas with a #15 blade    Second toe dorsal proximal inner phalangeal joint, second incision at the dorsal second metatarsal phalangeal joint, dorsal fourth metatarsal phalangeal joint, dorsal fifth metatarsal phalangeal joint, ellipse incision dorsal medial to proximal lateral at the proximal inner phalangeal joint fifth toe.  All incisions were sharply made with a #15 blade down through skin followed by blunt dissection working critical structures including tendon and neurovascular were retracted apart pectin throughout the surgery.  Electrocautery was used for hemostasis.      Starting with the second ray arthroplasty was performed at the second toe proximal interphalangeal joint.  A transverse resection of the extensor tendon at the proximal joint was performed sharply freeing the soft tissues medial lateral and proximal and distal at the middle phalanx and the proximal phalanx head.  Next using a sagittal saw resection of the head of the proximal phalanx and the base of the middle phalanx was performed including all articular cartilage.  Under standard technique insertion of the Soo night toe implant was then performed intramedullary within the middle phalanx and the proximal phalanx compressing the joint space for bony apposition.  A K wire was inserted in a  retrograde fashion from the distal tip of the toe extending to the base of the proximal phalanx.  The bone was noted to be extremely soft and the pin was required to be maintained through the osteotomy to hold position.  Next at the dorsal second metatarsal phalangeal joint capsule incision blunt dissection was performed down to the joint capsule where the extensor tendon was noted to be taut and a Z-type lengthening was performed a capsulotomy was also performed sharply with a #15 blade freeing up soft tissue structures medial and lateral to the joint.  The head was noted to be prominent in this area and there was a callus plantar to the met head.  Using a sagittal saw in a direction from dorsal distal to proximal plantar decompressing the metatarsal head proximally was then performed and fixated using a 2.5 millimeter screw in a direction from dorsal to plantar under standard AO technique.  No screw threads were within the 2nd metatarsal phalangeal joint capsule by direct visualization and fluoroscopy.  Confirmation of hardware within both the proximal phalanx and the metatarsal were confirmed under fluoroscopy 3 views.  Reapproximation of the extensor tendon at the proximal interphalangeal joint     Attention was then directed to both the 3rd and fourth toes where they were noted to be plantarflexed.  A flexor tenotomy was then performed with a stab incision plantar at the proximal inner phalangeal joint resection the tendon from medial to lateral noting the toe to sit in a more neutral position.  There was still extensive dorsiflexion at the metatarsal phalangeal joints to both the 3rd and 4th toes and decision for capsulotomy with tendon lengthening was then performed.  I was able to reach the third metatarsal phalangeal joint capsule through the  incision for the second metatarsal phalangeal joint.  A Z-lengthening extensor tendon lengthening was sharply made with a #15 blade followed by capsulotomy using a  #15 blade for freeing structures medial and lateral.  A separate incision was then placed at the dorsal fourth metatarsal phalangeal joint and blunt dissection again was performed down to the joint capsule where a Z-lengthening extensor tendon anatomy was performed with a #15 blade followed by a capsulotomy with a #15 blade resecting both the joint capsule dorsal and medial and lateral noting the toe to sit in a more neutral position.    Attention was then directed to the fifth metatarsal phalangeal joint which is noted to have a bunionette deformity and through the incision blunt dissection was performed down to the joint capsule which was then freed dorsal and lateral with a #15 blade.  An Wilner type osteotomy was performed within the head of the fifth metatarsal with the apex distal shifting the capital fragment medially reducing the bunionette deformity resection of hypertrophic bone lateral was performed with a sagittal saw.  Copious amounts of normal saline flush.  Fixation utilizing a 2.5 millimeter screw inserted in the standard fashion starting with dorsal proximal to plantar distal capturing the capital fragment.  No screw threads were noted within the fifth metatarsal phalangeal joint as noted under direct visualization and fluoroscopy 3 views.  Attention was then directed to the fifth toe which was noted to be in an abducted plantarflexed position rigidly at this point transverse resection of the extensor tendon was then performed from medial to lateral at the proximal inner phalangeal joint and freed off both the middle phalanx and the proximal phalanx.  Using a sagittal saw a resection of the head of the proximal phalanx and base of the middle phalanx was performed rotating the toe to a more neutral position.  Reapproximation of the extensor tendon was performed.     3 views of fluoroscopy was performed to the left foot noting at this point a partial fracture to the head of the third metatarsal which  was required ORIF using a K wire in a retrograde fashion through the base of the proximal phalanx exiting out the toe distally followed by pushing the pin through the third metatarsal head into the metatarsal shaft reducing the fracture fragment.  Confirmation was then confirmed under fluoroscopy for hardware and fracture reduction.    Closure in the in the standard fashion starting with deep subcutaneous subcuticular and skin.  Deflation of the ankle tourniquet was performed prior to closure.  Application of Adaptic 4 x 4 gauze Елена, Ace wrap.  The patient tolerated the procedure and anesthesia well.  The patient left the OR to the recovery room with vital sign stable vascular status intact to the left lower extremity as noted by immediate hyperemia of the digits upon deflation of the ankle tourniquet.  There was a complication of a third metatarsal fracture throughout the surgery due to the bone being extensively soft.  The patient had restless leg throughout the surgery and difficulty  with kicking throughout the procedure.    The patient will be partial weightbearing in a cam boot.    Judith Sanchez DPM

## 2025-07-17 NOTE — ANESTHESIA CARE TRANSFER NOTE
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    Anesthesia Type:   MAC     Note:    Oropharynx: oropharynx clear of all foreign objects and spontaneously breathing  Level of Consciousness: awake  Oxygen Supplementation: face mask  Level of Supplemental Oxygen (L/min / FiO2): 8  Independent Airway: airway patency satisfactory and stable  Dentition: dentition unchanged  Vital Signs Stable: post-procedure vital signs reviewed and stable  Report to RN Given: handoff report given  Patient transferred to: PACU    Handoff Report: Identifed the Patient, Identified the Reponsible Provider, Reviewed the pertinent medical history, Discussed the surgical course, Reviewed Intra-OP anesthesia mangement and issues during anesthesia, Set expectations for post-procedure period and Allowed opportunity for questions and acknowledgement of understanding      Vitals:  Vitals Value Taken Time   /56    Temp     Pulse 85    Resp 12    SpO2 97        Electronically Signed By: IZABELA Case CRNA  July 17, 2025  9:47 AM

## 2025-07-17 NOTE — BRIEF OP NOTE
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  Hammertoe deformity 2, 3, 4, 5.    Procedure: Arthroplasty of the 2nd and 5th toe, tenotomy of the 3rd and 4th toe, capsulotomy of 3rd and 4th metatarsal joints, Left - Toe  5th, 2nd and 3rd Metatarsal Osteotomy, Left - Leg    Surgeon: Surgeons and Role:     * Judith Sanchez DPM - Primary  Anesthesia: MAC with Local   Estimated Blood Loss: 15 mL from 7/17/2025  7:27 AM to 7/17/2025  9:45 AM      Drains: None  Specimens: * No specimens in log *  Findings:   Bone was noted to be very soft throughout the procedure..  Complications: Small fracture through the third metatarsal distally incomplete which required ORIF with K wire.  Implants:   Implant Name Type Inv. Item Serial No.  Lot No. LRB No. Used Action   ARTHREX DYNANITE PIP IMPLANT, STRAIGHT, 12MM WITH INSTRUMENTATION Metallic Hardware/[place]   ARTHREX [id number] Left 2 Implanted   SCREW COMPRESSION FT 2.5X14MM - [id number] Metallic Hardware/[place] SCREW COMPRESSION FT 2.5X14MM  ARTHREX 41 04 [id number] Left 1 Implanted   SCR BONE CAN COMPRESSION TRIAL 3.5MM MINI 12MM - [id number] Metallic Hardware/[place] SCR BONE CAN COMPRESSION TRIAL 3.5MM MINI 12MM  ARTHREX 41 04 [id number] Left 1 Implanted   GUIDE WIRE DOUBLE ENDED TROCAR - [id number]  GUIDE WIRE DOUBLE ENDED TROCAR  ARTHREX 41 04 [id number] Left 1 Implanted       Judith Sanchez DPM

## 2025-07-23 ENCOUNTER — OFFICE VISIT (OUTPATIENT)
Dept: PODIATRY | Facility: CLINIC | Age: 80
End: 2025-07-23
Payer: MEDICARE

## 2025-07-23 DIAGNOSIS — Z98.890 STATUS POST SURGERY: Primary | ICD-10-CM

## 2025-07-23 NOTE — LETTER
7/23/2025      Raisa Archer  [address]  [facility] [address]      Dear Colleague,    Thank you for referring your patient, Raisa Archer, to the [facility]. Please see a copy of my visit note below.    Subjective    Raisa Archer is a 80 year old female who presents to clinic status post left forefoot reconstruction with 2nd toe arthroplasty and metatarsal osteotomy, 3rd toe flexor tenotomy and metatarsal head fracture ORIF, 4th toe flexor tenotomy, 5th toe derotation arthroplasty with 5th metatarsal osteotomy. She has maintained her bandage since surgery. She lives alone and has been ambulating in her CAM boot with a walker. She notes her pain to be slowly improving, not currently taking anything. She has been good about elevating, icing, and resting. She has no fever, chills, nausea, vomiting today.      EXAM:  Lower Extremity Focused:    Dermatologic: incision dorsal forefoot to 2nd ray, 4th toe, 5th metatarsal and 5th toe with sutures intact, no openings, no active drainage, normal postoperative edema, erythema. Extensive ecchymosis dorsal forefoot to midfoot.      Vascular: DP pulse 2/4 right 2/4 left PT pulse 2/4 right 2/4 left.  Left forefoot edema.  CFT and skin temperature is normal to both lower extremities.     Neurologic: Lower extremity sensation is intact to light touch.  No evidence of weakness or contracture in the lower extremities.  No evidence of neuropathy.     Musculoskeletal: k-wire distal left 2nd toe and 3rd toe stable. Rectus toes 2-5 left, hallux dorsiflexion at 1st metatarsal phalangeal joint while sitting at rest per baseline.       Labs and Imaging: I have personally reviewed the following   X-rays left foot ordered and reviewed.  Noting per my read demineralization, hammertoe deformities 2 through 4, bunionette deformity      ASSESSMENT:   Hammer toe painful left 2nd status post arthroplaty with metatarsal osteotomy, flexor tenotomies  toes 3,4, 5th metatarsal osteotomy, and 5th toe derotational arthroplasty, 3rd metatarsal head fracture ORIF  Painful bunionette left  Fat pad atrophy bilateral forefoot       Medical Decision Making/Plan:  Reviewed patient's chart in [facility].  You may get your incisions wet at this time do not soak your foot. You may clean daily with a wash cloth and warm soapy water followed by drying completely and application of triple antibiotic ointment, gauze, and Compression.   Continue with elevation, rest, ice daily for pain and swelling. Anytime you are sitting elevate your surgical site.   Continue with minimal to no -weightbearing I your CAM boot with walker assist.   Follow-up in 1-2 weeks we will remove the sutures at that time, get left foot x-rays. Your pins will be removed in office in 6 weeks from surgery.   Bandage change performed today. Surgical sites appear stable without signs of infection. She has difficulty with removing her boot and being non-weight bearing.     Patient risk factor: low    All questions were answered to patients satisfaction and they will call with further questions or concerns.       Judith Sanchez DPM      Again, thank you for allowing me to participate in the care of your patient.        Sincerely,        Judith Sanchez DPM    Electronically signed

## 2025-07-23 NOTE — PROGRESS NOTES
Subjective    Raisa GRACIE Archer is a 80 year old female who presents to clinic status post left forefoot reconstruction with 2nd toe arthroplasty and metatarsal osteotomy, 3rd toe flexor tenotomy and metatarsal head fracture ORIF, 4th toe flexor tenotomy, 5th toe derotation arthroplasty with 5th metatarsal osteotomy. She has maintained her bandage since surgery. She lives alone and has been ambulating in her CAM boot with a walker. She notes her pain to be slowly improving, not currently taking anything. She has been good about elevating, icing, and resting. She has no fever, chills, nausea, vomiting today.      EXAM:  Lower Extremity Focused:    Dermatologic: incision dorsal forefoot to 2nd ray, 4th toe, 5th metatarsal and 5th toe with sutures intact, no openings, no active drainage, normal postoperative edema, erythema. Extensive ecchymosis dorsal forefoot to midfoot.      Vascular: DP pulse 2/4 right 2/4 left PT pulse 2/4 right 2/4 left.  Left forefoot edema.  CFT and skin temperature is normal to both lower extremities.     Neurologic: Lower extremity sensation is intact to light touch.  No evidence of weakness or contracture in the lower extremities.  No evidence of neuropathy.     Musculoskeletal: k-wire distal left 2nd toe and 3rd toe stable. Rectus toes 2-5 left, hallux dorsiflexion at 1st metatarsal phalangeal joint while sitting at rest per baseline.       Labs and Imaging: I have personally reviewed the following   X-rays left foot ordered and reviewed.  Noting per my read demineralization, hammertoe deformities 2 through 4, bunionette deformity      ASSESSMENT:   Hammer toe painful left 2nd status post arthroplaty with metatarsal osteotomy, flexor tenotomies toes 3,4, 5th metatarsal osteotomy, and 5th toe derotational arthroplasty, 3rd metatarsal head fracture ORIF  Painful bunionette left  Fat pad atrophy bilateral forefoot       Medical Decision Making/Plan:  Reviewed patient's chart in [facility].  You may  get your incisions wet at this time do not soak your foot. You may clean daily with a wash cloth and warm soapy water followed by drying completely and application of triple antibiotic ointment, gauze, and Compression.   Continue with elevation, rest, ice daily for pain and swelling. Anytime you are sitting elevate your surgical site.   Continue with minimal to no -weightbearing I your CAM boot with walker assist.   Follow-up in 1-2 weeks we will remove the sutures at that time, get left foot x-rays. Your pins will be removed in office in 6 weeks from surgery.   Bandage change performed today. Surgical sites appear stable without signs of infection. She has difficulty with removing her boot and being non-weight bearing.     Patient risk factor: low    All questions were answered to patients satisfaction and they will call with further questions or concerns.       Judith Sanchez DPM

## 2025-07-24 NOTE — PATIENT INSTRUCTIONS
You may get your incisions wet at this time do not soak your foot. You may clean daily with a wash cloth and warm soapy water followed by drying completely and application of triple antibiotic ointment, gauze, and Compression.   Continue with elevation, rest, ice daily for pain and swelling. Anytime you are sitting elevate your surgical site.   Continue with minimal to no -weightbearing I your CAM boot with walker assist.   Follow-up in 1-2 weeks we will remove the sutures at that time, get left foot x-rays. Your pins will be removed in office in 6 weeks from surgery. .

## 2025-07-30 ENCOUNTER — ANCILLARY PROCEDURE (OUTPATIENT)
Dept: GENERAL RADIOLOGY | Facility: CLINIC | Age: 80
End: 2025-07-30
Attending: PODIATRIST
Payer: MEDICARE

## 2025-07-30 ENCOUNTER — OFFICE VISIT (OUTPATIENT)
Dept: PODIATRY | Facility: CLINIC | Age: 80
End: 2025-07-30
Payer: MEDICARE

## 2025-07-30 DIAGNOSIS — Z98.890 STATUS POST SURGERY: ICD-10-CM

## 2025-07-30 DIAGNOSIS — Z98.890 STATUS POST SURGERY: Primary | ICD-10-CM

## 2025-07-30 PROCEDURE — 73630 X-RAY EXAM OF FOOT: CPT | Mod: TC | Performed by: RADIOLOGY

## 2025-07-30 PROCEDURE — 99024 POSTOP FOLLOW-UP VISIT: CPT | Performed by: PODIATRIST

## 2025-07-30 NOTE — PROGRESS NOTES
Subjective    Raisaavis Archer is a 80 year old female who presents to clinic status post left forefoot reconstruction with 2nd toe arthroplasty and metatarsal osteotomy, 3rd toe flexor tenotomy and metatarsal head fracture ORIF, 4th toe flexor tenotomy, 5th toe derotation arthroplasty with 5th metatarsal osteotomy. She has had one bandage change since her last office visit with me by her daughter. She lives alone and has been ambulating in her CAM boot with a walker. She notes her pain to be slowly improving, not currently taking anything. She has been good about elevating, icing, and resting. She has no fever, chills, nausea, vomiting today.      EXAM:  Lower Extremity Focused:    Dermatologic: incision dorsal forefoot to 2nd ray, 4th toe, 5th metatarsal and 5th toe with sutures intact, no openings, no active drainage, normal postoperative edema, erythema. Extensive ecchymosis dorsal forefoot to midfoot.      Vascular: DP pulse 2/4 right 2/4 left PT pulse 2/4 right 2/4 left.  Left forefoot edema.  CFT and skin temperature is normal to both lower extremities.     Neurologic: Lower extremity sensation is intact to light touch.  No evidence of weakness or contracture in the lower extremities.  No evidence of neuropathy.     Musculoskeletal: k-wire distal left 2nd toe and 3rd toe stable. Rectus toes 2-5 left, hallux dorsiflexion at 1st metatarsal phalangeal joint while sitting at rest per baseline.       Labs and Imaging: I have personally reviewed the following   X-rays left foot ordered and reviewed.  Noting per my read demineralization, hammertoe deformities 2 through 4, bunionette deformity      ASSESSMENT:   Hammer toe painful left 2nd status post arthroplaty with metatarsal osteotomy, flexor tenotomies toes 3,4, 5th metatarsal osteotomy, and 5th toe derotational arthroplasty, 3rd metatarsal head fracture ORIF 7/17/25  Painful bunionette left  Fat pad atrophy bilateral forefoot       Medical Decision Making/Plan:   Reviewed patient's chart in [facility].  You may get your incisions wet at this time do not soak your foot. You may clean daily with a wash cloth and warm soapy water followed by drying completely and application of triple antibiotic ointment, gauze, and Compression.   Continue with elevation, rest, ice daily for pain and swelling. Anytime you are sitting elevate your surgical site.   Continue with minimal to no -weightbearing I your CAM boot with walker assist.   Follow-up in 1-2 weeks we will remove the sutures at that time, get left foot x-rays. Your pins will be removed in office in 6 weeks from surgery.   Bandage change performed today. Surgical sites appear stable without signs of infection. She has difficulty with removing her boot and being non-weight bearing.     Patient risk factor: low    All questions were answered to patients satisfaction and they will call with further questions or concerns.       Judith Sanchez DPM

## 2025-07-30 NOTE — LETTER
7/30/2025      Raisa Archer  [address]  [facility] [id number]      Dear Colleague,    Thank you for referring your patient, Raisa Archer, to the [facility]. Please see a copy of my visit note below.    Subjective    Raisa Archer is a 80 year old female who presents to clinic status post left forefoot reconstruction with 2nd toe arthroplasty and metatarsal osteotomy, 3rd toe flexor tenotomy and metatarsal head fracture ORIF, 4th toe flexor tenotomy, 5th toe derotation arthroplasty with 5th metatarsal osteotomy. She has had one bandage change since her last office visit with me by her daughter. She lives alone and has been ambulating in her CAM boot with a walker. She notes her pain to be slowly improving, not currently taking anything. She has been good about elevating, icing, and resting. She has no fever, chills, nausea, vomiting today.      EXAM:  Lower Extremity Focused:    Dermatologic: incision dorsal forefoot to 2nd ray, 4th toe, 5th metatarsal and 5th toe with sutures intact, no openings, no active drainage, normal postoperative edema, erythema. Extensive ecchymosis dorsal forefoot to midfoot.      Vascular: DP pulse 2/4 right 2/4 left PT pulse 2/4 right 2/4 left.  Left forefoot edema.  CFT and skin temperature is normal to both lower extremities.     Neurologic: Lower extremity sensation is intact to light touch.  No evidence of weakness or contracture in the lower extremities.  No evidence of neuropathy.     Musculoskeletal: k-wire distal left 2nd toe and 3rd toe stable. Rectus toes 2-5 left, hallux dorsiflexion at 1st metatarsal phalangeal joint while sitting at rest per baseline. Loosening of the k-wire 3rd toe noted today.       Labs and Imaging: I have personally reviewed the following   X-rays left foot ordered and reviewed.  Noting per my read demineralization, hammertoe deformities 2 through 4, bunionette deformity      ASSESSMENT:   Hammer toe  painful left 2nd status post arthroplaty with metatarsal osteotomy, flexor tenotomies toes 3,4, 5th metatarsal osteotomy, and 5th toe derotational arthroplasty, 3rd metatarsal head fracture ORIF 7/17/25  Painful bunionette left  Fat pad atrophy bilateral forefoot       Medical Decision Making/Plan:  Reviewed patient's chart in [facility].  You may get your incisions wet at this time do not soak your foot. You may clean daily with a wash cloth and warm soapy water followed by drying completely and application of triple antibiotic ointment, gauze, and Compression.   Continue with elevation, rest, ice daily for pain and swelling. Anytime you are sitting elevate your surgical site.   Continue with minimal to no -weightbearing I your CAM boot with walker assist.   Follow-up in 1-2 weeks we will remove the sutures at that time, get left foot x-rays. Your pins will be removed in office in 6 weeks from surgery.   Bandage change performed today. Surgical sites appear stable without signs of infection. She has difficulty with removing her boot and being non-weight bearing.   New x-rays left foot ordered and reviewed noting slight distal translation of the third ray K wire, still crossing the third metatarsal head where the fracture is.  Stable hardware within the fifth metatarsal and second toe.    Patient risk factor: low    All questions were answered to patients satisfaction and they will call with further questions or concerns.       Judith Sanchez DPM      Again, thank you for allowing me to participate in the care of your patient.        Sincerely,        Judith Sanchez DPM    Electronically signed

## 2025-08-02 NOTE — PATIENT INSTRUCTIONS
You may get your incisions wet at this time do not soak your foot. You may clean daily with a wash cloth and warm soapy water followed by drying completely and application of triple antibiotic ointment, gauze, and Compression.   Continue with elevation, rest, ice daily for pain and swelling. Anytime you are sitting elevate your surgical site.   Continue with minimal to no -weightbearing I your CAM boot with walker assist.   Follow-up in 1-2 weeks we will remove the sutures at that time, get left foot x-rays. Your pins will be removed in office in 6 weeks from surgery.   Bandage change performed today. Surgical sites appear stable without signs of infection. She has difficulty with removing her boot and being non-weight bearing.   New x-rays left foot ordered and reviewed noting slight distal translation of the third ray K wire, still crossing the third metatarsal head where the fracture is.  Stable hardware within the fifth metatarsal and second toe.

## (undated) DEVICE — KIT TURNOVER FAIRVIEW SOUTHDALE FULL SP3889

## (undated) DEVICE — DRAPE STERI TOWEL LG 1010

## (undated) DEVICE — PAD FOAM MCGUIRE KIT 0814-0150

## (undated) DEVICE — SU VICRYL 2-0 CT-1 27" J339H

## (undated) DEVICE — DRAPE SHEET REV FOLD 3/4 9349

## (undated) DEVICE — SU ETHIBOND 0 CTX CR  8X18" CX31D

## (undated) DEVICE — DRAPE IOBAN INCISE 23X17" 6650EZ

## (undated) DEVICE — BNDG ELASTIC 4"X5YDS STERILE 6611-4S

## (undated) DEVICE — LINEN TOWEL PACK X5 5464

## (undated) DEVICE — PREP CHLORAPREP 26ML TINTED ORANGE  260815

## (undated) DEVICE — CAST PADDING 4" STERILE 9044S

## (undated) DEVICE — ENDO SCOPE WARMER LF TM500

## (undated) DEVICE — PACK MAJOR SBA15MAFSI

## (undated) DEVICE — SU VICRYL 0 UR-6 27" J603H

## (undated) DEVICE — BIT DRILL 2MM STRAIGHT CAN TRIAL CALIBRATED

## (undated) DEVICE — IMM PILLOW ABDUCT HIP MED 31143061

## (undated) DEVICE — SOL WATER IRRIG 1000ML BOTTLE 2F7114

## (undated) DEVICE — GUIDEWIRE TROCAR TIP .86MM

## (undated) DEVICE — SU VICRYL 0 CT-1 27" J340H

## (undated) DEVICE — SU STRATAFIX PDS PLUS 0 CT 45CM SXPP1A406

## (undated) DEVICE — GLOVE PROTEXIS BLUE W/NEU-THERA 8.0  2D73EB80

## (undated) DEVICE — ESU GROUND PAD UNIVERSAL W/O CORD

## (undated) DEVICE — BLADE SAW SAGITTAL STRK 18X90X1.27MM HD SYS 6 6118-127-090

## (undated) DEVICE — GOWN XXLG REINFORCED 9071EL

## (undated) DEVICE — BLADE SAW OSCILLATING STRYK MED 9.0X25X0.38MM 2296-003-111

## (undated) DEVICE — SU VICRYL 3-0 PS-1 18" UND J683

## (undated) DEVICE — ESU LIGASURE LAPAROSCOPIC BLUNT TIP SEALER 5MMX37CM LF1837

## (undated) DEVICE — CLOSURE SYS SKIN PREMIERPRO EXOFIN FUSION 4X22CM STRL 3472

## (undated) DEVICE — ENDO TROCAR SLEEVE KII Z-THREADED 05X100MM CTS02

## (undated) DEVICE — PACK EXTREMITY SOP15EXFSD

## (undated) DEVICE — TRAY PREP DRY SKIN SCRUB 067

## (undated) DEVICE — SU VICRYL+ 0 27 UR6 VLT VCP603H

## (undated) DEVICE — Device

## (undated) DEVICE — DRAPE MINI C-ARM 4003

## (undated) DEVICE — SOLUTION WOUND CLEANSING 3/4OZ 10% PVP EA-L3011FB-50

## (undated) DEVICE — SOL NACL 0.9% IRRIG 1000ML BOTTLE 2F7124

## (undated) DEVICE — SU ETHIBOND 2 V-37 4X30" MX69G

## (undated) DEVICE — SU DERMABOND ADVANCED .7ML DNX12

## (undated) DEVICE — GLOVE PROTEXIS W/NEU-THERA 8.0  2D73TE80

## (undated) DEVICE — BAG DECANTER STERILE WHITE DYNJDEC09

## (undated) DEVICE — SU VICRYL 4-0 PS-2 18" UND J496H

## (undated) DEVICE — STPL POWERED ECHELON 60MM PSEE60A

## (undated) DEVICE — PREP CHLORAPREP 26ML TINTED HI-LITE ORANGE 930815

## (undated) DEVICE — GLOVE PROTEXIS MICRO 8.0  2D73PM80

## (undated) DEVICE — BONE WAX 2.5GM W31G

## (undated) DEVICE — PACK TOTAL HIP W/POUCH SOP15HPFSM

## (undated) DEVICE — SOL NACL 0.9% IRRIG 3000ML BAG 2B7477

## (undated) DEVICE — GUIDEWIRE TROCAR TIP 1.1MM

## (undated) DEVICE — SYR 30ML LL W/O NDL 302832

## (undated) DEVICE — EVAC SYSTEM CLEAR FLOW SC082500

## (undated) DEVICE — BNDG ELASTIC 4"X5YDS UNSTERILE 6611-40

## (undated) DEVICE — CAST PADDING 4" UNSTERILE 9044

## (undated) DEVICE — ANTIFOG SOLUTION SEE SHARP 150M TROCAR SWABS 30978 (COI)

## (undated) DEVICE — PAD ABD 10X8IN DERMACEA ABS NWVN 4 SL EDG SFT LF STRL 7198D

## (undated) DEVICE — IMM LIMB ELEVATOR DC40-0203

## (undated) DEVICE — BNDG ROLLER GAUZE CONFORM 4"X4YD 41-54

## (undated) DEVICE — SUCTION IRR SYSTEM W/O TIP INTERPULSE HANDPIECE 0210-100-000

## (undated) DEVICE — ENDO TROCAR BLUNT TIP KII BALLOON 12X100MM C0R47

## (undated) DEVICE — ENDO POUCH UNIV RETRIEVAL SYSTEM INZII 10MM CD001

## (undated) DEVICE — MANIFOLD NEPTUNE 4 PORT 700-20

## (undated) DEVICE — ESU CORD MONOPOLAR 10'  E0510

## (undated) DEVICE — SU MONOCRYL 4-0 PS-2 18" UND Y496G

## (undated) DEVICE — SUCTION MANIFOLD NEPTUNE 2 SYS 4 PORT 0702-020-000

## (undated) DEVICE — CAP PROTECTIVE WHITE GUARD K-WIRE 0.89X0.45" 14-1045

## (undated) DEVICE — BONE CLEANING TIP INTERPULSE  0210-010-000

## (undated) DEVICE — ESU HOLDER LAP INST DISP PURPLE LONG 330MM H-PRO-330

## (undated) DEVICE — DRILL PROFILE STANDARD COMPRESSION FT 4 MM SCREW AR-8737-54

## (undated) DEVICE — SU MONOCRYL 3-0 PS-2 27" Y427H

## (undated) DEVICE — DRAPE BREAST/CHEST 29420

## (undated) DEVICE — BLADE KNIFE SURG 15 371115

## (undated) DEVICE — SU PROLENE 3-0 FS-1 18" 8684G

## (undated) DEVICE — ENDO TROCAR FIRST ENTRY KII FIOS Z-THRD 05X100MM CTF03

## (undated) DEVICE — BLADE SAW OSCIL/SAG STRK MICRO 5.5X25X0.38MM 2296-003-524

## (undated) DEVICE — SU PROLENE 4-0 PC-3 18" 8634G

## (undated) DEVICE — NDL 18GA 1.5" 305196

## (undated) RX ORDER — PROPOFOL 10 MG/ML
INJECTION, EMULSION INTRAVENOUS
Status: DISPENSED
Start: 2022-01-24

## (undated) RX ORDER — FENTANYL CITRATE 50 UG/ML
INJECTION, SOLUTION INTRAMUSCULAR; INTRAVENOUS
Status: DISPENSED
Start: 2022-01-24

## (undated) RX ORDER — PROPOFOL 10 MG/ML
INJECTION, EMULSION INTRAVENOUS
Status: DISPENSED
Start: 2025-07-17

## (undated) RX ORDER — OXYCODONE HYDROCHLORIDE 5 MG/1
TABLET ORAL
Status: DISPENSED
Start: 2025-02-12

## (undated) RX ORDER — HYDROMORPHONE HYDROCHLORIDE 1 MG/ML
INJECTION, SOLUTION INTRAMUSCULAR; INTRAVENOUS; SUBCUTANEOUS
Status: DISPENSED
Start: 2025-02-12

## (undated) RX ORDER — ONDANSETRON 2 MG/ML
INJECTION INTRAMUSCULAR; INTRAVENOUS
Status: DISPENSED
Start: 2025-07-17

## (undated) RX ORDER — CEFAZOLIN SODIUM/WATER 2 G/20 ML
SYRINGE (ML) INTRAVENOUS
Status: DISPENSED
Start: 2025-07-17

## (undated) RX ORDER — PROPOFOL 10 MG/ML
INJECTION, EMULSION INTRAVENOUS
Status: DISPENSED
Start: 2025-02-12

## (undated) RX ORDER — DEXAMETHASONE SODIUM PHOSPHATE 4 MG/ML
INJECTION, SOLUTION INTRA-ARTICULAR; INTRALESIONAL; INTRAMUSCULAR; INTRAVENOUS; SOFT TISSUE
Status: DISPENSED
Start: 2025-07-17

## (undated) RX ORDER — ACETAMINOPHEN 325 MG/1
TABLET ORAL
Status: DISPENSED
Start: 2025-02-12

## (undated) RX ORDER — FENTANYL CITRATE 0.05 MG/ML
INJECTION, SOLUTION INTRAMUSCULAR; INTRAVENOUS
Status: DISPENSED
Start: 2025-07-17

## (undated) RX ORDER — FENTANYL CITRATE 50 UG/ML
INJECTION, SOLUTION INTRAMUSCULAR; INTRAVENOUS
Status: DISPENSED
Start: 2019-10-04

## (undated) RX ORDER — LIDOCAINE HYDROCHLORIDE 10 MG/ML
INJECTION, SOLUTION EPIDURAL; INFILTRATION; INTRACAUDAL; PERINEURAL
Status: DISPENSED
Start: 2025-07-17

## (undated) RX ORDER — HYDROMORPHONE HYDROCHLORIDE 1 MG/ML
INJECTION, SOLUTION INTRAMUSCULAR; INTRAVENOUS; SUBCUTANEOUS
Status: DISPENSED
Start: 2022-01-24

## (undated) RX ORDER — DEXAMETHASONE SODIUM PHOSPHATE 4 MG/ML
INJECTION, SOLUTION INTRA-ARTICULAR; INTRALESIONAL; INTRAMUSCULAR; INTRAVENOUS; SOFT TISSUE
Status: DISPENSED
Start: 2022-01-24

## (undated) RX ORDER — ONDANSETRON 2 MG/ML
INJECTION INTRAMUSCULAR; INTRAVENOUS
Status: DISPENSED
Start: 2025-02-12

## (undated) RX ORDER — FENTANYL CITRATE 50 UG/ML
INJECTION, SOLUTION INTRAMUSCULAR; INTRAVENOUS
Status: DISPENSED
Start: 2025-07-17

## (undated) RX ORDER — BUPIVACAINE HYDROCHLORIDE 5 MG/ML
INJECTION, SOLUTION EPIDURAL; INTRACAUDAL; PERINEURAL
Status: DISPENSED
Start: 2025-07-17

## (undated) RX ORDER — GLYCOPYRROLATE 0.2 MG/ML
INJECTION, SOLUTION INTRAMUSCULAR; INTRAVENOUS
Status: DISPENSED
Start: 2022-01-24

## (undated) RX ORDER — CEFAZOLIN SODIUM/WATER 2 G/20 ML
SYRINGE (ML) INTRAVENOUS
Status: DISPENSED
Start: 2025-02-12

## (undated) RX ORDER — TRANEXAMIC ACID 10 MG/ML
INJECTION, SOLUTION INTRAVENOUS
Status: DISPENSED
Start: 2022-01-24

## (undated) RX ORDER — FENTANYL CITRATE 50 UG/ML
INJECTION, SOLUTION INTRAMUSCULAR; INTRAVENOUS
Status: DISPENSED
Start: 2025-02-12

## (undated) RX ORDER — ONDANSETRON 2 MG/ML
INJECTION INTRAMUSCULAR; INTRAVENOUS
Status: DISPENSED
Start: 2022-01-24

## (undated) RX ORDER — DEXAMETHASONE SODIUM PHOSPHATE 4 MG/ML
INJECTION, SOLUTION INTRA-ARTICULAR; INTRALESIONAL; INTRAMUSCULAR; INTRAVENOUS; SOFT TISSUE
Status: DISPENSED
Start: 2025-02-12

## (undated) RX ORDER — LIDOCAINE HYDROCHLORIDE 20 MG/ML
INJECTION, SOLUTION EPIDURAL; INFILTRATION; INTRACAUDAL; PERINEURAL
Status: DISPENSED
Start: 2022-01-24

## (undated) RX ORDER — METRONIDAZOLE 500 MG/100ML
INJECTION, SOLUTION INTRAVENOUS
Status: DISPENSED
Start: 2025-02-12